# Patient Record
Sex: FEMALE | Race: WHITE | Employment: OTHER | URBAN - METROPOLITAN AREA
[De-identification: names, ages, dates, MRNs, and addresses within clinical notes are randomized per-mention and may not be internally consistent; named-entity substitution may affect disease eponyms.]

---

## 2017-07-05 ENCOUNTER — HOSPITAL ENCOUNTER (OUTPATIENT)
Dept: MAMMOGRAPHY | Age: 77
Discharge: HOME | End: 2017-07-05
Attending: FAMILY MEDICINE
Payer: MEDICARE

## 2017-07-05 DIAGNOSIS — Z12.31 ENCOUNTER FOR SCREENING MAMMOGRAM FOR MALIGNANT NEOPLASM OF BREAST: ICD-10-CM

## 2017-07-05 PROCEDURE — 77067 SCR MAMMO BI INCL CAD: CPT | Performed by: FAMILY MEDICINE

## 2018-02-28 PROBLEM — M47.22 OSTEOARTHRITIS OF SPINE WITH RADICULOPATHY, CERVICAL REGION: Status: ACTIVE | Noted: 2018-02-28

## 2018-03-09 ENCOUNTER — HOSPITAL ENCOUNTER (OUTPATIENT)
Dept: MRI IMAGING | Age: 78
Discharge: HOME | End: 2018-03-09
Attending: PHYSICIAN ASSISTANT
Payer: MEDICARE

## 2018-03-09 DIAGNOSIS — M54.5 LOW BACK PAIN, UNSPECIFIED BACK PAIN LATERALITY, UNSPECIFIED CHRONICITY, WITH SCIATICA PRESENCE UNSPECIFIED: ICD-10-CM

## 2018-03-09 DIAGNOSIS — M47.22 OSTEOARTHRITIS OF SPINE WITH RADICULOPATHY, CERVICAL REGION: ICD-10-CM

## 2018-03-09 DIAGNOSIS — M54.2 NECK PAIN: ICD-10-CM

## 2018-03-09 PROCEDURE — 72141 MRI NECK SPINE W/O DYE: CPT | Performed by: PHYSICIAN ASSISTANT

## 2018-03-29 PROBLEM — M62.838 MUSCLE SPASM: Status: ACTIVE | Noted: 2018-03-29

## 2018-03-29 PROBLEM — M79.10 MYALGIA: Status: ACTIVE | Noted: 2018-03-29

## 2018-04-25 ENCOUNTER — HOSPITAL ENCOUNTER (INPATIENT)
Facility: HOSPITAL | Age: 78
LOS: 3 days | Discharge: NURSING, REHAB OR LONG-TERM CARE | DRG: 511 | End: 2018-04-29
Attending: EMERGENCY MEDICINE | Admitting: HOSPITALIST
Payer: MEDICARE

## 2018-04-25 ENCOUNTER — APPOINTMENT (OUTPATIENT)
Dept: GENERAL RADIOLOGY | Facility: HOSPITAL | Age: 78
DRG: 511 | End: 2018-04-25
Payer: MEDICARE

## 2018-04-25 DIAGNOSIS — S62.102A CLOSED FRACTURE OF LEFT WRIST, INITIAL ENCOUNTER: ICD-10-CM

## 2018-04-25 DIAGNOSIS — S32.512A CLOSED FRACTURE OF LEFT SUPERIOR PUBIC RAMUS, INITIAL ENCOUNTER: Primary | ICD-10-CM

## 2018-04-25 DIAGNOSIS — E11.65 TYPE 2 DIABETES MELLITUS WITH HYPERGLYCEMIA, UNSPECIFIED WHETHER LONG TERM INSULIN USE (HCC): ICD-10-CM

## 2018-04-25 PROBLEM — Z79.4 TYPE 2 DIABETES MELLITUS WITHOUT COMPLICATION, WITH LONG-TERM CURRENT USE OF INSULIN (HCC): Chronic | Status: ACTIVE | Noted: 2018-04-25

## 2018-04-25 PROBLEM — E11.9 TYPE 2 DIABETES MELLITUS WITHOUT COMPLICATION, WITH LONG-TERM CURRENT USE OF INSULIN (HCC): Chronic | Status: ACTIVE | Noted: 2018-04-25

## 2018-04-25 PROBLEM — E78.2 MIXED HYPERLIPIDEMIA: Chronic | Status: ACTIVE | Noted: 2018-04-25

## 2018-04-25 PROBLEM — I10 ESSENTIAL HYPERTENSION: Chronic | Status: ACTIVE | Noted: 2018-04-25

## 2018-04-25 PROBLEM — N17.9 ACUTE RENAL FAILURE (ARF): Status: ACTIVE | Noted: 2018-04-25

## 2018-04-25 PROBLEM — N17.9 ACUTE RENAL FAILURE (ARF) (HCC): Status: ACTIVE | Noted: 2018-04-25

## 2018-04-25 PROCEDURE — 73100 X-RAY EXAM OF WRIST: CPT | Performed by: EMERGENCY MEDICINE

## 2018-04-25 PROCEDURE — 73502 X-RAY EXAM HIP UNI 2-3 VIEWS: CPT

## 2018-04-25 PROCEDURE — 99222 1ST HOSP IP/OBS MODERATE 55: CPT | Performed by: HOSPITALIST

## 2018-04-25 PROCEDURE — 2W3DX1Z IMMOBILIZATION OF LEFT LOWER ARM USING SPLINT: ICD-10-PCS | Performed by: EMERGENCY MEDICINE

## 2018-04-25 RX ORDER — ATORVASTATIN CALCIUM 40 MG/1
80 TABLET, FILM COATED ORAL NIGHTLY
Status: DISCONTINUED | OUTPATIENT
Start: 2018-04-25 | End: 2018-04-29

## 2018-04-25 RX ORDER — POLYETHYLENE GLYCOL 3350 17 G/17G
17 POWDER, FOR SOLUTION ORAL DAILY PRN
Status: DISCONTINUED | OUTPATIENT
Start: 2018-04-25 | End: 2018-04-28

## 2018-04-25 RX ORDER — DILTIAZEM HYDROCHLORIDE 300 MG/1
300 CAPSULE, COATED, EXTENDED RELEASE ORAL DAILY
COMMUNITY
End: 2021-04-06 | Stop reason: ALTCHOICE

## 2018-04-25 RX ORDER — HEPARIN SODIUM 5000 [USP'U]/ML
5000 INJECTION, SOLUTION INTRAVENOUS; SUBCUTANEOUS EVERY 8 HOURS SCHEDULED
Status: DISCONTINUED | OUTPATIENT
Start: 2018-04-25 | End: 2018-04-29

## 2018-04-25 RX ORDER — HYDROCODONE BITARTRATE AND ACETAMINOPHEN 5; 325 MG/1; MG/1
2 TABLET ORAL EVERY 4 HOURS PRN
Status: DISCONTINUED | OUTPATIENT
Start: 2018-04-25 | End: 2018-04-26

## 2018-04-25 RX ORDER — HYDROMORPHONE HYDROCHLORIDE 1 MG/ML
0.5 INJECTION, SOLUTION INTRAMUSCULAR; INTRAVENOUS; SUBCUTANEOUS EVERY 30 MIN PRN
Status: DISCONTINUED | OUTPATIENT
Start: 2018-04-25 | End: 2018-04-27

## 2018-04-25 RX ORDER — VALSARTAN 320 MG/1
320 TABLET ORAL DAILY
Status: DISCONTINUED | OUTPATIENT
Start: 2018-04-26 | End: 2018-04-29

## 2018-04-25 RX ORDER — FENOFIBRATE 134 MG/1
134 CAPSULE ORAL
Status: DISCONTINUED | OUTPATIENT
Start: 2018-04-26 | End: 2018-04-29

## 2018-04-25 RX ORDER — BISACODYL 10 MG
10 SUPPOSITORY, RECTAL RECTAL
Status: DISCONTINUED | OUTPATIENT
Start: 2018-04-25 | End: 2018-04-26

## 2018-04-25 RX ORDER — DOCUSATE SODIUM 100 MG/1
100 CAPSULE, LIQUID FILLED ORAL 2 TIMES DAILY
Status: DISCONTINUED | OUTPATIENT
Start: 2018-04-25 | End: 2018-04-29

## 2018-04-25 RX ORDER — METOPROLOL SUCCINATE 25 MG/1
25 TABLET, EXTENDED RELEASE ORAL DAILY
Status: DISCONTINUED | OUTPATIENT
Start: 2018-04-26 | End: 2018-04-29

## 2018-04-25 RX ORDER — TEMAZEPAM 15 MG/1
15 CAPSULE ORAL NIGHTLY PRN
Status: DISCONTINUED | OUTPATIENT
Start: 2018-04-25 | End: 2018-04-29

## 2018-04-25 RX ORDER — DILTIAZEM HYDROCHLORIDE 300 MG/1
300 CAPSULE, COATED, EXTENDED RELEASE ORAL DAILY
Status: DISCONTINUED | OUTPATIENT
Start: 2018-04-26 | End: 2018-04-29

## 2018-04-25 RX ORDER — DEXTROSE MONOHYDRATE 25 G/50ML
50 INJECTION, SOLUTION INTRAVENOUS
Status: DISCONTINUED | OUTPATIENT
Start: 2018-04-25 | End: 2018-04-26

## 2018-04-25 RX ORDER — SODIUM CHLORIDE 9 MG/ML
INJECTION, SOLUTION INTRAVENOUS CONTINUOUS
Status: DISCONTINUED | OUTPATIENT
Start: 2018-04-25 | End: 2018-04-26

## 2018-04-25 RX ORDER — HYDROMORPHONE HYDROCHLORIDE 1 MG/ML
0.5 INJECTION, SOLUTION INTRAMUSCULAR; INTRAVENOUS; SUBCUTANEOUS EVERY 30 MIN PRN
Status: COMPLETED | OUTPATIENT
Start: 2018-04-25 | End: 2018-04-27

## 2018-04-25 RX ORDER — ACETAMINOPHEN 325 MG/1
650 TABLET ORAL EVERY 4 HOURS PRN
Status: DISCONTINUED | OUTPATIENT
Start: 2018-04-25 | End: 2018-04-29

## 2018-04-25 RX ORDER — ONDANSETRON 2 MG/ML
4 INJECTION INTRAMUSCULAR; INTRAVENOUS EVERY 6 HOURS PRN
Status: DISCONTINUED | OUTPATIENT
Start: 2018-04-25 | End: 2018-04-29

## 2018-04-25 RX ORDER — ACETAMINOPHEN / DIPHENHYDRAMINE 25; 500 MG/1; MG/1
2 TABLET ORAL NIGHTLY PRN
COMMUNITY
End: 2018-09-07

## 2018-04-25 RX ORDER — HYDROCODONE BITARTRATE AND ACETAMINOPHEN 5; 325 MG/1; MG/1
1 TABLET ORAL EVERY 4 HOURS PRN
Status: DISCONTINUED | OUTPATIENT
Start: 2018-04-25 | End: 2018-04-26

## 2018-04-25 RX ORDER — VALSARTAN AND HYDROCHLOROTHIAZIDE 320; 25 MG/1; MG/1
1 TABLET, FILM COATED ORAL DAILY
Status: DISCONTINUED | OUTPATIENT
Start: 2018-04-25 | End: 2018-04-25

## 2018-04-25 RX ORDER — HYDROMORPHONE HYDROCHLORIDE 1 MG/ML
0.5 INJECTION, SOLUTION INTRAMUSCULAR; INTRAVENOUS; SUBCUTANEOUS EVERY 30 MIN PRN
Status: DISCONTINUED | OUTPATIENT
Start: 2018-04-25 | End: 2018-04-25

## 2018-04-25 RX ORDER — INSULIN LISPRO 100 [IU]/ML
INJECTION, SOLUTION INTRAVENOUS; SUBCUTANEOUS
COMMUNITY
End: 2019-04-10

## 2018-04-25 RX ORDER — ACETAMINOPHEN 325 MG/1
650 TABLET ORAL EVERY 6 HOURS PRN
Status: DISCONTINUED | OUTPATIENT
Start: 2018-04-25 | End: 2018-04-26

## 2018-04-25 RX ORDER — ONDANSETRON 2 MG/ML
4 INJECTION INTRAMUSCULAR; INTRAVENOUS ONCE
Status: COMPLETED | OUTPATIENT
Start: 2018-04-25 | End: 2018-04-25

## 2018-04-25 RX ORDER — ONDANSETRON 2 MG/ML
4 INJECTION INTRAMUSCULAR; INTRAVENOUS EVERY 4 HOURS PRN
Status: DISCONTINUED | OUTPATIENT
Start: 2018-04-25 | End: 2018-04-25

## 2018-04-25 NOTE — PLAN OF CARE
Arrived via bed.  vss. Left upper extremity with ace wrap and post mold clean, dry, intact. Cap refill to left fingers less than 3 seconds. Wiggles left fingers. Cap refill to lle < 3 seconds. Denies numbness, tingling.   Instructed patient regarding d

## 2018-04-25 NOTE — H&P
[facility]ITAL[facility]  History and Physical     Ling Villalba Patient Status:  Emergency    1940 MRN [id number]   Location [facility] Attending Vonnie Mcmahon MD   Hosp Day # 0 PCP Cem Head     Chief Complaint: atorvastatin 80 MG Oral Tab Take 80 mg by mouth nightly. Disp:  Rfl: 2   Fenofibrate 160 MG Oral Tab Take 160 mg by mouth daily. Disp:  Rfl: 2   furosemide 20 MG Oral Tab Take 20 mg by mouth daily.    Disp:  Rfl: 2       Review of Systems:   A compreh surgical intervention. PT OT on consult. Orthopedic surgery also on consult. 2. Left distal radius fracture-patient to go for ORIF in the a.m. by orthopedic surgery. 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   Will cons

## 2018-04-26 ENCOUNTER — ANESTHESIA EVENT (OUTPATIENT)
Dept: SURGERY | Facility: HOSPITAL | Age: 78
DRG: 511 | End: 2018-04-26
Payer: MEDICARE

## 2018-04-26 ENCOUNTER — APPOINTMENT (OUTPATIENT)
Dept: GENERAL RADIOLOGY | Facility: HOSPITAL | Age: 78
DRG: 511 | End: 2018-04-26
Attending: ORTHOPAEDIC SURGERY
Payer: MEDICARE

## 2018-04-26 ENCOUNTER — ANESTHESIA (OUTPATIENT)
Dept: SURGERY | Facility: HOSPITAL | Age: 78
DRG: 511 | End: 2018-04-26
Payer: MEDICARE

## 2018-04-26 ENCOUNTER — SURGERY (OUTPATIENT)
Age: 78
End: 2018-04-26

## 2018-04-26 PROCEDURE — 0PSJ04Z REPOSITION LEFT RADIUS WITH INTERNAL FIXATION DEVICE, OPEN APPROACH: ICD-10-PCS | Performed by: ORTHOPAEDIC SURGERY

## 2018-04-26 PROCEDURE — 76001 XR FLUOROSCOPE EXAM >1 HR EXTENSIVE (CPT=76001): CPT | Performed by: ORTHOPAEDIC SURGERY

## 2018-04-26 PROCEDURE — 3E0T3BZ INTRODUCTION OF ANESTHETIC AGENT INTO PERIPHERAL NERVES AND PLEXI, PERCUTANEOUS APPROACH: ICD-10-PCS | Performed by: ANESTHESIOLOGY

## 2018-04-26 PROCEDURE — 99232 SBSQ HOSP IP/OBS MODERATE 35: CPT | Performed by: HOSPITALIST

## 2018-04-26 DEVICE — IMPLANTABLE DEVICE: Type: IMPLANTABLE DEVICE | Status: FUNCTIONAL

## 2018-04-26 DEVICE — IMPLANTABLE DEVICE: Type: IMPLANTABLE DEVICE | Site: WRIST | Status: FUNCTIONAL

## 2018-04-26 RX ORDER — POTASSIUM CHLORIDE 14.9 MG/ML
20 INJECTION INTRAVENOUS ONCE
Status: COMPLETED | OUTPATIENT
Start: 2018-04-26 | End: 2018-04-26

## 2018-04-26 RX ORDER — HYDROMORPHONE HYDROCHLORIDE 1 MG/ML
0.4 INJECTION, SOLUTION INTRAMUSCULAR; INTRAVENOUS; SUBCUTANEOUS EVERY 5 MIN PRN
Status: DISCONTINUED | OUTPATIENT
Start: 2018-04-26 | End: 2018-04-26 | Stop reason: HOSPADM

## 2018-04-26 RX ORDER — ONDANSETRON 2 MG/ML
4 INJECTION INTRAMUSCULAR; INTRAVENOUS AS NEEDED
Status: DISCONTINUED | OUTPATIENT
Start: 2018-04-26 | End: 2018-04-26 | Stop reason: HOSPADM

## 2018-04-26 RX ORDER — SODIUM CHLORIDE 9 MG/ML
INJECTION, SOLUTION INTRAVENOUS CONTINUOUS
Status: DISCONTINUED | OUTPATIENT
Start: 2018-04-26 | End: 2018-04-27

## 2018-04-26 RX ORDER — HYDROCODONE BITARTRATE AND ACETAMINOPHEN 5; 325 MG/1; MG/1
2 TABLET ORAL AS NEEDED
Status: DISCONTINUED | OUTPATIENT
Start: 2018-04-26 | End: 2018-04-26 | Stop reason: HOSPADM

## 2018-04-26 RX ORDER — ACETAMINOPHEN 500 MG
1000 TABLET ORAL ONCE AS NEEDED
Status: DISCONTINUED | OUTPATIENT
Start: 2018-04-26 | End: 2018-04-26 | Stop reason: HOSPADM

## 2018-04-26 RX ORDER — NALOXONE HYDROCHLORIDE 0.4 MG/ML
80 INJECTION, SOLUTION INTRAMUSCULAR; INTRAVENOUS; SUBCUTANEOUS AS NEEDED
Status: DISCONTINUED | OUTPATIENT
Start: 2018-04-26 | End: 2018-04-26 | Stop reason: HOSPADM

## 2018-04-26 RX ORDER — HYDROCODONE BITARTRATE AND ACETAMINOPHEN 5; 325 MG/1; MG/1
1 TABLET ORAL AS NEEDED
Status: DISCONTINUED | OUTPATIENT
Start: 2018-04-26 | End: 2018-04-26 | Stop reason: HOSPADM

## 2018-04-26 RX ORDER — CEFAZOLIN SODIUM/WATER 2 G/20 ML
2 SYRINGE (ML) INTRAVENOUS EVERY 8 HOURS
Status: COMPLETED | OUTPATIENT
Start: 2018-04-27 | End: 2018-04-27

## 2018-04-26 RX ORDER — SODIUM CHLORIDE, SODIUM LACTATE, POTASSIUM CHLORIDE, CALCIUM CHLORIDE 600; 310; 30; 20 MG/100ML; MG/100ML; MG/100ML; MG/100ML
INJECTION, SOLUTION INTRAVENOUS CONTINUOUS
Status: DISCONTINUED | OUTPATIENT
Start: 2018-04-26 | End: 2018-04-26 | Stop reason: HOSPADM

## 2018-04-26 RX ORDER — DEXTROSE MONOHYDRATE 25 G/50ML
50 INJECTION, SOLUTION INTRAVENOUS
Status: DISCONTINUED | OUTPATIENT
Start: 2018-04-26 | End: 2018-04-26 | Stop reason: HOSPADM

## 2018-04-26 RX ORDER — MIDAZOLAM HYDROCHLORIDE 1 MG/ML
1 INJECTION INTRAMUSCULAR; INTRAVENOUS EVERY 5 MIN PRN
Status: DISCONTINUED | OUTPATIENT
Start: 2018-04-26 | End: 2018-04-26 | Stop reason: HOSPADM

## 2018-04-26 RX ORDER — MEPERIDINE HYDROCHLORIDE 25 MG/ML
12.5 INJECTION INTRAMUSCULAR; INTRAVENOUS; SUBCUTANEOUS AS NEEDED
Status: DISCONTINUED | OUTPATIENT
Start: 2018-04-26 | End: 2018-04-26 | Stop reason: HOSPADM

## 2018-04-26 RX ORDER — BISACODYL 10 MG
10 SUPPOSITORY, RECTAL RECTAL
Status: DISCONTINUED | OUTPATIENT
Start: 2018-04-26 | End: 2018-04-29

## 2018-04-26 RX ORDER — INSULIN ASPART 100 [IU]/ML
INJECTION, SOLUTION INTRAVENOUS; SUBCUTANEOUS ONCE
Status: DISCONTINUED | OUTPATIENT
Start: 2018-04-26 | End: 2018-04-26 | Stop reason: HOSPADM

## 2018-04-26 NOTE — PROGRESS NOTES
[facility]ITAL[facility]  Progress Note     Ling Villalba Patient Status:  Inpatient    1940 MRN [id number]   [facility] 3SW-A Attending Atul Bradley MD   Hosp Day # 0 PCP Cem Head     Chief Complaint: s/p fall    S: Patient denies cp Daily   • Heparin Sodium (Porcine)  5,000 Units Subcutaneous Q8H [facility] & assisted   • docusate sodium  100 mg Oral BID   • Insulin Aspart Pen  1-10 Units Subcutaneous TID AC and HS   • valsartan  320 mg Oral Daily       ASSESSMENT / PLAN:     1.  Left superior pubic kasey

## 2018-04-26 NOTE — PROGRESS NOTES
[facility] Progress Note    Patient seen and examined.  Chart reviewed.      No chest pain or shortness of breath.       /45 (BP Location: Right arm)   Pulse 63   Temp 98.3 °F (36.8 °C) (Oral)   Resp 18   Ht 5' 7\" (1.702 m)   Wt 157 lb (71.2 kg)

## 2018-04-26 NOTE — ANESTHESIA PREPROCEDURE EVALUATION
PRE-OP EVALUATION    Patient Name: Natalio King    Pre-op Diagnosis: Fracture Randell Carlisle. 8XXA]    Procedure(s):  LEFT WRIST OPEN REDUCTION INTERNAL FIXATION     Surgeon(s) and Role:     * Maren Ibrahim MD - Primary    Pre-op vitals reviewed.   Temp: 98.7 Oral Q4H PRN   Or      [MAR Hold] HYDROcodone-acetaminophen (NORCO) 5-325 MG per tab 2 tablet 2 tablet Oral Q4H PRN   [MAR Hold] ondansetron HCl (ZOFRAN) injection 4 mg 4 mg Intravenous Q6H PRN   [MAR Hold] docusate sodium (COLACE) cap 100 mg 100 mg Oral B Allergies: Patient has no known allergies.       Anesthesia Evaluation  Past Surgical History:  No date: HYSTERECTOMY      Comment: age 32     Smoking status: Former Smoker     Smokeless tobacco: Never Used    Comment: quit 10 years ago     Alcohol us

## 2018-04-26 NOTE — PROGRESS NOTES
[facility]ITAL[facility]  Progress Note     Monika Gerard Patient Status:  Inpatient    1940 MRN [id number]   [facility] 3SW-A Attending Meeta Littlejohn MD   Hosp Day # 0 PCP Rober Puckett     Chief Complaint: fall     S: Patient   C/o left w micronized  134 mg Oral Daily with breakfast   • insulin detemir  28 Units Subcutaneous Nightly   • Metoprolol Succinate ER  25 mg Oral Daily   • Heparin Sodium (Porcine)  5,000 Units Subcutaneous Q8H [facility] & intermediate   • docusate sodium  100 mg Oral BID   • Insulin As

## 2018-04-26 NOTE — PROGRESS NOTES
Called cardiology and left message with answering service regarding consult.     Received call back from Dr Halle Torres- will see patient

## 2018-04-26 NOTE — ANESTHESIA POSTPROCEDURE EVALUATION
[address] Patient Status:  Inpatient   Age/Gender 68year old female MRN [id number]   [facility] SURGERY Attending Balwinder Nick MD   [facility] Day # 0 PCP Mary File       Anesthesia Post-op Note    Procedure(s):  LEFT W

## 2018-04-26 NOTE — CONSULTS
[facility]    PATIENT'S NAME: Carrie Ramos   ATTENDING PHYSICIAN: SUDEEP Parra PHYSICIAN: Clint Moran M.D.    PATIENT ACCOUNT#:   [de-identified]    LOCATION:  [address] [place] [address]  MEDICAL RECORD #:   [id number]       DATE OF BIRTH:  [birth year]. No evidence of femur fracture. ASSESSMENT:    1. Left superior and inferior pubic rami fractures, non-manipulative fracture care. 2.   Left comminuted, displaced, distal radius fracture.     PLAN:  I discussed the diagnoses with the patien

## 2018-04-26 NOTE — CONSULTS
[facility]    Report of Consultation    Nicolasa Mcintyre Patient Status:  Observation    1940 MRN [id number]   [facility] 3SW-A [facility] Day # 0 PCP Denece Gowers     Date of Admission:  2018 Diabetes ([facility] [address]    • Elevated cholesterol    • Essential hypertension        Past Surgical History        Past Surgical History:  No date: HYSTERECTOMY      Comment: age 32    Family History        No family history on file.     Social History        Patient mg 100 mg Oral BID   PEG 3350 (MIRALAX) powder packet 17 g 17 g Oral Daily PRN   bisacodyl (DULCOLAX) rectal suppository 10 mg 10 mg Rectal Daily PRN   temazepam (RESTORIL) cap 15 mg 15 mg Oral Nightly PRN   Insulin Aspart Pen (NOVOLOG) 100 UNIT/ML flexpen Last 24 hours: No intake or output data in the 24 hours ending 04/25/18 2217   This shift: No intake/output data recorded.        Scheduled Meds:    • atorvastatin  80 mg Oral Nightly   • [START ON 4/26/2018] DilTIAZem HCl ER Coated Beads  300 mg Oral Da Xr Wrist (2 Views), Left (cpt=73100)    Result Date: 4/25/2018  CONCLUSION:   1. Comminuted and displaced  intra-articular fracture involving the left distal radius.   The proximal displacement of the fracture fragments does allow the lateral aspect of t

## 2018-04-26 NOTE — OCCUPATIONAL THERAPY NOTE
OCCUPATIONAL THERAPY                         OT order received and chart reviewed. The patient has a displaced distal radius fracture and will be having surgical repair later today. Will hold OT evaluation today and see tomorrow.  Will need new

## 2018-04-26 NOTE — PHYSICAL THERAPY NOTE
IP PT attempted to see pt this AM for evaluation. Per Vickie PARKER, pt will be undergoing surgery for distal radial Fx later today. PT will HOLD for evaluation until pt is deemed appropriate post-op.

## 2018-04-27 PROCEDURE — 99232 SBSQ HOSP IP/OBS MODERATE 35: CPT | Performed by: HOSPITALIST

## 2018-04-27 RX ORDER — HYDROCODONE BITARTRATE AND ACETAMINOPHEN 5; 325 MG/1; MG/1
1 TABLET ORAL EVERY 6 HOURS PRN
Status: DISCONTINUED | OUTPATIENT
Start: 2018-04-27 | End: 2018-04-28

## 2018-04-27 RX ORDER — HYDROMORPHONE HYDROCHLORIDE 1 MG/ML
0.2 INJECTION, SOLUTION INTRAMUSCULAR; INTRAVENOUS; SUBCUTANEOUS EVERY 30 MIN PRN
Status: DISCONTINUED | OUTPATIENT
Start: 2018-04-27 | End: 2018-04-29

## 2018-04-27 RX ORDER — HYDROCODONE BITARTRATE AND ACETAMINOPHEN 10; 325 MG/1; MG/1
1 TABLET ORAL EVERY 6 HOURS PRN
Status: DISCONTINUED | OUTPATIENT
Start: 2018-04-27 | End: 2018-04-28

## 2018-04-27 NOTE — CM/SW NOTE
Informed by Anahi Guzman with [facility] [phone] that pt is accepted for rehab- bed would be available Sunday if medically cleared for d/c.  Cornell Phillip with [facility] here to see pt. She is familiar with facility as her  has been there previously.     Lakeshia Block

## 2018-04-27 NOTE — PROGRESS NOTES
[facility]  Progress Note     Evelynbelen Lazar Patient Status:  Inpatient    1940 MRN [id number]   [facility] 3SW-A Attending Naye Malik MD   Hosp Day # 1 PCP Christy Montoya     Chief Complaint: fall     S: Patient    Had a lot Lab  04/25/18   1620   TROP  <0.046            Imaging: Imaging data reviewed in Epic.     Medications:   • atorvastatin  80 mg Oral Nightly   • DilTIAZem HCl ER Coated Beads  300 mg Oral Daily   • fenofibrate micronized  134 mg Oral Daily with breakfast

## 2018-04-27 NOTE — CM/SW NOTE
Message received from Ayleen WEBBER, re: [facility] choice of [facility]. Referral sent via [employer]. Awaiting response.

## 2018-04-27 NOTE — PHYSICAL THERAPY NOTE
PHYSICAL THERAPY EVALUATION - INPATIENT     Room Number: 364/364-A  Evaluation Date: 4/27/2018  Type of Evaluation: Initial  Physician Order: PT Eval and Treat    Presenting Problem: S/p ORIF Left distal radius fx on 4/26/18, Left  superior pubic kasey Past Surgical History  Past Surgical History:  No date: HYSTERECTOMY      Comment: age 32    HOME SITUATION  Type of Home: House   Home Layout: One level  Stairs to Enter : 2  Railing: No  Stairs to Bedroom: 0  Railing: No    Lives With: Spouse  Conchita 4/5  Right Knee extension  4/5  Right Knee flexion  4/5  Right Dorsiflexion  4/5  Left Dorsiflexion  4/5   decreased L LE strength S/p Left superior pubic ramus fx on 4/25/18.  Patient demonstrated good L quadriceps activation during SAQ exercise    BALANCE clearing LEs off the bed. Patient was instructed on proper body mechanics for sit to stand, she performed sitting on EOB to standing at platform RW with max assist x2 and verbal cues to push from bed and hand placements on the walker.  Patient was instructe comorbidities manifest themselves as functional limitations in independent bed mobility, transfers, gait skills and safety.   The patient is below baseline and would benefit from skilled inpatient PT to address the above deficits to assist patient in return

## 2018-04-27 NOTE — CONSULTS
PM&R Consult Dictated. Recommendations:    Patient's biggest barrier is pain control.  Pt may benefit from an acute rehab stay, however, she does not feel she could tolerate aggressive therapy at this time and would prefer to go to subacute [facility]),

## 2018-04-27 NOTE — PROGRESS NOTES
[facility]  Progress Note    Nereida Frey Patient Status:  Inpatient    1940 MRN [id number]   [facility] 3SW-A Attending Niurka Santoro MD   [facility] Day # 1 PCP Toi Vasquez     SUBJECTIVE:  INTERVAL HISTORY: S/P  1  Procedure(s):

## 2018-04-27 NOTE — PROGRESS NOTES
Pt's urine has returned to a normal color. Now light yellow, no odor present, no urinary issues present.

## 2018-04-27 NOTE — BRIEF OP NOTE
Pre-Operative Diagnosis: Fracture [T14. 8XXA]     Post-Operative Diagnosis: Fracture [T14. 8XXA] left distal radius      Procedure Performed:   Procedure(s):  LEFT WRIST OPEN REDUCTION INTERNAL FIXATION     Surgeon(s) and Role:     * MD Khloe Oconnell P

## 2018-04-27 NOTE — CONSULTS
[facility]    PATIENT'S NAME: Bree Gilda   ATTENDING PHYSICIAN: SUDEEP Jay: Susan Hall DO   PATIENT ACCOUNT#:   [de-identified]    LOCATION:  [address] [place] [address]  MEDICAL RECORD #:   [id number]       DATE OF BIRTH:  [birth year]/ use of tobacco, alcohol, or illicit drug use. FAMILY HISTORY:  Reviewed per chart and is noncontributory to today's evaluation. FUNCTIONAL STATUS:  Prior to admission, the patient was independent for mobility and ADLs.       CURRENT FUNCTIONAL STATUS: symmetric. Tongue is midline. Sensation intact to light touch in all extremities. Speech is clear and fluent. LABORATORY DATA:  BMP from today sodium 140, potassium 3.8, BUN 22, creatinine 1. 12.   WBC 8.9, hemoglobin 10.3, hematocrit 31.6, platelet co

## 2018-04-27 NOTE — OPERATIVE REPORT
[facility]    PATIENT'S NAME: Prince Borrero   ATTENDING PHYSICIAN: Inge Allred M.D. OPERATING PHYSICIAN: Sagrario Garcia M.D.    PATIENT ACCOUNT#:   [de-identified]    LOCATION:  [address] [place] [address]  MEDICAL RECORD #:   [id number]       DATE OF BIRTH:  [birth year] revealing the fracture site and the comminution. Irrigation was used to clear the area of hematoma and debris.   Manipulation of the multiple fragments was performed and a DVR plate placed at the volar cortex and held in position with a K-wire, confirming

## 2018-04-27 NOTE — CM/SW NOTE
04/27/18 1300   CM/SW Referral Data   Referral Source Nurse; Other  (PT)   Reason for Referral Discharge planning   Informant Patient; Children;Edward Staff   Pertinent Medical Hx   Primary Care Physician Name FLORENTINO Cuello   Patient Info   Patient's Mental Stat

## 2018-04-27 NOTE — PLAN OF CARE
STRAIGHT CATH DONE WITH STERILE TECHNIQUE NO PROBLEM, URINE OUTPUT DARK YEN URINE NO BLOOD, SAMPLES SENT TO LAB. PT STATES\" I FEEL RELIEF AFTER CATH INSERTED.

## 2018-04-27 NOTE — OCCUPATIONAL THERAPY NOTE
OCCUPATIONAL THERAPY EVALUATION - INPATIENT     Room Number: 364/364-A  Evaluation Date: 4/27/2018  Type of Evaluation: Initial  Presenting Problem: L wrist ORIF and pubic rami fx    Physician Order: IP Consult to Occupational Therapy  Reason for Therapy: Lower Extremity: Weight Bearing as Tolerated    PAIN ASSESSMENT  Ratin  Location: L wrist and leg  Management Techniques: Relaxation;Repositioning    COGNITION  Overall Cognitive Status:  WFL - within functional limits    VISION  Current Vision: no vis w Mod a for balance due to pain. Max a scooting to EOB due to only able to use R UE to help propel herself forward. OT adjusted Pt's sling in sitting for proper positioning of L UE. Pt educated in use of adl ae but is unable to use at this time.  Platform w Complexity  Occupational Profile/Medical History MODERATE - Expanded review of history including review of medical or therapy record   Specific performance deficits impacting engagement in ADL/IADL MODERATE  3 - 5 performance deficits   Client Assessment/P

## 2018-04-27 NOTE — PROGRESS NOTES
[facility] Progress Note    Patient seen and examined.  Chart reviewed.  Discussed with RN. No chest pain or shortness of breath. Did well with surgery, had some confusion post-op but was easily re-oriented.     /53 (BP Location: Right arm)

## 2018-04-28 PROCEDURE — 99232 SBSQ HOSP IP/OBS MODERATE 35: CPT | Performed by: HOSPITALIST

## 2018-04-28 RX ORDER — ARIPIPRAZOLE 15 MG/1
40 TABLET ORAL ONCE
Status: COMPLETED | OUTPATIENT
Start: 2018-04-28 | End: 2018-04-28

## 2018-04-28 RX ORDER — HYDROCODONE BITARTRATE AND ACETAMINOPHEN 10; 325 MG/1; MG/1
1-2 TABLET ORAL EVERY 4 HOURS PRN
Status: DISCONTINUED | OUTPATIENT
Start: 2018-04-28 | End: 2018-04-29

## 2018-04-28 RX ORDER — POLYETHYLENE GLYCOL 3350 17 G/17G
17 POWDER, FOR SOLUTION ORAL DAILY
Status: DISCONTINUED | OUTPATIENT
Start: 2018-04-29 | End: 2018-04-29

## 2018-04-28 NOTE — PLAN OF CARE
Pt DTV this AM, unable to urinate. Per Dr. Heri Malik, will insert verdin. Verdin inserted without difficulty, urine dark in color, almost brown with some sediment noted. Dr. Heri Malik notified, will obtain UA.

## 2018-04-28 NOTE — PROGRESS NOTES
Attempted to void per bedpan but unable this time,helped patient to get up and use bedside commode,total lift used by PCT'S,still unable to void,straight cath done,obtained 1000 cc of urine. TOLERATED WELL

## 2018-04-28 NOTE — PHYSICAL THERAPY NOTE
PHYSICAL THERAPY TREATMENT NOTE - INPATIENT    Room Number: 364/364-A     Session: 1   Number of Visits to Meet Established Goals: 5    Presenting Problem: S/p ORIF Left distal radius fx on 4/26/18, Left  superior pubic ramus fx on 4/25/18  History relate out of the bed, but it is so hard to move\"    Patient’s self-stated goal is to sit in the chair    OBJECTIVE  Precautions:  Other (Comment) (Sling on L UE)    WEIGHT BEARING RESTRICTION  Weight Bearing Restriction: L lower extremity;L upper extremity     L sling to improve positioning/comfort. Encouraged pt to perform hand opening/closing every hour to improve distal edema and promote proper resting position for UE with hand slightly elevated.  2 person assist for reclined>sit transfer (pt requested head of b improvements in willingness/ability to participate in bed mobility, sit<>stand transfers, and seated therex. Pt continues to have high pain with mobility and L LE wt bearing requiring MAX A x2 for transfers and limited gait control/tolerance.  Patient j luis

## 2018-04-28 NOTE — PROGRESS NOTES
Post Op Day 1 Ortho Note    Status Post Nerve Block:  Type of Nerve Block: Left superclavicular  Single Injection Nerve Block    Post op review: No evidence of immediate block related complications and No paresthesia noted    Patient reports nerve block wo

## 2018-04-28 NOTE — PROGRESS NOTES
[facility]  Progress Note     Mini Larsen Patient Status:  Inpatient    1940 MRN [id number]   [facility] 3SW-A Attending Diana Roque MD   [address] Day # 2 PCP Elaina Temple     Chief Complaint: fall     S: Patient still having mg/dL). No results for input(s): PTP, INR in the last 72 hours. Recent Labs   Lab  04/25/18   1620   TROP  <0.046            Imaging: Imaging data reviewed in Epic.     Medications:   • atorvastatin  80 mg Oral Nightly   • DilTIAZem HCl ER Coated Bead

## 2018-04-29 VITALS
TEMPERATURE: 98 F | WEIGHT: 157 LBS | DIASTOLIC BLOOD PRESSURE: 60 MMHG | BODY MASS INDEX: 24.64 KG/M2 | HEIGHT: 67 IN | SYSTOLIC BLOOD PRESSURE: 126 MMHG | RESPIRATION RATE: 20 BRPM | HEART RATE: 64 BPM | OXYGEN SATURATION: 98 %

## 2018-04-29 PROCEDURE — 99239 HOSP IP/OBS DSCHRG MGMT >30: CPT | Performed by: HOSPITALIST

## 2018-04-29 RX ORDER — PSEUDOEPHEDRINE HCL 30 MG
100 TABLET ORAL 2 TIMES DAILY
Qty: 60 CAPSULE | Refills: 0 | Status: SHIPPED | OUTPATIENT
Start: 2018-04-29 | End: 2018-09-07

## 2018-04-29 RX ORDER — POLYETHYLENE GLYCOL 3350 17 G/17G
17 POWDER, FOR SOLUTION ORAL DAILY
Qty: 510 G | Refills: 0 | Status: SHIPPED | OUTPATIENT
Start: 2018-04-30 | End: 2018-05-30

## 2018-04-29 RX ORDER — HYDROCODONE BITARTRATE AND ACETAMINOPHEN 10; 325 MG/1; MG/1
1-2 TABLET ORAL EVERY 4 HOURS PRN
Qty: 30 TABLET | Refills: 0 | Status: SHIPPED | OUTPATIENT
Start: 2018-04-29 | End: 2018-09-07

## 2018-04-29 NOTE — PLAN OF CARE
Report given to ELENA Mayer from [facility]. Plan of care and medications reviewed, verbalized understanding. Awaiting ambulance service to transport patient.

## 2018-04-29 NOTE — PLAN OF CARE
Pt ready to discharge, transported to [facility] via [facility] with all belongings.

## 2018-04-29 NOTE — PHYSICAL THERAPY NOTE
PHYSICAL THERAPY TREATMENT NOTE - INPATIENT    Room Number: 364/364-A     Session: 2&3  Number of Visits to Meet Established Goals: 5    Presenting Problem: S/p ORIF Left distal radius fx on 4/26/18, Left  superior pubic ramus fx on 4/25/18  History relat when I am just resting; but when I move it hurts a lot\"    Patient’s self-stated goal is to take some steps    OBJECTIVE  Precautions:  Other (Comment) (Sling on L UE)    WEIGHT BEARING RESTRICTION  Weight Bearing Restriction: L lower extremity;L upper ext transfer. From reclined/upright position, pt able to move toward EOB with MOD A for L LE and trunk support. MIN A to scoot hips toward EOB to rest feet on floor. Able to participate in seated hand and LE therex with improved tolerance.  Required MOD A x2 fo mobility and L LE wt bearing requiring MAX A x2 for bed<>chair transfers and limited gait control/tolerance.  Patient continues to demonstrate deficits in strength, balance, endurance, mobility and functional independence, and will benefit from continued sk

## 2018-04-29 NOTE — PROGRESS NOTES
[facility]  Progress Note     Ammy Molina Patient Status:  Inpatient    1940 MRN [id number]   [facility] 3SW-A Attending Marco Antonio Lin MD   [facility] Day # 3 PCP Dena Young     Chief Complaint: fall     S: Patient without new 134 mg Oral Daily with breakfast   • insulin detemir  28 Units Subcutaneous Nightly   • Metoprolol Succinate ER  25 mg Oral Daily   • Heparin Sodium (Porcine)  5,000 Units Subcutaneous Q8H [facility]   • docusate sodium  100 mg Oral BID   • Insulin Aspart Pen  1

## 2018-04-29 NOTE — CM/SW NOTE
MD orders received for [facility] transfer today. SW spoke with [facility] who confirms bed available for transfer today.  [facility] [phone] called to arrange ambulance for  time of 3:00pm. PCS completed and placed on chart for transpo

## 2018-04-29 NOTE — PROGRESS NOTES
No numbness    No distress. Left hand is swollen moderately. Has good motion to all digits. Cap refill are good. s/p left distal radius ORIF:  Continue elevation and splint. Encouraged digital motion. Fu with  Dr. Jamey Mensah 1-2 weeks.     Odette Jara,

## 2018-04-30 NOTE — DISCHARGE SUMMARY
[facility] HOSPITALIST  DISCHARGE SUMMARY     Mat Ospina Patient Status:  Inpatient    1940 MRN [id number]   [facility] 3SW-A Attending No att. providers found   Hosp Day # 3 PCP Hai Rocha     Date of Admission: 2018  Date of improved with IV fluid hydration. Patient was seen by physical therapy recommended subacute rehab placement.     Procedures during hospitalization:   LEFT WRIST OPEN REDUCTION INTERNAL FIXATION     Incidental or significant findings and recommendations (br daily with meals. Refills:  0     Metoprolol Succinate ER 25 MG Tb24  Commonly known as: Toprol XL      Take 25 mg by mouth daily. Refills:  2     Potassium Chloride ER 10 MEQ Tbcr  Commonly known as:  K-DUR      Take 10 mEq by mouth daily.    Refills:

## 2018-04-30 NOTE — CM/SW NOTE
04/30/18 1300   Discharge disposition   Expected discharge disposition Skilled Nurs   Name of [address] [facility]   Discharge transportation [facility] 4/29/18

## 2018-05-05 ENCOUNTER — SNF ADMIT/H&P (OUTPATIENT)
Dept: FAMILY MEDICINE CLINIC | Facility: CLINIC | Age: 78
End: 2018-05-05

## 2018-05-05 DIAGNOSIS — E78.2 MIXED HYPERLIPIDEMIA: Chronic | ICD-10-CM

## 2018-05-05 DIAGNOSIS — R53.1 GENERALIZED WEAKNESS: ICD-10-CM

## 2018-05-05 DIAGNOSIS — S52.572D OTHER CLOSED INTRA-ARTICULAR FRACTURE OF DISTAL END OF LEFT RADIUS WITH ROUTINE HEALING, SUBSEQUENT ENCOUNTER: ICD-10-CM

## 2018-05-05 DIAGNOSIS — R53.81 PHYSICAL DECONDITIONING: ICD-10-CM

## 2018-05-05 DIAGNOSIS — Z98.890 S/P ORIF (OPEN REDUCTION INTERNAL FIXATION) FRACTURE: Primary | ICD-10-CM

## 2018-05-05 DIAGNOSIS — E11.65 TYPE 2 DIABETES MELLITUS WITH HYPERGLYCEMIA, UNSPECIFIED WHETHER LONG TERM INSULIN USE (HCC): ICD-10-CM

## 2018-05-05 DIAGNOSIS — M47.22 OSTEOARTHRITIS OF SPINE WITH RADICULOPATHY, CERVICAL REGION: ICD-10-CM

## 2018-05-05 DIAGNOSIS — Z87.81 S/P ORIF (OPEN REDUCTION INTERNAL FIXATION) FRACTURE: Primary | ICD-10-CM

## 2018-05-05 DIAGNOSIS — S62.102A CLOSED FRACTURE OF LEFT WRIST, INITIAL ENCOUNTER: ICD-10-CM

## 2018-05-05 DIAGNOSIS — K59.00 CONSTIPATION, UNSPECIFIED CONSTIPATION TYPE: ICD-10-CM

## 2018-05-05 DIAGNOSIS — S32.512D CLOSED FRACTURE OF LEFT SUPERIOR PUBIC RAMUS WITH ROUTINE HEALING, SUBSEQUENT ENCOUNTER: ICD-10-CM

## 2018-05-05 DIAGNOSIS — I10 ESSENTIAL HYPERTENSION: Chronic | ICD-10-CM

## 2018-05-05 PROCEDURE — 99306 1ST NF CARE HIGH MDM 50: CPT | Performed by: FAMILY MEDICINE

## 2018-05-05 PROCEDURE — 1111F DSCHRG MED/CURRENT MED MERGE: CPT | Performed by: FAMILY MEDICINE

## 2018-05-08 ENCOUNTER — NURSE ONLY (OUTPATIENT)
Dept: LAB | Age: 78
End: 2018-05-08
Attending: FAMILY MEDICINE
Payer: MEDICARE

## 2018-05-08 DIAGNOSIS — S32.000A COMPRESSION OF LUMBAR VERTEBRA (HCC): ICD-10-CM

## 2018-05-08 DIAGNOSIS — N17.9 ACUTE KIDNEY FAILURE, UNSPECIFIED (HCC): Primary | ICD-10-CM

## 2018-05-08 PROBLEM — S32.512D: Status: ACTIVE | Noted: 2018-04-25

## 2018-05-08 PROBLEM — S52.572D OTHER CLOSED INTRA-ARTICULAR FRACTURE OF DISTAL END OF LEFT RADIUS WITH ROUTINE HEALING, SUBSEQUENT ENCOUNTER: Status: ACTIVE | Noted: 2018-05-08

## 2018-05-08 PROBLEM — Z87.81 S/P ORIF (OPEN REDUCTION INTERNAL FIXATION) FRACTURE: Status: ACTIVE | Noted: 2018-05-08

## 2018-05-08 PROBLEM — Z98.890 S/P ORIF (OPEN REDUCTION INTERNAL FIXATION) FRACTURE: Status: ACTIVE | Noted: 2018-05-08

## 2018-05-08 PROCEDURE — 83735 ASSAY OF MAGNESIUM: CPT

## 2018-05-08 PROCEDURE — 80053 COMPREHEN METABOLIC PANEL: CPT

## 2018-05-08 PROCEDURE — 85025 COMPLETE CBC W/AUTO DIFF WBC: CPT

## 2018-05-08 PROCEDURE — 82306 VITAMIN D 25 HYDROXY: CPT

## 2018-05-08 PROCEDURE — 82746 ASSAY OF FOLIC ACID SERUM: CPT

## 2018-05-08 PROCEDURE — 82607 VITAMIN B-12: CPT

## 2018-05-08 PROCEDURE — 84443 ASSAY THYROID STIM HORMONE: CPT

## 2018-05-11 ENCOUNTER — NURSE ONLY (OUTPATIENT)
Dept: LAB | Age: 78
End: 2018-05-11
Attending: FAMILY MEDICINE
Payer: MEDICARE

## 2018-05-11 DIAGNOSIS — N39.0 URINARY TRACT INFECTION, SITE NOT SPECIFIED: Primary | ICD-10-CM

## 2018-05-11 PROCEDURE — 87086 URINE CULTURE/COLONY COUNT: CPT

## 2018-05-11 PROCEDURE — 87077 CULTURE AEROBIC IDENTIFY: CPT

## 2018-05-11 PROCEDURE — 87186 SC STD MICRODIL/AGAR DIL: CPT

## 2018-05-11 PROCEDURE — 81001 URINALYSIS AUTO W/SCOPE: CPT

## 2018-05-11 NOTE — PROGRESS NOTES
[address]: Khoa Red MD     1940 MRN [id number]   [facility]  Admission 18      Last Hospital Discharge 18 PCP Quita Ganser of Discharge 18       Date of Admission:  generalized physical deconditioning and generalized weakness. Patient reports pain is currently under control she does have a left arm sling in place denies any pain to area.    Patient was noted to have renal insufficiency while in hospital currently unde Fenofibrate 160 MG Oral Tab Take 160 mg by mouth daily. furosemide 20 MG Oral Tab Take 20 mg by mouth daily. No current facility-administered medications on file prior to visit.        HISTORY:  She  has a past medical history of Diabetes ([address] swelling. Left hip: She exhibits decreased range of motion and decreased strength. Lymphadenopathy:     She has no cervical adenopathy. Neurological: She is alert and oriented to person, place, and time. She has normal reflexes.  She displays nor involving the left distal radius. The proximal displacement of the fracture fragments does allow the lateral aspect of the carpal rows to sublux proximally. A wrist CT could be useful for further evaluation of carpal alignment.   2.  Acute avulsion fract CULTURE REFLEX   Collection Time: 05/11/18  7:33 AM   Result Value Ref Range   Urine Color Yellow Yellow   Clarity Urine Clear Clear   Spec Gravity 1.013 1.001 - 1.030   Glucose Urine Negative Negative mg/dl   Bilirubin Urine Negative Negative   Ketones Ur Natalia Wesley needs then following services:  Occupational Therapy  Physical Therapy  Respiratory Therapy  wound care     I anticipate that she will need 2 weeks of therapy and recooperation before an eventual transition to home and we will work on her discharge

## 2018-05-14 ENCOUNTER — MED REC SCAN ONLY (OUTPATIENT)
Dept: FAMILY MEDICINE CLINIC | Facility: CLINIC | Age: 78
End: 2018-05-14

## 2018-05-15 ENCOUNTER — APPOINTMENT (OUTPATIENT)
Dept: GENERAL RADIOLOGY | Facility: HOSPITAL | Age: 78
End: 2018-05-15
Attending: EMERGENCY MEDICINE
Payer: MEDICARE

## 2018-05-15 ENCOUNTER — APPOINTMENT (OUTPATIENT)
Dept: NUCLEAR MEDICINE | Facility: HOSPITAL | Age: 78
End: 2018-05-15
Attending: EMERGENCY MEDICINE
Payer: MEDICARE

## 2018-05-15 PROCEDURE — 71045 X-RAY EXAM CHEST 1 VIEW: CPT | Performed by: EMERGENCY MEDICINE

## 2018-05-15 NOTE — ED NOTES
Pt awake and alert, skin w/d,resps reg/unlabored. Pt appears comfortable and in no distress. Discharge instructions reviewed with Rio Alberto, nurse from [facility], as well as with pt and her daughter.  Discharge papers and prescription given to pt's daughter

## 2018-05-15 NOTE — ED PROVIDER NOTES
Patient Seen in: [facility] Emergency Department    History   Patient presents with:  Dyspnea KIRSTEN SOB (respiratory)  Nausea/Vomiting/Diarrhea (gastrointestinal)  Anxiety/Panic attack (neurologic)    Stated Complaint: pt c/o shortness of breath, N/V 100%   BMI 24.58 kg/m²         Physical Exam      Constitutional: Pt is oriented to person, place, and time. Appears well-developed and well-nourished. She is obviously anxious. Looking on the room, wide-eyed. Head: Normocephalic and atraumatic.    Nose: result                 Please view results for these tests on the individual orders. RAINBOW DRAW BLUE   RAINBOW DRAW LAVENDER   RAINBOW DRAW LIGHT GREEN   RAINBOW DRAW GOLD     EKG    Rate, intervals and axes as noted on EKG Report.   Rate: 71  Rhythm: S worsen        Medications Prescribed:  Current Discharge Medication List    START taking these medications    LORazepam 0.5 MG Oral Tab  Take 1 tablet (0.5 mg total) by mouth every 6 (six) hours as needed for Anxiety.   Qty: 10 tablet Refills: 0

## 2018-05-15 NOTE — ED NOTES
Pt awake and alert, appears calm, resps regular and unlabored. Pt denies feeling short of breath at this time. Hold on CT at this time due to elevated creatinine.

## 2018-05-15 NOTE — ED INITIAL ASSESSMENT (HPI)
Pt presents to the ED via EMS from [facility] s/p pelvic fracture and left wrist fracture since April. Pt states that she became short of breath some time this morning, with nausea and vomiting. Pt was diagnosed with a UTI yesterday.  Pt c/o feeling anxi

## 2018-05-15 NOTE — ED NOTES
Pt's resps appear easier at this time, pt states she is feeling better. Awaiting lab results for CT.

## 2018-05-15 NOTE — ED NOTES
Pt awake and alert,skin w/d,resps reg/unlabored. Per dr. Mimi Jenkins, pt to receive 1/2 of bag of saline and will be discharged back to rehab. Order for CT and VQ will be cancelled by Dr. Mimi Jenkins. Pt and family aware.

## 2018-05-15 NOTE — ED NOTES
Pt positioned on cart for comfort, blanket rolls placed under both knees and under left arm for support and comfort. Pt awake and alert,skin w/d, resps appear easier. Pt appears less anxious at this time. Family at bedside.

## 2018-05-22 ENCOUNTER — SNF VISIT (OUTPATIENT)
Dept: INTERNAL MEDICINE CLINIC | Age: 78
End: 2018-05-22

## 2018-05-22 DIAGNOSIS — I10 ESSENTIAL HYPERTENSION: ICD-10-CM

## 2018-05-22 DIAGNOSIS — E78.00 PURE HYPERCHOLESTEROLEMIA: ICD-10-CM

## 2018-05-22 DIAGNOSIS — R52 PAIN: ICD-10-CM

## 2018-05-22 DIAGNOSIS — E08.00 DIABETES MELLITUS DUE TO UNDERLYING CONDITION WITH HYPEROSMOLARITY WITHOUT COMA, UNSPECIFIED WHETHER LONG TERM INSULIN USE (HCC): ICD-10-CM

## 2018-05-22 DIAGNOSIS — S32.512A CLOSED FRACTURE OF LEFT SUPERIOR PUBIC RAMUS, INITIAL ENCOUNTER: Primary | ICD-10-CM

## 2018-05-22 DIAGNOSIS — R60.0 LOWER EXTREMITY EDEMA: ICD-10-CM

## 2018-05-22 PROCEDURE — 99308 SBSQ NF CARE LOW MDM 20: CPT | Performed by: NURSE PRACTITIONER

## 2018-05-23 ENCOUNTER — NURSE ONLY (OUTPATIENT)
Dept: LAB | Age: 78
End: 2018-05-23
Attending: NURSE PRACTITIONER
Payer: MEDICARE

## 2018-05-23 VITALS
DIASTOLIC BLOOD PRESSURE: 60 MMHG | HEART RATE: 86 BPM | OXYGEN SATURATION: 99 % | TEMPERATURE: 98 F | RESPIRATION RATE: 16 BRPM | SYSTOLIC BLOOD PRESSURE: 124 MMHG

## 2018-05-23 DIAGNOSIS — R33.9 RETENTION OF URINE, UNSPECIFIED: ICD-10-CM

## 2018-05-23 DIAGNOSIS — E11.8 DIABETIC COMPLICATION (HCC): Primary | ICD-10-CM

## 2018-05-23 DIAGNOSIS — B35.0 AMIANTACEA BARBAE: ICD-10-CM

## 2018-05-23 DIAGNOSIS — E78.5 HYPERLIPEMIA: ICD-10-CM

## 2018-05-23 DIAGNOSIS — N17.9 ACUTE KIDNEY FAILURE, UNSPECIFIED (HCC): ICD-10-CM

## 2018-05-23 PROCEDURE — 80048 BASIC METABOLIC PNL TOTAL CA: CPT

## 2018-05-24 NOTE — PROGRESS NOTES
Maria Del Carmen Peguero, 9/13/1940, 68year old, female    Chief Complaint:  Patient presents with:   Follow - Up: s/p left arm fracture and juvenal leg swelling       Subjective:  Patient presented to ER after falling while getting tangled with dog leash and subseque guarding, no rebound tenderness.   :Deferred  LYMPHATIC:no lymphedema  MUSCULOSKELETAL: ---+pelvic area pain  EXTREMITIES/VASCULAR:dorsalis pedal pulses 2+ and ---no cyanosis,clubbing;+2 edema on juvenal le,  NEUROLOGIC: intact; no sensorimotor deficit, refle

## 2018-05-25 ENCOUNTER — NURSE ONLY (OUTPATIENT)
Dept: LAB | Age: 78
End: 2018-05-25
Attending: NURSE PRACTITIONER
Payer: MEDICARE

## 2018-05-25 DIAGNOSIS — I10 ESSENTIAL HYPERTENSION, MALIGNANT: Primary | ICD-10-CM

## 2018-05-25 DIAGNOSIS — E11.65 TYPE II DIABETES MELLITUS, UNCONTROLLED (HCC): ICD-10-CM

## 2018-05-25 DIAGNOSIS — E78.5 HYPERLIPEMIA: ICD-10-CM

## 2018-05-25 PROCEDURE — 80048 BASIC METABOLIC PNL TOTAL CA: CPT

## 2018-05-26 ENCOUNTER — SNF ADMIT/H&P (OUTPATIENT)
Dept: FAMILY MEDICINE CLINIC | Facility: CLINIC | Age: 78
End: 2018-05-26

## 2018-05-26 DIAGNOSIS — Z79.4 TYPE 2 DIABETES MELLITUS WITHOUT COMPLICATION, WITH LONG-TERM CURRENT USE OF INSULIN (HCC): Chronic | ICD-10-CM

## 2018-05-26 DIAGNOSIS — S62.102D CLOSED FRACTURE OF LEFT WRIST WITH ROUTINE HEALING, SUBSEQUENT ENCOUNTER: ICD-10-CM

## 2018-05-26 DIAGNOSIS — S32.512D CLOSED FRACTURE OF LEFT SUPERIOR PUBIC RAMUS WITH ROUTINE HEALING, SUBSEQUENT ENCOUNTER: ICD-10-CM

## 2018-05-26 DIAGNOSIS — R53.1 GENERALIZED WEAKNESS: ICD-10-CM

## 2018-05-26 DIAGNOSIS — K59.09 OTHER CONSTIPATION: ICD-10-CM

## 2018-05-26 DIAGNOSIS — Z98.890 HISTORY OF OPEN REDUCTION AND INTERNAL FIXATION (ORIF) PROCEDURE: Primary | ICD-10-CM

## 2018-05-26 DIAGNOSIS — E78.2 MIXED HYPERLIPIDEMIA: Chronic | ICD-10-CM

## 2018-05-26 DIAGNOSIS — M47.22 OSTEOARTHRITIS OF SPINE WITH RADICULOPATHY, CERVICAL REGION: ICD-10-CM

## 2018-05-26 DIAGNOSIS — R60.9 PERIPHERAL EDEMA: ICD-10-CM

## 2018-05-26 DIAGNOSIS — I10 ESSENTIAL HYPERTENSION: Chronic | ICD-10-CM

## 2018-05-26 DIAGNOSIS — R53.81 PHYSICAL DECONDITIONING: ICD-10-CM

## 2018-05-26 DIAGNOSIS — E11.9 TYPE 2 DIABETES MELLITUS WITHOUT COMPLICATION, WITH LONG-TERM CURRENT USE OF INSULIN (HCC): Chronic | ICD-10-CM

## 2018-05-26 PROCEDURE — 99306 1ST NF CARE HIGH MDM 50: CPT | Performed by: FAMILY MEDICINE

## 2018-05-28 NOTE — PROGRESS NOTES
[address]: Keshia Wagner MD     1940 MRN [id number]   [facility]  Admission 18      Last Hospital Discharge 18 TAISHA Bartlett of Discharge 18       Date of Admission:  deconditioning and generalized weakness. Patient reports pain is currently under control she does have a left arm sling in place denies any pain to area.    Patient was noted to have renal insufficiency while in hospital currently under control mild consti 25 MG Oral Tablet 24 Hr Take 25 mg by mouth daily. MetFORMIN HCl 850 MG Oral Tab Take 850 mg by mouth 2 (two) times daily with meals. atorvastatin 80 MG Oral Tab Take 80 mg by mouth nightly.      Fenofibrate 160 MG Oral Tab Take 160 mg by mouth chet distension and no mass. There is no tenderness. There is no rebound and no guarding. No hernia. Musculoskeletal: She exhibits no edema. Left wrist: She exhibits decreased range of motion, tenderness, bony tenderness and swelling.         Left hip: compared to the distal radius. SOFT TISSUES:  There is diffuse sauce to pseudo swelling about the left wrist. EFFUSION:   None visible. OTHER:  Negative. CONCLUSION:   1.   Comminuted and displaced  intra-articular fracture involving the left distal Nondisplaced fracture involving the left superior pubic ramus. Dictated by: Young Yates MD on 4/25/2018 at 15:39     Approved by: Young Yates MD              No results found for this or any previous visit (from the past 67 hour(s)).         ASSESSME

## 2018-05-29 ENCOUNTER — NURSE ONLY (OUTPATIENT)
Dept: LAB | Age: 78
End: 2018-05-29
Attending: NURSE PRACTITIONER
Payer: MEDICARE

## 2018-05-29 DIAGNOSIS — I10 HTN (HYPERTENSION): Primary | ICD-10-CM

## 2018-05-29 PROCEDURE — 80048 BASIC METABOLIC PNL TOTAL CA: CPT

## 2018-06-08 ENCOUNTER — HOSPITAL ENCOUNTER (OUTPATIENT)
Dept: ULTRASOUND IMAGING | Facility: HOSPITAL | Age: 78
Discharge: HOME | End: 2018-06-08
Attending: FAMILY MEDICINE
Payer: MEDICARE

## 2018-06-08 DIAGNOSIS — R60.0 LEG EDEMA: ICD-10-CM

## 2018-06-08 PROCEDURE — 93971 EXTREMITY STUDY: CPT | Performed by: FAMILY MEDICINE

## 2018-06-29 PROBLEM — G90.512 COMPLEX REGIONAL PAIN SYNDROME TYPE 1 OF LEFT UPPER EXTREMITY: Status: ACTIVE | Noted: 2018-06-29

## 2018-06-29 PROBLEM — R25.1 TREMOR OF UNKNOWN ORIGIN: Status: ACTIVE | Noted: 2018-06-29

## 2018-07-09 ENCOUNTER — HOSPITAL ENCOUNTER (OUTPATIENT)
Dept: BONE DENSITY | Age: 78
Discharge: HOME | End: 2018-07-09
Attending: FAMILY MEDICINE
Payer: MEDICARE

## 2018-07-09 DIAGNOSIS — M81.0 POSTMENOPAUSAL OSTEOPOROSIS: ICD-10-CM

## 2018-07-09 PROCEDURE — 77080 DXA BONE DENSITY AXIAL: CPT | Performed by: FAMILY MEDICINE

## 2018-07-11 PROCEDURE — 86617 LYME DISEASE ANTIBODY: CPT | Performed by: OTHER

## 2018-07-11 PROCEDURE — 86618 LYME DISEASE ANTIBODY: CPT | Performed by: OTHER

## 2018-07-11 PROCEDURE — 84425 ASSAY OF VITAMIN B-1: CPT | Performed by: OTHER

## 2018-07-11 PROCEDURE — 83921 ORGANIC ACID SINGLE QUANT: CPT | Performed by: OTHER

## 2018-07-11 PROCEDURE — 82607 VITAMIN B-12: CPT | Performed by: OTHER

## 2018-07-11 PROCEDURE — 82746 ASSAY OF FOLIC ACID SERUM: CPT | Performed by: OTHER

## 2018-08-10 PROBLEM — S52.572G: Status: ACTIVE | Noted: 2018-05-08

## 2018-08-14 PROBLEM — S62.101K: Status: ACTIVE | Noted: 2018-08-14

## 2019-01-21 PROCEDURE — 83921 ORGANIC ACID SINGLE QUANT: CPT | Performed by: OTHER

## 2019-01-21 PROCEDURE — 84425 ASSAY OF VITAMIN B-1: CPT | Performed by: OTHER

## 2019-04-30 PROBLEM — Z86.010 PERSONAL HISTORY OF COLONIC POLYPS: Status: ACTIVE | Noted: 2019-04-30

## 2019-04-30 PROBLEM — Z86.0100 PERSONAL HISTORY OF COLONIC POLYPS: Status: ACTIVE | Noted: 2019-04-30

## 2019-07-02 ENCOUNTER — HOSPITAL ENCOUNTER (OUTPATIENT)
Dept: ULTRASOUND IMAGING | Age: 79
Discharge: HOME | End: 2019-07-02
Attending: FAMILY MEDICINE
Payer: MEDICARE

## 2019-07-02 DIAGNOSIS — N18.9 CHRONIC KIDNEY DISEASE, UNSPECIFIED: ICD-10-CM

## 2019-07-02 PROCEDURE — 76775 US EXAM ABDO BACK WALL LIM: CPT | Performed by: FAMILY MEDICINE

## 2019-07-11 ENCOUNTER — HOSPITAL ENCOUNTER (EMERGENCY)
Facility: HOSPITAL | Age: 79
Discharge: HOME | End: 2019-07-11
Attending: EMERGENCY MEDICINE
Payer: MEDICARE

## 2019-07-11 VITALS
RESPIRATION RATE: 16 BRPM | HEIGHT: 66 IN | BODY MASS INDEX: 18.96 KG/M2 | DIASTOLIC BLOOD PRESSURE: 66 MMHG | WEIGHT: 118 LBS | TEMPERATURE: 98 F | SYSTOLIC BLOOD PRESSURE: 130 MMHG | OXYGEN SATURATION: 98 % | HEART RATE: 88 BPM

## 2019-07-11 DIAGNOSIS — R33.9 URINARY RETENTION: ICD-10-CM

## 2019-07-11 DIAGNOSIS — R31.9 HEMATURIA, UNSPECIFIED TYPE: Primary | ICD-10-CM

## 2019-07-11 LAB
GLUCOSE UR STRIP.AUTO-MCNC: NEGATIVE MG/DL
LEUKOCYTE ESTERASE UR QL STRIP.AUTO: NEGATIVE
NITRITE UR QL STRIP.AUTO: NEGATIVE
PH UR STRIP.AUTO: 5.5 [PH] (ref 4.5–8)
PROT UR STRIP.AUTO-MCNC: >=300 MG/DL
RBC #/AREA URNS AUTO: >10 /HPF
SP GR UR STRIP.AUTO: 1.02 (ref 1–1.03)
UROBILINOGEN UR STRIP.AUTO-MCNC: 0.2 MG/DL

## 2019-07-11 PROCEDURE — 51702 INSERT TEMP BLADDER CATH: CPT

## 2019-07-11 PROCEDURE — 87086 URINE CULTURE/COLONY COUNT: CPT | Performed by: EMERGENCY MEDICINE

## 2019-07-11 PROCEDURE — 81015 MICROSCOPIC EXAM OF URINE: CPT | Performed by: EMERGENCY MEDICINE

## 2019-07-11 PROCEDURE — 81001 URINALYSIS AUTO W/SCOPE: CPT | Performed by: EMERGENCY MEDICINE

## 2019-07-11 PROCEDURE — 99284 EMERGENCY DEPT VISIT MOD MDM: CPT

## 2019-07-11 PROCEDURE — 99283 EMERGENCY DEPT VISIT LOW MDM: CPT

## 2019-07-11 RX ORDER — CEPHALEXIN 500 MG/1
500 CAPSULE ORAL ONCE
Status: COMPLETED | OUTPATIENT
Start: 2019-07-11 | End: 2019-07-11

## 2019-07-11 RX ORDER — CEFADROXIL 500 MG/1
500 CAPSULE ORAL 2 TIMES DAILY
Qty: 14 CAPSULE | Refills: 0 | Status: SHIPPED | OUTPATIENT
Start: 2019-07-11 | End: 2019-07-18

## 2019-07-11 NOTE — ED NOTES
Pt reevaluated by er physician. Pt informed of all her test reports and plan of care. Pt verbalizing understanding.  Correia cath to be removed, pt declined to keep it on, will return if unable to urinate in the next 6-8 hours, pt verbalizing understanding

## 2019-07-11 NOTE — ED INITIAL ASSESSMENT (HPI)
Pt states she noted blood in her urine yesterday. This morning at 0300 woke up with low abd pressure and urinary urgency.

## 2019-07-11 NOTE — ED PROVIDER NOTES
Patient Seen in: [facility] Emergency Department    History   Patient presents with:  Urinary Symptoms (urologic)  Eval-G (genital)    Stated Complaint: Urinary symptoms, bleeding.      HPI    70-year-old female presents to the emergency department c BMI 19.05 kg/m²         Physical Exam    General appearance: This is an elderly female lying in a gurney. Vital signs were reviewed per nurse's notes. HEENT: Normocephalic atraumatic. Anicteric sclera.   Oral mucosa is moist.  Neck: No adenopathy or thyr see if the appointment can be moved up sooner. MDM   #1. Hematuria likely secondary to urinary tract infection. 2.  Acute urinary retention secondary to #1.               Disposition and Plan     Clinical Impression:  Hematuria, unspecified type  (prim

## 2019-07-11 NOTE — ED NOTES
Pt tolerated verdin cath insertion, 600 urine mello colored output. Specimen to lab. Manual irrigation until clear done.  Pt tolerated it well

## 2019-08-27 PROCEDURE — 88108 CYTOPATH CONCENTRATE TECH: CPT | Performed by: UROLOGY

## 2019-10-16 PROBLEM — N17.9 ACUTE RENAL FAILURE (ARF): Status: RESOLVED | Noted: 2018-04-25 | Resolved: 2019-10-16

## 2019-10-16 PROBLEM — C67.2 MALIGNANT NEOPLASM OF LATERAL WALL OF URINARY BLADDER (HCC): Status: ACTIVE | Noted: 2019-10-16

## 2019-10-16 PROBLEM — N17.9 ACUTE RENAL FAILURE (ARF) (HCC): Status: RESOLVED | Noted: 2018-04-25 | Resolved: 2019-10-16

## 2020-10-16 ENCOUNTER — HOSPITAL ENCOUNTER (OUTPATIENT)
Dept: BONE DENSITY | Age: 80
Discharge: HOME | End: 2020-10-16
Attending: FAMILY MEDICINE
Payer: MEDICARE

## 2020-10-16 DIAGNOSIS — M81.0 AGE-RELATED OSTEOPOROSIS WITHOUT CURRENT PATHOLOGICAL FRACTURE: ICD-10-CM

## 2020-10-16 PROCEDURE — 77080 DXA BONE DENSITY AXIAL: CPT | Performed by: FAMILY MEDICINE

## 2020-12-17 RX ORDER — CYANOCOBALAMIN (VITAMIN B-12) 1000 MCG
1 TABLET, EXTENDED RELEASE ORAL DAILY
COMMUNITY

## 2020-12-17 RX ORDER — MELATONIN
1000 DAILY
COMMUNITY

## 2020-12-28 ENCOUNTER — LAB ENCOUNTER (OUTPATIENT)
Dept: LAB | Age: 80
End: 2020-12-28
Attending: UROLOGY
Payer: MEDICARE

## 2020-12-28 DIAGNOSIS — C67.2 MALIGNANT NEOPLASM OF LATERAL WALL OF URINARY BLADDER (HCC): ICD-10-CM

## 2020-12-28 LAB
DEPRECATED RDW RBC AUTO: 46.2 FL (ref 35.1–46.3)
ERYTHROCYTE [DISTWIDTH] IN BLOOD BY AUTOMATED COUNT: 15 % (ref 11–15)
HCT VFR BLD AUTO: 38.1 %
HGB BLD-MCNC: 11.4 G/DL
MCH RBC QN AUTO: 25.4 PG (ref 26–34)
MCHC RBC AUTO-ENTMCNC: 29.9 G/DL (ref 31–37)
MCV RBC AUTO: 84.9 FL
PLATELET # BLD AUTO: 344 10(3)UL (ref 150–450)
RBC # BLD AUTO: 4.49 X10(6)UL
WBC # BLD AUTO: 10.2 X10(3) UL (ref 4–11)

## 2020-12-28 PROCEDURE — 85027 COMPLETE CBC AUTOMATED: CPT

## 2020-12-28 PROCEDURE — 36415 COLL VENOUS BLD VENIPUNCTURE: CPT

## 2020-12-29 LAB — SARS-COV-2 RNA RESP QL NAA+PROBE: NOT DETECTED

## 2020-12-31 ENCOUNTER — HOSPITAL ENCOUNTER (OUTPATIENT)
Facility: HOSPITAL | Age: 80
Setting detail: HOSPITAL OUTPATIENT SURGERY
Discharge: HOME | End: 2020-12-31
Attending: UROLOGY | Admitting: UROLOGY
Payer: MEDICARE

## 2020-12-31 ENCOUNTER — ANESTHESIA (OUTPATIENT)
Dept: SURGERY | Facility: HOSPITAL | Age: 80
End: 2020-12-31
Payer: MEDICARE

## 2020-12-31 ENCOUNTER — ANESTHESIA EVENT (OUTPATIENT)
Dept: SURGERY | Facility: HOSPITAL | Age: 80
End: 2020-12-31
Payer: MEDICARE

## 2020-12-31 VITALS
RESPIRATION RATE: 16 BRPM | BODY MASS INDEX: 25.01 KG/M2 | DIASTOLIC BLOOD PRESSURE: 58 MMHG | HEART RATE: 59 BPM | OXYGEN SATURATION: 100 % | WEIGHT: 159.38 LBS | SYSTOLIC BLOOD PRESSURE: 146 MMHG | HEIGHT: 67 IN | TEMPERATURE: 97 F

## 2020-12-31 DIAGNOSIS — C67.2 MALIGNANT NEOPLASM OF LATERAL WALL OF URINARY BLADDER (HCC): Primary | ICD-10-CM

## 2020-12-31 LAB
GLUCOSE BLD-MCNC: 107 MG/DL (ref 70–99)
GLUCOSE BLD-MCNC: 113 MG/DL (ref 70–99)

## 2020-12-31 PROCEDURE — 82962 GLUCOSE BLOOD TEST: CPT

## 2020-12-31 PROCEDURE — 0TBB8ZX EXCISION OF BLADDER, VIA NATURAL OR ARTIFICIAL OPENING ENDOSCOPIC, DIAGNOSTIC: ICD-10-PCS | Performed by: UROLOGY

## 2020-12-31 PROCEDURE — 88305 TISSUE EXAM BY PATHOLOGIST: CPT | Performed by: UROLOGY

## 2020-12-31 PROCEDURE — 3E0M705 INTRODUCTION OF OTHER ANTINEOPLASTIC INTO PERITONEAL CAVITY, VIA NATURAL OR ARTIFICIAL OPENING: ICD-10-PCS | Performed by: UROLOGY

## 2020-12-31 RX ORDER — ONDANSETRON 2 MG/ML
INJECTION INTRAMUSCULAR; INTRAVENOUS AS NEEDED
Status: DISCONTINUED | OUTPATIENT
Start: 2020-12-31 | End: 2020-12-31 | Stop reason: SURG

## 2020-12-31 RX ORDER — ONDANSETRON 2 MG/ML
4 INJECTION INTRAMUSCULAR; INTRAVENOUS AS NEEDED
Status: DISCONTINUED | OUTPATIENT
Start: 2020-12-31 | End: 2020-12-31

## 2020-12-31 RX ORDER — EPHEDRINE SULFATE 50 MG/ML
INJECTION INTRAVENOUS AS NEEDED
Status: DISCONTINUED | OUTPATIENT
Start: 2020-12-31 | End: 2020-12-31 | Stop reason: SURG

## 2020-12-31 RX ORDER — ACETAMINOPHEN 500 MG
1000 TABLET ORAL ONCE
Status: DISCONTINUED | OUTPATIENT
Start: 2020-12-31 | End: 2020-12-31 | Stop reason: HOSPADM

## 2020-12-31 RX ORDER — HYDROMORPHONE HYDROCHLORIDE 1 MG/ML
0.4 INJECTION, SOLUTION INTRAMUSCULAR; INTRAVENOUS; SUBCUTANEOUS EVERY 5 MIN PRN
Status: DISCONTINUED | OUTPATIENT
Start: 2020-12-31 | End: 2020-12-31

## 2020-12-31 RX ORDER — SODIUM CHLORIDE, SODIUM LACTATE, POTASSIUM CHLORIDE, CALCIUM CHLORIDE 600; 310; 30; 20 MG/100ML; MG/100ML; MG/100ML; MG/100ML
INJECTION, SOLUTION INTRAVENOUS CONTINUOUS
Status: DISCONTINUED | OUTPATIENT
Start: 2020-12-31 | End: 2020-12-31

## 2020-12-31 RX ORDER — MEPERIDINE HYDROCHLORIDE 25 MG/ML
12.5 INJECTION INTRAMUSCULAR; INTRAVENOUS; SUBCUTANEOUS AS NEEDED
Status: DISCONTINUED | OUTPATIENT
Start: 2020-12-31 | End: 2020-12-31

## 2020-12-31 RX ORDER — DEXTROSE MONOHYDRATE 25 G/50ML
50 INJECTION, SOLUTION INTRAVENOUS
Status: DISCONTINUED | OUTPATIENT
Start: 2020-12-31 | End: 2020-12-31 | Stop reason: HOSPADM

## 2020-12-31 RX ORDER — INSULIN ASPART 100 [IU]/ML
INJECTION, SOLUTION INTRAVENOUS; SUBCUTANEOUS ONCE
Status: DISCONTINUED | OUTPATIENT
Start: 2020-12-31 | End: 2020-12-31

## 2020-12-31 RX ORDER — NALOXONE HYDROCHLORIDE 0.4 MG/ML
80 INJECTION, SOLUTION INTRAMUSCULAR; INTRAVENOUS; SUBCUTANEOUS AS NEEDED
Status: DISCONTINUED | OUTPATIENT
Start: 2020-12-31 | End: 2020-12-31

## 2020-12-31 RX ORDER — CEFAZOLIN SODIUM/WATER 2 G/20 ML
2 SYRINGE (ML) INTRAVENOUS ONCE
Status: COMPLETED | OUTPATIENT
Start: 2020-12-31 | End: 2020-12-31

## 2020-12-31 RX ORDER — DIPHENHYDRAMINE HYDROCHLORIDE 50 MG/ML
12.5 INJECTION INTRAMUSCULAR; INTRAVENOUS AS NEEDED
Status: DISCONTINUED | OUTPATIENT
Start: 2020-12-31 | End: 2020-12-31

## 2020-12-31 RX ORDER — LIDOCAINE HYDROCHLORIDE 10 MG/ML
INJECTION, SOLUTION EPIDURAL; INFILTRATION; INTRACAUDAL; PERINEURAL AS NEEDED
Status: DISCONTINUED | OUTPATIENT
Start: 2020-12-31 | End: 2020-12-31 | Stop reason: SURG

## 2020-12-31 RX ORDER — DEXTROSE MONOHYDRATE 25 G/50ML
50 INJECTION, SOLUTION INTRAVENOUS
Status: DISCONTINUED | OUTPATIENT
Start: 2020-12-31 | End: 2020-12-31

## 2020-12-31 RX ORDER — CEFAZOLIN SODIUM/WATER 2 G/20 ML
SYRINGE (ML) INTRAVENOUS
Status: DISCONTINUED
Start: 2020-12-31 | End: 2020-12-31

## 2020-12-31 RX ADMIN — EPHEDRINE SULFATE 5 MG: 50 INJECTION INTRAVENOUS at 08:16:00

## 2020-12-31 RX ADMIN — SODIUM CHLORIDE, SODIUM LACTATE, POTASSIUM CHLORIDE, CALCIUM CHLORIDE: 600; 310; 30; 20 INJECTION, SOLUTION INTRAVENOUS at 08:33:00

## 2020-12-31 RX ADMIN — EPHEDRINE SULFATE 5 MG: 50 INJECTION INTRAVENOUS at 08:02:00

## 2020-12-31 RX ADMIN — CEFAZOLIN SODIUM/WATER 2 G: 2 G/20 ML SYRINGE (ML) INTRAVENOUS at 07:40:00

## 2020-12-31 RX ADMIN — EPHEDRINE SULFATE 10 MG: 50 INJECTION INTRAVENOUS at 07:46:00

## 2020-12-31 RX ADMIN — LIDOCAINE HYDROCHLORIDE 50 MG: 10 INJECTION, SOLUTION EPIDURAL; INFILTRATION; INTRACAUDAL; PERINEURAL at 07:32:00

## 2020-12-31 RX ADMIN — ONDANSETRON 4 MG: 2 INJECTION INTRAMUSCULAR; INTRAVENOUS at 08:20:00

## 2020-12-31 NOTE — ANESTHESIA POSTPROCEDURE EVALUATION
[address] Patient Status:  Hospital Outpatient Surgery   Age/Gender [de-identified]year old female MRN [id number]   [facility] SURGERY Attending Beverly Cagle MD   Hosp Day # 0 PCP Dana Higgins       Anesthesia Post-op Note    Pro

## 2020-12-31 NOTE — ANESTHESIA PREPROCEDURE EVALUATION
PRE-OP EVALUATION    Patient Name: Stephany Thomas    Pre-op Diagnosis: Malignant neoplasm of lateral wall of urinary bladder ([facility] [address] [C67.2]    Procedure(s):  CYSTOSCOPY, TRANSURETHRAL RESECTION OF BLADDER TUMOR, POST OPERATIVE INSTILLATION OF GEMCITABINE , Rfl: , 12/27/2020 at 0900    •  DilTIAZem HCl ER Coated Beads (CARTIA XT) 300 MG Oral Capsule SR 24 Hr, Take 300 mg by mouth daily. , Disp: , Rfl: , 12/31/2020 at 0330    •  Metoprolol Succinate ER 25 MG Oral Tablet 24 Hr, Take 25 mg by mouth daily.   Sakshi Vines WRIST OPEN REDUCTION INTERNAL FIXATION Left 4/26/2018    Performed by Julia Steven MD at [facility] MAIN OR     Social History    Tobacco Use      Smoking status: Former Smoker        Packs/day: 1.00        Years: 49.00        Pack years: 52      Smokeless to

## 2020-12-31 NOTE — ANESTHESIA PROCEDURE NOTES
Airway  Urgency: elective    Airway not difficult    General Information and Staff    Patient location during procedure: OR  Anesthesiologist: Obinna Dee MD  Resident/CRNA: Andrade Lozano CRNA  Performed: CRNA     Indications and Patient Condition  I

## 2020-12-31 NOTE — BRIEF OP NOTE
Pre-Operative Diagnosis: Malignant neoplasm of lateral wall of urinary bladder (HCC) [C67.2]     Post-Operative Diagnosis: Malignant neoplasm of lateral wall of urinary bladder (HCC) [C67.2] , dome and posterior wall     Procedure Performed:   Procedure(s)

## 2021-01-03 NOTE — PROGRESS NOTES
Future Appointments  Will try to call patient this week in lieu of face to face visit  Had discussed with patient and daughter the poss of trying Gemzar x 6 post TURBT instead of more BCG  1/13/2021  10:10 AM   Tim Hernandez MD            NPV RCK URO    [facility]

## 2021-01-04 NOTE — OPERATIVE REPORT
[facility], [place]    PATIENT'S NAME: Shoshana Briones   ATTENDING PHYSICIAN: Marjorie Orta M.D. OPERATING PHYSICIAN: Marjorie Orta M.D.    PATIENT ACCOUNT#:   [de-identified]    LOCATION:  PREOPASCC  PRE ASCC 4 EDWP 10  MEDICAL RECORD #:   [id number]       DATE OF DARCIE There was 1+ trabeculation. There were again multiple papillary regions scattered throughout the bladder. The bulk are closer to the posterior and dome. The largest tumor is about 1 cm in diameter. They are papillary in nature.   There is not a lot of e 16:52:21  [facility] [id number]  TESFAYE/    cc: Dr. Susan Dickens

## 2021-01-27 DIAGNOSIS — Z23 NEED FOR VACCINATION: ICD-10-CM

## 2021-03-29 ENCOUNTER — TELEPHONE (OUTPATIENT)
Dept: FAMILY MEDICINE CLINIC | Facility: CLINIC | Age: 81
End: 2021-03-29

## 2021-04-06 ENCOUNTER — OFFICE VISIT (OUTPATIENT)
Dept: FAMILY MEDICINE CLINIC | Facility: CLINIC | Age: 81
End: 2021-04-06
Payer: MEDICARE

## 2021-04-06 VITALS
DIASTOLIC BLOOD PRESSURE: 60 MMHG | WEIGHT: 152.38 LBS | TEMPERATURE: 98 F | SYSTOLIC BLOOD PRESSURE: 118 MMHG | HEIGHT: 65.5 IN | OXYGEN SATURATION: 99 % | BODY MASS INDEX: 25.08 KG/M2 | HEART RATE: 66 BPM

## 2021-04-06 DIAGNOSIS — E78.2 MIXED HYPERLIPIDEMIA: ICD-10-CM

## 2021-04-06 DIAGNOSIS — Z79.4 TYPE 2 DIABETES MELLITUS WITHOUT COMPLICATION, WITH LONG-TERM CURRENT USE OF INSULIN (HCC): Primary | ICD-10-CM

## 2021-04-06 DIAGNOSIS — I10 ESSENTIAL HYPERTENSION: ICD-10-CM

## 2021-04-06 DIAGNOSIS — E11.9 TYPE 2 DIABETES MELLITUS WITHOUT COMPLICATION, WITH LONG-TERM CURRENT USE OF INSULIN (HCC): Primary | ICD-10-CM

## 2021-04-06 PROBLEM — S62.101K: Status: RESOLVED | Noted: 2018-08-14 | Resolved: 2021-04-06

## 2021-04-06 PROCEDURE — 99213 OFFICE O/P EST LOW 20 MIN: CPT | Performed by: FAMILY MEDICINE

## 2021-04-06 PROCEDURE — 80061 LIPID PANEL: CPT | Performed by: FAMILY MEDICINE

## 2021-04-06 PROCEDURE — 83036 HEMOGLOBIN GLYCOSYLATED A1C: CPT | Performed by: FAMILY MEDICINE

## 2021-04-06 PROCEDURE — 80053 COMPREHEN METABOLIC PANEL: CPT | Performed by: FAMILY MEDICINE

## 2021-04-06 RX ORDER — VALSARTAN AND HYDROCHLOROTHIAZIDE 160; 25 MG/1; MG/1
1 TABLET ORAL DAILY
COMMUNITY
End: 2021-06-11

## 2021-04-06 RX ORDER — INSULIN LISPRO 100 [IU]/ML
INJECTION, SOLUTION INTRAVENOUS; SUBCUTANEOUS
COMMUNITY
End: 2021-11-05

## 2021-04-06 RX ORDER — AMLODIPINE BESYLATE 5 MG/1
5 TABLET ORAL DAILY
COMMUNITY

## 2021-04-06 RX ORDER — INSULIN DETEMIR 100 [IU]/ML
INJECTION, SOLUTION SUBCUTANEOUS
COMMUNITY
Start: 2021-03-15 | End: 2021-04-13

## 2021-04-06 NOTE — PROGRESS NOTES
Benoit Baca is a [de-identified]year old female. HPI:   Patient's dtr, Stephen Dalton referred her to me. Is here to establish care.   Patient's pcp was in [address] [place] [address] (Dr. Mere Huff) but patient recently moved in with her dtr in [facility] and transfering care her 11/25/2020    Cystoscopy Procedure Dr Vannessa Zelaya  - recurrent tumor on BTS   • CYSTOURETHROSCOPY,BIOPSY  12/16/2019    BBx of scar sites, [place]   • CYSTOURETHROSCOPY,FULGUR .5-2CM ALBERT  04/16/2020    TURBT, [place]   • CYSTOURETHROSCOPY,FULGUR .5-2CM ALBERT  07/30/2

## 2021-04-12 ENCOUNTER — TELEPHONE (OUTPATIENT)
Dept: FAMILY MEDICINE CLINIC | Facility: CLINIC | Age: 81
End: 2021-04-12

## 2021-04-12 NOTE — TELEPHONE ENCOUNTER
Received medical records from [facility] [address]  Gave medical records disc to

## 2021-04-12 NOTE — TELEPHONE ENCOUNTER
----- Message from David Parisi MD sent at 4/12/2021  6:55 AM CDT -----  Pleaes notify patient her diabetes is not well controlled, I need to adjust her medication.   Please verify with her exactly whiich medicationis she is taking and what dosages, it miles

## 2021-04-13 RX ORDER — INSULIN DETEMIR 100 [IU]/ML
26 INJECTION, SOLUTION SUBCUTANEOUS NIGHTLY
Refills: 0 | COMMUNITY
Start: 2021-04-13 | End: 2021-08-24

## 2021-04-13 NOTE — TELEPHONE ENCOUNTER
Spoke with patient and she states she take 850mg of metformin bid. Humalog sliding scale and Levimir 22-24 units at bedtime. I asked her how many time a day she checks her blood sugar, she stated 3x. I asked her blood sugar this am and it was 195.   She

## 2021-04-13 NOTE — TELEPHONE ENCOUNTER
Patient states that in the past month she has had 2 hypoglycemic events. It is always at 2-3am and the blood sugar at those times were 40 and 50.

## 2021-04-13 NOTE — TELEPHONE ENCOUNTER
Not fasting doesn't impact the A1C, the result is reliable. When was the last time she felt hypoglycemic and her blood sugar was too low? What was her blood sugar at that time and what time of day?   I want to increase her insulin, but depending on if she

## 2021-04-13 NOTE — TELEPHONE ENCOUNTER
Let's up metformin to 1000mg BID, Script sent    If no hypoglycemic events after a week on that, then increase her levemir to 26units at bedtime. I'd like a week of her blood sugar and sliding scale injection log  1-2 weeks after upping levemir.   On that

## 2021-04-23 NOTE — TELEPHONE ENCOUNTER
Spoke with patient and her sugars have been better. She takes 1000mg metformin bid and 24 units of levemir at night and patient states sugars are 90's in the morning and like 180s during the day. I will check with patient in a week or two.

## 2021-04-28 ENCOUNTER — PATIENT MESSAGE (OUTPATIENT)
Dept: FAMILY MEDICINE CLINIC | Facility: CLINIC | Age: 81
End: 2021-04-28

## 2021-04-28 NOTE — TELEPHONE ENCOUNTER
From: Donte Saleem  To:  Fam Mansfield MD  Sent: 4/28/2021 1:02 PM CDT  Subject: Other    Dear Dr. Elis Childs,  I received an email this morning regarding an eye exam. Every year I schedule an eye exam with my ophthalmologist, Dr. Antolin Teresa who is with [facility]

## 2021-04-30 ENCOUNTER — TELEPHONE (OUTPATIENT)
Dept: FAMILY MEDICINE CLINIC | Facility: CLINIC | Age: 81
End: 2021-04-30

## 2021-05-05 PROBLEM — S52.572G: Status: RESOLVED | Noted: 2018-05-08 | Resolved: 2021-05-05

## 2021-05-05 PROBLEM — G90.512 COMPLEX REGIONAL PAIN SYNDROME TYPE 1 OF LEFT UPPER EXTREMITY: Status: RESOLVED | Noted: 2018-06-29 | Resolved: 2021-05-05

## 2021-05-05 PROBLEM — E11.65 TYPE 2 DIABETES MELLITUS WITH HYPERGLYCEMIA, UNSPECIFIED WHETHER LONG TERM INSULIN USE (HCC): Status: RESOLVED | Noted: 2018-04-25 | Resolved: 2021-05-05

## 2021-05-05 PROBLEM — M79.10 MYALGIA: Status: RESOLVED | Noted: 2018-03-29 | Resolved: 2021-05-05

## 2021-05-05 PROBLEM — S32.512A CLOSED FRACTURE OF LEFT SUPERIOR PUBIC RAMUS (HCC): Status: RESOLVED | Noted: 2018-04-25 | Resolved: 2021-05-05

## 2021-05-05 PROBLEM — S32.512D: Status: RESOLVED | Noted: 2018-04-25 | Resolved: 2021-05-05

## 2021-05-05 PROBLEM — M62.838 MUSCLE SPASM: Status: RESOLVED | Noted: 2018-03-29 | Resolved: 2021-05-05

## 2021-05-05 PROBLEM — S62.102A CLOSED FRACTURE OF LEFT WRIST, INITIAL ENCOUNTER: Status: RESOLVED | Noted: 2018-04-25 | Resolved: 2021-05-05

## 2021-05-11 ENCOUNTER — TELEPHONE (OUTPATIENT)
Dept: FAMILY MEDICINE CLINIC | Facility: CLINIC | Age: 81
End: 2021-05-11

## 2021-06-11 ENCOUNTER — PATIENT MESSAGE (OUTPATIENT)
Dept: FAMILY MEDICINE CLINIC | Facility: CLINIC | Age: 81
End: 2021-06-11

## 2021-06-11 RX ORDER — METOPROLOL SUCCINATE 25 MG/1
25 TABLET, EXTENDED RELEASE ORAL DAILY
Qty: 90 TABLET | Refills: 2 | Status: SHIPPED | OUTPATIENT
Start: 2021-06-11

## 2021-06-11 RX ORDER — VALSARTAN AND HYDROCHLOROTHIAZIDE 160; 25 MG/1; MG/1
1 TABLET ORAL DAILY
Qty: 90 TABLET | Refills: 2 | Status: SHIPPED | OUTPATIENT
Start: 2021-06-11

## 2021-06-11 NOTE — TELEPHONE ENCOUNTER
LOV 04/06/2021  BP Readings from Last 3 Encounters:  04/06/21 : 118/60  12/31/20 : 146/58  07/11/19 : 130/66      Last refill on ?(historical), for #?, with ? refills  Valsartan-hydroCHLOROthiazide 160-25 MG Oral Tab    Last refill on ?(historical), for #?

## 2021-06-11 NOTE — TELEPHONE ENCOUNTER
From: Reggy Apley  To: Lindsay Joel MD  Sent: 6/11/2021 12:05 PM CDT  Subject: Prescription Question    Dr. Osvaldo Block, could you please send a couple of scripts to [facility] [place]?  I have no more refills left on the scripts

## 2021-06-23 ENCOUNTER — TELEPHONE (OUTPATIENT)
Dept: FAMILY MEDICINE CLINIC | Facility: CLINIC | Age: 81
End: 2021-06-23

## 2021-06-23 ENCOUNTER — PATIENT MESSAGE (OUTPATIENT)
Dept: FAMILY MEDICINE CLINIC | Facility: CLINIC | Age: 81
End: 2021-06-23

## 2021-06-23 NOTE — TELEPHONE ENCOUNTER
From: Stephany Thomas  To: Pema Watson MD  Sent: 6/23/2021 3:09 PM CDT  Subject: Prescription Question    Dr. Wilber Solano, would you please send a script for One Touch Ultra test strips to [facility] in [place] at [address] [place] [address]? Thank you.   Jeanine Chung

## 2021-06-27 RX ORDER — BLOOD SUGAR DIAGNOSTIC
STRIP MISCELLANEOUS
Qty: 100 EACH | Refills: 3 | Status: SHIPPED | OUTPATIENT
Start: 2021-06-27 | End: 2021-06-27

## 2021-06-27 RX ORDER — BLOOD SUGAR DIAGNOSTIC
STRIP MISCELLANEOUS
Qty: 100 EACH | Refills: 3 | Status: SHIPPED | OUTPATIENT
Start: 2021-06-27 | End: 2021-06-28

## 2021-06-28 ENCOUNTER — TELEPHONE (OUTPATIENT)
Dept: FAMILY MEDICINE CLINIC | Facility: CLINIC | Age: 81
End: 2021-06-28

## 2021-06-28 DIAGNOSIS — E11.9 TYPE 2 DIABETES MELLITUS WITHOUT COMPLICATION, WITH LONG-TERM CURRENT USE OF INSULIN (HCC): Primary | ICD-10-CM

## 2021-06-28 DIAGNOSIS — Z79.4 TYPE 2 DIABETES MELLITUS WITHOUT COMPLICATION, WITH LONG-TERM CURRENT USE OF INSULIN (HCC): Primary | ICD-10-CM

## 2021-06-28 RX ORDER — BLOOD SUGAR DIAGNOSTIC
STRIP MISCELLANEOUS
Qty: 100 EACH | Refills: 3 | Status: SHIPPED | OUTPATIENT
Start: 2021-06-28 | End: 2021-09-10

## 2021-06-28 NOTE — TELEPHONE ENCOUNTER
PHARMACY CALLED AND ADV THAT ONE TOUCH STRIPS THAT WERE SENT IN YESTERDAY NEEDS TO HAVE A ICD-10     CODE OF E11.9 AND ORDER HAS TO HAVE ON IT IF PT IS INSULIN DEPENDENT OR NOT.     PLEASE ADV    THANK YOU 73

## 2021-07-06 ENCOUNTER — MED REC SCAN ONLY (OUTPATIENT)
Dept: FAMILY MEDICINE CLINIC | Facility: CLINIC | Age: 81
End: 2021-07-06

## 2021-08-18 ENCOUNTER — PATIENT MESSAGE (OUTPATIENT)
Dept: FAMILY MEDICINE CLINIC | Facility: CLINIC | Age: 81
End: 2021-08-18

## 2021-08-18 DIAGNOSIS — M47.22 OSTEOARTHRITIS OF SPINE WITH RADICULOPATHY, CERVICAL REGION: ICD-10-CM

## 2021-08-18 DIAGNOSIS — M54.2 NECK PAIN: ICD-10-CM

## 2021-08-18 DIAGNOSIS — M54.50 LOW BACK PAIN: ICD-10-CM

## 2021-08-18 NOTE — TELEPHONE ENCOUNTER
From: Dena Razo  To: Emily Mcclure MD  Sent: 8/18/2021 8:50 AM CDT  Subject: Other    Good morning Dr. Maria Luz Muller,  Would you please send a script for Fenofibrate 160 MG and Risedronate Sodium 35 MG to [facility] in [place] [address]   ([phone]).  This is th

## 2021-08-21 RX ORDER — FENOFIBRATE 160 MG/1
160 TABLET ORAL DAILY
Qty: 90 TABLET | Refills: 3 | Status: SHIPPED | OUTPATIENT
Start: 2021-08-21

## 2021-08-21 RX ORDER — RISEDRONATE SODIUM 35 MG/1
35 TABLET, FILM COATED ORAL
Qty: 12 TABLET | Refills: 3 | Status: SHIPPED | OUTPATIENT
Start: 2021-08-21

## 2021-08-24 ENCOUNTER — PATIENT MESSAGE (OUTPATIENT)
Dept: FAMILY MEDICINE CLINIC | Facility: CLINIC | Age: 81
End: 2021-08-24

## 2021-08-24 DIAGNOSIS — M54.50 LOW BACK PAIN: ICD-10-CM

## 2021-08-24 DIAGNOSIS — M47.22 OSTEOARTHRITIS OF SPINE WITH RADICULOPATHY, CERVICAL REGION: ICD-10-CM

## 2021-08-24 DIAGNOSIS — M54.2 NECK PAIN: ICD-10-CM

## 2021-08-24 RX ORDER — INSULIN DETEMIR 100 [IU]/ML
26 INJECTION, SOLUTION SUBCUTANEOUS NIGHTLY
Qty: 9 ML | Refills: 1 | Status: SHIPPED | OUTPATIENT
Start: 2021-08-24 | End: 2022-01-03

## 2021-08-24 NOTE — TELEPHONE ENCOUNTER
From: Deb Pro  To: Lolly Selby MD  Sent: 8/24/2021 12:26 PM CDT  Subject: Other    Dr. Topher Singletary,  Before you leave could you please send new scripts to [facility] in [place] for Levenir and Humog insulin pens? Thank you.

## 2021-08-25 RX ORDER — INSULIN LISPRO 100 [IU]/ML
INJECTION, SOLUTION INTRAVENOUS; SUBCUTANEOUS
Qty: 3 ML | Refills: 1 | Status: SHIPPED | OUTPATIENT
Start: 2021-08-25 | End: 2022-01-28

## 2021-09-07 ENCOUNTER — TELEPHONE (OUTPATIENT)
Dept: FAMILY MEDICINE CLINIC | Facility: CLINIC | Age: 81
End: 2021-09-07

## 2021-09-07 NOTE — TELEPHONE ENCOUNTER
Pharmacy called to let [facility] know that for the refill of the   LEVEMIR FLEXTOUCH 100 UNIT/ML Subcutaneous Solution Pen-injector    It was sent for 3 pens,   They cant open the box so they are going to give her all 5 pens.

## 2021-09-10 DIAGNOSIS — E11.9 TYPE 2 DIABETES MELLITUS WITHOUT COMPLICATION, WITH LONG-TERM CURRENT USE OF INSULIN (HCC): ICD-10-CM

## 2021-09-10 DIAGNOSIS — Z79.4 TYPE 2 DIABETES MELLITUS WITHOUT COMPLICATION, WITH LONG-TERM CURRENT USE OF INSULIN (HCC): ICD-10-CM

## 2021-09-10 RX ORDER — BLOOD SUGAR DIAGNOSTIC
STRIP MISCELLANEOUS
Qty: 100 EACH | Refills: 3 | Status: SHIPPED | OUTPATIENT
Start: 2021-09-10 | End: 2021-09-20

## 2021-09-20 ENCOUNTER — TELEPHONE (OUTPATIENT)
Dept: FAMILY MEDICINE CLINIC | Facility: CLINIC | Age: 81
End: 2021-09-20

## 2021-09-20 DIAGNOSIS — Z79.4 TYPE 2 DIABETES MELLITUS WITHOUT COMPLICATION, WITH LONG-TERM CURRENT USE OF INSULIN (HCC): ICD-10-CM

## 2021-09-20 DIAGNOSIS — E11.9 TYPE 2 DIABETES MELLITUS WITHOUT COMPLICATION, WITH LONG-TERM CURRENT USE OF INSULIN (HCC): ICD-10-CM

## 2021-09-20 RX ORDER — BLOOD SUGAR DIAGNOSTIC
STRIP MISCELLANEOUS
Qty: 100 EACH | Refills: 3 | Status: SHIPPED | OUTPATIENT
Start: 2021-09-20

## 2021-10-07 ENCOUNTER — OFFICE VISIT (OUTPATIENT)
Dept: FAMILY MEDICINE CLINIC | Facility: CLINIC | Age: 81
End: 2021-10-07
Payer: MEDICARE

## 2021-10-07 VITALS
DIASTOLIC BLOOD PRESSURE: 70 MMHG | OXYGEN SATURATION: 100 % | HEART RATE: 94 BPM | RESPIRATION RATE: 18 BRPM | WEIGHT: 160 LBS | HEIGHT: 66 IN | BODY MASS INDEX: 25.71 KG/M2 | TEMPERATURE: 98 F | SYSTOLIC BLOOD PRESSURE: 132 MMHG

## 2021-10-07 DIAGNOSIS — Z76.89 ENCOUNTER TO ESTABLISH CARE WITH NEW DOCTOR: Primary | ICD-10-CM

## 2021-10-07 DIAGNOSIS — E11.9 TYPE 2 DIABETES MELLITUS WITHOUT COMPLICATION, WITH LONG-TERM CURRENT USE OF INSULIN (HCC): ICD-10-CM

## 2021-10-07 DIAGNOSIS — Z23 NEED FOR INFLUENZA VACCINATION: ICD-10-CM

## 2021-10-07 DIAGNOSIS — G89.29 CHRONIC LEFT SHOULDER PAIN: ICD-10-CM

## 2021-10-07 DIAGNOSIS — Z79.4 TYPE 2 DIABETES MELLITUS WITHOUT COMPLICATION, WITH LONG-TERM CURRENT USE OF INSULIN (HCC): ICD-10-CM

## 2021-10-07 DIAGNOSIS — M54.50 CHRONIC LOW BACK PAIN WITHOUT SCIATICA, UNSPECIFIED BACK PAIN LATERALITY: ICD-10-CM

## 2021-10-07 DIAGNOSIS — M79.605 PAIN OF LEFT LOWER EXTREMITY: ICD-10-CM

## 2021-10-07 DIAGNOSIS — M25.512 CHRONIC LEFT SHOULDER PAIN: ICD-10-CM

## 2021-10-07 DIAGNOSIS — G89.29 CHRONIC LOW BACK PAIN WITHOUT SCIATICA, UNSPECIFIED BACK PAIN LATERALITY: ICD-10-CM

## 2021-10-07 PROCEDURE — 99214 OFFICE O/P EST MOD 30 MIN: CPT | Performed by: FAMILY MEDICINE

## 2021-10-11 NOTE — TELEPHONE ENCOUNTER
Pt states she was suppose to have Metformin sent on 10/07/2021 to [address] [place] [address] in [place] [address]. She called there wasn't a refill sent in. Pt would like a call back once refill is sent. Forward to Dr. Aries Alvarez please advise on refills.      Diabetic Medication P

## 2021-11-01 NOTE — PROGRESS NOTES
[facility] Family Medicine Office Note  Chief Complaint:   Patient presents with:  Medication Follow-Up: Patient states she will need a new script for Metformin.  Also would like flu shot      HPI:   This is a 80year old female coming in for new Father    • Colon Polyps Mother    • Diabetes Mother    • Heart Attack Mother      Allergies:  No Known Allergies  Current Meds:  Current Outpatient Medications   Medication Sig Dispense Refill   • Glucose Blood (ONETOUCH ULTRA) In Vitro Strip Use daily as 25.82 kg/m² as calculated from the following:    Height as of this encounter: 5' 6\" (1.676 m). Weight as of this encounter: 160 lb (72.6 kg). Vital signs reviewed. Appears stated age, well groomed.   Physical Exam     GEN: Not in any acute distress, ma Flu vaccine ordered     Patient verbalized understanding and agreed with the plan. Patient was given the opportunity to ask questions. All questions were addressed, and patient voices no further concerns. Patient is in agreement with plan.  If the patient

## 2021-11-02 NOTE — ED PROVIDER NOTES
Patient Seen in: [facility] Emergency Department    History   Patient presents with:  Fall (musculoskeletal, neurologic)    Stated Complaint: fall wrist deformity    HPI    66-year-old female fell while walking her dog landing on her left wrist comp on palpation. There is no shortening or external rotation of the left hip. Peripheral pulses are present. Both the lower extremities in the right upper extremity are atraumatic. Left upper extremity–there is deformity to the distal radius and ulna.   Ca CT could be useful for further evaluation of carpal alignment. 2.  Acute avulsion fracture of the ulnar styloid.     Dictated by: Elis Juarez MD on 4/25/2018 at 15:43     Approved by: Elis Juarez MD            Xr Hip W Or Wo Pelvis 2 Or 3 Views, Left ( Abdomen

## 2021-11-05 ENCOUNTER — TELEPHONE (OUTPATIENT)
Dept: FAMILY MEDICINE CLINIC | Facility: CLINIC | Age: 81
End: 2021-11-05

## 2021-11-05 ENCOUNTER — OFFICE VISIT (OUTPATIENT)
Dept: FAMILY MEDICINE CLINIC | Facility: CLINIC | Age: 81
End: 2021-11-05
Payer: MEDICARE

## 2021-11-05 VITALS
SYSTOLIC BLOOD PRESSURE: 120 MMHG | HEART RATE: 50 BPM | BODY MASS INDEX: 26.05 KG/M2 | OXYGEN SATURATION: 100 % | RESPIRATION RATE: 18 BRPM | TEMPERATURE: 97 F | HEIGHT: 65 IN | DIASTOLIC BLOOD PRESSURE: 72 MMHG | WEIGHT: 156.38 LBS

## 2021-11-05 DIAGNOSIS — C67.2 MALIGNANT NEOPLASM OF LATERAL WALL OF URINARY BLADDER (HCC): ICD-10-CM

## 2021-11-05 DIAGNOSIS — Z86.010 PERSONAL HISTORY OF COLONIC POLYPS: ICD-10-CM

## 2021-11-05 DIAGNOSIS — R25.1 TREMOR OF UNKNOWN ORIGIN: ICD-10-CM

## 2021-11-05 DIAGNOSIS — M54.50 CHRONIC LOW BACK PAIN WITHOUT SCIATICA, UNSPECIFIED BACK PAIN LATERALITY: ICD-10-CM

## 2021-11-05 DIAGNOSIS — R26.89 BALANCE PROBLEMS: ICD-10-CM

## 2021-11-05 DIAGNOSIS — E78.2 MIXED HYPERLIPIDEMIA: ICD-10-CM

## 2021-11-05 DIAGNOSIS — E11.9 TYPE 2 DIABETES MELLITUS WITHOUT COMPLICATION, WITH LONG-TERM CURRENT USE OF INSULIN (HCC): ICD-10-CM

## 2021-11-05 DIAGNOSIS — Z79.4 TYPE 2 DIABETES MELLITUS WITHOUT COMPLICATION, WITH LONG-TERM CURRENT USE OF INSULIN (HCC): ICD-10-CM

## 2021-11-05 DIAGNOSIS — M81.0 AGE-RELATED OSTEOPOROSIS WITHOUT CURRENT PATHOLOGICAL FRACTURE: ICD-10-CM

## 2021-11-05 DIAGNOSIS — Z13.0 SCREENING FOR DEFICIENCY ANEMIA: Primary | ICD-10-CM

## 2021-11-05 DIAGNOSIS — Z87.81 S/P ORIF (OPEN REDUCTION INTERNAL FIXATION) FRACTURE: ICD-10-CM

## 2021-11-05 DIAGNOSIS — Z98.890 S/P ORIF (OPEN REDUCTION INTERNAL FIXATION) FRACTURE: ICD-10-CM

## 2021-11-05 DIAGNOSIS — G89.29 CHRONIC LOW BACK PAIN WITHOUT SCIATICA, UNSPECIFIED BACK PAIN LATERALITY: ICD-10-CM

## 2021-11-05 DIAGNOSIS — M47.22 OSTEOARTHRITIS OF SPINE WITH RADICULOPATHY, CERVICAL REGION: ICD-10-CM

## 2021-11-05 DIAGNOSIS — I10 ESSENTIAL HYPERTENSION: ICD-10-CM

## 2021-11-05 DIAGNOSIS — Z00.00 ENCOUNTER FOR ANNUAL HEALTH EXAMINATION: ICD-10-CM

## 2021-11-05 DIAGNOSIS — G89.29 CHRONIC LEFT SHOULDER PAIN: ICD-10-CM

## 2021-11-05 DIAGNOSIS — M25.512 CHRONIC LEFT SHOULDER PAIN: ICD-10-CM

## 2021-11-05 PROBLEM — E11.65 TYPE 2 DIABETES MELLITUS WITH HYPERGLYCEMIA (HCC): Status: ACTIVE | Noted: 2021-11-05

## 2021-11-05 PROCEDURE — G0439 PPPS, SUBSEQ VISIT: HCPCS | Performed by: FAMILY MEDICINE

## 2021-11-05 PROCEDURE — 80061 LIPID PANEL: CPT | Performed by: FAMILY MEDICINE

## 2021-11-05 PROCEDURE — 85025 COMPLETE CBC W/AUTO DIFF WBC: CPT | Performed by: FAMILY MEDICINE

## 2021-11-05 PROCEDURE — 83036 HEMOGLOBIN GLYCOSYLATED A1C: CPT | Performed by: FAMILY MEDICINE

## 2021-11-05 PROCEDURE — 80053 COMPREHEN METABOLIC PANEL: CPT | Performed by: FAMILY MEDICINE

## 2021-11-05 NOTE — PATIENT INSTRUCTIONS
[facility] SCREENING SCHEDULE   Tests on this list are recommended by your physician but may not be covered, or covered at this frequency, by your insurer. Please check with your insurance carrier before scheduling to verify coverage.    PREVENT 10/16/2020      No recommendations at this time   Pap and Pelvic    Pap   Covered every 2 years for women at normal risk;  Annually if at high risk -  No recommendations at this time    Chlamydia Annually if high risk -  No recommendations at this time   Sc Component Value Date    CREATSERUM 1.65 (H) 04/06/2021         BUN Annually Lab Results   Component Value Date    BUN 43 (H) 04/06/2021       Drug Serum Conc Annually No results found for: DIGOXIN, DIG, VALP              Recommended Websites for Advanced

## 2021-11-05 NOTE — TELEPHONE ENCOUNTER
Pt is scheduled for a pre op for 3/10/2022 for cosmetic surgery  orders will be faxed per pt     Future Appointments   Date Time Provider Argenis Lucas   11/16/2021 10:00 AM Mariela Thornton MD NPV RCK URO [facility] NPV RCK   2/23/2022 10:40 AM Madeline Kebede

## 2021-11-05 NOTE — PROGRESS NOTES
HPI:   Monika Gerard is a 80year old female who presents for a Medicare Subsequent Annual Wellness visit (Pt already had Initial Annual Wellness).     No concerns today, however on further query she appears to use her quick acting insulin at varying d Health Care on file in [address] [place] [address] Rd.    Advance care planning including the explanation and discussion of advance directives standard forms performed Face to Face with patient and Family/surrogate (if present), and forms available to patient in AVS       She smoked metFORMIN HCl 1000 MG Oral Tab, Take 1 tablet (1,000 mg total) by mouth 2 (two) times daily with meals.   Glucose Blood (ONETOUCH ULTRA) In Vitro Strip, Use daily as directed to check blood sugar, insulin dependent  HUMALOG KWIKPEN 100 UNIT/ML Subcutaneou has a 49.00 pack-year smoking history. She has never used smokeless tobacco. She reports that she does not drink alcohol and does not use drugs.      REVIEW OF SYSTEMS:   All other review of systems are negative except for as mentioned in the HPI      EXAM: 06/01/2021, 07/01/2021, 08/02/2021, 09/01/2021, 10/01/2021, 11/01/2021   • Influenza 11/13/2017, 08/13/2018, 09/23/2019, 09/01/2020   • Pneumococcal (Prevnar 13) 03/11/2019   • Vitamin B-12 Up To 1000mcg, IM/SC Inj 07/11/2018, 07/12/2018, 07/13/2018, 07/16 recent falls     Chronic left shoulder pain  - Controlled now   - PT referral placed at last visit     Chronic low back pain without sciatica, unspecified back pain laterality  -Controlled now   - PT referral placed     Encounter for annual health examinat pounds without trying?: 2 - No  Has your appetite been poor?: No  How does the patient maintain a good energy level?: Daily Walks  How would you describe your daily physical activity?: Light  How would you describe your current health state?: Good  How do Covered every 4 years -    Fecal Occult Blood Test Covered annually -   Bone Density Screening    Bone density screening    Covered every 2 years after age 72 if diagnosed with risk of osteoporosis or estrogen deficiency.     Covered yearly for long-term gl MICROALBCREA, [facility]    LDL Annually Lab Results   Component Value Date    LDL 48 11/05/2021       Dilated Eye Exam Annually -       Annual Monitoring of Persistent Medications (ACE/ARB, digoxin diuretics, anticonvulsants)    Potassium Annually Lab Re

## 2021-11-10 ENCOUNTER — TELEPHONE (OUTPATIENT)
Dept: FAMILY MEDICINE CLINIC | Facility: CLINIC | Age: 81
End: 2021-11-10

## 2021-11-10 NOTE — TELEPHONE ENCOUNTER
----- Message from Ayaka Lang MD sent at 11/7/2021  6:44 PM CST -----  [facility] sent, please call the patient get more details on how she gives herself Ana Maria Pena prior to her meals - 12-14 units prior to her meals seems like it is a lot.  I want to

## 2021-11-11 NOTE — TELEPHONE ENCOUNTER
Left message to voicemail (per verbal release form consent, confirmed with identifying message.) Patient advised to call office back [phone] - need to discuss note below.

## 2021-11-12 NOTE — TELEPHONE ENCOUNTER
Pt called back and reports if her blood glucose under 100 - NO insulin    Pt reports her \"sliding scale\" Varies - has been administering 10-12 units humalog kwikpen    If eating a lot or/and depending what she will eat - then will give self 12 units vs 1

## 2021-11-14 NOTE — TELEPHONE ENCOUNTER
She was complaining of some dizzy spells and I believe the big fluctuations in the sugar is making her hypoglycemic and thus her meals are so varied in quantity \"eating a lot\"     Recommended medication modifications to avoid these huge fluctuations:

## 2021-11-15 NOTE — TELEPHONE ENCOUNTER
[facility] - Last read by Nadine Valdez at 1:20 PM on 11/15/2021.       Recall placed for 3-4 weeks follow-up w/ pt RE: blood glucose and insulin

## 2021-11-15 NOTE — TELEPHONE ENCOUNTER
[facility] sent to pt regarding doctor's note below  Routing to nurse pool for follow-up message read by pt    Placed - Notify me if not read by: 11/20/21      Attempted to call pt at multiple numbers - to discuss note below via Telephone call - no answers.      L

## 2021-12-06 ENCOUNTER — TELEPHONE (OUTPATIENT)
Dept: FAMILY MEDICINE CLINIC | Facility: CLINIC | Age: 81
End: 2021-12-06

## 2021-12-06 NOTE — TELEPHONE ENCOUNTER
[facility] sent to pt regarding note below  Routing to nurse pool for follow-up message read by pt and for pt response  Placed - Notify me if not read by: 12/18/2021

## 2021-12-09 NOTE — TELEPHONE ENCOUNTER
Left message to voicemail (per verbal release form consent, confirmed with identifying message.) Patient advised to call office back [phone] - need to discuss note below.

## 2021-12-17 ENCOUNTER — TELEPHONE (OUTPATIENT)
Dept: FAMILY MEDICINE CLINIC | Facility: CLINIC | Age: 81
End: 2021-12-17

## 2021-12-17 DIAGNOSIS — E11.9: Primary | ICD-10-CM

## 2021-12-17 NOTE — TELEPHONE ENCOUNTER
Routing to referrals dept -  Able to change the current referral for pt?  Pt requesting closer location \"[facility] [place]\"    Please advise, thank you

## 2021-12-17 NOTE — TELEPHONE ENCOUNTER
Called and spoke with pt - advised of note below    Pt reports she has been adjusting Insulin (levemir) from 28 units to 26 at night  Takes humalog - from 12-14 units to now 10-12 units - takes after dinner most of time - pt reports she follows parameters

## 2021-12-17 NOTE — TELEPHONE ENCOUNTER
Pt reports Dr. Mirna Prater gave referral for Physical Therapy - location in [facility] - pt reports too far for her    Pt requesting closer PT location - requesting referral for \"[facility] [place]\"

## 2021-12-20 ENCOUNTER — TELEPHONE (OUTPATIENT)
Dept: ENDOCRINOLOGY CLINIC | Facility: CLINIC | Age: 81
End: 2021-12-20

## 2021-12-20 DIAGNOSIS — E11.9 TYPE 2 DIABETES MELLITUS WITHOUT COMPLICATION, WITH LONG-TERM CURRENT USE OF INSULIN (HCC): Primary | ICD-10-CM

## 2021-12-20 DIAGNOSIS — Z79.4 TYPE 2 DIABETES MELLITUS WITHOUT COMPLICATION, WITH LONG-TERM CURRENT USE OF INSULIN (HCC): Primary | ICD-10-CM

## 2021-12-20 NOTE — TELEPHONE ENCOUNTER
I have pended an order for a diagnostic Shilo. I try to have pt.'s keep a food log & when they took their insulin while wearing it.  Thanks,

## 2022-01-03 ENCOUNTER — PATIENT MESSAGE (OUTPATIENT)
Dept: FAMILY MEDICINE CLINIC | Facility: CLINIC | Age: 82
End: 2022-01-03

## 2022-01-03 ENCOUNTER — NURSE ONLY (OUTPATIENT)
Dept: ENDOCRINOLOGY CLINIC | Facility: CLINIC | Age: 82
End: 2022-01-03
Payer: MEDICARE

## 2022-01-03 VITALS — BODY MASS INDEX: 26 KG/M2 | WEIGHT: 158 LBS

## 2022-01-03 DIAGNOSIS — E11.9 TYPE 2 DIABETES MELLITUS WITHOUT COMPLICATION, WITH LONG-TERM CURRENT USE OF INSULIN (HCC): Chronic | ICD-10-CM

## 2022-01-03 DIAGNOSIS — Z79.4 TYPE 2 DIABETES MELLITUS WITHOUT COMPLICATION, WITH LONG-TERM CURRENT USE OF INSULIN (HCC): Chronic | ICD-10-CM

## 2022-01-03 PROCEDURE — 95249 CONT GLUC MNTR PT PROV EQP: CPT | Performed by: DIETITIAN, REGISTERED

## 2022-01-03 RX ORDER — INSULIN DETEMIR 100 [IU]/ML
26 INJECTION, SOLUTION SUBCUTANEOUS NIGHTLY
Qty: 9 ML | Refills: 1 | Status: SHIPPED | OUTPATIENT
Start: 2022-01-03

## 2022-01-03 NOTE — TELEPHONE ENCOUNTER
From: Paul Montiel  To: Angle Sheets MD  Sent: 1/3/2022 8:17 AM CST  Subject: Insulin Prescription    Dr. Tacho Cheng, would you please send a new Levemir Flex Touch pens prescription to [facility] in [place] [address].  I started using my last pen this

## 2022-01-03 NOTE — TELEPHONE ENCOUNTER
Routing to provider per protocol. LEVEMIR FLEXTOUCH 100 UNIT/ML Subcutaneous Solution Pen-injector  Last refilled on 8/24/21 for #9ml  with 1 rf. Last labs 11/5/21. Last seen on 11/5/21.      Future Appointments   Date Time Provider Argenis uLcas

## 2022-01-04 NOTE — PROGRESS NOTES
Taty Schwartz  [id number] was seen for Personal Continuous Glucose Monitoring Training/Start:    Date: 1/3/2022  Referring Provider: Stephen Steel  Start time: 10am End time: 11am  [facility] 12/5/21 to 1/3/22  Meter downloaded; form sent to scanning    F

## 2022-01-13 ENCOUNTER — TELEPHONE (OUTPATIENT)
Dept: ENDOCRINOLOGY CLINIC | Facility: CLINIC | Age: 82
End: 2022-01-13

## 2022-01-13 DIAGNOSIS — Z79.4 TYPE 2 DIABETES MELLITUS WITH HYPERGLYCEMIA, WITH LONG-TERM CURRENT USE OF INSULIN (HCC): Primary | ICD-10-CM

## 2022-01-13 DIAGNOSIS — E11.65 TYPE 2 DIABETES MELLITUS WITH HYPERGLYCEMIA, WITH LONG-TERM CURRENT USE OF INSULIN (HCC): Primary | ICD-10-CM

## 2022-01-17 RX ORDER — FLASH GLUCOSE SENSOR
1 KIT MISCELLANEOUS
Qty: 6 EACH | Refills: 3 | Status: SHIPPED | OUTPATIENT
Start: 2022-01-17

## 2022-01-17 NOTE — TELEPHONE ENCOUNTER
Pt. was provided a [employer]  at her visit. Have pended a prescription to check on ins. Coverage.  Please sign if agreeable

## 2022-01-19 ENCOUNTER — TELEPHONE (OUTPATIENT)
Dept: FAMILY MEDICINE CLINIC | Facility: CLINIC | Age: 82
End: 2022-01-19

## 2022-01-19 NOTE — TELEPHONE ENCOUNTER
Cira recevied from [address] [place] [address] that the [employer] sensors need a prior [employer] the [employer] and spoke with Fox Qureshi he states that the sensors do not need a PA but that they can not bill for these sensors and the only thing that they can do is offer the pt a coupon card-

## 2022-01-24 ENCOUNTER — TELEPHONE (OUTPATIENT)
Dept: FAMILY MEDICINE CLINIC | Facility: CLINIC | Age: 82
End: 2022-01-24

## 2022-01-24 ENCOUNTER — NURSE ONLY (OUTPATIENT)
Dept: ENDOCRINOLOGY CLINIC | Facility: CLINIC | Age: 82
End: 2022-01-24
Payer: MEDICARE

## 2022-01-24 VITALS — BODY MASS INDEX: 25 KG/M2 | WEIGHT: 157 LBS

## 2022-01-24 DIAGNOSIS — Z79.4 TYPE 2 DIABETES MELLITUS WITH HYPERGLYCEMIA, WITH LONG-TERM CURRENT USE OF INSULIN (HCC): ICD-10-CM

## 2022-01-24 DIAGNOSIS — E11.65 TYPE 2 DIABETES MELLITUS WITH HYPERGLYCEMIA, WITH LONG-TERM CURRENT USE OF INSULIN (HCC): ICD-10-CM

## 2022-01-24 PROCEDURE — G0108 DIAB MANAGE TRN  PER INDIV: HCPCS | Performed by: DIETITIAN, REGISTERED

## 2022-01-24 NOTE — PROGRESS NOTES
Kanwal Paiz  [id number] was seen for Continuous Glucose Sensor Follow-up Visit:    Date: 1/24/2022  Referring Provider: Dr. Roshan Monte  Start time: 11am End time: 11:30am    Sensor Type: Shilo 2    Site Assessment: sensor ended 1 week ago    Review

## 2022-01-24 NOTE — TELEPHONE ENCOUNTER
Please refer to TE 01/19/2022    Per pharmacy - can only offer coupon card, if pt agreeable to coupon price

## 2022-01-24 NOTE — TELEPHONE ENCOUNTER
----- Message from Mireya Saba RN, CDE sent at 1/24/2022 10:58 AM CST -----  Regarding: Progress on Shilo 2  Hi,  I'm seeing Kam today & was wondering what progress has been made on getting her the Boyd 2 sensors covered by Medicare.  I'm giving her a sample sensor todayJas Steen

## 2022-01-28 ENCOUNTER — TELEPHONE (OUTPATIENT)
Dept: FAMILY MEDICINE CLINIC | Facility: CLINIC | Age: 82
End: 2022-01-28

## 2022-01-28 DIAGNOSIS — M54.2 NECK PAIN: ICD-10-CM

## 2022-01-28 DIAGNOSIS — M47.22 OSTEOARTHRITIS OF SPINE WITH RADICULOPATHY, CERVICAL REGION: ICD-10-CM

## 2022-01-28 DIAGNOSIS — M54.50 LOW BACK PAIN: ICD-10-CM

## 2022-01-28 RX ORDER — INSULIN LISPRO 100 [IU]/ML
INJECTION, SOLUTION INTRAVENOUS; SUBCUTANEOUS
Qty: 15 ML | Refills: 0 | Status: SHIPPED | OUTPATIENT
Start: 2022-01-28

## 2022-01-28 NOTE — TELEPHONE ENCOUNTER
Fax request for Humalog    LRF  8/25/21  LOV 11/5/21    Lab Results   Component Value Date     (H) 11/05/2021    A1C 6.9 (H) 11/05/2021     Last comp 11/5/21  AST   15 - 37 U/L 24    ALT   13 - 56 U/L 24      Refilled per protocol  Future Appointmen

## 2022-02-16 ENCOUNTER — TELEPHONE (OUTPATIENT)
Dept: FAMILY MEDICINE CLINIC | Facility: CLINIC | Age: 82
End: 2022-02-16

## 2022-02-17 NOTE — TELEPHONE ENCOUNTER
Called Dr. Shiv Angel office [phone] (Facial Plastic & Reconstructive Surgery)    Was advised to call Dr. Shiv Angel surgical scheduler at ph# [phone] - Yspb detailed message to send pre-op orders to our office, fax and office # provided    Awaiting pre-op orders at this time

## 2022-02-23 ENCOUNTER — TELEPHONE (OUTPATIENT)
Dept: FAMILY MEDICINE CLINIC | Facility: CLINIC | Age: 82
End: 2022-02-23

## 2022-02-23 ENCOUNTER — TELEPHONE (OUTPATIENT)
Dept: ENDOCRINOLOGY CLINIC | Facility: CLINIC | Age: 82
End: 2022-02-23

## 2022-02-23 ENCOUNTER — OFFICE VISIT (OUTPATIENT)
Dept: FAMILY MEDICINE CLINIC | Facility: CLINIC | Age: 82
End: 2022-02-23
Payer: MEDICARE

## 2022-02-23 VITALS
TEMPERATURE: 97 F | DIASTOLIC BLOOD PRESSURE: 78 MMHG | HEIGHT: 66 IN | RESPIRATION RATE: 18 BRPM | BODY MASS INDEX: 24.97 KG/M2 | SYSTOLIC BLOOD PRESSURE: 136 MMHG | WEIGHT: 155.38 LBS

## 2022-02-23 DIAGNOSIS — N18.32 STAGE 3B CHRONIC KIDNEY DISEASE (HCC): ICD-10-CM

## 2022-02-23 DIAGNOSIS — E11.65 TYPE 2 DIABETES MELLITUS WITH HYPERGLYCEMIA, WITH LONG-TERM CURRENT USE OF INSULIN (HCC): ICD-10-CM

## 2022-02-23 DIAGNOSIS — Z79.4 TYPE 2 DIABETES MELLITUS WITH HYPERGLYCEMIA, WITH LONG-TERM CURRENT USE OF INSULIN (HCC): ICD-10-CM

## 2022-02-23 DIAGNOSIS — E11.65 TYPE 2 DIABETES MELLITUS WITH HYPERGLYCEMIA, WITH LONG-TERM CURRENT USE OF INSULIN (HCC): Primary | ICD-10-CM

## 2022-02-23 DIAGNOSIS — M54.50 LOW BACK PAIN: ICD-10-CM

## 2022-02-23 DIAGNOSIS — Z01.818 PREOP TESTING: Primary | ICD-10-CM

## 2022-02-23 DIAGNOSIS — I10 ESSENTIAL HYPERTENSION: ICD-10-CM

## 2022-02-23 DIAGNOSIS — M47.22 OSTEOARTHRITIS OF SPINE WITH RADICULOPATHY, CERVICAL REGION: ICD-10-CM

## 2022-02-23 DIAGNOSIS — Z79.4 TYPE 2 DIABETES MELLITUS WITH HYPERGLYCEMIA, WITH LONG-TERM CURRENT USE OF INSULIN (HCC): Primary | ICD-10-CM

## 2022-02-23 DIAGNOSIS — M54.2 NECK PAIN: ICD-10-CM

## 2022-02-23 PROBLEM — N18.9 CHRONIC KIDNEY DISEASE (CKD): Status: ACTIVE | Noted: 2022-02-23

## 2022-02-23 PROBLEM — N18.9 CHRONIC KIDNEY DISEASE (CKD): Status: RESOLVED | Noted: 2022-02-23 | Resolved: 2022-02-23

## 2022-02-23 LAB
ANION GAP SERPL CALC-SCNC: 7 MMOL/L (ref 0–18)
BUN BLD-MCNC: 33 MG/DL (ref 7–18)
CALCIUM BLD-MCNC: 10.3 MG/DL (ref 8.5–10.1)
CHLORIDE SERPL-SCNC: 107 MMOL/L (ref 98–112)
CO2 SERPL-SCNC: 29 MMOL/L (ref 21–32)
CREAT BLD-MCNC: 1.25 MG/DL
FASTING STATUS PATIENT QL REPORTED: YES
GLUCOSE BLD-MCNC: 127 MG/DL (ref 70–99)
OSMOLALITY SERPL CALC.SUM OF ELEC: 305 MOSM/KG (ref 275–295)
POTASSIUM SERPL-SCNC: 4.6 MMOL/L (ref 3.5–5.1)
SODIUM SERPL-SCNC: 143 MMOL/L (ref 136–145)

## 2022-02-23 PROCEDURE — 93000 ELECTROCARDIOGRAM COMPLETE: CPT | Performed by: FAMILY MEDICINE

## 2022-02-23 PROCEDURE — 99214 OFFICE O/P EST MOD 30 MIN: CPT | Performed by: FAMILY MEDICINE

## 2022-02-23 PROCEDURE — 80048 BASIC METABOLIC PNL TOTAL CA: CPT | Performed by: OTOLARYNGOLOGY

## 2022-02-23 RX ORDER — CEPHALEXIN 500 MG/1
CAPSULE ORAL
COMMUNITY
Start: 2022-02-22 | End: 2022-03-08

## 2022-02-23 RX ORDER — INSULIN LISPRO 100 [IU]/ML
INJECTION, SOLUTION INTRAVENOUS; SUBCUTANEOUS
Qty: 15 ML | Refills: 0 | Status: SHIPPED | OUTPATIENT
Start: 2022-02-23 | End: 2022-03-18

## 2022-02-23 RX ORDER — INSULIN DETEMIR 100 [IU]/ML
18 INJECTION, SOLUTION SUBCUTANEOUS NIGHTLY
Qty: 9 ML | Refills: 1 | COMMUNITY
Start: 2022-02-23 | End: 2022-05-28

## 2022-02-23 RX ORDER — OXYCODONE HYDROCHLORIDE AND ACETAMINOPHEN 5; 325 MG/1; MG/1
TABLET ORAL
COMMUNITY
Start: 2022-02-22 | End: 2022-03-08

## 2022-02-23 NOTE — TELEPHONE ENCOUNTER
Please let Néstor Christophe know that she needs to follow up with me on 3/7 to nail her Insulin dosing down and to touch base regarding the Free style NATALIE. I told her to follow up with me after surgery but in hindsight I am not realizing (while typing her note out) that she needs to see me a few days priro to her surgery. Thank you     One visit on 3/7 (3 days prior to surgery) and the next visit 10- 14 days after surgery :) Thank you.   She is going to be jumping all over the place especially if having facial surgery and not being able to eat or chew :) - Per MM

## 2022-02-23 NOTE — TELEPHONE ENCOUNTER
Left message to voicemail (per verbal release form consent, did not leave detailed message) Patient advised to call office back [phone] - need to discuss note below.

## 2022-02-23 NOTE — TELEPHONE ENCOUNTER
[facility] For [facility] CGM. Pharmacist said that the Dx Code was included so will have to call his help desk to find out what the problem is. Said he would contact the pt.

## 2022-02-24 NOTE — TELEPHONE ENCOUNTER
Received a P. A. regarding Shilo 2 sensors. Contacted [facility] & spoke w/Allen the pharmacy tech. He said they aren't able to process pres. for CGM's through Medicare Part B. They had been able to in the past. So will need to go through [facility] until they can get back to me w/an answer. Will start paperwork process.

## 2022-02-24 NOTE — TELEPHONE ENCOUNTER
Pt. Contacted regarding response from [facility]. The pharmacist looked everything over & doesn't see what other information they would need. He said he will need to call his help desk & would return call to pt. I said I will also call back tomorrow. Pt. v/u & was agreeable w/plan.

## 2022-02-28 DIAGNOSIS — M47.22 OSTEOARTHRITIS OF SPINE WITH RADICULOPATHY, CERVICAL REGION: ICD-10-CM

## 2022-02-28 DIAGNOSIS — M54.50 LOW BACK PAIN: ICD-10-CM

## 2022-02-28 DIAGNOSIS — M54.2 NECK PAIN: ICD-10-CM

## 2022-02-28 RX ORDER — ATORVASTATIN CALCIUM 80 MG/1
TABLET, FILM COATED ORAL
Qty: 90 TABLET | Refills: 0 | Status: SHIPPED | OUTPATIENT
Start: 2022-02-28 | End: 2022-05-28

## 2022-02-28 NOTE — TELEPHONE ENCOUNTER
Cholesterol Medication Protocol Passed 02/28/2022 12:06 PM   Protocol Details  ALT < 80    ALT resulted within past year    Lipid panel within past 12 months    Appointment within past 12 or next 3 months     Refilled per protocol    Last refilled on *** #*** with *** rf.  LOV- ***  Last labs- ***    Sent to pharmacy

## 2022-02-28 NOTE — TELEPHONE ENCOUNTER
Cholesterol Medication Protocol Passed 02/28/2022 01:31 PM   Protocol Details  ALT < 80    ALT resulted within past year    Lipid panel within past 12 months    Appointment within past 12 or next 3 months     Order per protocol

## 2022-02-28 NOTE — TELEPHONE ENCOUNTER
[facility] sent to pt regarding doctor's note below  Pt to schedule follow-up appts

## 2022-03-02 ENCOUNTER — TELEPHONE (OUTPATIENT)
Dept: ENDOCRINOLOGY CLINIC | Facility: CLINIC | Age: 82
End: 2022-03-02

## 2022-03-02 ENCOUNTER — MED REC SCAN ONLY (OUTPATIENT)
Dept: FAMILY MEDICINE CLINIC | Facility: CLINIC | Age: 82
End: 2022-03-02

## 2022-03-02 NOTE — TELEPHONE ENCOUNTER
After speaking w/[facility], they were mistaken about being able to process Shilo CGM through Medicare Part B. I have completed an order form to [facility] ([facility]) & faxed to PCP office for signature w/confirmation received.

## 2022-03-03 ENCOUNTER — TELEPHONE (OUTPATIENT)
Dept: FAMILY MEDICINE CLINIC | Facility: CLINIC | Age: 82
End: 2022-03-03

## 2022-03-03 NOTE — TELEPHONE ENCOUNTER
Received fax from [address] [place] [address],[address] regarding pt's Rx    Insulin Lisp /ML    - drug NOT on drug list - pt was given temporary supply.     Please advise on alternative, thank you    (send to scanning)

## 2022-03-04 ENCOUNTER — TELEPHONE (OUTPATIENT)
Dept: FAMILY MEDICINE CLINIC | Facility: CLINIC | Age: 82
End: 2022-03-04

## 2022-03-04 NOTE — TELEPHONE ENCOUNTER
Please review pt's lab results for pre-op - 02/23/22 BMP    Pt to follow-up prior to surgery  Future Appointments   Date Time Provider Argenis Lucas   3/7/2022  1:00 PM Ayaka Eagle MD [facility] EMG Carmin Opitz

## 2022-03-07 ENCOUNTER — TELEPHONE (OUTPATIENT)
Dept: FAMILY MEDICINE CLINIC | Facility: CLINIC | Age: 82
End: 2022-03-07

## 2022-03-07 ENCOUNTER — OFFICE VISIT (OUTPATIENT)
Dept: FAMILY MEDICINE CLINIC | Facility: CLINIC | Age: 82
End: 2022-03-07
Payer: MEDICARE

## 2022-03-07 VITALS
DIASTOLIC BLOOD PRESSURE: 80 MMHG | WEIGHT: 157.19 LBS | BODY MASS INDEX: 25 KG/M2 | TEMPERATURE: 97 F | SYSTOLIC BLOOD PRESSURE: 170 MMHG | HEART RATE: 61 BPM | OXYGEN SATURATION: 98 %

## 2022-03-07 DIAGNOSIS — Z51.89 ENCOUNTER FOR MEDICATION ADJUSTMENT: Primary | ICD-10-CM

## 2022-03-07 DIAGNOSIS — Z71.9 ENCOUNTER FOR EDUCATION: ICD-10-CM

## 2022-03-07 PROCEDURE — 99213 OFFICE O/P EST LOW 20 MIN: CPT | Performed by: FAMILY MEDICINE

## 2022-03-07 NOTE — TELEPHONE ENCOUNTER
EKG and BMP results faxed to #[phone], to [facility] RN
[place] (nurse) [place] surgical nurse called and states that she needs the HNP clearance and EKG.  She can see clearance in Epic but needs EKG faxed      Fax number [phone]
No

## 2022-03-08 ENCOUNTER — TELEPHONE (OUTPATIENT)
Dept: FAMILY MEDICINE CLINIC | Facility: CLINIC | Age: 82
End: 2022-03-08

## 2022-03-08 NOTE — TELEPHONE ENCOUNTER
Looking for preop clearance faxed    [place] from [facility]      Fax #[phone]

## 2022-03-08 NOTE — TELEPHONE ENCOUNTER
Routing to Dr. Mirna Prater for review. Labs and EKG faxed yesterday. Please advise--pre op note not complete/no clearance given. Thank you.

## 2022-03-09 RX ORDER — METOPROLOL SUCCINATE 25 MG/1
25 TABLET, EXTENDED RELEASE ORAL DAILY
Qty: 90 TABLET | Refills: 2 | Status: SHIPPED | OUTPATIENT
Start: 2022-03-09

## 2022-03-09 RX ORDER — VALSARTAN AND HYDROCHLOROTHIAZIDE 160; 25 MG/1; MG/1
1 TABLET ORAL DAILY
Qty: 90 TABLET | Refills: 2 | Status: SHIPPED | OUTPATIENT
Start: 2022-03-09

## 2022-03-09 NOTE — TELEPHONE ENCOUNTER
Received fax from [facility] regarding Rx renewal request    LOV 03/07/22  Last labs 02/23/2022  BP Readings from Last 3 Encounters:  03/07/22 : (!) 170/80  02/23/22 : 136/78  11/11/21 : (!) 165/73      Last refill on 06/11/2021, for #90 tabs, with 2 refills  Metoprolol Succinate ER 25 MG Oral Tablet 24 Hr    Last refill on 06/11/2021, for #90 tabs, with 2 refills  Valsartan-hydroCHLOROthiazide 160-25 MG Oral Tab    Hypertension Medications Protocol Passed 03/09/2022 04:32 PM   Protocol Details  CMP or BMP in past 12 months    Last serum creatinine< 2.0    Appointment in past 6 or next 3 months       Future Appointments   Date Time Provider Argenis Lucas   4/7/2022  2:00 PM Ayaka Gonzales MD [facility] EMG Unknown [place] per protocol

## 2022-03-10 ENCOUNTER — TELEPHONE (OUTPATIENT)
Dept: FAMILY MEDICINE CLINIC | Facility: CLINIC | Age: 82
End: 2022-03-10

## 2022-03-10 NOTE — TELEPHONE ENCOUNTER
Need certificate of medical necessity and a prescription for a gemma reader.     Phone [phone]    Fax: [phone]

## 2022-03-15 ENCOUNTER — PATIENT MESSAGE (OUTPATIENT)
Dept: FAMILY MEDICINE CLINIC | Facility: CLINIC | Age: 82
End: 2022-03-15

## 2022-03-15 NOTE — TELEPHONE ENCOUNTER
From: Aishwarya Jefferson  To: Bobby Quinn MD  Sent: 3/15/2022 2:38 PM CDT  Subject: Jacquelin Ariza,  I am about to lose my mind over getting the CGM! I was assured on March 9 that my prescription was approved by Medicare and I would receive a 90 day supply of sensors and a monitor in 5 days (order #[id number]). Today I called Argen[facility] Lugo because I have not received it and have not heard anything from them about the shipping delay. Today I was told my order was cancelled on March 10 because of an improper prescription. I just can't believe the run around I've been getting regarding this product. Can you or Erica Ruiz (I cannot reach her) deal with [facility]? I've been dealing with this since January.   CAN YOU PLEASE HELP ME!!!!!!

## 2022-03-16 ENCOUNTER — TELEPHONE (OUTPATIENT)
Dept: FAMILY MEDICINE CLINIC | Facility: CLINIC | Age: 82
End: 2022-03-16

## 2022-03-16 NOTE — TELEPHONE ENCOUNTER
Regarding CMN & Letter of medical necessity, you will need to contact  [facility] & they will send paperwork to be completed.

## 2022-03-16 NOTE — TELEPHONE ENCOUNTER
Received fax from [facility] regarding order and CMN    Dr. Edward Brenner to review and sign  Need to fax back to #[phone]    Office visit 11/05/2021 - [facility]  Nurse visit 01/03/2022 - w/ Diabetic educator

## 2022-03-16 NOTE — TELEPHONE ENCOUNTER
Called [facility] - requesting for them to fax over paperwork for CMN - our office fax # provided    Awaiting paperwork/fax from [place] at this time

## 2022-03-16 NOTE — TELEPHONE ENCOUNTER
We were in the process of sending these requests out to [facility]. Where are we on this ?

## 2022-03-16 NOTE — TELEPHONE ENCOUNTER
PT CALLED AND ADV THAT SHE NEEDS AN MRI. DR Maribel Goyal ADV PT TO CALL PCP AND PLACE.  LOOKS LIKE DR KENNEDY TRIED TO PLACE ORDER --PLEASE VERIFY     [place] [address]

## 2022-03-17 NOTE — TELEPHONE ENCOUNTER
Dr. Mirna Prater signed and faxed to MATTHEW TUCKER [facility] fax# [phone]    Awaiting response from [facility] at this time

## 2022-03-17 NOTE — TELEPHONE ENCOUNTER
Patient picked temporary supply Lispro 2/25/22    Per Janett at [facility] [address] is preferred. Pharmacy ran as Lispro.     Will need to order as Humalog

## 2022-03-17 NOTE — TELEPHONE ENCOUNTER
Dr. Sarita Salcedo did place the order she can go wherever is in network for her- she has medicare so no PA required    The order is visible in THE [facility] and [place] [address] system.

## 2022-03-18 RX ORDER — INSULIN LISPRO 100 [IU]/ML
INJECTION, SOLUTION SUBCUTANEOUS
Qty: 3 EACH | Refills: 0 | COMMUNITY
Start: 2022-03-18

## 2022-03-22 NOTE — TELEPHONE ENCOUNTER
Called [facility] and Spoke with Chico Degroot to confirm that they received the CMN form and she confirms that yes she did recevie the form and it is under review-    They will contact us if they need anything else

## 2022-03-25 NOTE — TELEPHONE ENCOUNTER
Scarlet Hatchet called me back on the status of the device and sensors- it is going to shipping today- per Simone Kohli- the pt will be able to track the shipment via her email- there is 3 months of sensors and a reader that is coming to her.

## 2022-03-31 NOTE — TELEPHONE ENCOUNTER
Diabetic Medication Protocol Failed 03/31/2022 01:34 AM   Protocol Details  Microalbumin procedure in past 12 months or taking ACE/ARB    HgBA1C procedure resulted in past 6 months    Last HgBA1C < 7.5    Appointment in past 6 or next 3 months     Last OV 3/7/22  Last lab 11/5/21 A1c 6.9, on valsartan/hctz  Last refilled 10/11/21  #180  1 refill

## 2022-04-05 ENCOUNTER — PATIENT MESSAGE (OUTPATIENT)
Dept: FAMILY MEDICINE CLINIC | Facility: CLINIC | Age: 82
End: 2022-04-05

## 2022-04-05 RX ORDER — FENOFIBRATE 160 MG/1
160 TABLET ORAL DAILY
Qty: 90 TABLET | Refills: 3 | Status: SHIPPED | OUTPATIENT
Start: 2022-04-05

## 2022-04-05 RX ORDER — AMLODIPINE BESYLATE 5 MG/1
5 TABLET ORAL DAILY
Qty: 90 TABLET | Refills: 0 | Status: SHIPPED | OUTPATIENT
Start: 2022-04-05

## 2022-04-05 NOTE — TELEPHONE ENCOUNTER
From: Atiya Márquez  To: David Dominique MD  Sent: 4/5/2022 8:57 AM CDT  Subject: No Refills Left    Dr. Carol Mendez would you please send new scripts for Amlodipine and Fenofibrate to [facility] in [place] [address]? Thank you.

## 2022-04-06 RX ORDER — AMLODIPINE BESYLATE 5 MG/1
5 TABLET ORAL DAILY
Qty: 90 TABLET | Refills: 0 | OUTPATIENT
Start: 2022-04-06

## 2022-04-06 RX ORDER — FENOFIBRATE 160 MG/1
160 TABLET ORAL DAILY
Qty: 90 TABLET | Refills: 3 | OUTPATIENT
Start: 2022-04-06

## 2022-04-07 ENCOUNTER — OFFICE VISIT (OUTPATIENT)
Dept: FAMILY MEDICINE CLINIC | Facility: CLINIC | Age: 82
End: 2022-04-07
Payer: MEDICARE

## 2022-04-07 VITALS
RESPIRATION RATE: 18 BRPM | TEMPERATURE: 98 F | BODY MASS INDEX: 25 KG/M2 | DIASTOLIC BLOOD PRESSURE: 72 MMHG | SYSTOLIC BLOOD PRESSURE: 128 MMHG | WEIGHT: 155.13 LBS

## 2022-04-07 DIAGNOSIS — Z79.4 TYPE 2 DIABETES MELLITUS WITHOUT COMPLICATION, WITH LONG-TERM CURRENT USE OF INSULIN (HCC): ICD-10-CM

## 2022-04-07 DIAGNOSIS — Z09 FOLLOW-UP EXAM: Primary | ICD-10-CM

## 2022-04-07 DIAGNOSIS — I10 ESSENTIAL HYPERTENSION: ICD-10-CM

## 2022-04-07 DIAGNOSIS — Z23 NEED FOR VACCINATION: ICD-10-CM

## 2022-04-07 DIAGNOSIS — E11.9 TYPE 2 DIABETES MELLITUS WITHOUT COMPLICATION, WITH LONG-TERM CURRENT USE OF INSULIN (HCC): ICD-10-CM

## 2022-04-07 PROCEDURE — 99213 OFFICE O/P EST LOW 20 MIN: CPT | Performed by: FAMILY MEDICINE

## 2022-04-28 ENCOUNTER — TELEPHONE (OUTPATIENT)
Dept: FAMILY MEDICINE CLINIC | Facility: CLINIC | Age: 82
End: 2022-04-28

## 2022-04-28 NOTE — TELEPHONE ENCOUNTER
Pt advised of note below - she v/u    Future Appointments   Date Time Provider Argenis Lucas   5/2/2022 11:00 AM EMG AARTI NURSE BRYAN Albrecht   7/13/2022 11:00 AM MD BRYAN Schuler EMG Rizwan Albrecht

## 2022-04-28 NOTE — TELEPHONE ENCOUNTER
Ayaka Lamb MD [place], [place] Nurse 10 hours ago (10:47 PM)     Please let patient know that she is due for her PCV 20. Order placed.

## 2022-05-02 ENCOUNTER — NURSE ONLY (OUTPATIENT)
Dept: FAMILY MEDICINE CLINIC | Facility: CLINIC | Age: 82
End: 2022-05-02
Payer: MEDICARE

## 2022-05-02 PROCEDURE — G0009 ADMIN PNEUMOCOCCAL VACCINE: HCPCS | Performed by: FAMILY MEDICINE

## 2022-05-02 PROCEDURE — 90677 PCV20 VACCINE IM: CPT | Performed by: FAMILY MEDICINE

## 2022-05-02 NOTE — PROGRESS NOTES
Michelle Ferguson present in office for nurse visit. PCV20 Vaccine(s) as ordered by Dr. Tami Betancourt, Lot and Expiration date verified with Desi LANG, Administered to left deltoid, VIS sheet(s) given to pt     All questions/concerns addressed. Patient left in stable condition.

## 2022-05-27 DIAGNOSIS — Z79.4 TYPE 2 DIABETES MELLITUS WITH HYPERGLYCEMIA, WITH LONG-TERM CURRENT USE OF INSULIN (HCC): ICD-10-CM

## 2022-05-27 DIAGNOSIS — E11.65 TYPE 2 DIABETES MELLITUS WITH HYPERGLYCEMIA, WITH LONG-TERM CURRENT USE OF INSULIN (HCC): ICD-10-CM

## 2022-05-27 DIAGNOSIS — M47.22 OSTEOARTHRITIS OF SPINE WITH RADICULOPATHY, CERVICAL REGION: ICD-10-CM

## 2022-05-27 DIAGNOSIS — M54.2 NECK PAIN: ICD-10-CM

## 2022-05-27 DIAGNOSIS — M54.50 LOW BACK PAIN: ICD-10-CM

## 2022-05-28 RX ORDER — INSULIN DETEMIR 100 [IU]/ML
INJECTION, SOLUTION SUBCUTANEOUS
Qty: 15 ML | Refills: 0 | Status: SHIPPED | OUTPATIENT
Start: 2022-05-28

## 2022-05-28 RX ORDER — ATORVASTATIN CALCIUM 80 MG/1
TABLET, FILM COATED ORAL
Qty: 90 TABLET | Refills: 0 | Status: SHIPPED | OUTPATIENT
Start: 2022-05-28

## 2022-05-28 NOTE — TELEPHONE ENCOUNTER
Last refilled 2/28/22 for #90 with 0 RF  Last Lipid 11/5/21  Last ALT 11/5/21    Cholesterol Medication Protocol Passed 05/27/2022 08:48 PM   Protocol Details  ALT < 80    ALT resulted within past year    Lipid panel within past 12 months    Appointment within past 12 or next 3 months     Nick last refilled 2/23/22 for 9 ml with 1 RF  Protocol: none  LOV with MM 4/7/22  Future appt with pcp 7/13/22

## 2022-06-20 DIAGNOSIS — E11.65 TYPE 2 DIABETES MELLITUS WITH HYPERGLYCEMIA, WITH LONG-TERM CURRENT USE OF INSULIN (HCC): ICD-10-CM

## 2022-06-20 DIAGNOSIS — Z79.4 TYPE 2 DIABETES MELLITUS WITH HYPERGLYCEMIA, WITH LONG-TERM CURRENT USE OF INSULIN (HCC): ICD-10-CM

## 2022-06-20 RX ORDER — INSULIN LISPRO 100 [IU]/ML
INJECTION, SOLUTION INTRAVENOUS; SUBCUTANEOUS
Qty: 15 ML | Refills: 0 | Status: SHIPPED | OUTPATIENT
Start: 2022-06-20

## 2022-07-05 RX ORDER — AMLODIPINE BESYLATE 5 MG/1
TABLET ORAL
Qty: 90 TABLET | Refills: 0 | Status: SHIPPED | OUTPATIENT
Start: 2022-07-05

## 2022-07-05 NOTE — TELEPHONE ENCOUNTER
Last OV 4/7/22  Last lab  11/5/21 A1c 6.9, CMP/creat 1.38  Last refilled:  4/5/22 Amlodipine #90  0 refills  3/31/22 metformin  #180  0 refill

## 2022-07-11 NOTE — ED NOTES
Elida Burch 69 year old female    SURGEON:  Vance Omalley MD    PREOPERATIVE DIAGNOSIS:  Visually significant Nuclear Sclerotic cataract, Left eye.    POSTOPERATIVE DIAGNOSIS:  Visually significant Nuclear Sclerotic cataract,  Left eye.    PROCEDURE PERFORMED:  Phacoemulsification of cataract with posterior chamber lens implantation, Left eye.    ANESTHESIA:  Local with monitored anesthesia care.     ASSISTANT:  KWAME Headley    COMPLICATIONS: None    SPECIMENS: None    ESTIMATED BLOOD LOSS: None    IMPLANTS/GRAFTS: See description of procedure         DESCRIPTION OF PROCEDURE:  While in the preoperative holding area, the pupil of the operative eye was pharmacologically dilated.  Tetracaine 0.5% was placed on the ocular surface twice before surgery.  The patient was properly positioned and transported to the operating room.  The periorbital area was thoroughly prepped with Betadine and draped in the usual fashion.     A wire lid speculum was then placed to separate the lids.  The operating microscope was brought into position. The surgery was performed from the temporal side. A 2.5 mm keratome was inserted into the temporal clear limbal cornea to create a self-sealing incision with entry into the anterior chamber.  A second stab incision was placed through the limbal cornea.  Viscoelastic was then instilled to deepen the anterior chamber.  A central capsulorrhexis was fashioned and balanced salt solution then placed to hydrodissect and hydrodelineate the lens material.  The lens nucleus was then rotated.  The phaco-emulsifier was introduced and used to sculpt a groove into the central lens nucleus, creating a deep cleft in the central nucleus.  The nucleus was cracked and rotated.  Each half was similarly sculpted and divided.  The pieces were emulsified within the capsular bag requiring a total ultrasound time of 1:21 and an EFX of 60.  The remaining cortical material was then aspirated from the fornices of the  Full report called to [facility], pt stable at this time. capsular bag.  The posterior capsule was cleaned and polished and the bag filled with Viscoelastic.  Multiple ORA measurements were taken to guide IOL selection and measure astigmatism which was low and did not correlate with office measurements. No LRi was performed.  A Robel & Robel ZCB00 posterior chamber intraocular lens implant with a power of +22.0 diopters was folded and inserted through the corneal wound and into the capsular bag.  This lens was placed within the capsular bag and rotated into good position.  The remaining Viscoelastic was then evacuated from the anterior chamber and from beneath the intraocular lens optic.  Additional balanced salt solution was placed to obtain good intraocular pressure.  The corneal wound was hydrated and found to be watertight. The lid speculum and drapes were removed.  Vigamox and Timoptic were placed on the eye.  A protective shield was placed, and the patient then returned to recovery in good condition.

## 2022-07-13 ENCOUNTER — OFFICE VISIT (OUTPATIENT)
Dept: FAMILY MEDICINE CLINIC | Facility: CLINIC | Age: 82
End: 2022-07-13
Payer: MEDICARE

## 2022-07-13 VITALS
TEMPERATURE: 97 F | SYSTOLIC BLOOD PRESSURE: 104 MMHG | BODY MASS INDEX: 25 KG/M2 | DIASTOLIC BLOOD PRESSURE: 68 MMHG | WEIGHT: 156.5 LBS | HEART RATE: 65 BPM | OXYGEN SATURATION: 99 % | RESPIRATION RATE: 16 BRPM

## 2022-07-13 DIAGNOSIS — I10 ESSENTIAL HYPERTENSION: ICD-10-CM

## 2022-07-13 DIAGNOSIS — Z79.4 TYPE 2 DIABETES MELLITUS WITH HYPERGLYCEMIA, WITH LONG-TERM CURRENT USE OF INSULIN (HCC): Primary | ICD-10-CM

## 2022-07-13 DIAGNOSIS — E11.65 TYPE 2 DIABETES MELLITUS WITH HYPERGLYCEMIA, WITH LONG-TERM CURRENT USE OF INSULIN (HCC): Primary | ICD-10-CM

## 2022-07-13 DIAGNOSIS — E78.2 MIXED HYPERLIPIDEMIA: ICD-10-CM

## 2022-07-13 PROBLEM — E11.22 TYPE 2 DIABETES MELLITUS WITH DIABETIC CHRONIC KIDNEY DISEASE (HCC): Status: ACTIVE | Noted: 2022-07-13

## 2022-07-13 LAB
CREAT UR-SCNC: 58.3 MG/DL
EST. AVERAGE GLUCOSE BLD GHB EST-MCNC: 143 MG/DL (ref 68–126)
HBA1C MFR BLD: 6.6 % (ref ?–5.7)
MICROALBUMIN UR-MCNC: 2.6 MG/DL
MICROALBUMIN/CREAT 24H UR-RTO: 44.6 UG/MG (ref ?–30)

## 2022-07-13 PROCEDURE — 83036 HEMOGLOBIN GLYCOSYLATED A1C: CPT | Performed by: FAMILY MEDICINE

## 2022-07-13 PROCEDURE — 99213 OFFICE O/P EST LOW 20 MIN: CPT | Performed by: FAMILY MEDICINE

## 2022-07-13 PROCEDURE — 82570 ASSAY OF URINE CREATININE: CPT | Performed by: FAMILY MEDICINE

## 2022-07-13 PROCEDURE — 82043 UR ALBUMIN QUANTITATIVE: CPT | Performed by: FAMILY MEDICINE

## 2022-07-13 RX ORDER — INSULIN DETEMIR 100 [IU]/ML
INJECTION, SOLUTION SUBCUTANEOUS DAILY
Qty: 16.2 ML | Refills: 0 | COMMUNITY
Start: 2022-07-13

## 2022-07-14 ENCOUNTER — TELEPHONE (OUTPATIENT)
Dept: FAMILY MEDICINE CLINIC | Facility: CLINIC | Age: 82
End: 2022-07-14

## 2022-07-14 NOTE — TELEPHONE ENCOUNTER
Advised patient of Doctor's note below. Patient verbalized understanding. No further questions at this time.     Recall placed for 3-6 month DM check up

## 2022-07-14 NOTE — TELEPHONE ENCOUNTER
----- Message from Ayaka Peña MD sent at 7/13/2022 11:39 PM CDT -----  Labs look good - stable. Follow up in 3-6 months for diabetes check up. At this time recommend to strictly adhere to using the Sliding scale only if sugars > 150 (THIS SHOULD BE check a few minutes prior to her meals) and if > 150 she gives herself LISPRO and eats right after. Patient informed me that sometimes she will give herself insulin even if she sugars are 100. So, please educate regarding the same.

## 2022-07-16 RX ORDER — RISEDRONATE SODIUM 35 MG/1
35 TABLET, FILM COATED ORAL
Qty: 12 TABLET | Refills: 3 | Status: SHIPPED | OUTPATIENT
Start: 2022-07-16

## 2022-07-16 NOTE — TELEPHONE ENCOUNTER
Osteoporosis Medication Protocol Passed 07/16/2022 10:36 AM   Protocol Details  DEXA scan within past 2 years    CMP within the past 12 months    Calcium level between 8.3 and 10.3    GFR level greater than 35    Appointment within past 12 or next 3 months        Refilled per protocol  Risedronate Sodium 35 MG Oral Tab  Last refilled on 8/21/21 #12 with 3 rf.   LOV- 7/13/22  Last labs- 11/5/21    Sent to pharmacy

## 2022-07-18 RX ORDER — AMLODIPINE BESYLATE 5 MG/1
TABLET ORAL
Qty: 90 TABLET | Refills: 0 | OUTPATIENT
Start: 2022-07-18

## 2022-07-18 NOTE — TELEPHONE ENCOUNTER
Medication Quantity Refills Start End   AMLODIPINE 5 MG Oral Tab 90 tablet 0 7/5/2022      Refill request too soon

## 2022-07-19 ENCOUNTER — PATIENT MESSAGE (OUTPATIENT)
Dept: FAMILY MEDICINE CLINIC | Facility: CLINIC | Age: 82
End: 2022-07-19

## 2022-07-19 ENCOUNTER — TELEPHONE (OUTPATIENT)
Dept: FAMILY MEDICINE CLINIC | Facility: CLINIC | Age: 82
End: 2022-07-19

## 2022-07-19 RX ORDER — AMLODIPINE BESYLATE 5 MG/1
5 TABLET ORAL DAILY
Qty: 90 TABLET | Refills: 0 | Status: SHIPPED | OUTPATIENT
Start: 2022-07-19

## 2022-07-19 NOTE — TELEPHONE ENCOUNTER
Refill was sent on 7/5/22 for 90 days. [facility] sent to patient to confirm she picked this up.

## 2022-07-19 NOTE — TELEPHONE ENCOUNTER
From: Katerine Veras  To: Marva Mancini MD  Sent: 7/19/2022 9:03 AM CDT  Subject: Refill Problem    Osman told me my request for an Amlodipine refill was denied. Would you be kind enough to tell me why you won't renew this prescription? Thalidomide Counseling: I discussed with the patient the risks of thalidomide including but not limited to birth defects, anxiety, weakness, chest pain, dizziness, cough and severe allergy.

## 2022-07-19 NOTE — TELEPHONE ENCOUNTER
Manny Rodriguez states pt lost medication, she searched her house and car and was not able to find prescription. Refill has been sent for pt as pt was in store waiting.

## 2022-08-01 ENCOUNTER — TELEPHONE (OUTPATIENT)
Dept: FAMILY MEDICINE CLINIC | Facility: CLINIC | Age: 82
End: 2022-08-01

## 2022-08-01 NOTE — TELEPHONE ENCOUNTER
Yes please. She is diabetic and I would like to see her first. Thank you.  Put her in this week (possibly when XR is available)

## 2022-08-01 NOTE — TELEPHONE ENCOUNTER
Pt states all of a sudden pt is having sharp shooting pain across foot. Pain has been happening for a while but happening more often now. Loses balance when pain happens. Pt would like an xray. Please advise what pt should do. Thank you!

## 2022-08-04 ENCOUNTER — HOSPITAL ENCOUNTER (OUTPATIENT)
Dept: ULTRASOUND IMAGING | Age: 82
Discharge: HOME | End: 2022-08-04
Attending: FAMILY MEDICINE
Payer: MEDICARE

## 2022-08-04 ENCOUNTER — TELEPHONE (OUTPATIENT)
Dept: FAMILY MEDICINE CLINIC | Facility: CLINIC | Age: 82
End: 2022-08-04

## 2022-08-04 ENCOUNTER — OFFICE VISIT (OUTPATIENT)
Dept: FAMILY MEDICINE CLINIC | Facility: CLINIC | Age: 82
End: 2022-08-04
Payer: MEDICARE

## 2022-08-04 VITALS
BODY MASS INDEX: 24.94 KG/M2 | WEIGHT: 155.19 LBS | SYSTOLIC BLOOD PRESSURE: 130 MMHG | HEIGHT: 66 IN | DIASTOLIC BLOOD PRESSURE: 78 MMHG | HEART RATE: 72 BPM | TEMPERATURE: 97 F

## 2022-08-04 DIAGNOSIS — M79.662 PAIN OF LEFT CALF: Primary | ICD-10-CM

## 2022-08-04 DIAGNOSIS — M79.662 PAIN OF LEFT CALF: ICD-10-CM

## 2022-08-04 DIAGNOSIS — M25.472 ANKLE SWELLING, LEFT: ICD-10-CM

## 2022-08-04 PROCEDURE — 99213 OFFICE O/P EST LOW 20 MIN: CPT | Performed by: FAMILY MEDICINE

## 2022-08-04 PROCEDURE — 93971 EXTREMITY STUDY: CPT | Performed by: FAMILY MEDICINE

## 2022-08-04 NOTE — TELEPHONE ENCOUNTER
----- Message from Ayaka Anderson MD sent at 8/4/2022  4:43 PM CDT -----  Please let Natalie Hazelruben know that her US was normal and negative for a DVT. At this time I recommend Rest, Ice, ace wrapping (remove at night time) and Elevation. Tylenol  500 mg every 6 hours as needed for pain / discomfort.    Avoid any strenuous activity for the next 2 weeks - likely a sprain / calf strain

## 2022-08-21 DIAGNOSIS — E11.65 TYPE 2 DIABETES MELLITUS WITH HYPERGLYCEMIA, WITH LONG-TERM CURRENT USE OF INSULIN (HCC): ICD-10-CM

## 2022-08-21 DIAGNOSIS — Z79.4 TYPE 2 DIABETES MELLITUS WITH HYPERGLYCEMIA, WITH LONG-TERM CURRENT USE OF INSULIN (HCC): ICD-10-CM

## 2022-08-22 RX ORDER — INSULIN DETEMIR 100 [IU]/ML
INJECTION, SOLUTION SUBCUTANEOUS
Qty: 15 ML | Refills: 0 | Status: SHIPPED | OUTPATIENT
Start: 2022-08-22

## 2022-08-22 NOTE — TELEPHONE ENCOUNTER
LOV 8/4/22  Last refill 7/13/22 #16.2ml 0 refills  **most recent refill sig is for 16-18 units per day

## 2022-08-28 DIAGNOSIS — E11.65 TYPE 2 DIABETES MELLITUS WITH HYPERGLYCEMIA, WITH LONG-TERM CURRENT USE OF INSULIN (HCC): ICD-10-CM

## 2022-08-28 DIAGNOSIS — Z79.4 TYPE 2 DIABETES MELLITUS WITH HYPERGLYCEMIA, WITH LONG-TERM CURRENT USE OF INSULIN (HCC): ICD-10-CM

## 2022-08-29 RX ORDER — INSULIN LISPRO 100 [IU]/ML
INJECTION, SOLUTION INTRAVENOUS; SUBCUTANEOUS
Qty: 15 ML | Refills: 0 | Status: SHIPPED | OUTPATIENT
Start: 2022-08-29

## 2022-09-04 DIAGNOSIS — M54.2 NECK PAIN: ICD-10-CM

## 2022-09-04 DIAGNOSIS — M54.50 LOW BACK PAIN: ICD-10-CM

## 2022-09-04 DIAGNOSIS — M47.22 OSTEOARTHRITIS OF SPINE WITH RADICULOPATHY, CERVICAL REGION: ICD-10-CM

## 2022-09-06 RX ORDER — ATORVASTATIN CALCIUM 80 MG/1
TABLET, FILM COATED ORAL
Qty: 90 TABLET | Refills: 0 | Status: SHIPPED | OUTPATIENT
Start: 2022-09-06

## 2022-09-06 NOTE — TELEPHONE ENCOUNTER
Cholesterol Medication Protocol Passed 09/04/2022 08:43 AM   Protocol Details  ALT < 80    ALT resulted within past year    Lipid panel within past 12 months    Appointment within past 12 or next 3 months        LRF 5/28/22 #90  LOV  8/4/22   Last lipid  11/5/21  Future Appointments   Date Time Provider Argenis Lucas   11/11/2022 10:00 AM Ayaka Chaparro MD [facility] Lugo     Refilled per protocol

## 2022-09-13 ENCOUNTER — HOSPITAL ENCOUNTER (INPATIENT)
Facility: HOSPITAL | Age: 82
LOS: 2 days | Discharge: HOME | DRG: 309 | End: 2022-09-15
Attending: EMERGENCY MEDICINE | Admitting: HOSPITALIST

## 2022-09-13 ENCOUNTER — APPOINTMENT (OUTPATIENT)
Dept: GENERAL RADIOLOGY | Facility: HOSPITAL | Age: 82
DRG: 309 | End: 2022-09-13
Attending: EMERGENCY MEDICINE

## 2022-09-13 ENCOUNTER — TELEPHONE (OUTPATIENT)
Dept: FAMILY MEDICINE CLINIC | Facility: CLINIC | Age: 82
End: 2022-09-13

## 2022-09-13 DIAGNOSIS — I48.91 ATRIAL FIBRILLATION WITH RAPID VENTRICULAR RESPONSE (HCC): Primary | ICD-10-CM

## 2022-09-13 PROBLEM — I10 BENIGN ESSENTIAL HTN: Status: ACTIVE | Noted: 2018-04-25

## 2022-09-13 PROBLEM — E11.9 DIABETES MELLITUS TYPE 2 IN NONOBESE (HCC): Status: ACTIVE | Noted: 2022-07-13

## 2022-09-13 LAB
ALBUMIN SERPL-MCNC: 3.8 G/DL (ref 3.4–5)
ALBUMIN/GLOB SERPL: 1.4 {RATIO} (ref 1–2)
ALP LIVER SERPL-CCNC: 38 U/L
ALT SERPL-CCNC: 17 U/L
ANION GAP SERPL CALC-SCNC: 7 MMOL/L (ref 0–18)
APTT PPP: 30 SECONDS (ref 23.3–35.6)
AST SERPL-CCNC: 18 U/L (ref 15–37)
ATRIAL RATE: 125 BPM
BASOPHILS # BLD AUTO: 0.05 X10(3) UL (ref 0–0.2)
BASOPHILS NFR BLD AUTO: 0.6 %
BILIRUB SERPL-MCNC: 0.5 MG/DL (ref 0.1–2)
BUN BLD-MCNC: 34 MG/DL (ref 7–18)
CALCIUM BLD-MCNC: 10 MG/DL (ref 8.5–10.1)
CHLORIDE SERPL-SCNC: 108 MMOL/L (ref 98–112)
CO2 SERPL-SCNC: 27 MMOL/L (ref 21–32)
CREAT BLD-MCNC: 1.62 MG/DL
D DIMER PPP FEU-MCNC: <0.27 UG/ML FEU (ref ?–0.81)
EOSINOPHIL # BLD AUTO: 0.12 X10(3) UL (ref 0–0.7)
EOSINOPHIL NFR BLD AUTO: 1.4 %
ERYTHROCYTE [DISTWIDTH] IN BLOOD BY AUTOMATED COUNT: 15.9 %
GFR SERPLBLD BASED ON 1.73 SQ M-ARVRAT: 32 ML/MIN/1.73M2 (ref 60–?)
GLOBULIN PLAS-MCNC: 2.8 G/DL (ref 2.8–4.4)
GLUCOSE BLD-MCNC: 81 MG/DL (ref 70–99)
GLUCOSE BLD-MCNC: 92 MG/DL (ref 70–99)
HCT VFR BLD AUTO: 38.5 %
HGB BLD-MCNC: 12.3 G/DL
IMM GRANULOCYTES # BLD AUTO: 0.02 X10(3) UL (ref 0–1)
IMM GRANULOCYTES NFR BLD: 0.2 %
LYMPHOCYTES # BLD AUTO: 2.91 X10(3) UL (ref 1–4)
LYMPHOCYTES NFR BLD AUTO: 34.2 %
MCH RBC QN AUTO: 26.8 PG (ref 26–34)
MCHC RBC AUTO-ENTMCNC: 31.9 G/DL (ref 31–37)
MCV RBC AUTO: 83.9 FL
MONOCYTES # BLD AUTO: 0.85 X10(3) UL (ref 0.1–1)
MONOCYTES NFR BLD AUTO: 10 %
NEUTROPHILS # BLD AUTO: 4.56 X10 (3) UL (ref 1.5–7.7)
NEUTROPHILS # BLD AUTO: 4.56 X10(3) UL (ref 1.5–7.7)
NEUTROPHILS NFR BLD AUTO: 53.6 %
OSMOLALITY SERPL CALC.SUM OF ELEC: 301 MOSM/KG (ref 275–295)
PLATELET # BLD AUTO: 370 10(3)UL (ref 150–450)
POTASSIUM SERPL-SCNC: 4 MMOL/L (ref 3.5–5.1)
PROT SERPL-MCNC: 6.6 G/DL (ref 6.4–8.2)
Q-T INTERVAL: 340 MS
QRS DURATION: 90 MS
QTC CALCULATION (BEZET): 482 MS
R AXIS: -9 DEGREES
RBC # BLD AUTO: 4.59 X10(6)UL
SARS-COV-2 RNA RESP QL NAA+PROBE: NOT DETECTED
SODIUM SERPL-SCNC: 142 MMOL/L (ref 136–145)
T AXIS: 198 DEGREES
TROPONIN I HIGH SENSITIVITY: 27 NG/L
TSI SER-ACNC: 1.35 MIU/ML (ref 0.36–3.74)
VENTRICULAR RATE: 121 BPM
WBC # BLD AUTO: 8.5 X10(3) UL (ref 4–11)

## 2022-09-13 PROCEDURE — 71045 X-RAY EXAM CHEST 1 VIEW: CPT | Performed by: EMERGENCY MEDICINE

## 2022-09-13 PROCEDURE — 99223 1ST HOSP IP/OBS HIGH 75: CPT | Performed by: HOSPITALIST

## 2022-09-13 PROCEDURE — 99497 ADVNCD CARE PLAN 30 MIN: CPT | Performed by: HOSPITALIST

## 2022-09-13 RX ORDER — ATORVASTATIN CALCIUM 80 MG/1
80 TABLET, FILM COATED ORAL DAILY
Status: DISCONTINUED | OUTPATIENT
Start: 2022-09-13 | End: 2022-09-13

## 2022-09-13 RX ORDER — DEXTROSE MONOHYDRATE 25 G/50ML
50 INJECTION, SOLUTION INTRAVENOUS
Status: DISCONTINUED | OUTPATIENT
Start: 2022-09-13 | End: 2022-09-15

## 2022-09-13 RX ORDER — ASPIRIN 81 MG/1
324 TABLET, CHEWABLE ORAL ONCE
Status: COMPLETED | OUTPATIENT
Start: 2022-09-13 | End: 2022-09-13

## 2022-09-13 RX ORDER — FENOFIBRATE 134 MG/1
134 CAPSULE ORAL
Status: DISCONTINUED | OUTPATIENT
Start: 2022-09-14 | End: 2022-09-15

## 2022-09-13 RX ORDER — HEPARIN SODIUM 1000 [USP'U]/ML
60 INJECTION, SOLUTION INTRAVENOUS; SUBCUTANEOUS ONCE
Status: COMPLETED | OUTPATIENT
Start: 2022-09-13 | End: 2022-09-13

## 2022-09-13 RX ORDER — BISACODYL 10 MG
10 SUPPOSITORY, RECTAL RECTAL
Status: DISCONTINUED | OUTPATIENT
Start: 2022-09-13 | End: 2022-09-15

## 2022-09-13 RX ORDER — CALCIUM CARBONATE/VITAMIN D3 250-3.125
1 TABLET ORAL DAILY
Status: DISCONTINUED | OUTPATIENT
Start: 2022-09-13 | End: 2022-09-15

## 2022-09-13 RX ORDER — ONDANSETRON 2 MG/ML
4 INJECTION INTRAMUSCULAR; INTRAVENOUS EVERY 6 HOURS PRN
Status: DISCONTINUED | OUTPATIENT
Start: 2022-09-13 | End: 2022-09-15

## 2022-09-13 RX ORDER — SODIUM CHLORIDE 9 MG/ML
1000 INJECTION, SOLUTION INTRAVENOUS CONTINUOUS
Status: ACTIVE | OUTPATIENT
Start: 2022-09-13 | End: 2022-09-14

## 2022-09-13 RX ORDER — HEPARIN SODIUM AND DEXTROSE 10000; 5 [USP'U]/100ML; G/100ML
12 INJECTION INTRAVENOUS ONCE
Status: COMPLETED | OUTPATIENT
Start: 2022-09-13 | End: 2022-09-13

## 2022-09-13 RX ORDER — POLYETHYLENE GLYCOL 3350 17 G/17G
17 POWDER, FOR SOLUTION ORAL DAILY PRN
Status: DISCONTINUED | OUTPATIENT
Start: 2022-09-13 | End: 2022-09-15

## 2022-09-13 RX ORDER — MELATONIN
3 NIGHTLY PRN
Status: DISCONTINUED | OUTPATIENT
Start: 2022-09-13 | End: 2022-09-15

## 2022-09-13 RX ORDER — ECHINACEA PURPUREA EXTRACT 125 MG
1 TABLET ORAL
Status: DISCONTINUED | OUTPATIENT
Start: 2022-09-13 | End: 2022-09-15

## 2022-09-13 RX ORDER — ATORVASTATIN CALCIUM 80 MG/1
80 TABLET, FILM COATED ORAL NIGHTLY
Status: DISCONTINUED | OUTPATIENT
Start: 2022-09-13 | End: 2022-09-15

## 2022-09-13 RX ORDER — METOCLOPRAMIDE HYDROCHLORIDE 5 MG/ML
5 INJECTION INTRAMUSCULAR; INTRAVENOUS EVERY 8 HOURS PRN
Status: DISCONTINUED | OUTPATIENT
Start: 2022-09-13 | End: 2022-09-15

## 2022-09-13 RX ORDER — NICOTINE POLACRILEX 4 MG
30 LOZENGE BUCCAL
Status: DISCONTINUED | OUTPATIENT
Start: 2022-09-13 | End: 2022-09-15

## 2022-09-13 RX ORDER — ACETAMINOPHEN 500 MG
1000 TABLET ORAL EVERY 4 HOURS PRN
Status: DISCONTINUED | OUTPATIENT
Start: 2022-09-13 | End: 2022-09-15

## 2022-09-13 RX ORDER — HEPARIN SODIUM AND DEXTROSE 10000; 5 [USP'U]/100ML; G/100ML
INJECTION INTRAVENOUS CONTINUOUS
Status: DISCONTINUED | OUTPATIENT
Start: 2022-09-13 | End: 2022-09-14

## 2022-09-13 RX ORDER — SENNOSIDES 8.6 MG
17.2 TABLET ORAL NIGHTLY PRN
Status: DISCONTINUED | OUTPATIENT
Start: 2022-09-13 | End: 2022-09-15

## 2022-09-13 RX ORDER — NICOTINE POLACRILEX 4 MG
15 LOZENGE BUCCAL
Status: DISCONTINUED | OUTPATIENT
Start: 2022-09-13 | End: 2022-09-15

## 2022-09-13 RX ORDER — METOPROLOL SUCCINATE 25 MG/1
25 TABLET, EXTENDED RELEASE ORAL
Status: DISCONTINUED | OUTPATIENT
Start: 2022-09-14 | End: 2022-09-15

## 2022-09-13 RX ORDER — SODIUM CHLORIDE, SODIUM LACTATE, POTASSIUM CHLORIDE, CALCIUM CHLORIDE 600; 310; 30; 20 MG/100ML; MG/100ML; MG/100ML; MG/100ML
INJECTION, SOLUTION INTRAVENOUS CONTINUOUS
Status: CANCELLED | OUTPATIENT
Start: 2022-09-13 | End: 2022-09-14

## 2022-09-13 RX ORDER — MELATONIN
1000 DAILY
Status: DISCONTINUED | OUTPATIENT
Start: 2022-09-13 | End: 2022-09-15

## 2022-09-13 NOTE — TELEPHONE ENCOUNTER
Pt called she is having shortness of breath, heavy feeling in her chest, fatigue and her BP is high.

## 2022-09-13 NOTE — TELEPHONE ENCOUNTER
Feels like there are heavy books on chest. Started 3 days ago. Sleeping fine through the night. 10 min after she is up she feels it. She is so tired, has to sit down after a shower and rest.   Her daughter is in the house with her and worried. I recommend she go to the ER immediately. She agrees and says her daughter can drive her. They will go to THE [facility]. Forwarded to RN to follow up and make sure she was seen.

## 2022-09-13 NOTE — ED QUICK NOTES
Orders for admission, patient is aware of plan and ready to go upstairs. Any questions, please call ED RN Nuzhat at extension [phone].      Patient Covid vaccination status: Fully vaccinated and immunocompromised     COVID Test Ordered in ED: Rapid SARS-CoV-2 by PCR    COVID Suspicion at Admission: Low clinical suspicion for COVID    Running Infusions:   0.9NS @ gravity  Heparin @ 8mL/hr   cardizem paused    Mental Status/LOC at time of transport: A&Ox3    Other pertinent information:   CIWA score: N/A   NIH score:  N/A

## 2022-09-14 ENCOUNTER — APPOINTMENT (OUTPATIENT)
Dept: CV DIAGNOSTICS | Facility: HOSPITAL | Age: 82
DRG: 309 | End: 2022-09-14
Attending: HOSPITALIST

## 2022-09-14 LAB
ANION GAP SERPL CALC-SCNC: 3 MMOL/L (ref 0–18)
APTT PPP: 50.4 SECONDS (ref 23.3–35.6)
APTT PPP: 77.6 SECONDS (ref 23.3–35.6)
BUN BLD-MCNC: 32 MG/DL (ref 7–18)
CALCIUM BLD-MCNC: 8.7 MG/DL (ref 8.5–10.1)
CHLORIDE SERPL-SCNC: 112 MMOL/L (ref 98–112)
CO2 SERPL-SCNC: 26 MMOL/L (ref 21–32)
CREAT BLD-MCNC: 1.3 MG/DL
ERYTHROCYTE [DISTWIDTH] IN BLOOD BY AUTOMATED COUNT: 15.9 %
GFR SERPLBLD BASED ON 1.73 SQ M-ARVRAT: 41 ML/MIN/1.73M2 (ref 60–?)
GLUCOSE BLD-MCNC: 100 MG/DL (ref 70–99)
GLUCOSE BLD-MCNC: 162 MG/DL (ref 70–99)
GLUCOSE BLD-MCNC: 166 MG/DL (ref 70–99)
GLUCOSE BLD-MCNC: 203 MG/DL (ref 70–99)
GLUCOSE BLD-MCNC: 96 MG/DL (ref 70–99)
HCT VFR BLD AUTO: 35.3 %
HGB BLD-MCNC: 11.1 G/DL
MAGNESIUM SERPL-MCNC: 1.8 MG/DL (ref 1.6–2.6)
MCH RBC QN AUTO: 26.9 PG (ref 26–34)
MCHC RBC AUTO-ENTMCNC: 31.4 G/DL (ref 31–37)
MCV RBC AUTO: 85.7 FL
OSMOLALITY SERPL CALC.SUM OF ELEC: 299 MOSM/KG (ref 275–295)
PLATELET # BLD AUTO: 295 10(3)UL (ref 150–450)
PLATELET # BLD AUTO: 295 10(3)UL (ref 150–450)
POTASSIUM SERPL-SCNC: 3.6 MMOL/L (ref 3.5–5.1)
POTASSIUM SERPL-SCNC: 4.3 MMOL/L (ref 3.5–5.1)
RBC # BLD AUTO: 4.12 X10(6)UL
SODIUM SERPL-SCNC: 141 MMOL/L (ref 136–145)
WBC # BLD AUTO: 8.1 X10(3) UL (ref 4–11)

## 2022-09-14 PROCEDURE — 93306 TTE W/DOPPLER COMPLETE: CPT | Performed by: HOSPITALIST

## 2022-09-14 PROCEDURE — 99233 SBSQ HOSP IP/OBS HIGH 50: CPT | Performed by: HOSPITALIST

## 2022-09-14 RX ORDER — MAGNESIUM OXIDE 400 MG/1
400 TABLET ORAL ONCE
Status: COMPLETED | OUTPATIENT
Start: 2022-09-14 | End: 2022-09-14

## 2022-09-14 RX ORDER — DILTIAZEM HYDROCHLORIDE 180 MG/1
180 CAPSULE, EXTENDED RELEASE ORAL DAILY
Status: DISCONTINUED | OUTPATIENT
Start: 2022-09-14 | End: 2022-09-15

## 2022-09-14 RX ORDER — POTASSIUM CHLORIDE 20 MEQ/1
40 TABLET, EXTENDED RELEASE ORAL EVERY 4 HOURS
Status: COMPLETED | OUTPATIENT
Start: 2022-09-14 | End: 2022-09-14

## 2022-09-14 RX ORDER — MAGNESIUM HYDROXIDE/ALUMINUM HYDROXICE/SIMETHICONE 120; 1200; 1200 MG/30ML; MG/30ML; MG/30ML
30 SUSPENSION ORAL 4 TIMES DAILY PRN
Status: DISCONTINUED | OUTPATIENT
Start: 2022-09-14 | End: 2022-09-15

## 2022-09-14 NOTE — PROGRESS NOTES
81 y/o female with hx bladder CA presents 3 days of chest fullness and SOb. Found to have afib. Some slowing on cardizem. Start cardizem 180 and Eliquis. eventual stress test as outpatient. Hopefully home 1-2 days.  Echo normal. Romero develop hematuria on Eliquis and if she does then [facility]

## 2022-09-14 NOTE — PLAN OF CARE
Alert and oriented x4. On RA. Denies any SOB or chest pain. A fib on tele, rates irregular. On Hep gtt. Continent of bowel and bladder. Denies pain. Up with SBA. QID accucheck.

## 2022-09-14 NOTE — PLAN OF CARE
Received patient at 1100. Alert and Oriented x4. Tele Rhythm Afib. Rate  at rest, 120s with activity. On PO cardizem and eliquis. O2 saturation 98% On room air. Breath sounds clear. Bed is locked and in low position. Call light and personal items within reach. No C/O chest pain or shortness of breath. Pt voiding with no issue. Pt tolerating ambulation well with SBA. . Skin dry and intact. Reviewed plan of care and patient verbalizes understanding. Problem: Patient/Family Goals  Goal: Patient/Family Long Term Goal  Description: Patient's Long Term Goal: No further admission    Interventions:  - compliance with medication regimen  -participation in plan of care  -diagnostic testing  -appropriate follow up with MDs  - See additional Care Plan goals for specific interventions  Outcome: Progressing  Goal: Patient/Family Short Term Goal  Description: Patient's Short Term Goal: discharge    Interventions:   - medication regimen compliance  -participation in new med education  -diagnostic testing  -participation in plan of care  - See additional Care Plan goals for specific interventions  Outcome: Progressing     Problem: CARDIOVASCULAR - ADULT  Goal: Maintains optimal cardiac output and hemodynamic stability  Description: INTERVENTIONS:  - Monitor vital signs, rhythm, and trends  - Monitor for bleeding, hypotension and signs of decreased cardiac output  - Evaluate effectiveness of vasoactive medications to optimize hemodynamic stability  - Monitor arterial and/or venous puncture sites for bleeding and/or hematoma  - Assess quality of pulses, skin color and temperature  - Assess for signs of decreased coronary artery perfusion - ex.  Angina  - Evaluate fluid balance, assess for edema, trend weights  Outcome: Progressing  Goal: Absence of cardiac arrhythmias or at baseline  Description: INTERVENTIONS:  - Continuous cardiac monitoring, monitor vital signs, obtain 12 lead EKG if indicated  - Evaluate effectiveness of antiarrhythmic and heart rate control medications as ordered  - Initiate emergency measures for life threatening arrhythmias  - Monitor electrolytes and administer replacement therapy as ordered  Outcome: Progressing

## 2022-09-14 NOTE — PLAN OF CARE
Assumed care of patient at 0730. Alert and oriented. Vital signs stable. Atrial fibrillation on telemetry. HR mostly controlled. Low 100s when ambulating. Patient reports miles is greatly improved. Heparin gtt and diltiazem gtt infusing per order. CGM to RUE. Echo at bedside. Diltiazem gtt turned down and off per protocol. HR 50s-60s, still Afib. Plan of care updated with patient. Will cont to monitor. Problem: Patient/Family Goals  Goal: Patient/Family Long Term Goal  Description: Patient's Long Term Goal: No further admission    Interventions:  - compliance with medication regimen  -participation in plan of care  -diagnostic testing  -appropriate follow up with MDs  - See additional Care Plan goals for specific interventions  Outcome: Progressing  Goal: Patient/Family Short Term Goal  Description: Patient's Short Term Goal: discharge    Interventions:   - medication regimen compliance  -participation in new med education  -diagnostic testing  -participation in plan of care  - See additional Care Plan goals for specific interventions  Outcome: Progressing     Problem: CARDIOVASCULAR - ADULT  Goal: Maintains optimal cardiac output and hemodynamic stability  Description: INTERVENTIONS:  - Monitor vital signs, rhythm, and trends  - Monitor for bleeding, hypotension and signs of decreased cardiac output  - Evaluate effectiveness of vasoactive medications to optimize hemodynamic stability  - Monitor arterial and/or venous puncture sites for bleeding and/or hematoma  - Assess quality of pulses, skin color and temperature  - Assess for signs of decreased coronary artery perfusion - ex.  Angina  - Evaluate fluid balance, assess for edema, trend weights  Outcome: Progressing  Goal: Absence of cardiac arrhythmias or at baseline  Description: INTERVENTIONS:  - Continuous cardiac monitoring, monitor vital signs, obtain 12 lead EKG if indicated  - Evaluate effectiveness of antiarrhythmic and heart rate control medications as ordered  - Initiate emergency measures for life threatening arrhythmias  - Monitor electrolytes and administer replacement therapy as ordered  Outcome: Progressing

## 2022-09-15 VITALS
TEMPERATURE: 97 F | SYSTOLIC BLOOD PRESSURE: 105 MMHG | RESPIRATION RATE: 20 BRPM | OXYGEN SATURATION: 96 % | DIASTOLIC BLOOD PRESSURE: 51 MMHG | HEART RATE: 84 BPM | BODY MASS INDEX: 25.19 KG/M2 | HEIGHT: 66 IN | WEIGHT: 156.75 LBS

## 2022-09-15 LAB
APTT PPP: 32.8 SECONDS (ref 23.3–35.6)
GLUCOSE BLD-MCNC: 122 MG/DL (ref 70–99)
GLUCOSE BLD-MCNC: 171 MG/DL (ref 70–99)
MAGNESIUM SERPL-MCNC: 2.1 MG/DL (ref 1.6–2.6)
PLATELET # BLD AUTO: 312 10(3)UL (ref 150–450)

## 2022-09-15 PROCEDURE — 99238 HOSP IP/OBS DSCHRG MGMT 30/<: CPT | Performed by: HOSPITALIST

## 2022-09-15 RX ORDER — ATORVASTATIN CALCIUM 80 MG/1
80 TABLET, FILM COATED ORAL NIGHTLY
COMMUNITY

## 2022-09-15 RX ORDER — DILTIAZEM HYDROCHLORIDE 180 MG/1
180 CAPSULE, COATED, EXTENDED RELEASE ORAL DAILY
Qty: 30 CAPSULE | Refills: 3 | Status: SHIPPED | OUTPATIENT
Start: 2022-09-16 | End: 2022-09-29

## 2022-09-15 NOTE — PLAN OF CARE
Patient alert and oriented x4. Up with standby assist. Afib on tele. Maintaining o2 sats on RA. C/o intermittent SOB with activity overnight. Denies pain. Updated patient on POC, no questions at this time. Safety precautions put in place, bed alarm on. Problem: Patient/Family Goals  Goal: Patient/Family Long Term Goal  Description: Patient's Long Term Goal: No further admission    Interventions:  - compliance with medication regimen  -participation in plan of care  -diagnostic testing  -appropriate follow up with MDs  - See additional Care Plan goals for specific interventions  Outcome: Progressing  Goal: Patient/Family Short Term Goal  Description: Patient's Short Term Goal: discharge    Interventions:   - medication regimen compliance  -participation in new med education  -diagnostic testing  -participation in plan of care  - See additional Care Plan goals for specific interventions  Outcome: Progressing     Problem: CARDIOVASCULAR - ADULT  Goal: Maintains optimal cardiac output and hemodynamic stability  Description: INTERVENTIONS:  - Monitor vital signs, rhythm, and trends  - Monitor for bleeding, hypotension and signs of decreased cardiac output  - Evaluate effectiveness of vasoactive medications to optimize hemodynamic stability  - Monitor arterial and/or venous puncture sites for bleeding and/or hematoma  - Assess quality of pulses, skin color and temperature  - Assess for signs of decreased coronary artery perfusion - ex.  Angina  - Evaluate fluid balance, assess for edema, trend weights  Outcome: Progressing  Goal: Absence of cardiac arrhythmias or at baseline  Description: INTERVENTIONS:  - Continuous cardiac monitoring, monitor vital signs, obtain 12 lead EKG if indicated  - Evaluate effectiveness of antiarrhythmic and heart rate control medications as ordered  - Initiate emergency measures for life threatening arrhythmias  - Monitor electrolytes and administer replacement therapy as ordered  Outcome: Progressing

## 2022-09-15 NOTE — PLAN OF CARE
Pt is Ok to discharge per primary and consults. Pt tolerating ambulation well. Denies pain or discomfort. Will follow up with EP in 2 weeks. Has scripts for eliquis and cardizem filled from meds to beds. HR is 60-70 at rest  with activity. Discharge instructions including medications and follow ups given and patient and daughter verbalize understanding. IV removed, tele monitor discontinued. Daughter here to pick her up. All belongings taken with pt. Pt transported off unit via wheelchair.

## 2022-09-15 NOTE — CM/SW NOTE
Script for eliquis sent to [facility] with insurance per month $133.94--30 day free voucher will be applied by pharmacy

## 2022-09-15 NOTE — PLAN OF CARE
Received patient at 0730. Alert and Oriented x4. Tele Rhythm Afib rate  at rest, 120s with activity. O2 saturation 96% On room air. Breath sounds clear. Bed is locked and in low position. Call light and personal items within reach. No C/O chest pain or shortness of breath. Shortness of breath with exertion improved now that HR is more controlled. Pt voiding with no issue. Pt tolerating ambulation well. Right arm CGM intact. Eliquis has been priced and info sent to cardiac APN. Skin dry and intact. Reviewed plan of care and patient verbalizes understanding. Problem: Patient/Family Goals  Goal: Patient/Family Long Term Goal  Description: Patient's Long Term Goal: No further admission    Interventions:  - compliance with medication regimen  -participation in plan of care  -diagnostic testing  -appropriate follow up with MDs  - See additional Care Plan goals for specific interventions  Outcome: Progressing  Goal: Patient/Family Short Term Goal  Description: Patient's Short Term Goal: discharge    Interventions:   - medication regimen compliance  -participation in new med education  -diagnostic testing  -participation in plan of care  - See additional Care Plan goals for specific interventions  Outcome: Progressing     Problem: CARDIOVASCULAR - ADULT  Goal: Maintains optimal cardiac output and hemodynamic stability  Description: INTERVENTIONS:  - Monitor vital signs, rhythm, and trends  - Monitor for bleeding, hypotension and signs of decreased cardiac output  - Evaluate effectiveness of vasoactive medications to optimize hemodynamic stability  - Monitor arterial and/or venous puncture sites for bleeding and/or hematoma  - Assess quality of pulses, skin color and temperature  - Assess for signs of decreased coronary artery perfusion - ex.  Angina  - Evaluate fluid balance, assess for edema, trend weights  Outcome: Progressing  Goal: Absence of cardiac arrhythmias or at baseline  Description: INTERVENTIONS:  - Continuous cardiac monitoring, monitor vital signs, obtain 12 lead EKG if indicated  - Evaluate effectiveness of antiarrhythmic and heart rate control medications as ordered  - Initiate emergency measures for life threatening arrhythmias  - Monitor electrolytes and administer replacement therapy as ordered  Outcome: Progressing

## 2022-09-16 ENCOUNTER — PATIENT OUTREACH (OUTPATIENT)
Dept: CASE MANAGEMENT | Age: 82
End: 2022-09-16

## 2022-09-16 DIAGNOSIS — Z02.9 ENCOUNTERS FOR UNSPECIFIED ADMINISTRATIVE PURPOSE: ICD-10-CM

## 2022-09-16 DIAGNOSIS — I48.91 ATRIAL FIBRILLATION WITH RAPID VENTRICULAR RESPONSE (HCC): ICD-10-CM

## 2022-09-16 PROCEDURE — 1111F DSCHRG MED/CURRENT MED MERGE: CPT

## 2022-09-22 ENCOUNTER — OFFICE VISIT (OUTPATIENT)
Dept: INTERNAL MEDICINE CLINIC | Facility: CLINIC | Age: 82
End: 2022-09-22

## 2022-09-22 VITALS
TEMPERATURE: 97 F | WEIGHT: 157 LBS | HEIGHT: 66 IN | OXYGEN SATURATION: 95 % | SYSTOLIC BLOOD PRESSURE: 128 MMHG | DIASTOLIC BLOOD PRESSURE: 72 MMHG | HEART RATE: 81 BPM | RESPIRATION RATE: 20 BRPM | BODY MASS INDEX: 25.23 KG/M2

## 2022-09-22 DIAGNOSIS — I48.91 ATRIAL FIBRILLATION WITH RAPID VENTRICULAR RESPONSE (HCC): Primary | ICD-10-CM

## 2022-09-22 DIAGNOSIS — K59.09 OTHER CONSTIPATION: ICD-10-CM

## 2022-09-22 DIAGNOSIS — Z79.4 TYPE 2 DIABETES MELLITUS WITHOUT COMPLICATION, WITH LONG-TERM CURRENT USE OF INSULIN (HCC): Chronic | ICD-10-CM

## 2022-09-22 DIAGNOSIS — E11.9 TYPE 2 DIABETES MELLITUS WITHOUT COMPLICATION, WITH LONG-TERM CURRENT USE OF INSULIN (HCC): Chronic | ICD-10-CM

## 2022-09-22 PROCEDURE — 1111F DSCHRG MED/CURRENT MED MERGE: CPT | Performed by: NURSE PRACTITIONER

## 2022-09-22 PROCEDURE — 99495 TRANSJ CARE MGMT MOD F2F 14D: CPT | Performed by: NURSE PRACTITIONER

## 2022-09-22 NOTE — PATIENT INSTRUCTIONS
PATIENT INSTRUCTIONS:    1.  [facility] Support program call [phone] (MKATRIN 7a-7p, Sat-Sun 8a-5pm) - this program is specifically for Medicare patients. 2.  May take Colace (Docusate Sodium) 100mg capsule - 1 capsule once or twice daily as needed for hard stool or Miralax - 1 capful in 8 ounces of liquid once daily as needed.

## 2022-09-27 ENCOUNTER — APPOINTMENT (OUTPATIENT)
Dept: GENERAL RADIOLOGY | Facility: HOSPITAL | Age: 82
End: 2022-09-27
Attending: EMERGENCY MEDICINE
Payer: MEDICARE

## 2022-09-27 ENCOUNTER — HOSPITAL ENCOUNTER (INPATIENT)
Facility: HOSPITAL | Age: 82
LOS: 2 days | Discharge: HOME | End: 2022-09-29
Attending: EMERGENCY MEDICINE | Admitting: INTERNAL MEDICINE
Payer: MEDICARE

## 2022-09-27 DIAGNOSIS — R06.09 DOE (DYSPNEA ON EXERTION): ICD-10-CM

## 2022-09-27 DIAGNOSIS — I48.91 ATRIAL FIBRILLATION WITH RAPID VENTRICULAR RESPONSE (HCC): Primary | ICD-10-CM

## 2022-09-27 DIAGNOSIS — J81.0 ACUTE PULMONARY EDEMA (HCC): ICD-10-CM

## 2022-09-27 LAB
ALBUMIN SERPL-MCNC: 3.5 G/DL (ref 3.4–5)
ALBUMIN/GLOB SERPL: 1 {RATIO} (ref 1–2)
ALP LIVER SERPL-CCNC: 50 U/L
ALT SERPL-CCNC: 23 U/L
ANION GAP SERPL CALC-SCNC: 8 MMOL/L (ref 0–18)
AST SERPL-CCNC: 18 U/L (ref 15–37)
ATRIAL RATE: 129 BPM
BASOPHILS # BLD AUTO: 0.03 X10(3) UL (ref 0–0.2)
BASOPHILS NFR BLD AUTO: 0.4 %
BILIRUB SERPL-MCNC: 0.6 MG/DL (ref 0.1–2)
BUN BLD-MCNC: 24 MG/DL (ref 7–18)
CALCIUM BLD-MCNC: 9.6 MG/DL (ref 8.5–10.1)
CHLORIDE SERPL-SCNC: 110 MMOL/L (ref 98–112)
CO2 SERPL-SCNC: 23 MMOL/L (ref 21–32)
CREAT BLD-MCNC: 1.39 MG/DL
D DIMER PPP FEU-MCNC: <0.27 UG/ML FEU (ref ?–0.82)
EOSINOPHIL # BLD AUTO: 0.1 X10(3) UL (ref 0–0.7)
EOSINOPHIL NFR BLD AUTO: 1.4 %
ERYTHROCYTE [DISTWIDTH] IN BLOOD BY AUTOMATED COUNT: 15.8 %
GFR SERPLBLD BASED ON 1.73 SQ M-ARVRAT: 38 ML/MIN/1.73M2 (ref 60–?)
GLOBULIN PLAS-MCNC: 3.4 G/DL (ref 2.8–4.4)
GLUCOSE BLD-MCNC: 129 MG/DL (ref 70–99)
GLUCOSE BLD-MCNC: 165 MG/DL (ref 70–99)
GLUCOSE BLD-MCNC: 233 MG/DL (ref 70–99)
HCT VFR BLD AUTO: 37.1 %
HGB BLD-MCNC: 11.4 G/DL
IMM GRANULOCYTES # BLD AUTO: 0.02 X10(3) UL (ref 0–1)
IMM GRANULOCYTES NFR BLD: 0.3 %
LYMPHOCYTES # BLD AUTO: 1.68 X10(3) UL (ref 1–4)
LYMPHOCYTES NFR BLD AUTO: 23.5 %
MCH RBC QN AUTO: 26.1 PG (ref 26–34)
MCHC RBC AUTO-ENTMCNC: 30.7 G/DL (ref 31–37)
MCV RBC AUTO: 84.9 FL
MONOCYTES # BLD AUTO: 0.66 X10(3) UL (ref 0.1–1)
MONOCYTES NFR BLD AUTO: 9.2 %
NEUTROPHILS # BLD AUTO: 4.65 X10 (3) UL (ref 1.5–7.7)
NEUTROPHILS # BLD AUTO: 4.65 X10(3) UL (ref 1.5–7.7)
NEUTROPHILS NFR BLD AUTO: 65.2 %
NT-PROBNP SERPL-MCNC: 4447 PG/ML (ref ?–450)
OSMOLALITY SERPL CALC.SUM OF ELEC: 300 MOSM/KG (ref 275–295)
PLATELET # BLD AUTO: 323 10(3)UL (ref 150–450)
POTASSIUM SERPL-SCNC: 4.2 MMOL/L (ref 3.5–5.1)
PROT SERPL-MCNC: 6.9 G/DL (ref 6.4–8.2)
Q-T INTERVAL: 356 MS
QRS DURATION: 94 MS
QTC CALCULATION (BEZET): 479 MS
R AXIS: 0 DEGREES
RBC # BLD AUTO: 4.37 X10(6)UL
SARS-COV-2 RNA RESP QL NAA+PROBE: NOT DETECTED
SODIUM SERPL-SCNC: 141 MMOL/L (ref 136–145)
T AXIS: 173 DEGREES
TROPONIN I HIGH SENSITIVITY: 8 NG/L
VENTRICULAR RATE: 109 BPM
WBC # BLD AUTO: 7.1 X10(3) UL (ref 4–11)

## 2022-09-27 PROCEDURE — 71045 X-RAY EXAM CHEST 1 VIEW: CPT | Performed by: EMERGENCY MEDICINE

## 2022-09-27 PROCEDURE — 99223 1ST HOSP IP/OBS HIGH 75: CPT | Performed by: HOSPITALIST

## 2022-09-27 RX ORDER — INSULIN LISPRO 100 [IU]/ML
INJECTION, SOLUTION INTRAVENOUS; SUBCUTANEOUS
COMMUNITY

## 2022-09-27 RX ORDER — METOCLOPRAMIDE HYDROCHLORIDE 5 MG/ML
5 INJECTION INTRAMUSCULAR; INTRAVENOUS EVERY 8 HOURS PRN
Status: DISCONTINUED | OUTPATIENT
Start: 2022-09-27 | End: 2022-09-29

## 2022-09-27 RX ORDER — FUROSEMIDE 10 MG/ML
20 INJECTION INTRAMUSCULAR; INTRAVENOUS
Status: COMPLETED | OUTPATIENT
Start: 2022-09-28 | End: 2022-09-28

## 2022-09-27 RX ORDER — ATORVASTATIN CALCIUM 80 MG/1
80 TABLET, FILM COATED ORAL NIGHTLY
Status: DISCONTINUED | OUTPATIENT
Start: 2022-09-27 | End: 2022-09-29

## 2022-09-27 RX ORDER — DEXTROSE MONOHYDRATE 25 G/50ML
50 INJECTION, SOLUTION INTRAVENOUS
Status: DISCONTINUED | OUTPATIENT
Start: 2022-09-27 | End: 2022-09-29

## 2022-09-27 RX ORDER — ACETAMINOPHEN 500 MG
500 TABLET ORAL EVERY 4 HOURS PRN
Status: DISCONTINUED | OUTPATIENT
Start: 2022-09-27 | End: 2022-09-29

## 2022-09-27 RX ORDER — METOPROLOL SUCCINATE 25 MG/1
25 TABLET, EXTENDED RELEASE ORAL DAILY
Status: DISCONTINUED | OUTPATIENT
Start: 2022-09-28 | End: 2022-09-29

## 2022-09-27 RX ORDER — FUROSEMIDE 10 MG/ML
40 INJECTION INTRAMUSCULAR; INTRAVENOUS ONCE
Status: COMPLETED | OUTPATIENT
Start: 2022-09-27 | End: 2022-09-27

## 2022-09-27 RX ORDER — DILTIAZEM HYDROCHLORIDE 180 MG/1
180 CAPSULE, EXTENDED RELEASE ORAL DAILY
Status: DISCONTINUED | OUTPATIENT
Start: 2022-09-27 | End: 2022-09-27

## 2022-09-27 RX ORDER — NICOTINE POLACRILEX 4 MG
30 LOZENGE BUCCAL
Status: DISCONTINUED | OUTPATIENT
Start: 2022-09-27 | End: 2022-09-29

## 2022-09-27 RX ORDER — ONDANSETRON 2 MG/ML
4 INJECTION INTRAMUSCULAR; INTRAVENOUS EVERY 6 HOURS PRN
Status: DISCONTINUED | OUTPATIENT
Start: 2022-09-27 | End: 2022-09-29

## 2022-09-27 RX ORDER — FENOFIBRATE 134 MG/1
134 CAPSULE ORAL
Refills: 3 | Status: DISCONTINUED | OUTPATIENT
Start: 2022-09-28 | End: 2022-09-29

## 2022-09-27 RX ORDER — NICOTINE POLACRILEX 4 MG
15 LOZENGE BUCCAL
Status: DISCONTINUED | OUTPATIENT
Start: 2022-09-27 | End: 2022-09-29

## 2022-09-27 RX ORDER — MELATONIN
3 NIGHTLY PRN
Status: DISCONTINUED | OUTPATIENT
Start: 2022-09-27 | End: 2022-09-29

## 2022-09-27 NOTE — PLAN OF CARE
Patient seen and examined independently. Full consult to follow. 80year old female with progressive dyspnea, orthopnea and lower extremity swelling in left leg. She has been recently treated for afib w RVR and plan was for outpatient DCCV after consultation with EP after 30 days of on uninterrupted [address]. However signs and symptoms of heart failure with moderate afib rates. Continue IV diuresis, IV diltiazem drip if needed for persistent afib rate > 130, will review with EP colleagues but tentative plans for DEBRA/DCCV tomorrow; thus keep NPO after midnight. D/w patient and daughter at bedside.     Maxi Abreu MD

## 2022-09-27 NOTE — ED QUICK NOTES
Orders for admission, patient is aware of plan and ready to go upstairs. Any questions, please call ED RN Roxane Yanez at extension [phone]    Vaccinated? Yes  Type of COVID test sent: Rapid  COVID Suspicion level: Low      Titratable drug(s) infusing:   Rate: diltiazem @ 10 mg/hr.     LOC at time of transport: A&O x4    Other pertinent information:     CIWA score=  NIH score=

## 2022-09-27 NOTE — ED INITIAL ASSESSMENT (HPI)
Pt here due to KIRSTEN. Pt was here two weeks ago for the same thing she is scheduled to get cardioverted in the next few weeks. Since leaving the hospital she has been getting worse.

## 2022-09-27 NOTE — PROGRESS NOTES
Patient received at 1600 from ED. Alert and oriented x4. Tele rhythm is atrial fibrillation with a rate of  bpm at rest and 120-130 with activity. Cardizem drip going at 10 mg/hr. NPO 9/28 at 0015 for DEBRA/DCCV. O2 saturation at 97% on room air. Shortness of breath on exertion, breath sounds are regular and diminished. Patient is able to walk throughout room with stand by assistance. Patient voids. Last BM 9/27. PIV clean,dry,, and intact, currently infusing. CGM on left posterior arm, consent forms signed. Skin is intact, rechecked with ELENA Perea. Patient denies any pain.

## 2022-09-28 ENCOUNTER — APPOINTMENT (OUTPATIENT)
Dept: CV DIAGNOSTICS | Facility: HOSPITAL | Age: 82
End: 2022-09-28
Payer: MEDICARE

## 2022-09-28 ENCOUNTER — APPOINTMENT (OUTPATIENT)
Dept: INTERVENTIONAL RADIOLOGY/VASCULAR | Facility: HOSPITAL | Age: 82
End: 2022-09-28
Payer: MEDICARE

## 2022-09-28 LAB
ALBUMIN SERPL-MCNC: 3.3 G/DL (ref 3.4–5)
ALBUMIN/GLOB SERPL: 1 {RATIO} (ref 1–2)
ALP LIVER SERPL-CCNC: 46 U/L
ALT SERPL-CCNC: 23 U/L
ANION GAP SERPL CALC-SCNC: 7 MMOL/L (ref 0–18)
AST SERPL-CCNC: 32 U/L (ref 15–37)
BASOPHILS # BLD AUTO: 0.03 X10(3) UL (ref 0–0.2)
BASOPHILS NFR BLD AUTO: 0.5 %
BILIRUB SERPL-MCNC: 1 MG/DL (ref 0.1–2)
BUN BLD-MCNC: 24 MG/DL (ref 7–18)
CALCIUM BLD-MCNC: 9.7 MG/DL (ref 8.5–10.1)
CHLORIDE SERPL-SCNC: 107 MMOL/L (ref 98–112)
CO2 SERPL-SCNC: 26 MMOL/L (ref 21–32)
CREAT BLD-MCNC: 1.38 MG/DL
EOSINOPHIL # BLD AUTO: 0.14 X10(3) UL (ref 0–0.7)
EOSINOPHIL NFR BLD AUTO: 2.4 %
ERYTHROCYTE [DISTWIDTH] IN BLOOD BY AUTOMATED COUNT: 15.9 %
GFR SERPLBLD BASED ON 1.73 SQ M-ARVRAT: 38 ML/MIN/1.73M2 (ref 60–?)
GLOBULIN PLAS-MCNC: 3.3 G/DL (ref 2.8–4.4)
GLUCOSE BLD-MCNC: 154 MG/DL (ref 70–99)
GLUCOSE BLD-MCNC: 168 MG/DL (ref 70–99)
GLUCOSE BLD-MCNC: 178 MG/DL (ref 70–99)
GLUCOSE BLD-MCNC: 179 MG/DL (ref 70–99)
GLUCOSE BLD-MCNC: 284 MG/DL (ref 70–99)
HCT VFR BLD AUTO: 35.5 %
HGB BLD-MCNC: 11.1 G/DL
IMM GRANULOCYTES # BLD AUTO: 0.01 X10(3) UL (ref 0–1)
IMM GRANULOCYTES NFR BLD: 0.2 %
LYMPHOCYTES # BLD AUTO: 1.67 X10(3) UL (ref 1–4)
LYMPHOCYTES NFR BLD AUTO: 28.3 %
MAGNESIUM SERPL-MCNC: 1.8 MG/DL (ref 1.6–2.6)
MCH RBC QN AUTO: 26.7 PG (ref 26–34)
MCHC RBC AUTO-ENTMCNC: 31.3 G/DL (ref 31–37)
MCV RBC AUTO: 85.5 FL
MONOCYTES # BLD AUTO: 0.81 X10(3) UL (ref 0.1–1)
MONOCYTES NFR BLD AUTO: 13.7 %
NEUTROPHILS # BLD AUTO: 3.24 X10 (3) UL (ref 1.5–7.7)
NEUTROPHILS # BLD AUTO: 3.24 X10(3) UL (ref 1.5–7.7)
NEUTROPHILS NFR BLD AUTO: 54.9 %
OSMOLALITY SERPL CALC.SUM OF ELEC: 298 MOSM/KG (ref 275–295)
PLATELET # BLD AUTO: 291 10(3)UL (ref 150–450)
POTASSIUM SERPL-SCNC: 4.4 MMOL/L (ref 3.5–5.1)
PROT SERPL-MCNC: 6.6 G/DL (ref 6.4–8.2)
RBC # BLD AUTO: 4.15 X10(6)UL
SODIUM SERPL-SCNC: 140 MMOL/L (ref 136–145)
WBC # BLD AUTO: 5.9 X10(3) UL (ref 4–11)

## 2022-09-28 PROCEDURE — B24BZZ4 ULTRASONOGRAPHY OF HEART WITH AORTA, TRANSESOPHAGEAL: ICD-10-PCS | Performed by: INTERNAL MEDICINE

## 2022-09-28 PROCEDURE — 93325 DOPPLER ECHO COLOR FLOW MAPG: CPT

## 2022-09-28 PROCEDURE — 99233 SBSQ HOSP IP/OBS HIGH 50: CPT | Performed by: HOSPITALIST

## 2022-09-28 PROCEDURE — 93320 DOPPLER ECHO COMPLETE: CPT

## 2022-09-28 PROCEDURE — 5A2204Z RESTORATION OF CARDIAC RHYTHM, SINGLE: ICD-10-PCS | Performed by: INTERNAL MEDICINE

## 2022-09-28 RX ORDER — MAGNESIUM OXIDE 400 MG/1
400 TABLET ORAL ONCE
Status: COMPLETED | OUTPATIENT
Start: 2022-09-28 | End: 2022-09-28

## 2022-09-28 RX ORDER — MIDAZOLAM HYDROCHLORIDE 1 MG/ML
INJECTION INTRAMUSCULAR; INTRAVENOUS
Status: COMPLETED
Start: 2022-09-28 | End: 2022-09-28

## 2022-09-28 NOTE — PROCEDURES
[facility]  Cardioversion Note    [facility]   [facility] [id number] MRN [id number]   Admission Date 9/27/2022 Procedure Date 9/28/2022   Attending Physician Dat Mckoy DO Procedure Physician Wiliam Acosta MD     Pre-Operative Diagnosis: Atrial fibrillation with rapid ventricular rate    Post-Operative Diagnosis: Same as above    Procedure(s) Performed:    1. Cardioversion. 2.    Sedation     Indication:  Atrial fibrillation with rapid ventricular rate    Anesthesia: IV Sedation with Brevital    Complications: None    Summary of Case: The patient was brought to the cardiac interventional suites in a fasting and non-sedated state after providing informed consent. IV sedation was administered during continuous ECG, pulse oximeter and non-invasive hemodynamic monitoring from 4:04 PM to 4:08 PM.  After administering a total of 20 mg of IV Brevital, the desired level of sedation was achieved. A single 300-joule synchronized biphasic shock was delivered with successful conversion to sinus rhythm. The patient remained on telemetry until fully recovered. There were no complications. Conclusion:  1. Successful cardioversion.     Wiliam Acosta MD  9/28/2022  4:22 PM

## 2022-09-28 NOTE — CM/SW NOTE
Received order for POLST form. POLST completed by RN's earlier. Waiting signature for MD. Soumya Handler acknowledged.     JACQUIE Romero, [facility]  Discharge [address] [facility].

## 2022-09-28 NOTE — PROGRESS NOTES
Pt in IVS for DEBRA/cardioversion. T.O. at 15:46. Pt on tele, o2 applied, suction at bedside. After adequate sedation, DEBRA started. Pt tolerated well, end time 1603. Pt then was cardioverted at [address]. Pt in NSR. Pt awake and alert. Report given to floor RN.  Will transfer pt back to [facility]

## 2022-09-28 NOTE — PLAN OF CARE
Received patient at 0730 . Alert and oriented x4 . Tele rhythm is atrial fibrillation with a rate maintaining a range of  at rest and 121-130 with exertion . Cardizem drip currently running at 15 mg/hr. O2 saturation is 94% on room air  . Breath sounds are regular and diminished bilaterally. Patient is having shortness of breath on exertion, IV lasix BID is being given and patient says she can notice an improvement in breathing and a decrease in edema in lower extremities bilaterally . Patient is ambulating throughout the room with stand by assist . Patient is voiding. Last BM is 9/27. NPO for DEBRA/DCCV this afternoon. POLST form completed with patient, Jaciel Palafox RN witnessed and awaiting signature from physician, Dr. Maciej Cardenas notified. Patient has no complaints of pain. Call light is within patient reach. All patient needs are currently met.             Problem: Diabetes/Glucose Control  Goal: Glucose maintained within prescribed range  Description: INTERVENTIONS:  - Monitor Blood Glucose as ordered  - Assess for signs and symptoms of hyperglycemia and hypoglycemia  - Administer ordered medications to maintain glucose within target range  - Assess barriers to adequate nutritional intake and initiate nutrition consult as needed  - Instruct patient on self management of diabetes  Outcome: Progressing     Problem: Patient/Family Goals  Goal: Patient/Family Long Term Goal  Description: Patient's Long Term Goal: go home    Interventions:  - rate control, DEBRA/CDV, increase activity, tele monitoring    - See additional Care Plan goals for specific interventions  Outcome: Progressing  Goal: Patient/Family Short Term Goal  Description: Patient's Short Term Goal: return to baseline    Interventions:   - - rate control, DEBRA/CDV, increase activity, tele monitoring    - See additional Care Plan goals for specific interventions  Outcome: Progressing     Problem: CARDIOVASCULAR - ADULT  Goal: Maintains optimal cardiac output and hemodynamic stability  Description: INTERVENTIONS:  - Monitor vital signs, rhythm, and trends  - Monitor for bleeding, hypotension and signs of decreased cardiac output  - Evaluate effectiveness of vasoactive medications to optimize hemodynamic stability  - Monitor arterial and/or venous puncture sites for bleeding and/or hematoma  - Assess quality of pulses, skin color and temperature  - Assess for signs of decreased coronary artery perfusion - ex.  Angina  - Evaluate fluid balance, assess for edema, trend weights  Outcome: Progressing  Goal: Absence of cardiac arrhythmias or at baseline  Description: INTERVENTIONS:  - Continuous cardiac monitoring, monitor vital signs, obtain 12 lead EKG if indicated  - Evaluate effectiveness of antiarrhythmic and heart rate control medications as ordered  - Initiate emergency measures for life threatening arrhythmias  - Monitor electrolytes and administer replacement therapy as ordered  Outcome: Progressing     Problem: RESPIRATORY - ADULT  Goal: Achieves optimal ventilation and oxygenation  Description: INTERVENTIONS:  - Assess for changes in respiratory status  - Assess for changes in mentation and behavior  - Position to facilitate oxygenation and minimize respiratory effort  - Oxygen supplementation based on oxygen saturation or ABGs  - Provide Smoking Cessation handout, if applicable  - Encourage broncho-pulmonary hygiene including cough, deep breathe, Incentive Spirometry  - Assess the need for suctioning and perform as needed  - Assess and instruct to report SOB or any respiratory difficulty  - Respiratory Therapy support as indicated  - Manage/alleviate anxiety  - Monitor for signs/symptoms of CO2 retention  Outcome: Progressing

## 2022-09-28 NOTE — PLAN OF CARE
Heart rate went up to 130 with shortness of breath when she up to the washroom. Problem: Diabetes/Glucose Control  Goal: Glucose maintained within prescribed range  Description: INTERVENTIONS:  - Monitor Blood Glucose as ordered  - Assess for signs and symptoms of hyperglycemia and hypoglycemia  - Administer ordered medications to maintain glucose within target range  - Assess barriers to adequate nutritional intake and initiate nutrition consult as needed  - Instruct patient on self management of diabetes  Outcome: Progressing     Problem: Patient/Family Goals  Goal: Patient/Family Long Term Goal  Description: Patient's Long Term Goal: go home    Interventions:  - rate control, DEBRA/CDV, increase activity, tele monitoring    - See additional Care Plan goals for specific interventions  Outcome: Progressing  Goal: Patient/Family Short Term Goal  Description: Patient's Short Term Goal: return to baseline    Interventions:   - - rate control, DEBRA/CDV, increase activity, tele monitoring    - See additional Care Plan goals for specific interventions  Outcome: Progressing     Problem: CARDIOVASCULAR - ADULT  Goal: Maintains optimal cardiac output and hemodynamic stability  Description: INTERVENTIONS:  - Monitor vital signs, rhythm, and trends  - Monitor for bleeding, hypotension and signs of decreased cardiac output  - Evaluate effectiveness of vasoactive medications to optimize hemodynamic stability  - Monitor arterial and/or venous puncture sites for bleeding and/or hematoma  - Assess quality of pulses, skin color and temperature  - Assess for signs of decreased coronary artery perfusion - ex.  Angina  - Evaluate fluid balance, assess for edema, trend weights  Outcome: Progressing  Goal: Absence of cardiac arrhythmias or at baseline  Description: INTERVENTIONS:  - Continuous cardiac monitoring, monitor vital signs, obtain 12 lead EKG if indicated  - Evaluate effectiveness of antiarrhythmic and heart rate control medications as ordered  - Initiate emergency measures for life threatening arrhythmias  - Monitor electrolytes and administer replacement therapy as ordered  Outcome: Progressing     Problem: RESPIRATORY - ADULT  Goal: Achieves optimal ventilation and oxygenation  Description: INTERVENTIONS:  - Assess for changes in respiratory status  - Assess for changes in mentation and behavior  - Position to facilitate oxygenation and minimize respiratory effort  - Oxygen supplementation based on oxygen saturation or ABGs  - Provide Smoking Cessation handout, if applicable  - Encourage broncho-pulmonary hygiene including cough, deep breathe, Incentive Spirometry  - Assess the need for suctioning and perform as needed  - Assess and instruct to report SOB or any respiratory difficulty  - Respiratory Therapy support as indicated  - Manage/alleviate anxiety  - Monitor for signs/symptoms of CO2 retention  Outcome: Progressing

## 2022-09-29 VITALS
RESPIRATION RATE: 18 BRPM | SYSTOLIC BLOOD PRESSURE: 151 MMHG | DIASTOLIC BLOOD PRESSURE: 59 MMHG | HEIGHT: 66 IN | TEMPERATURE: 98 F | WEIGHT: 152.13 LBS | OXYGEN SATURATION: 96 % | HEART RATE: 77 BPM | BODY MASS INDEX: 24.45 KG/M2

## 2022-09-29 PROBLEM — K59.09 OTHER CONSTIPATION: Status: ACTIVE | Noted: 2022-09-29

## 2022-09-29 LAB
ANION GAP SERPL CALC-SCNC: 5 MMOL/L (ref 0–18)
ATRIAL RATE: 61 BPM
ATRIAL RATE: 76 BPM
BASOPHILS # BLD AUTO: 0.02 X10(3) UL (ref 0–0.2)
BASOPHILS NFR BLD AUTO: 0.3 %
BUN BLD-MCNC: 27 MG/DL (ref 7–18)
CALCIUM BLD-MCNC: 9.1 MG/DL (ref 8.5–10.1)
CHLORIDE SERPL-SCNC: 106 MMOL/L (ref 98–112)
CO2 SERPL-SCNC: 28 MMOL/L (ref 21–32)
CREAT BLD-MCNC: 1.46 MG/DL
EOSINOPHIL # BLD AUTO: 0.24 X10(3) UL (ref 0–0.7)
EOSINOPHIL NFR BLD AUTO: 3.9 %
ERYTHROCYTE [DISTWIDTH] IN BLOOD BY AUTOMATED COUNT: 16.1 %
GFR SERPLBLD BASED ON 1.73 SQ M-ARVRAT: 36 ML/MIN/1.73M2 (ref 60–?)
GLUCOSE BLD-MCNC: 125 MG/DL (ref 70–99)
GLUCOSE BLD-MCNC: 135 MG/DL (ref 70–99)
GLUCOSE BLD-MCNC: 280 MG/DL (ref 70–99)
HCT VFR BLD AUTO: 35.2 %
HGB BLD-MCNC: 11.1 G/DL
IMM GRANULOCYTES # BLD AUTO: 0.02 X10(3) UL (ref 0–1)
IMM GRANULOCYTES NFR BLD: 0.3 %
LYMPHOCYTES # BLD AUTO: 1.37 X10(3) UL (ref 1–4)
LYMPHOCYTES NFR BLD AUTO: 22 %
MAGNESIUM SERPL-MCNC: 1.9 MG/DL (ref 1.6–2.6)
MCH RBC QN AUTO: 26.7 PG (ref 26–34)
MCHC RBC AUTO-ENTMCNC: 31.5 G/DL (ref 31–37)
MCV RBC AUTO: 84.8 FL
MONOCYTES # BLD AUTO: 0.83 X10(3) UL (ref 0.1–1)
MONOCYTES NFR BLD AUTO: 13.3 %
NEUTROPHILS # BLD AUTO: 3.75 X10 (3) UL (ref 1.5–7.7)
NEUTROPHILS # BLD AUTO: 3.75 X10(3) UL (ref 1.5–7.7)
NEUTROPHILS NFR BLD AUTO: 60.2 %
OSMOLALITY SERPL CALC.SUM OF ELEC: 295 MOSM/KG (ref 275–295)
P AXIS: 70 DEGREES
P AXIS: 73 DEGREES
P-R INTERVAL: 160 MS
P-R INTERVAL: 196 MS
PLATELET # BLD AUTO: 269 10(3)UL (ref 150–450)
POTASSIUM SERPL-SCNC: 3.6 MMOL/L (ref 3.5–5.1)
Q-T INTERVAL: 424 MS
Q-T INTERVAL: 462 MS
QRS DURATION: 102 MS
QRS DURATION: 96 MS
QTC CALCULATION (BEZET): 465 MS
QTC CALCULATION (BEZET): 477 MS
R AXIS: -2 DEGREES
R AXIS: -9 DEGREES
RBC # BLD AUTO: 4.15 X10(6)UL
SODIUM SERPL-SCNC: 139 MMOL/L (ref 136–145)
T AXIS: 253 DEGREES
T AXIS: 64 DEGREES
VENTRICULAR RATE: 61 BPM
VENTRICULAR RATE: 76 BPM
WBC # BLD AUTO: 6.2 X10(3) UL (ref 4–11)

## 2022-09-29 PROCEDURE — 99239 HOSP IP/OBS DSCHRG MGMT >30: CPT | Performed by: HOSPITALIST

## 2022-09-29 PROCEDURE — 99497 ADVNCD CARE PLAN 30 MIN: CPT | Performed by: HOSPITALIST

## 2022-09-29 RX ORDER — METOPROLOL SUCCINATE 25 MG/1
25 TABLET, EXTENDED RELEASE ORAL ONCE
Status: COMPLETED | OUTPATIENT
Start: 2022-09-29 | End: 2022-09-29

## 2022-09-29 RX ORDER — METOPROLOL SUCCINATE 50 MG/1
50 TABLET, EXTENDED RELEASE ORAL
Qty: 90 TABLET | Refills: 1 | Status: SHIPPED | OUTPATIENT
Start: 2022-09-30

## 2022-09-29 RX ORDER — FENOFIBRATE 134 MG/1
134 CAPSULE ORAL
Qty: 90 CAPSULE | Refills: 1 | Status: SHIPPED | OUTPATIENT
Start: 2022-09-30

## 2022-09-29 RX ORDER — METOPROLOL SUCCINATE 50 MG/1
50 TABLET, EXTENDED RELEASE ORAL
Status: DISCONTINUED | OUTPATIENT
Start: 2022-09-30 | End: 2022-09-29

## 2022-09-29 RX ORDER — POTASSIUM CHLORIDE 20 MEQ/1
40 TABLET, EXTENDED RELEASE ORAL EVERY 4 HOURS
Status: COMPLETED | OUTPATIENT
Start: 2022-09-29 | End: 2022-09-29

## 2022-09-29 NOTE — PLAN OF CARE
Patient alert and oriented x4. Up with standby assist. NSR on tele. Maintaining o2 sats on RA. C/o intermittent SOB with exertion. Denies pain. Updated patient on POC, verbalized understanding. Safety precautions put in place, bed alarm on. Problem: Diabetes/Glucose Control  Goal: Glucose maintained within prescribed range  Description: INTERVENTIONS:  - Monitor Blood Glucose as ordered  - Assess for signs and symptoms of hyperglycemia and hypoglycemia  - Administer ordered medications to maintain glucose within target range  - Assess barriers to adequate nutritional intake and initiate nutrition consult as needed  - Instruct patient on self management of diabetes  Outcome: Progressing     Problem: Patient/Family Goals  Goal: Patient/Family Long Term Goal  Description: Patient's Long Term Goal: go home    Interventions:  - rate control, DEBRA/CDV, increase activity, tele monitoring    - See additional Care Plan goals for specific interventions  Outcome: Progressing  Goal: Patient/Family Short Term Goal  Description: Patient's Short Term Goal: return to baseline    Interventions:   - - rate control, DEBRA/CDV, increase activity, tele monitoring    - See additional Care Plan goals for specific interventions  Outcome: Progressing     Problem: CARDIOVASCULAR - ADULT  Goal: Maintains optimal cardiac output and hemodynamic stability  Description: INTERVENTIONS:  - Monitor vital signs, rhythm, and trends  - Monitor for bleeding, hypotension and signs of decreased cardiac output  - Evaluate effectiveness of vasoactive medications to optimize hemodynamic stability  - Monitor arterial and/or venous puncture sites for bleeding and/or hematoma  - Assess quality of pulses, skin color and temperature  - Assess for signs of decreased coronary artery perfusion - ex.  Angina  - Evaluate fluid balance, assess for edema, trend weights  Outcome: Progressing  Goal: Absence of cardiac arrhythmias or at baseline  Description: INTERVENTIONS:  - Continuous cardiac monitoring, monitor vital signs, obtain 12 lead EKG if indicated  - Evaluate effectiveness of antiarrhythmic and heart rate control medications as ordered  - Initiate emergency measures for life threatening arrhythmias  - Monitor electrolytes and administer replacement therapy as ordered  Outcome: Progressing     Problem: RESPIRATORY - ADULT  Goal: Achieves optimal ventilation and oxygenation  Description: INTERVENTIONS:  - Assess for changes in respiratory status  - Assess for changes in mentation and behavior  - Position to facilitate oxygenation and minimize respiratory effort  - Oxygen supplementation based on oxygen saturation or ABGs  - Provide Smoking Cessation handout, if applicable  - Encourage broncho-pulmonary hygiene including cough, deep breathe, Incentive Spirometry  - Assess the need for suctioning and perform as needed  - Assess and instruct to report SOB or any respiratory difficulty  - Respiratory Therapy support as indicated  - Manage/alleviate anxiety  - Monitor for signs/symptoms of CO2 retention  Outcome: Progressing

## 2022-09-29 NOTE — PROGRESS NOTES
NURSING DISCHARGE NOTE    Discharged Home via Wheelchair. Accompanied by RN  Belongings Taken by patient/family.

## 2022-09-29 NOTE — CONSULTS
[facility]    PATIENT'S NAME: Shelly Gong   ATTENDING PHYSICIAN: Pearletha Harada, M.D. Josetta Kirsch: Mauro Breaux M.D. PATIENT ACCOUNT#:   [de-identified]    LOCATION:  [address] [place] [address]  MEDICAL RECORD #:   [id number]       YOB: 1940  ADMISSION DATE:       09/13/2022      CONSULT DATE:  09/14/2022    REPORT OF CONSULTATION    HISTORY OF PRESENT ILLNESS:  An 80-year-old female with history of hypertension, diabetes, and dyslipidemia. She has had 3 days of some chest pressure and shortness of breath. Denies any syncope, orthopnea, or PND. The patient was found in the emergency room to have rapid atrial fibrillation. Patient has a history of bladder cancer. She is currently resting comfortably. PAST MEDICAL HISTORY:  Diabetes, hypertension, dyslipidemia, bladder cancer. MEDICATIONS:  Atorvastatin, metformin, insulin, valsartan. SOCIAL HISTORY:  Patient quit smoking. FAMILY HISTORY:  Remarkable for father with diabetes. Mom with colonic polyps. REVIEW OF SYSTEMS:  Denies hemoptysis, denies hematemesis, denies hematuria. Rest of review of systems normal except as in HPI. PHYSICAL EXAMINATION:    VITAL SIGNS:  Blood pressure 130/70; pulse 90, irregularly irregular. HEENT:  Within normal limits. NECK:  No JVD at 30 degrees. No thyromegaly. LUNGS:  Clear to auscultation. HEART:  PMI nondisplaced. S1, S2. No S3.    ABDOMEN:  Soft. No hepatosplenomegaly. EXTREMITIES:  No clubbing, cyanosis or edema. BACK:  No kyphosis. EKG:  Atrial fibrillation. ASSESSMENT AND PLAN:    1. Atrial fibrillation. At the present time, patient has new-onset atrial fibrillation. We will start on IV Cardizem and then place her on Cardizem 180 mg. Would place her on Eliquis. Patient should have a stress test as an outpatient. Given her bladder cancer, I think we should plan on a Watchman to avoid anticoagulation in the long run.    2.   History of bladder cancer. 3.   Hypertension. 4.   Diabetes.     Dictated By Robbie Casanova M.D.  d: 09/28/2022 18:40:38  t: 09/28/2022 21:00:53  [facility] [id number]  YNT/

## 2022-09-30 ENCOUNTER — PATIENT OUTREACH (OUTPATIENT)
Dept: CASE MANAGEMENT | Age: 82
End: 2022-09-30

## 2022-09-30 NOTE — PROGRESS NOTES
TOMMIE spoke with patient she states is not doing any better, her shortness of breath is severe, has wheezing, cough. Pt advised to return to ED for further evaluation. She states will be calling cardiology and then proceeding to ED if needed. Pt declined further assessment. TOMMIE closing encounter.

## 2022-10-01 ENCOUNTER — PATIENT MESSAGE (OUTPATIENT)
Dept: FAMILY MEDICINE CLINIC | Facility: CLINIC | Age: 82
End: 2022-10-01

## 2022-10-01 NOTE — TELEPHONE ENCOUNTER
LOV 08/04/2022  Last labs 09/29/2022  Last refill on ?, for #?, with ? refills    Future Appointments   Date Time Provider Argenis Lucas   10/6/2022 10:30 AM RUIZ Hartmann [facility]   11/11/2022 10:00 AM Ayaka Daly MD [facility] [place] 2[facility]     Order(s) pending, please review. Thank you.   [address]

## 2022-10-01 NOTE — TELEPHONE ENCOUNTER
From: Marla Roger  To: Sue Denise MD  Sent: 10/1/2022 10:54 AM CDT  Subject: Need Prescription    Would you please send a prescription for a supply of needles for my insulin pens to [facility] in [place] [address].

## 2022-10-05 ENCOUNTER — TELEPHONE (OUTPATIENT)
Dept: FAMILY MEDICINE CLINIC | Facility: CLINIC | Age: 82
End: 2022-10-05

## 2022-10-05 NOTE — TELEPHONE ENCOUNTER
[facility] ASKING IF WE CAN CHANGE THE PEN NEEDLE FROM 5MM AND 29 GAUGE TO 5MM AND 31 GAUGE.   [phone]

## 2022-10-06 ENCOUNTER — OFFICE VISIT (OUTPATIENT)
Dept: INTERNAL MEDICINE CLINIC | Facility: CLINIC | Age: 82
End: 2022-10-06
Payer: MEDICARE

## 2022-10-06 VITALS
OXYGEN SATURATION: 97 % | HEIGHT: 66 IN | SYSTOLIC BLOOD PRESSURE: 138 MMHG | BODY MASS INDEX: 23.95 KG/M2 | WEIGHT: 149 LBS | HEART RATE: 64 BPM | RESPIRATION RATE: 18 BRPM | TEMPERATURE: 97 F | DIASTOLIC BLOOD PRESSURE: 68 MMHG

## 2022-10-06 DIAGNOSIS — K59.1 FUNCTIONAL DIARRHEA: ICD-10-CM

## 2022-10-06 DIAGNOSIS — I48.91 ATRIAL FIBRILLATION WITH RAPID VENTRICULAR RESPONSE (HCC): Primary | ICD-10-CM

## 2022-10-06 DIAGNOSIS — E78.2 MIXED HYPERLIPIDEMIA: Chronic | ICD-10-CM

## 2022-10-06 DIAGNOSIS — E11.9 TYPE 2 DIABETES MELLITUS WITHOUT COMPLICATION, WITH LONG-TERM CURRENT USE OF INSULIN (HCC): Chronic | ICD-10-CM

## 2022-10-06 DIAGNOSIS — Z79.4 TYPE 2 DIABETES MELLITUS WITHOUT COMPLICATION, WITH LONG-TERM CURRENT USE OF INSULIN (HCC): Chronic | ICD-10-CM

## 2022-10-06 DIAGNOSIS — J81.0 ACUTE PULMONARY EDEMA (HCC): ICD-10-CM

## 2022-10-06 DIAGNOSIS — I10 BENIGN ESSENTIAL HTN: ICD-10-CM

## 2022-10-06 PROCEDURE — 99214 OFFICE O/P EST MOD 30 MIN: CPT | Performed by: NURSE PRACTITIONER

## 2022-10-06 PROCEDURE — 1111F DSCHRG MED/CURRENT MED MERGE: CPT | Performed by: NURSE PRACTITIONER

## 2022-10-06 RX ORDER — LISINOPRIL 5 MG/1
5 TABLET ORAL DAILY
COMMUNITY
Start: 2022-10-04

## 2022-10-06 RX ORDER — POTASSIUM CHLORIDE 1500 MG/1
1 TABLET, FILM COATED, EXTENDED RELEASE ORAL DAILY
COMMUNITY
Start: 2022-09-30

## 2022-10-06 RX ORDER — FUROSEMIDE 40 MG/1
40 TABLET ORAL DAILY
COMMUNITY
Start: 2022-09-30

## 2022-10-11 ENCOUNTER — TELEPHONE (OUTPATIENT)
Dept: INTERNAL MEDICINE CLINIC | Facility: CLINIC | Age: 82
End: 2022-10-11

## 2022-10-11 NOTE — TELEPHONE ENCOUNTER
Diabetic Medication Protocol Passed 10/11/2022 08:18 AM   Protocol Details  HgBA1C procedure resulted in past 6 months    Last HgBA1C < 7.5    Microalbumin procedure in past 12 months or taking ACE/ARB    Appointment in past 6 or next 3 months        Refilled per protocol  metFORMIN HCl 1000 MG Oral Tab  Last refilled on ? #? with ? rf.   LOV- 8/4/22  Last labs- 7/13/22    Sent to pharmacy

## 2022-10-11 NOTE — TELEPHONE ENCOUNTER
Pt calling with last 5 days of BP readings as requested by Renzo Simental APRN     AM BP AM HR PM BP PM HR   10/7 166/79 51 165/72 59   10/8 163/87 59 184/79 59   10/9 167/76 52 156/76 66   10/10 173/85 56 160/69 65   10/11 167/89 61

## 2022-10-12 NOTE — TELEPHONE ENCOUNTER
Call patient and have her increase her Lisinopril to 10mg daily and continue to check BP BID x 1 week and call in BP log again.  Call if HR is less than 50

## 2022-10-12 NOTE — TELEPHONE ENCOUNTER
Called patient - verified patient's name and  - informed pt of provider's note - patient verbalized understanding

## 2022-10-16 PROBLEM — K59.1 FUNCTIONAL DIARRHEA: Status: ACTIVE | Noted: 2022-10-16

## 2022-10-19 ENCOUNTER — TELEPHONE (OUTPATIENT)
Dept: CASE MANAGEMENT | Age: 82
End: 2022-10-19

## 2022-10-19 DIAGNOSIS — I10 ESSENTIAL HYPERTENSION: Primary | ICD-10-CM

## 2022-10-19 RX ORDER — LISINOPRIL 20 MG/1
20 TABLET ORAL EVERY EVENING
Qty: 30 TABLET | Refills: 0 | Status: SHIPPED | OUTPATIENT
Start: 2022-10-19

## 2022-10-19 NOTE — TELEPHONE ENCOUNTER
Called patient - verified patient's name and  - informed pt of provider's note - patient verbalized understanding and intent to comply

## 2022-10-19 NOTE — TELEPHONE ENCOUNTER
Pt calling with BP readings.  Please advise     AM BP AM HR PM BP PM HR   10/12 153/65 61 182/93 68   10/13 141/76 58 160/85 66   10/14 147/80 59 159/87 65   10/15 169/78 58 157/87 61   10/16 166/81 57 171/70 56   10/17 159/74 54 178/76 60   10/18 179/77 56 187/75 57   10/19 178/84 56

## 2022-10-19 NOTE — TELEPHONE ENCOUNTER
Call patient and have her continue to take Metoprolol in am and change Lisinopril to increase to 20mg in the evening now. Continue to check BP 2 times daily and call in log in one week as well as monitor HR and hold any medication for HR of 50 or less. We will send a new prescription for the Lisinopril.

## 2022-10-19 NOTE — TELEPHONE ENCOUNTER
Dose of Lisinopril increased to 20mg every evening per APN. New prescription sent for 30 day supply.

## 2022-10-26 NOTE — TELEPHONE ENCOUNTER
Pt calling with BP readings.  Please advise     AM BP AM HR PM BP PM HR   10/19 178/84 56 183/71 61   10/20 157/82 58 148/71 65   10/21 164/77 59 161/69 65   10/22 132/76 53 183/81 62   10/23 180/82 54 160/68 57   10/24 154/87 51 159/70 57   10/25 158/73 51 163/86 56

## 2022-10-26 NOTE — TELEPHONE ENCOUNTER
Called patient - informed pt of provider's note - patient verbalized understanding but stated that it was her understanding that Dr Italia Moore was not going to manage her HTN meds but Dr Yudelka Mas would. Pt has appt with Dr Yudelka Mas next week and will discuss at that point.  Pt states she will continue keeping log of her BP

## 2022-10-27 ENCOUNTER — TELEPHONE (OUTPATIENT)
Dept: CASE MANAGEMENT | Age: 82
End: 2022-10-27

## 2022-10-27 ENCOUNTER — TELEPHONE (OUTPATIENT)
Dept: FAMILY MEDICINE CLINIC | Facility: CLINIC | Age: 82
End: 2022-10-27

## 2022-10-27 DIAGNOSIS — I10 ESSENTIAL HYPERTENSION: Primary | ICD-10-CM

## 2022-10-27 RX ORDER — AMLODIPINE BESYLATE 5 MG/1
5 TABLET ORAL DAILY
Qty: 30 TABLET | Refills: 0 | Status: SHIPPED | OUTPATIENT
Start: 2022-10-27

## 2022-10-27 NOTE — TELEPHONE ENCOUNTER
Call patient and let her know Dr. Minerva Figueroa would like her to start Norvasc 5mg daily and we will send in prescription.

## 2022-10-27 NOTE — TELEPHONE ENCOUNTER
Patient called with new blood pressure readings. SBP still running high in the 160-180's. APN deferred to Dr. Abhay Ackerman (cardiology) for next steps due to high blood pressure and low heart rate. Patient is maxed out on the Lisinopril at this time. Per Dr. Blank Said, start Amlodipine 5mg daily. Sent prescription for Amlodipine.

## 2022-11-07 ENCOUNTER — LAB ENCOUNTER (OUTPATIENT)
Dept: LAB | Age: 82
End: 2022-11-07
Attending: INTERNAL MEDICINE
Payer: MEDICARE

## 2022-11-07 DIAGNOSIS — E11.65 TYPE II DIABETES MELLITUS WITH HYPEROSMOLARITY, UNCONTROLLED (HCC): ICD-10-CM

## 2022-11-07 DIAGNOSIS — E78.2 MIXED HYPERLIPIDEMIA: ICD-10-CM

## 2022-11-07 DIAGNOSIS — I10 ESSENTIAL HYPERTENSION, MALIGNANT: Primary | ICD-10-CM

## 2022-11-07 DIAGNOSIS — E11.00 TYPE II DIABETES MELLITUS WITH HYPEROSMOLARITY, UNCONTROLLED (HCC): ICD-10-CM

## 2022-11-07 LAB
ANION GAP SERPL CALC-SCNC: 7 MMOL/L (ref 0–18)
BUN BLD-MCNC: 32 MG/DL (ref 7–18)
CALCIUM BLD-MCNC: 9.9 MG/DL (ref 8.5–10.1)
CHLORIDE SERPL-SCNC: 107 MMOL/L (ref 98–112)
CO2 SERPL-SCNC: 29 MMOL/L (ref 21–32)
CREAT BLD-MCNC: 1.55 MG/DL
FASTING STATUS PATIENT QL REPORTED: NO
GFR SERPLBLD BASED ON 1.73 SQ M-ARVRAT: 33 ML/MIN/1.73M2 (ref 60–?)
GLUCOSE BLD-MCNC: 220 MG/DL (ref 70–99)
NT-PROBNP SERPL-MCNC: 865 PG/ML (ref ?–450)
OSMOLALITY SERPL CALC.SUM OF ELEC: 310 MOSM/KG (ref 275–295)
POTASSIUM SERPL-SCNC: 3.9 MMOL/L (ref 3.5–5.1)
SODIUM SERPL-SCNC: 143 MMOL/L (ref 136–145)

## 2022-11-07 PROCEDURE — 80048 BASIC METABOLIC PNL TOTAL CA: CPT

## 2022-11-07 PROCEDURE — 83880 ASSAY OF NATRIURETIC PEPTIDE: CPT

## 2022-11-07 PROCEDURE — 36415 COLL VENOUS BLD VENIPUNCTURE: CPT

## 2022-11-08 RX ORDER — INSULIN LISPRO 100 [IU]/ML
INJECTION, SOLUTION INTRAVENOUS; SUBCUTANEOUS
Qty: 15 ML | Refills: 0 | Status: SHIPPED | OUTPATIENT
Start: 2022-11-08

## 2022-11-08 NOTE — TELEPHONE ENCOUNTER
LOV: 8/4/22   Last Refill: historical    Future Appointments   Date Time Provider Argenis Lucas   11/11/2022 10:00 AM Ayaka Orourke MD [facility]       Last A1c value was 6.6% done 7/13/2022.

## 2022-11-11 ENCOUNTER — OFFICE VISIT (OUTPATIENT)
Dept: FAMILY MEDICINE CLINIC | Facility: CLINIC | Age: 82
End: 2022-11-11
Payer: MEDICARE

## 2022-11-11 VITALS
HEIGHT: 66 IN | SYSTOLIC BLOOD PRESSURE: 142 MMHG | HEART RATE: 70 BPM | TEMPERATURE: 98 F | BODY MASS INDEX: 24.33 KG/M2 | WEIGHT: 151.38 LBS | DIASTOLIC BLOOD PRESSURE: 84 MMHG

## 2022-11-11 DIAGNOSIS — E11.9 DIABETES MELLITUS TYPE 2 IN NONOBESE (HCC): ICD-10-CM

## 2022-11-11 DIAGNOSIS — Z00.00 ENCOUNTER FOR ANNUAL HEALTH EXAMINATION: ICD-10-CM

## 2022-11-11 DIAGNOSIS — J81.0 ACUTE PULMONARY EDEMA (HCC): ICD-10-CM

## 2022-11-11 DIAGNOSIS — Z00.00 ANNUAL PHYSICAL EXAM: Primary | ICD-10-CM

## 2022-11-11 DIAGNOSIS — Z91.81 AT HIGH RISK FOR FALLS: ICD-10-CM

## 2022-11-11 DIAGNOSIS — E78.2 MIXED HYPERLIPIDEMIA: ICD-10-CM

## 2022-11-11 DIAGNOSIS — R06.09 DOE (DYSPNEA ON EXERTION): ICD-10-CM

## 2022-11-11 DIAGNOSIS — Z23 NEED FOR VACCINATION: ICD-10-CM

## 2022-11-11 DIAGNOSIS — C67.2 MALIGNANT NEOPLASM OF LATERAL WALL OF URINARY BLADDER (HCC): ICD-10-CM

## 2022-11-11 DIAGNOSIS — I10 BENIGN ESSENTIAL HTN: ICD-10-CM

## 2022-11-11 DIAGNOSIS — I48.91 ATRIAL FIBRILLATION WITH RAPID VENTRICULAR RESPONSE (HCC): ICD-10-CM

## 2022-11-11 DIAGNOSIS — Z86.010 PERSONAL HISTORY OF COLONIC POLYPS: ICD-10-CM

## 2022-11-11 DIAGNOSIS — N18.32 STAGE 3B CHRONIC KIDNEY DISEASE (HCC): ICD-10-CM

## 2022-11-11 DIAGNOSIS — K59.09 OTHER CONSTIPATION: ICD-10-CM

## 2022-11-11 DIAGNOSIS — K59.1 FUNCTIONAL DIARRHEA: ICD-10-CM

## 2022-11-11 PROBLEM — E11.65 TYPE 2 DIABETES MELLITUS WITH HYPERGLYCEMIA (HCC): Status: RESOLVED | Noted: 2021-11-05 | Resolved: 2022-11-11

## 2022-11-11 PROBLEM — E11.22 TYPE 2 DIABETES MELLITUS WITH DIABETIC CHRONIC KIDNEY DISEASE (HCC): Status: ACTIVE | Noted: 2022-11-11

## 2022-11-11 RX ORDER — HYDRALAZINE HYDROCHLORIDE 25 MG/1
25 TABLET, FILM COATED ORAL 3 TIMES DAILY
COMMUNITY
Start: 2022-11-03

## 2022-12-04 ENCOUNTER — PATIENT MESSAGE (OUTPATIENT)
Dept: FAMILY MEDICINE CLINIC | Facility: CLINIC | Age: 82
End: 2022-12-04

## 2022-12-05 NOTE — TELEPHONE ENCOUNTER
[address] [address]  Dept: [phone]  Loc: [address] ENCOUNTER        This patient was not seen or evaluated by the attending physician. I evaluated this patient, the attending physician was available for consultation. CHIEF COMPLAINT    Chief Complaint   Patient presents with    Shortness of Breath     with exertion        HPI    Dyan Estes is a 80 y.o. female who presents with increased confusion and shortness of breath. Onset was uncertain. Patient was brought in by the grandson. The grandson states that his mother, is in rehab due to COVID-19. She is the one that normally takes care of his grandmother. He took her to see his mother, although the patient has not been fully vaccinated against Covid. It does appear that she had a dose about last week approximately on her care everywhere but she does not remember this happening. She is disoriented to person place and time. She seems very confused, was originally not refusing to answer questions for the nursing staff but she seemed cooperative for me. Became much more cooperative for nursing staff with the grandson at bedside. He states that she is somewhat confused at baseline but this is more than typical. The duration has been constant since the patients grandson noticed her symptoms today. She denies cough but she did have a consistent dry, hacking cough on my exam. No known fevers or chills. The context was that the symptoms started spontaneously. Was brought to the ED by the grandson for further evaluation and treatment. REVIEW OF SYSTEMS    Cardiac: no chest pain, no palpitations, no syncope  Respiratory: see HPI, + cough  Neurologic: no syncope or LOC  GI: no vomiting, no diarrhea, no abdominal pain  General: no known measured fevers  All other systems reviewed and are negative.     PAST MEDICAL & Osteoporosis Medication Protocol Failed 12/05/2022 09:19 AM   Protocol Details  DEXA scan within past 2 years    GFR level greater than 35    CMP within the past 12 months    Calcium level between 8.3 and 10.3    Appointment within past 12 or next 3 months        Routing to provider per protocol. risedronate 35 MG Oral Tab  Last refilled on 7/16/22 for #12  with 3 rf. Last labs 9/28/22. Last seen on 11/11/22. Future Appointments   Date Time Provider Argenis Lucas   3/10/2023 10:00 AM Davian aGge MD [facility] [place]          Thank you. SURGICAL HISTORY    Past Medical History:   Diagnosis Date    Advance directive discussed with patient     Asked to bring in:  tells me she is DNR    Chronic kidney disease (CKD) stage G3a/A1, moderately decreased glomerular filtration rate (GFR) between 45-59 mL/min/1.73 square meter and albuminuria creatinine ratio less than 30 mg/g (HCC)     Gout     Gout     HTN (hypertension)     Hypercholesteremia      Past Surgical History:   Procedure Laterality Date    HYSTERECTOMY  1971    RECTOCELE REPAIR  2005    Dr. Ortega Bias  Dec 2012    Dr Mccracken Mask :  Right   Begoniasingel 13  (may include discharge medications prescribed in the ED)  Current Outpatient Rx   Medication Sig Dispense Refill    amLODIPine (NORVASC) 10 MG tablet Take 10 mg by mouth daily         ALLERGIES    Allergies   Allergen Reactions    Morphine      Vomiting      Vicodin [Hydrocodone-Acetaminophen]      Vomiting/nausea    Meclizine      Makes her feels worse     Zetia [Ezetimibe] Itching    Lipitor [Atorvastatin] Other (See Comments)     myalgia       SOCIAL & FAMILY HISTORY    Social History     Socioeconomic History    Marital status: Single     Spouse name: Daughter Oswaldo Diss    Number of children: 3    Years of education: Not on file    Highest education level: Not on file   Occupational History    Occupation: [employer]   Tobacco Use    Smoking status: Never Smoker    Smokeless tobacco: Never Used   Substance and Sexual Activity    Alcohol use: Yes     Comment: Wine nightly    Drug use: No    Sexual activity: Not on file   Other Topics Concern    Not on file   Social History Narrative    Not on file     Social Determinants of Health     Financial Resource Strain:     Difficulty of Paying Living Expenses:    Food Insecurity:     Worried About Running Out of Food in the Last Year:     [address] in the Last Year:    Transportation Needs:     Lack of Transportation (Medical):      Lack of Transportation (Non-Medical):    Physical Activity:     Days of Exercise per Week:     Minutes of Exercise per Session:    Stress:     Feeling of Stress :    Social Connections:     Frequency of Communication with Friends and Family:     Frequency of Social Gatherings with Friends and Family:     Attends Christianity Services:     Active Member of Clubs or Organizations:     Attends Club or Organization Meetings:     Marital Status:    Intimate Partner Violence:     Fear of Current or Ex-Partner:     Emotionally Abused:     Physically Abused:     Sexually Abused:      Family History   Problem Relation Age of Onset    Cirrhosis Mother         Blood transfusion: Hep C    Diabetes Mother     Hypertension Mother     Emphysema Father                PHYSICAL EXAM    VITAL SIGNS: /76   Pulse 107   Temp 97.2 °F (36.2 °C) (Temporal)   Resp 16   Wt 137 lb 5.6 oz (62.3 kg)   SpO2 92%   BMI 25.12 kg/m²    Constitutional:  Well developed, well nourished, no acute distress   HENT: Normocephalic, Atraumatic, moist mucus membranes  Neck: supple, no JVD   Respiratory:  Lungs rhonchorous throughout all lung fields, no wheezes, no retractions   Cardiovascular:  tachycardic rate, no murmurs, rubs or gallops  Vascular: Radial and DP pulses 2+ and equal bilaterally  GI:  Soft, nontender, normal bowel sounds  Musculoskeletal:  no lower extremity edema, no lower extremity asymmetry, no calf tenderness, no thigh tenderness, no acute deformities  Integument:  Skin is warm and dry, no petechiae   Neurologic:  Alert but disoriented, disoriented to person place and time, is uncooperative but easily redirected, no slurred speech, no ataxia or gait abnormalities, moves all 4 extremities spontaneously and equally, sensation to light touch intact in all 4 extremities  Psych: confused affect, no A/V hallucinations noted        EKG    Please see the physician note for EKG interpretation.     LABS  Results for orders placed or performed during the hospital encounter of 10/11/21   COVID-19, Rapid    Specimen: Nasopharyngeal Swab   Result Value Ref Range    SARS-CoV-2, NAAT Not Detected Not Detected   CBC Auto Differential   Result Value Ref Range    WBC 10.4 4.0 - 11.0 K/uL    RBC 4.17 4.00 - 5.20 M/uL    Hemoglobin 12.6 12.0 - 16.0 g/dL    Hematocrit 38.1 36.0 - 48.0 %    MCV 91.2 80.0 - 100.0 fL    MCH 30.2 26.0 - 34.0 pg    MCHC 33.1 31.0 - 36.0 g/dL    RDW 14.4 12.4 - 15.4 %    Platelets 407 394 - 057 K/uL    MPV 7.1 5.0 - 10.5 fL    Neutrophils % 75.1 %    Lymphocytes % 17.1 %    Monocytes % 5.8 %    Eosinophils % 1.4 %    Basophils % 0.6 %    Neutrophils Absolute 7.8 (H) 1.7 - 7.7 K/uL    Lymphocytes Absolute 1.8 1.0 - 5.1 K/uL    Monocytes Absolute 0.6 0.0 - 1.3 K/uL    Eosinophils Absolute 0.1 0.0 - 0.6 K/uL    Basophils Absolute 0.1 0.0 - 0.2 K/uL   Comprehensive Metabolic Panel w/ Reflex to MG   Result Value Ref Range    Sodium 135 (L) 136 - 145 mmol/L    Potassium reflex Magnesium 3.6 3.5 - 5.1 mmol/L    Chloride 97 (L) 99 - 110 mmol/L    CO2 24 21 - 32 mmol/L    Anion Gap 14 3 - 16    Glucose 119 (H) 70 - 99 mg/dL    BUN 16 7 - 20 mg/dL    CREATININE 0.9 0.6 - 1.2 mg/dL    GFR Non-African American 60 (A) >60    GFR African American >60 >60    Calcium 9.4 8.3 - 10.6 mg/dL    Total Protein 8.0 6.4 - 8.2 g/dL    Albumin 3.2 (L) 3.4 - 5.0 g/dL    Albumin/Globulin Ratio 0.7 (L) 1.1 - 2.2    Total Bilirubin 0.7 0.0 - 1.0 mg/dL    Alkaline Phosphatase 115 40 - 129 U/L    ALT 19 10 - 40 U/L    AST 23 15 - 37 U/L    Globulin 4.8 Not Established g/dL   Troponin   Result Value Ref Range    Troponin <0.01 <0.01 ng/mL   Brain Natriuretic Peptide   Result Value Ref Range    Pro- 0 - 449 pg/mL   Lactic Acid, Plasma   Result Value Ref Range    Lactic Acid 1.3 0.4 - 2.0 mmol/L   Procalcitonin   Result Value Ref Range    Procalcitonin 0.08 0.00 - 0.15 ng/mL        RADIOLOGY/PROCEDURES CT CHEST PULMONARY EMBOLISM W CONTRAST   Final Result   1. Multifocal ground-glass the confluent airspace disease. Correlate with   presentation for pneumonia, including COVID pneumonia. Follow-up to   resolution recommended. 2.  Motion degraded evaluation of the pulmonary arteries. No acute pulmonary   embolism to the lobar arteries given limitation. 3. Other findings as described. CT Head WO Contrast   Final Result   No acute hemorrhage or midline shift. Other findings as described. XR CHEST (2 VW)   Final Result   Peripheral bilateral pulmonary a opacification, likely multifocal pneumonia   including COVID-19 pneumonia. Mild right basilar atelectasis. ED COURSE & MEDICAL DECISION MAKING    Pertinent Labs & Imaging studies reviewed and interpreted. (See chart for details)    See chart for details of medications given during the ED stay. Vitals:    10/11/21 1452 10/11/21 1654   BP: 113/76    Pulse: 107    Resp: 16    Temp: 97.2 °F (36.2 °C)    TempSrc: Temporal    SpO2: 92%    Weight:  137 lb 5.6 oz (62.3 kg)       Differential Diagnosis: Acute Coronary Syndrome, Congestive Heart Failure, Myocardial Infarction, Pulmonary Embolus, Pneumonia, Pneumothorax, COVID-19, Influenza, other    CRITICAL CARE NOTE:  There was a high probability of clinically significant life-threatening deterioration of the patient's condition requiring my urgent intervention. Total critical care time was at least 15 minutes. This includes vital sign monitoring, pulse oximetry monitoring, telemetry monitoring, clinical response to the IV medications, reviewing the nursing notes, consultation time, dictation/documentation time, and interpretation of the labwork. This excludes any separately billable procedures performed.   The critical care time above also includes time spent obtaining history from family members and electronic chart, as the patient was unable to provide the history AND the history obtained was directly relevant to the care of the patient. Patient is afebrile and nontoxic in appearance. Tachycardic on arrival.  Not hypoxic but borderline at 92% on room air. Does have some mild conversational dyspnea. Very rhonchorous lung sounds throughout all lung fields, and she does have a hacking cough on exam even though she denies any cough. Labs reveal no leukocytosis or anemia. Metabolic panel unremarkable. No lactic acidosis. CXR findings as above. CT findings as above. EKG interpreted by physician. Troponin negative. CTA to rule out PE was ordered given her borderline hypoxia, and tachycardia and PERC criteria cannot be applied. Rapid Covid was ordered due to the patient's symptoms resembling COVID-19 illness. Covid Decompensation Risk Scale    Screen for Imminent Risk:  -O2 Sat <95%, SBP <100 or DBP <60?  -RR >22?  -Age 61+ AND Pulse >100 at time of disposition? ---------> High Risk- Recommend Admission      Clinical Risk Score:                                                                                                                       -history of CHF, COPD, age >57 = +2 pts each                                                                 -CXR:    Moderate, non-lobar = +2   1 lobar infiltrate = +4   Severe bilateral OR 2+ lobar infiltrates = +8    - HR >100 at disposition: +2 pts    -SpO2 < 92% on RA = +4 pts    -RR> 20 = 1 pt each    Total 18      Score >9+:  High Risk? Admit. Base off of the above scale patient scores a 18 for risk of decompensation within 24 hours due to Covid-19. This was utilized via the clinical management tool as above from [facility]. This puts patient into the HIGH risk category for decompensation within 24 hours and based off of this the recommendation is for the patient to be ADMITTED. Consultation with hospitalist at 31 19 62:  I contacted the hospitalist via [employer], and the patient was accepted for admission. FINAL IMPRESSION    1. Dyspnea and respiratory abnormalities    2. Altered mental status, unspecified altered mental status type    3. Pneumonia of both lungs due to infectious organism, unspecified part of lung    4.  Suspected COVID-19 virus infection          PLAN  Admission to the hospital    (Please note that this note was completed with a voice recognition program.  Every attempt was made to edit the dictations, but inevitably there remain words that are mis-transcribed.)     FLOR Do - KAYA  10/11/21 8188

## 2022-12-05 NOTE — TELEPHONE ENCOUNTER
From: David Membreno  To: Joseph Lang MD  Sent: 12/4/2022 9:20 AM CST  Subject: Prescription Refill    Would you please send a script for Risedronate to [facility] in [place] [address]. I don't have any tablets left.

## 2022-12-06 RX ORDER — RISEDRONATE SODIUM 35 MG/1
35 TABLET, FILM COATED ORAL
Qty: 12 TABLET | Refills: 3 | Status: SHIPPED | OUTPATIENT
Start: 2022-12-06

## 2022-12-07 RX ORDER — INSULIN DETEMIR 100 [IU]/ML
INJECTION, SOLUTION SUBCUTANEOUS
Qty: 15 ML | Refills: 0 | Status: SHIPPED | OUTPATIENT
Start: 2022-12-07

## 2022-12-07 NOTE — TELEPHONE ENCOUNTER
LOV 08/04/2022  Last labs 11/07/2022, 07/13/2022  Last refill on 08/22/2022, for #15mL, with 0 refills  LEVEMIR FLEXTOUCH 100 UNIT/ML Subcutaneous Solution Pen-injector     Future Appointments   Date Time Provider Argenis Lucas   3/10/2023 10:00 AM Corinne Brand, [place], MD [facility] Ryena     Order(s) pending, please review. Thank you.

## 2023-01-04 RX ORDER — INSULIN LISPRO 100 [IU]/ML
INJECTION, SOLUTION INTRAVENOUS; SUBCUTANEOUS
Qty: 15 ML | Refills: 0 | Status: SHIPPED | OUTPATIENT
Start: 2023-01-04

## 2023-01-18 ENCOUNTER — TELEPHONE (OUTPATIENT)
Dept: FAMILY MEDICINE CLINIC | Facility: CLINIC | Age: 83
End: 2023-01-18

## 2023-01-18 NOTE — TELEPHONE ENCOUNTER
Received fax from [facility] regarding \"Rx for CGM is expiring in 45 days.  We are proactively seeking new rx to avoid delay on their next order\"    Dr. Tami Betancourt to review and sign    Pt's facesheet attached  Need to fax to #[phone]

## 2023-02-10 ENCOUNTER — TELEPHONE (OUTPATIENT)
Dept: FAMILY MEDICINE CLINIC | Facility: CLINIC | Age: 83
End: 2023-02-10

## 2023-02-10 NOTE — TELEPHONE ENCOUNTER
Received letter from [facility] regarding pt's Renewal Rx for CGM supplies    Dr. Maciej Almanzar reviewed and signed    [facility] notes 11/11/22 and BMP 11/07/22 - faxed to [place] at fax #[phone]    Send to scanning

## 2023-02-14 ENCOUNTER — TELEPHONE (OUTPATIENT)
Dept: FAMILY MEDICINE CLINIC | Facility: CLINIC | Age: 83
End: 2023-02-14

## 2023-02-16 ENCOUNTER — LAB ENCOUNTER (OUTPATIENT)
Dept: LAB | Facility: HOSPITAL | Age: 83
End: 2023-02-16
Attending: INTERNAL MEDICINE
Payer: MEDICARE

## 2023-02-16 DIAGNOSIS — I10 ESSENTIAL HYPERTENSION, BENIGN: Primary | ICD-10-CM

## 2023-02-16 DIAGNOSIS — E11.65 TYPE II DIABETES MELLITUS WITH HYPEROSMOLARITY, UNCONTROLLED (HCC): ICD-10-CM

## 2023-02-16 DIAGNOSIS — E11.00 TYPE II DIABETES MELLITUS WITH HYPEROSMOLARITY, UNCONTROLLED (HCC): ICD-10-CM

## 2023-02-16 DIAGNOSIS — E78.2 MIXED HYPERLIPIDEMIA: ICD-10-CM

## 2023-02-16 DIAGNOSIS — R06.09 DOE (DYSPNEA ON EXERTION): ICD-10-CM

## 2023-02-16 LAB
ANION GAP SERPL CALC-SCNC: 2 MMOL/L (ref 0–18)
BUN BLD-MCNC: 34 MG/DL (ref 7–18)
CALCIUM BLD-MCNC: 10 MG/DL (ref 8.5–10.1)
CHLORIDE SERPL-SCNC: 111 MMOL/L (ref 98–112)
CO2 SERPL-SCNC: 30 MMOL/L (ref 21–32)
CREAT BLD-MCNC: 1.52 MG/DL
FASTING STATUS PATIENT QL REPORTED: YES
GFR SERPLBLD BASED ON 1.73 SQ M-ARVRAT: 34 ML/MIN/1.73M2 (ref 60–?)
GLUCOSE BLD-MCNC: 141 MG/DL (ref 70–99)
NT-PROBNP SERPL-MCNC: 860 PG/ML (ref ?–450)
OSMOLALITY SERPL CALC.SUM OF ELEC: 306 MOSM/KG (ref 275–295)
POTASSIUM SERPL-SCNC: 3.9 MMOL/L (ref 3.5–5.1)
SODIUM SERPL-SCNC: 143 MMOL/L (ref 136–145)

## 2023-02-16 PROCEDURE — 80048 BASIC METABOLIC PNL TOTAL CA: CPT

## 2023-02-16 PROCEDURE — 83880 ASSAY OF NATRIURETIC PEPTIDE: CPT

## 2023-02-16 PROCEDURE — 36415 COLL VENOUS BLD VENIPUNCTURE: CPT

## 2023-02-23 ENCOUNTER — HOSPITAL ENCOUNTER (OUTPATIENT)
Facility: HOSPITAL | Age: 83
Setting detail: OBSERVATION
LOS: 1 days | Discharge: HOME | End: 2023-02-24
Attending: EMERGENCY MEDICINE | Admitting: EMERGENCY MEDICINE
Payer: MEDICARE

## 2023-02-23 DIAGNOSIS — E16.2 HYPOGLYCEMIA: ICD-10-CM

## 2023-02-23 DIAGNOSIS — I48.91 ATRIAL FIBRILLATION WITH RAPID VENTRICULAR RESPONSE (HCC): Primary | ICD-10-CM

## 2023-02-23 DIAGNOSIS — E11.9 TYPE 2 DIABETES MELLITUS WITHOUT COMPLICATION, WITH LONG-TERM CURRENT USE OF INSULIN (HCC): ICD-10-CM

## 2023-02-23 DIAGNOSIS — Z79.4 TYPE 2 DIABETES MELLITUS WITHOUT COMPLICATION, WITH LONG-TERM CURRENT USE OF INSULIN (HCC): ICD-10-CM

## 2023-02-23 LAB
ALBUMIN SERPL-MCNC: 4.1 G/DL (ref 3.4–5)
ALBUMIN/GLOB SERPL: 1.4 {RATIO} (ref 1–2)
ALP LIVER SERPL-CCNC: 44 U/L
ALT SERPL-CCNC: 22 U/L
ANION GAP SERPL CALC-SCNC: 10 MMOL/L (ref 0–18)
AST SERPL-CCNC: 24 U/L (ref 15–37)
ATRIAL RATE: 110 BPM
BASOPHILS # BLD AUTO: 0.05 X10(3) UL (ref 0–0.2)
BASOPHILS NFR BLD AUTO: 0.5 %
BILIRUB SERPL-MCNC: 0.5 MG/DL (ref 0.1–2)
BUN BLD-MCNC: 37 MG/DL (ref 7–18)
CALCIUM BLD-MCNC: 9.9 MG/DL (ref 8.5–10.1)
CHLORIDE SERPL-SCNC: 107 MMOL/L (ref 98–112)
CHOLEST SERPL-MCNC: 156 MG/DL (ref ?–200)
CO2 SERPL-SCNC: 27 MMOL/L (ref 21–32)
CREAT BLD-MCNC: 1.6 MG/DL
EOSINOPHIL # BLD AUTO: 0.12 X10(3) UL (ref 0–0.7)
EOSINOPHIL NFR BLD AUTO: 1.1 %
ERYTHROCYTE [DISTWIDTH] IN BLOOD BY AUTOMATED COUNT: 13.6 %
EST. AVERAGE GLUCOSE BLD GHB EST-MCNC: 154 MG/DL (ref 68–126)
GFR SERPLBLD BASED ON 1.73 SQ M-ARVRAT: 32 ML/MIN/1.73M2 (ref 60–?)
GLOBULIN PLAS-MCNC: 3 G/DL (ref 2.8–4.4)
GLUCOSE BLD-MCNC: 167 MG/DL (ref 70–99)
GLUCOSE BLD-MCNC: 247 MG/DL (ref 70–99)
GLUCOSE BLD-MCNC: 60 MG/DL (ref 70–99)
GLUCOSE BLD-MCNC: 81 MG/DL (ref 70–99)
GLUCOSE BLD-MCNC: 83 MG/DL (ref 70–99)
HBA1C MFR BLD: 7 % (ref ?–5.7)
HCT VFR BLD AUTO: 42.1 %
HDLC SERPL-MCNC: 78 MG/DL (ref 40–59)
HGB BLD-MCNC: 13.4 G/DL
IMM GRANULOCYTES # BLD AUTO: 0.04 X10(3) UL (ref 0–1)
IMM GRANULOCYTES NFR BLD: 0.4 %
LDLC SERPL CALC-MCNC: 60 MG/DL (ref ?–100)
LYMPHOCYTES # BLD AUTO: 3.28 X10(3) UL (ref 1–4)
LYMPHOCYTES NFR BLD AUTO: 29.9 %
MCH RBC QN AUTO: 26.4 PG (ref 26–34)
MCHC RBC AUTO-ENTMCNC: 31.8 G/DL (ref 31–37)
MCV RBC AUTO: 83 FL
MONOCYTES # BLD AUTO: 1.04 X10(3) UL (ref 0.1–1)
MONOCYTES NFR BLD AUTO: 9.5 %
NEUTROPHILS # BLD AUTO: 6.44 X10 (3) UL (ref 1.5–7.7)
NEUTROPHILS # BLD AUTO: 6.44 X10(3) UL (ref 1.5–7.7)
NEUTROPHILS NFR BLD AUTO: 58.6 %
NONHDLC SERPL-MCNC: 78 MG/DL (ref ?–130)
NT-PROBNP SERPL-MCNC: 1973 PG/ML (ref ?–450)
OSMOLALITY SERPL CALC.SUM OF ELEC: 306 MOSM/KG (ref 275–295)
PLATELET # BLD AUTO: 338 10(3)UL (ref 150–450)
POTASSIUM SERPL-SCNC: 3.3 MMOL/L (ref 3.5–5.1)
PROT SERPL-MCNC: 7.1 G/DL (ref 6.4–8.2)
Q-T INTERVAL: 362 MS
QRS DURATION: 106 MS
QTC CALCULATION (BEZET): 464 MS
R AXIS: -14 DEGREES
RBC # BLD AUTO: 5.07 X10(6)UL
SARS-COV-2 RNA RESP QL NAA+PROBE: NOT DETECTED
SODIUM SERPL-SCNC: 144 MMOL/L (ref 136–145)
T AXIS: 136 DEGREES
TRIGL SERPL-MCNC: 99 MG/DL (ref 30–149)
TROPONIN I HIGH SENSITIVITY: 105 NG/L
TROPONIN I HIGH SENSITIVITY: 25 NG/L
VENTRICULAR RATE: 99 BPM
VLDLC SERPL CALC-MCNC: 15 MG/DL (ref 0–30)
WBC # BLD AUTO: 11 X10(3) UL (ref 4–11)

## 2023-02-23 PROCEDURE — 99223 1ST HOSP IP/OBS HIGH 75: CPT | Performed by: INTERNAL MEDICINE

## 2023-02-23 RX ORDER — POTASSIUM CHLORIDE 20 MEQ/1
40 TABLET, EXTENDED RELEASE ORAL ONCE
Status: COMPLETED | OUTPATIENT
Start: 2023-02-23 | End: 2023-02-23

## 2023-02-23 RX ORDER — FUROSEMIDE 40 MG/1
40 TABLET ORAL DAILY
Status: DISCONTINUED | OUTPATIENT
Start: 2023-02-24 | End: 2023-02-24

## 2023-02-23 RX ORDER — BISACODYL 10 MG
10 SUPPOSITORY, RECTAL RECTAL
Status: DISCONTINUED | OUTPATIENT
Start: 2023-02-23 | End: 2023-02-24

## 2023-02-23 RX ORDER — BENZONATATE 100 MG/1
200 CAPSULE ORAL 3 TIMES DAILY PRN
Status: DISCONTINUED | OUTPATIENT
Start: 2023-02-23 | End: 2023-02-24

## 2023-02-23 RX ORDER — MELATONIN
3 NIGHTLY PRN
Status: DISCONTINUED | OUTPATIENT
Start: 2023-02-23 | End: 2023-02-24

## 2023-02-23 RX ORDER — ONDANSETRON 2 MG/ML
4 INJECTION INTRAMUSCULAR; INTRAVENOUS EVERY 6 HOURS PRN
Status: DISCONTINUED | OUTPATIENT
Start: 2023-02-23 | End: 2023-02-24

## 2023-02-23 RX ORDER — FENOFIBRATE 67 MG/1
134 CAPSULE ORAL
Status: DISCONTINUED | OUTPATIENT
Start: 2023-02-24 | End: 2023-02-24

## 2023-02-23 RX ORDER — METOCLOPRAMIDE HYDROCHLORIDE 5 MG/ML
5 INJECTION INTRAMUSCULAR; INTRAVENOUS EVERY 8 HOURS PRN
Status: DISCONTINUED | OUTPATIENT
Start: 2023-02-23 | End: 2023-02-24

## 2023-02-23 RX ORDER — LISINOPRIL 20 MG/1
20 TABLET ORAL DAILY
Status: DISCONTINUED | OUTPATIENT
Start: 2023-02-24 | End: 2023-02-24

## 2023-02-23 RX ORDER — METOPROLOL SUCCINATE 50 MG/1
50 TABLET, EXTENDED RELEASE ORAL
Status: DISCONTINUED | OUTPATIENT
Start: 2023-02-24 | End: 2023-02-24

## 2023-02-23 RX ORDER — ECHINACEA PURPUREA EXTRACT 125 MG
1 TABLET ORAL
Status: DISCONTINUED | OUTPATIENT
Start: 2023-02-23 | End: 2023-02-24

## 2023-02-23 RX ORDER — DEXTROSE MONOHYDRATE 25 G/50ML
50 INJECTION, SOLUTION INTRAVENOUS
Status: DISCONTINUED | OUTPATIENT
Start: 2023-02-23 | End: 2023-02-24

## 2023-02-23 RX ORDER — ACETAMINOPHEN 500 MG
500 TABLET ORAL EVERY 4 HOURS PRN
Status: DISCONTINUED | OUTPATIENT
Start: 2023-02-23 | End: 2023-02-24

## 2023-02-23 RX ORDER — NICOTINE POLACRILEX 4 MG
30 LOZENGE BUCCAL
Status: DISCONTINUED | OUTPATIENT
Start: 2023-02-23 | End: 2023-02-24

## 2023-02-23 RX ORDER — POLYETHYLENE GLYCOL 3350 17 G/17G
17 POWDER, FOR SOLUTION ORAL DAILY PRN
Status: DISCONTINUED | OUTPATIENT
Start: 2023-02-23 | End: 2023-02-24

## 2023-02-23 RX ORDER — ATORVASTATIN CALCIUM 80 MG/1
80 TABLET, FILM COATED ORAL NIGHTLY
Status: DISCONTINUED | OUTPATIENT
Start: 2023-02-23 | End: 2023-02-24

## 2023-02-23 RX ORDER — SENNOSIDES 8.6 MG
17.2 TABLET ORAL NIGHTLY PRN
Status: DISCONTINUED | OUTPATIENT
Start: 2023-02-23 | End: 2023-02-24

## 2023-02-23 RX ORDER — NICOTINE POLACRILEX 4 MG
15 LOZENGE BUCCAL
Status: DISCONTINUED | OUTPATIENT
Start: 2023-02-23 | End: 2023-02-24

## 2023-02-23 RX ORDER — METOPROLOL TARTRATE 5 MG/5ML
5 INJECTION INTRAVENOUS EVERY 6 HOURS PRN
Status: DISCONTINUED | OUTPATIENT
Start: 2023-02-23 | End: 2023-02-24

## 2023-02-23 RX ORDER — AMLODIPINE BESYLATE 5 MG/1
5 TABLET ORAL DAILY
Status: DISCONTINUED | OUTPATIENT
Start: 2023-02-24 | End: 2023-02-24

## 2023-02-23 RX ORDER — DEXTROSE MONOHYDRATE 25 G/50ML
50 INJECTION, SOLUTION INTRAVENOUS ONCE
Status: COMPLETED | OUTPATIENT
Start: 2023-02-23 | End: 2023-02-23

## 2023-02-23 NOTE — ED INITIAL ASSESSMENT (HPI)
Patient had cardiology appointment today in the clinic her blood sugar is low  . As per daughter her afib is back  Cardiology want to admit her  For cardioversion . Denies chest pain now .

## 2023-02-23 NOTE — ED QUICK NOTES
Orders for admission, patient is aware of plan and ready to go upstairs. Any questions, please call ED RN Raymundo Oconnor at extension [phone].      Patient Covid vaccination status: Fully vaccinated     COVID Test Ordered in ED: Rapid SARS-CoV-2 by PCR    COVID Suspicion at Admission: N/A    Running Infusions:  None    Mental Status/LOC at time of transport: A&Ox4    Other pertinent information: ambulatory  CIWA score: N/A   NIH score:  N/A

## 2023-02-24 VITALS
DIASTOLIC BLOOD PRESSURE: 42 MMHG | SYSTOLIC BLOOD PRESSURE: 150 MMHG | HEART RATE: 55 BPM | OXYGEN SATURATION: 100 % | RESPIRATION RATE: 16 BRPM | WEIGHT: 153 LBS | BODY MASS INDEX: 24.59 KG/M2 | HEIGHT: 66 IN | TEMPERATURE: 98 F

## 2023-02-24 LAB
ANION GAP SERPL CALC-SCNC: 3 MMOL/L (ref 0–18)
ATRIAL RATE: 55 BPM
ATRIAL RATE: 57 BPM
BASOPHILS # BLD AUTO: 0.03 X10(3) UL (ref 0–0.2)
BASOPHILS NFR BLD AUTO: 0.5 %
BUN BLD-MCNC: 37 MG/DL (ref 7–18)
CALCIUM BLD-MCNC: 9.5 MG/DL (ref 8.5–10.1)
CHLORIDE SERPL-SCNC: 111 MMOL/L (ref 98–112)
CO2 SERPL-SCNC: 27 MMOL/L (ref 21–32)
CREAT BLD-MCNC: 1.47 MG/DL
EOSINOPHIL # BLD AUTO: 0.13 X10(3) UL (ref 0–0.7)
EOSINOPHIL NFR BLD AUTO: 2 %
ERYTHROCYTE [DISTWIDTH] IN BLOOD BY AUTOMATED COUNT: 13.8 %
GFR SERPLBLD BASED ON 1.73 SQ M-ARVRAT: 35 ML/MIN/1.73M2 (ref 60–?)
GLUCOSE BLD-MCNC: 136 MG/DL (ref 70–99)
GLUCOSE BLD-MCNC: 148 MG/DL (ref 70–99)
GLUCOSE BLD-MCNC: 253 MG/DL (ref 70–99)
GLUCOSE BLD-MCNC: 298 MG/DL (ref 70–99)
HCT VFR BLD AUTO: 37.8 %
HGB BLD-MCNC: 11.7 G/DL
IMM GRANULOCYTES # BLD AUTO: 0.02 X10(3) UL (ref 0–1)
IMM GRANULOCYTES NFR BLD: 0.3 %
LYMPHOCYTES # BLD AUTO: 1.98 X10(3) UL (ref 1–4)
LYMPHOCYTES NFR BLD AUTO: 30.6 %
MCH RBC QN AUTO: 26.7 PG (ref 26–34)
MCHC RBC AUTO-ENTMCNC: 31 G/DL (ref 31–37)
MCV RBC AUTO: 86.3 FL
MONOCYTES # BLD AUTO: 0.78 X10(3) UL (ref 0.1–1)
MONOCYTES NFR BLD AUTO: 12.1 %
NEUTROPHILS # BLD AUTO: 3.53 X10 (3) UL (ref 1.5–7.7)
NEUTROPHILS # BLD AUTO: 3.53 X10(3) UL (ref 1.5–7.7)
NEUTROPHILS NFR BLD AUTO: 54.5 %
OSMOLALITY SERPL CALC.SUM OF ELEC: 303 MOSM/KG (ref 275–295)
P AXIS: 112 DEGREES
P AXIS: 57 DEGREES
P-R INTERVAL: 182 MS
P-R INTERVAL: 188 MS
PLATELET # BLD AUTO: 280 10(3)UL (ref 150–450)
POTASSIUM SERPL-SCNC: 5.1 MMOL/L (ref 3.5–5.1)
POTASSIUM SERPL-SCNC: 5.1 MMOL/L (ref 3.5–5.1)
Q-T INTERVAL: 446 MS
Q-T INTERVAL: 478 MS
QRS DURATION: 94 MS
QRS DURATION: 94 MS
QTC CALCULATION (BEZET): 434 MS
QTC CALCULATION (BEZET): 457 MS
R AXIS: -13 DEGREES
R AXIS: 195 DEGREES
RBC # BLD AUTO: 4.38 X10(6)UL
SODIUM SERPL-SCNC: 141 MMOL/L (ref 136–145)
T AXIS: -74 DEGREES
T AXIS: 179 DEGREES
TROPONIN I HIGH SENSITIVITY: 138 NG/L
VENTRICULAR RATE: 55 BPM
VENTRICULAR RATE: 57 BPM
WBC # BLD AUTO: 6.5 X10(3) UL (ref 4–11)

## 2023-02-24 PROCEDURE — 99239 HOSP IP/OBS DSCHRG MGMT >30: CPT | Performed by: HOSPITALIST

## 2023-02-24 RX ORDER — METOPROLOL SUCCINATE 25 MG/1
25 TABLET, EXTENDED RELEASE ORAL
Qty: 30 TABLET | Refills: 0 | Status: SHIPPED | OUTPATIENT
Start: 2023-02-25

## 2023-02-24 RX ORDER — METOPROLOL SUCCINATE 25 MG/1
25 TABLET, EXTENDED RELEASE ORAL
Status: DISCONTINUED | OUTPATIENT
Start: 2023-02-24 | End: 2023-02-24

## 2023-02-24 RX ORDER — FLECAINIDE ACETATE 100 MG/1
50 TABLET ORAL EVERY 12 HOURS SCHEDULED
Status: DISCONTINUED | OUTPATIENT
Start: 2023-02-24 | End: 2023-02-24

## 2023-02-24 RX ORDER — FLECAINIDE ACETATE 50 MG/1
50 TABLET ORAL EVERY 12 HOURS SCHEDULED
Qty: 60 TABLET | Refills: 0 | Status: SHIPPED | OUTPATIENT
Start: 2023-02-24

## 2023-02-24 NOTE — CM/SW NOTE
Patient failed inpatient criteria. Second level of review completed and supports observation. UR committee in agreement. Pratima Wolf RN discussed with Dr. Urvashi Christine who approves observation status. Observation letter given to the patient and order written.     [address], [facility]  Extension [phone]

## 2023-02-24 NOTE — PLAN OF CARE
NURSING ADMISSION NOTE      Patient admitted via Cart  Oriented to room. Safety precautions initiated. Bed in low position. Call light in reach. Assumed care of pt around 1900. A/o x4. RA. NSR/SB w/ PACs on tele. Pt got to the floor around 6:45pm, by that time had already converted back to SR.  K replaced per protocol. Critical troponin. [facility] notified. Repeat in the AM.   EKG completed. Denies any pain overnight. Up to the bathroom w/ standby assistance. Currently resting in bed w/ call light within reach.

## 2023-02-24 NOTE — PLAN OF CARE
Pt Aox4  Pt denies pain. RA  SB  Pt's bed in lowest position and bed alarm on. Discharge pt    -educated pt on the importance of follow up and medication. Pt had all her belongings. All of pt's question answered.

## 2023-02-24 NOTE — PLAN OF CARE
David Membreno Patient Status:  Observation    1940 MRN [id number]   [facility] 7NE-A Attending Jonathon Dickey, DO   Hosp Day # 0 PCP Ayaka Mcallister MD     Cardiology Nocturnal APN Plan of Care     Page Received: Bedside RN 2419    Patient with Afib RVR currently in SR. Troponin 25 -> 105 without chest pain.       Assessment/Plan:     - Repeat troponin and EKG in AM; elevation likely s/t demand from Afib RVR  - Plan for possible cardioversion in AM  - Continue eliquis / metoprolol     [place] Steve, [address]  2023  8:15 PM

## 2023-02-27 ENCOUNTER — PATIENT OUTREACH (OUTPATIENT)
Dept: CASE MANAGEMENT | Age: 83
End: 2023-02-27

## 2023-02-27 DIAGNOSIS — Z02.9 ENCOUNTERS FOR UNSPECIFIED ADMINISTRATIVE PURPOSE: ICD-10-CM

## 2023-02-27 DIAGNOSIS — I48.91 ATRIAL FIBRILLATION WITH RAPID VENTRICULAR RESPONSE (HCC): ICD-10-CM

## 2023-02-27 PROCEDURE — 1111F DSCHRG MED/CURRENT MED MERGE: CPT

## 2023-03-01 ENCOUNTER — OFFICE VISIT (OUTPATIENT)
Dept: INTERNAL MEDICINE CLINIC | Facility: CLINIC | Age: 83
End: 2023-03-01
Payer: MEDICARE

## 2023-03-01 VITALS
DIASTOLIC BLOOD PRESSURE: 60 MMHG | HEIGHT: 66 IN | SYSTOLIC BLOOD PRESSURE: 142 MMHG | BODY MASS INDEX: 24.96 KG/M2 | RESPIRATION RATE: 16 BRPM | WEIGHT: 155.31 LBS | HEART RATE: 58 BPM

## 2023-03-01 DIAGNOSIS — I48.91 ATRIAL FIBRILLATION WITH RAPID VENTRICULAR RESPONSE (HCC): Primary | ICD-10-CM

## 2023-03-01 DIAGNOSIS — E11.9 TYPE 2 DIABETES MELLITUS WITHOUT COMPLICATION, WITH LONG-TERM CURRENT USE OF INSULIN (HCC): Chronic | ICD-10-CM

## 2023-03-01 DIAGNOSIS — Z79.4 TYPE 2 DIABETES MELLITUS WITHOUT COMPLICATION, WITH LONG-TERM CURRENT USE OF INSULIN (HCC): Chronic | ICD-10-CM

## 2023-03-01 DIAGNOSIS — E16.2 HYPOGLYCEMIA: ICD-10-CM

## 2023-03-01 DIAGNOSIS — K59.09 OTHER CONSTIPATION: ICD-10-CM

## 2023-03-01 DIAGNOSIS — I10 BENIGN ESSENTIAL HTN: ICD-10-CM

## 2023-03-01 DIAGNOSIS — N18.32 STAGE 3B CHRONIC KIDNEY DISEASE (HCC): ICD-10-CM

## 2023-03-01 PROCEDURE — 1111F DSCHRG MED/CURRENT MED MERGE: CPT | Performed by: NURSE PRACTITIONER

## 2023-03-01 PROCEDURE — 99495 TRANSJ CARE MGMT MOD F2F 14D: CPT | Performed by: NURSE PRACTITIONER

## 2023-03-01 PROCEDURE — 1126F AMNT PAIN NOTED NONE PRSNT: CPT | Performed by: NURSE PRACTITIONER

## 2023-03-01 NOTE — PATIENT INSTRUCTIONS
PATIENT INSTRUCTIONS:    Have Vitamin D levels drawn at next PCP visit to see if Vitamin D dose should be decreased  Discuss with Dr. Maciej Almanzar about Metformin with a low creatinine clearance-maybe should be stopped  Take over the counter Senna-S 8.6-50mg 1 tab every other day until has bowel movement 2 days in a row and then may stop.    Stop Senna-S if any diarrhea  Call us if still not moving your bowels regularly after taking the Senna-S for at least 3-4 doses

## 2023-03-04 NOTE — TELEPHONE ENCOUNTER
Routing to provider per protocol. HUMALOG KWIKPEN 100 UNIT/ML Subcutaneous Solution Pen-injector  Last refilled on 1/4/23 for #15  with 0 rf. Last labs 2/24/23. Last seen on 11/11/22. Future Appointments   Date Time Provider Argenis Lucas   3/8/2023 11:00 AM Morro Toscano MD [facility] [place]          Thank you.

## 2023-03-04 NOTE — TELEPHONE ENCOUNTER
HUMALOG KWIKPEN 100 UNIT/ML Subcutaneous Solution Pen-injector      Pt would like sent to   [id number] [address], [address] [place] [address]  [phone] [facility], [phone]

## 2023-03-06 RX ORDER — INSULIN LISPRO 100 [IU]/ML
30 INJECTION, SOLUTION INTRAVENOUS; SUBCUTANEOUS DAILY
Qty: 15 ML | Refills: 0 | Status: SHIPPED | OUTPATIENT
Start: 2023-03-06

## 2023-03-08 ENCOUNTER — OFFICE VISIT (OUTPATIENT)
Dept: FAMILY MEDICINE CLINIC | Facility: CLINIC | Age: 83
End: 2023-03-08
Payer: MEDICARE

## 2023-03-08 VITALS
HEART RATE: 85 BPM | OXYGEN SATURATION: 98 % | TEMPERATURE: 97 F | BODY MASS INDEX: 25 KG/M2 | SYSTOLIC BLOOD PRESSURE: 120 MMHG | DIASTOLIC BLOOD PRESSURE: 70 MMHG | WEIGHT: 154.19 LBS

## 2023-03-08 DIAGNOSIS — I48.91 ATRIAL FIBRILLATION WITH RAPID VENTRICULAR RESPONSE (HCC): ICD-10-CM

## 2023-03-08 DIAGNOSIS — N18.32 STAGE 3B CHRONIC KIDNEY DISEASE (HCC): ICD-10-CM

## 2023-03-08 DIAGNOSIS — E16.2 HYPOGLYCEMIA: ICD-10-CM

## 2023-03-08 DIAGNOSIS — Z09 HOSPITAL DISCHARGE FOLLOW-UP: Primary | ICD-10-CM

## 2023-03-08 DIAGNOSIS — Z79.4 TYPE 2 DIABETES MELLITUS WITHOUT COMPLICATION, WITH LONG-TERM CURRENT USE OF INSULIN (HCC): Chronic | ICD-10-CM

## 2023-03-08 DIAGNOSIS — I10 BENIGN ESSENTIAL HTN: ICD-10-CM

## 2023-03-08 DIAGNOSIS — E11.9 TYPE 2 DIABETES MELLITUS WITHOUT COMPLICATION, WITH LONG-TERM CURRENT USE OF INSULIN (HCC): Chronic | ICD-10-CM

## 2023-03-21 NOTE — TELEPHONE ENCOUNTER
Last OV:03/08/2023  Last refill:12/07/2022, 15 mL, 0 refill     Medication pended, please sign if appropriate

## 2023-03-22 RX ORDER — INSULIN DETEMIR 100 [IU]/ML
INJECTION, SOLUTION SUBCUTANEOUS
Qty: 15 ML | Refills: 0 | Status: SHIPPED | OUTPATIENT
Start: 2023-03-22

## 2023-03-24 RX ORDER — METOPROLOL SUCCINATE 25 MG/1
25 TABLET, EXTENDED RELEASE ORAL
Qty: 90 TABLET | Refills: 0 | Status: SHIPPED | OUTPATIENT
Start: 2023-03-24 | End: 2023-06-22

## 2023-03-24 NOTE — TELEPHONE ENCOUNTER
This was started during her recent hospitalization. Seeing Dr. Reggie Almonte at [address] [place] [address]. They should be handling this new medication. Patient should also be see in our office sooner than June for follow up. Thanks!

## 2023-03-24 NOTE — TELEPHONE ENCOUNTER
Last OV:03/08/2023  Last refill:02/25/2023, 30 tabs, 0 refill     Medication pended, please sign if appropriate

## 2023-03-27 RX ORDER — FLECAINIDE ACETATE 50 MG/1
50 TABLET ORAL EVERY 12 HOURS SCHEDULED
Qty: 60 TABLET | Refills: 0 | OUTPATIENT
Start: 2023-03-27

## 2023-03-27 NOTE — TELEPHONE ENCOUNTER
Advised patient of APRN's note below. Patient verbalized understanding and stated her Cardiologist filled this for her - pharmacy sent request to wrong office. Advised pt of making sooner appt - pt stated that Dr. Tacho Cheng recommended 3 months. Will keep future appt. Future Appointments   Date Time Provider Argenis Lucas   6/14/2023 10:40 AM Ayaka Whitt MD [facility]     No further questions at this time.

## 2023-03-28 PROBLEM — J42 CHRONIC BRONCHITIS, UNSPECIFIED CHRONIC BRONCHITIS TYPE (HCC): Status: ACTIVE | Noted: 2023-03-28

## 2023-04-17 NOTE — TELEPHONE ENCOUNTER
LOV 03/08/23  Last labs 02/23/23  Last refill on 11/11/22, for #180 tabs, with 0 refills  metFORMIN HCl 1000 MG Oral Tab  Diabetic Medication Protocol Passed 04/17/2023 08:21 AM   Protocol Details  HgBA1C procedure resulted in past 6 months    Last HgBA1C < 7.5    Microalbumin procedure in past 12 months or taking ACE/ARB    Appointment in past 6 or next 3 months     Future Appointments   Date Time Provider Argenis Lucas   6/14/2023 10:40 AM Morro Toscano MD [facility] Angeline Cárdenas per protocol

## 2023-04-29 DIAGNOSIS — N18.32 TYPE 2 DIABETES MELLITUS WITH STAGE 3B CHRONIC KIDNEY DISEASE, WITH LONG-TERM CURRENT USE OF INSULIN (HCC): Primary | ICD-10-CM

## 2023-04-29 DIAGNOSIS — E11.22 TYPE 2 DIABETES MELLITUS WITH STAGE 3B CHRONIC KIDNEY DISEASE, WITH LONG-TERM CURRENT USE OF INSULIN (HCC): Primary | ICD-10-CM

## 2023-04-29 DIAGNOSIS — Z79.4 TYPE 2 DIABETES MELLITUS WITH STAGE 3B CHRONIC KIDNEY DISEASE, WITH LONG-TERM CURRENT USE OF INSULIN (HCC): Primary | ICD-10-CM

## 2023-04-29 RX ORDER — INSULIN LISPRO 100 [IU]/ML
30 INJECTION, SOLUTION INTRAVENOUS; SUBCUTANEOUS DAILY
Qty: 15 ML | Refills: 1 | Status: SHIPPED | OUTPATIENT
Start: 2023-04-29

## 2023-04-29 NOTE — TELEPHONE ENCOUNTER
Last OV:03/08/2023  Last refill:03/06/2023, 15mL 0 refill     Medication pended, please sign if appropriate

## 2023-06-14 ENCOUNTER — OFFICE VISIT (OUTPATIENT)
Dept: FAMILY MEDICINE CLINIC | Facility: CLINIC | Age: 83
End: 2023-06-14
Payer: MEDICARE

## 2023-06-14 VITALS
HEART RATE: 86 BPM | TEMPERATURE: 96 F | WEIGHT: 154.81 LBS | BODY MASS INDEX: 25 KG/M2 | DIASTOLIC BLOOD PRESSURE: 70 MMHG | OXYGEN SATURATION: 90 % | SYSTOLIC BLOOD PRESSURE: 114 MMHG

## 2023-06-14 DIAGNOSIS — E11.9 TYPE 2 DIABETES MELLITUS WITHOUT COMPLICATION, WITH LONG-TERM CURRENT USE OF INSULIN (HCC): Chronic | ICD-10-CM

## 2023-06-14 DIAGNOSIS — I48.91 ATRIAL FIBRILLATION WITH RAPID VENTRICULAR RESPONSE (HCC): ICD-10-CM

## 2023-06-14 DIAGNOSIS — C67.2 MALIGNANT NEOPLASM OF LATERAL WALL OF URINARY BLADDER (HCC): ICD-10-CM

## 2023-06-14 DIAGNOSIS — Z51.81 MEDICATION MONITORING ENCOUNTER: ICD-10-CM

## 2023-06-14 DIAGNOSIS — M81.0 AGE-RELATED OSTEOPOROSIS WITHOUT CURRENT PATHOLOGICAL FRACTURE: Primary | ICD-10-CM

## 2023-06-14 DIAGNOSIS — K86.2 PANCREATIC CYST: ICD-10-CM

## 2023-06-14 DIAGNOSIS — J42 CHRONIC BRONCHITIS, UNSPECIFIED CHRONIC BRONCHITIS TYPE (HCC): ICD-10-CM

## 2023-06-14 DIAGNOSIS — Z79.4 TYPE 2 DIABETES MELLITUS WITHOUT COMPLICATION, WITH LONG-TERM CURRENT USE OF INSULIN (HCC): Chronic | ICD-10-CM

## 2023-06-14 DIAGNOSIS — N18.32 STAGE 3B CHRONIC KIDNEY DISEASE (HCC): ICD-10-CM

## 2023-06-14 DIAGNOSIS — J06.9 VIRAL URI: ICD-10-CM

## 2023-06-14 PROCEDURE — 99214 OFFICE O/P EST MOD 30 MIN: CPT | Performed by: FAMILY MEDICINE

## 2023-06-14 RX ORDER — INSULIN DETEMIR 100 [IU]/ML
14 INJECTION, SOLUTION SUBCUTANEOUS NIGHTLY
Qty: 15 ML | Refills: 0 | COMMUNITY
Start: 2023-06-14

## 2023-06-20 RX ORDER — METOPROLOL SUCCINATE 25 MG/1
TABLET, EXTENDED RELEASE ORAL
Qty: 90 TABLET | Refills: 0 | Status: SHIPPED | OUTPATIENT
Start: 2023-06-20

## 2023-06-20 NOTE — TELEPHONE ENCOUNTER
Hypertension Medications Protocol Passed 06/20/2023 07:40 AM   Protocol Details  CMP or BMP in past 12 months    Last serum creatinine< 2.0    Appointment in past 6 or next 3 months     Last OV 6/14/23  Last lab 2/24/23 bmp/creat 1.47, 4/20/23 creat 1.89  Last refilled 3/24/23  #90  0 refills

## 2023-06-28 ENCOUNTER — HOSPITAL ENCOUNTER (OUTPATIENT)
Dept: INTERVENTIONAL RADIOLOGY/VASCULAR | Facility: HOSPITAL | Age: 83
Discharge: HOME | End: 2023-06-28
Attending: INTERNAL MEDICINE | Admitting: INTERNAL MEDICINE
Payer: MEDICARE

## 2023-06-28 VITALS
BODY MASS INDEX: 24.91 KG/M2 | RESPIRATION RATE: 15 BRPM | HEIGHT: 66 IN | DIASTOLIC BLOOD PRESSURE: 58 MMHG | WEIGHT: 155 LBS | SYSTOLIC BLOOD PRESSURE: 145 MMHG | OXYGEN SATURATION: 99 % | HEART RATE: 61 BPM

## 2023-06-28 DIAGNOSIS — I48.91 A-FIB (HCC): ICD-10-CM

## 2023-06-28 DIAGNOSIS — Z01.818 PRE-OP TESTING: Primary | ICD-10-CM

## 2023-06-28 LAB
ANION GAP SERPL CALC-SCNC: 6 MMOL/L (ref 0–18)
ATRIAL RATE: 57 BPM
BUN BLD-MCNC: 33 MG/DL (ref 7–18)
CALCIUM BLD-MCNC: 9.2 MG/DL (ref 8.5–10.1)
CHLORIDE SERPL-SCNC: 107 MMOL/L (ref 98–112)
CO2 SERPL-SCNC: 27 MMOL/L (ref 21–32)
CREAT BLD-MCNC: 1.74 MG/DL
FASTING STATUS PATIENT QL REPORTED: YES
GFR SERPLBLD BASED ON 1.73 SQ M-ARVRAT: 29 ML/MIN/1.73M2 (ref 60–?)
GLUCOSE BLD-MCNC: 143 MG/DL (ref 70–99)
OSMOLALITY SERPL CALC.SUM OF ELEC: 300 MOSM/KG (ref 275–295)
P AXIS: 80 DEGREES
P-R INTERVAL: 294 MS
POTASSIUM SERPL-SCNC: 4 MMOL/L (ref 3.5–5.1)
Q-T INTERVAL: 466 MS
QRS DURATION: 102 MS
QTC CALCULATION (BEZET): 453 MS
R AXIS: 9 DEGREES
SODIUM SERPL-SCNC: 140 MMOL/L (ref 136–145)
T AXIS: 66 DEGREES
VENTRICULAR RATE: 57 BPM

## 2023-06-28 PROCEDURE — 93005 ELECTROCARDIOGRAM TRACING: CPT

## 2023-06-28 PROCEDURE — 93010 ELECTROCARDIOGRAM REPORT: CPT | Performed by: INTERNAL MEDICINE

## 2023-06-28 PROCEDURE — 5A2204Z RESTORATION OF CARDIAC RHYTHM, SINGLE: ICD-10-PCS | Performed by: INTERNAL MEDICINE

## 2023-06-28 PROCEDURE — 92960 CARDIOVERSION ELECTRIC EXT: CPT | Performed by: INTERNAL MEDICINE

## 2023-06-28 PROCEDURE — 80048 BASIC METABOLIC PNL TOTAL CA: CPT | Performed by: INTERNAL MEDICINE

## 2023-06-28 RX ORDER — METHOHEXITAL IN WATER/PF 100MG/10ML
SYRINGE (ML) INTRAVENOUS
Status: COMPLETED
Start: 2023-06-28 | End: 2023-06-28

## 2023-06-28 RX ORDER — SODIUM CHLORIDE 9 MG/ML
INJECTION, SOLUTION INTRAVENOUS CONTINUOUS
Status: DISCONTINUED | OUTPATIENT
Start: 2023-06-28 | End: 2023-06-28

## 2023-07-12 NOTE — H&P
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    Congestive heart disease ([address]     Diabetes ([address]     IDDM     Diabetes mellitus ([address]     CLARKE (dyspnea on exertion) 2022    Elevated cholesterol     Essential hypertension     High blood pressure     High cholesterol     Hyperlipidemia     Osteoporosis     Type 2 diabetes mellitus with hyperglycemia ([address] 2021    Visual impairment     glasses    Wears glasses      Past Surgical History:   Procedure Laterality Date    CATARACT      COLONOSCOPY      COLONOSCOPY      CYSTOSCOPY,INSERT URETERAL STENT      CYSTOURETHROSCOPY  2020    Cystoscopy Procedure Dr Izzy Paris  - recurrent tumor on BTS    CYSTOURETHROSCOPY,BIOPSY  2019    BBx of scar sites, [place]    CYSTOURETHROSCOPY,FULGUR .5-2CM Oliver Ferraro  2020    TURBT, Dinesh Roles .5-2CM Oliver Ferraro  2020    TURBT with Gemcitabine    CYSTOURETHROSCOPY,FULGUR >5CM LESN  10/03/2019    TURBT with bilateral RPGs ([place])    FRACTURE SURGERY      left wrist    HYSTERECTOMY      age 32    OTHER SURGICAL HISTORY  2021    BTS Cysto, Dr Elisabeth Lockett  2021    [facility]  Cristhian Shingles HISTORY  2021    BTS Chela Rhodes Dr. Cristhian Shingles HISTORY  2022    BTS Cysto- Dr. Damien Serna     Family History   Problem Relation Age of Onset    Diabetes Father     Stroke Father     Colon Polyps Mother     Diabetes Mother Heart Attack Mother     Other (Other) Son         Marilee      reports that she has quit smoking. Her smoking use included cigarettes. She has a 49.00 pack-year smoking history. She has never used smokeless tobacco. She reports that she does not drink alcohol and does not use drugs. Allergies:  No Known Allergies    Medications:  No current facility-administered medications for this encounter. OBJECTIVE      Physical Exam:  Physical Exam   There were no vitals taken for this visit. No data recorded. Wt Readings from Last 3 Encounters:  06/23/23 : 155 lb (70.3 kg)  06/14/23 : 154 lb 12.8 oz (70.2 kg)  03/08/23 : 154 lb 3.2 oz (69.9 kg)      NAD  PERRLA/EOMI  Neck veins not elevated  Carotids- no bruits  CTA bilaterally  Cardiac- rrr  Abdomen- Soft,Nontender, normal BS  Extremities- pulses normal  Edema- none  Mood /Affect Congruent  Skin- no lesions          Results:   Recent Labs   Lab 07/17/23  1410   GLU 91   BUN 28*   CREATSERUM 1.63*   EGFRCR 31*   CA 8.7      K 4.4      CO2 26.0     No results for input(s): RBC, HGB, HCT, MCV, MCH, MCHC, RDW, NEPRELIM, WBC, PLT in the last 168 hours. [unfilled]  No results for input(s): BNP in the last 168 hours. No results found for: PT, INR  Lab Results   Component Value Date    TROP <0.046 05/15/2018    TROP <0.046 04/25/2018         No results found.        Assessment and Plan    Patient Active Problem List:     Osteoarthritis of spine with radiculopathy, cervical region     Benign essential HTN     Type 2 diabetes mellitus without complication, with long-term current use of insulin (HCC)     Mixed hyperlipidemia     S/P ORIF (open reduction internal fixation) fracture     Tremor of unknown origin     Personal history of colonic polyps     Malignant neoplasm of lateral wall of urinary bladder (HCC)     Stage 3b chronic kidney disease (HCC)     Atrial fibrillation with rapid ventricular response ([facility]     Advance care planning     Acute pulmonary edema (HCC)     Other constipation     Functional diarrhea     Type 2 diabetes mellitus with diabetic chronic kidney disease (HCC)     At high risk for falls     Hypoglycemia     Chronic bronchitis, unspecified chronic bronchitis type ([facility]      Consent: Risks, Benefits and alternatives discussed in clinic.  Reverified on the day of the procedure    Procedure Planned: Cryo PVI Plus RF CTI    Sedation Plan: general    Discharge Plan: same day      Katy Steven MD  Cardiac Electrophysiology  [address] [place] [address]  7/19/2023  7:39 AM

## 2023-07-14 NOTE — TELEPHONE ENCOUNTER
LOV 06/14/23  Last labs 02/23/23  Last refill on 06/14/23, for #15ml, with 0 refills  LEVEMIR FLEXTOUCH 100 UNIT/ML Subcutaneous Solution Pen-injector     Future Appointments   Date Time Provider Argenis Shayy   7/17/2023 12:00 PM [facility] LAB [facility]   7/17/2023 12:00 PM [facility] CT MAIN RM4 [facility] CT [facility]   7/17/2023  1:00 PM [facility] CT MAIN RM4 [address] [place] [address]   7/19/2023 12:00 PM [facility] IVS RM 2 EP EH IVS [facility]   10/11/2023 10:40 AM MD BRYAN Clark EMG [facility] Don[facility]     Order(s) pending, please review. Thank you.

## 2023-07-15 RX ORDER — INSULIN DETEMIR 100 [IU]/ML
26 INJECTION, SOLUTION SUBCUTANEOUS NIGHTLY
Qty: 15 ML | Refills: 2 | Status: SHIPPED | OUTPATIENT
Start: 2023-07-15

## 2023-07-17 ENCOUNTER — HOSPITAL ENCOUNTER (OUTPATIENT)
Dept: CT IMAGING | Facility: HOSPITAL | Age: 83
Discharge: HOME | End: 2023-07-17
Attending: INTERNAL MEDICINE
Payer: MEDICARE

## 2023-07-17 ENCOUNTER — LAB ENCOUNTER (OUTPATIENT)
Dept: LAB | Facility: HOSPITAL | Age: 83
End: 2023-07-17
Attending: INTERNAL MEDICINE
Payer: MEDICARE

## 2023-07-17 VITALS — DIASTOLIC BLOOD PRESSURE: 89 MMHG | HEART RATE: 103 BPM | SYSTOLIC BLOOD PRESSURE: 142 MMHG | RESPIRATION RATE: 16 BRPM

## 2023-07-17 VITALS
HEART RATE: 74 BPM | SYSTOLIC BLOOD PRESSURE: 123 MMHG | OXYGEN SATURATION: 97 % | RESPIRATION RATE: 11 BRPM | DIASTOLIC BLOOD PRESSURE: 66 MMHG

## 2023-07-17 DIAGNOSIS — I48.91 ATRIAL FIBRILLATION WITH RAPID VENTRICULAR RESPONSE (HCC): ICD-10-CM

## 2023-07-17 DIAGNOSIS — I48.91 A-FIB (HCC): ICD-10-CM

## 2023-07-17 DIAGNOSIS — Z01.818 PREOP TESTING: ICD-10-CM

## 2023-07-17 LAB
ALBUMIN SERPL-MCNC: 3.3 G/DL (ref 3.4–5)
ALBUMIN/GLOB SERPL: 1.2 {RATIO} (ref 1–2)
ALP LIVER SERPL-CCNC: 38 U/L
ALT SERPL-CCNC: 26 U/L
ANION GAP SERPL CALC-SCNC: 5 MMOL/L (ref 0–18)
AST SERPL-CCNC: 29 U/L (ref 15–37)
BILIRUB SERPL-MCNC: 0.5 MG/DL (ref 0.1–2)
BUN BLD-MCNC: 28 MG/DL (ref 7–18)
CALCIUM BLD-MCNC: 8.7 MG/DL (ref 8.5–10.1)
CHLORIDE SERPL-SCNC: 109 MMOL/L (ref 98–112)
CO2 SERPL-SCNC: 26 MMOL/L (ref 21–32)
CREAT BLD-MCNC: 1.63 MG/DL
FASTING STATUS PATIENT QL REPORTED: YES
GFR SERPLBLD BASED ON 1.73 SQ M-ARVRAT: 31 ML/MIN/1.73M2 (ref 60–?)
GLOBULIN PLAS-MCNC: 2.8 G/DL (ref 2.8–4.4)
GLUCOSE BLD-MCNC: 91 MG/DL (ref 70–99)
OSMOLALITY SERPL CALC.SUM OF ELEC: 295 MOSM/KG (ref 275–295)
POTASSIUM SERPL-SCNC: 4.4 MMOL/L (ref 3.5–5.1)
PROT SERPL-MCNC: 6.1 G/DL (ref 6.4–8.2)
SODIUM SERPL-SCNC: 140 MMOL/L (ref 136–145)

## 2023-07-17 PROCEDURE — 36415 COLL VENOUS BLD VENIPUNCTURE: CPT

## 2023-07-17 PROCEDURE — 80053 COMPREHEN METABOLIC PANEL: CPT

## 2023-07-17 PROCEDURE — 75574 CT ANGIO HRT W/3D IMAGE: CPT | Performed by: INTERNAL MEDICINE

## 2023-07-17 PROCEDURE — 96360 HYDRATION IV INFUSION INIT: CPT

## 2023-07-17 RX ORDER — SODIUM CHLORIDE 9 MG/ML
INJECTION, SOLUTION INTRAVENOUS CONTINUOUS
Status: ACTIVE | OUTPATIENT
Start: 2023-07-17 | End: 2023-07-17

## 2023-07-17 RX ADMIN — SODIUM CHLORIDE: 9 INJECTION, SOLUTION INTRAVENOUS at 12:30:00

## 2023-07-17 NOTE — IMAGING NOTE
Call placed and voicemail left to pt regarding CTA Gated Pulmonary Vein. Instructed to arrive at [phone] for hydration. May eat a light breakfast/lunch but drink plenty of fluids a day before and morning of procedure. .   Advised to hold caffeine 12 hrs prior to procedure. May take usual meds.
Manisha Holding to CT Rm 4. Positioned pt on table. Procedure explained and questions answered. Vital signs monitored and noted in Flowsheet. GFR = 29; received IV hydration  Contrast injected followed by saline flush at 1407  Contrast = 85ml  0.9 NS flush = 64ml  Average HR = 71 BPM Afib    Patient tolerated the procedure without complication. Denies any contrast reaction. Discontinued IV saline lock. Escorted pt to [facility] Dressing Room and discharged in stable condition.
RN received the patient into radiology holding. SBAR given to Marleen Dorsey RN. Patient tolerated IV hydration well. Patient ambulatory to CT with Rina Squires RN.
4

## 2023-07-18 ENCOUNTER — ANESTHESIA EVENT (OUTPATIENT)
Dept: INTERVENTIONAL RADIOLOGY/VASCULAR | Facility: HOSPITAL | Age: 83
End: 2023-07-18
Payer: MEDICARE

## 2023-07-19 ENCOUNTER — HOSPITAL ENCOUNTER (OUTPATIENT)
Dept: INTERVENTIONAL RADIOLOGY/VASCULAR | Facility: HOSPITAL | Age: 83
Discharge: HOME | End: 2023-07-20
Attending: INTERNAL MEDICINE | Admitting: INTERNAL MEDICINE
Payer: MEDICARE

## 2023-07-19 ENCOUNTER — ANESTHESIA (OUTPATIENT)
Dept: INTERVENTIONAL RADIOLOGY/VASCULAR | Facility: HOSPITAL | Age: 83
End: 2023-07-19
Payer: MEDICARE

## 2023-07-19 DIAGNOSIS — I48.91 A-FIB (HCC): Primary | ICD-10-CM

## 2023-07-19 PROBLEM — E78.5 HYPERLIPIDEMIA: Status: ACTIVE | Noted: 2023-07-19

## 2023-07-19 PROBLEM — I10 PRIMARY HYPERTENSION: Status: ACTIVE | Noted: 2018-04-25

## 2023-07-19 PROBLEM — I48.19 PERSISTENT ATRIAL FIBRILLATION (HCC): Status: ACTIVE | Noted: 2023-07-19

## 2023-07-19 LAB
GLUCOSE BLD-MCNC: 125 MG/DL (ref 70–99)
GLUCOSE BLD-MCNC: 173 MG/DL (ref 70–99)
GLUCOSE BLD-MCNC: 260 MG/DL (ref 70–99)
ISTAT ACTIVATED CLOTTING TIME: 245 SECONDS (ref 74–137)
ISTAT ACTIVATED CLOTTING TIME: 287 SECONDS (ref 74–137)

## 2023-07-19 PROCEDURE — 02583ZZ DESTRUCTION OF CONDUCTION MECHANISM, PERCUTANEOUS APPROACH: ICD-10-PCS | Performed by: INTERNAL MEDICINE

## 2023-07-19 PROCEDURE — 4A0234Z MEASUREMENT OF CARDIAC ELECTRICAL ACTIVITY, PERCUTANEOUS APPROACH: ICD-10-PCS | Performed by: INTERNAL MEDICINE

## 2023-07-19 PROCEDURE — 02K83ZZ MAP CONDUCTION MECHANISM, PERCUTANEOUS APPROACH: ICD-10-PCS | Performed by: INTERNAL MEDICINE

## 2023-07-19 PROCEDURE — 99214 OFFICE O/P EST MOD 30 MIN: CPT | Performed by: HOSPITALIST

## 2023-07-19 PROCEDURE — 4A023FZ MEASUREMENT OF CARDIAC RHYTHM, PERCUTANEOUS APPROACH: ICD-10-PCS | Performed by: INTERNAL MEDICINE

## 2023-07-19 RX ORDER — LABETALOL HYDROCHLORIDE 5 MG/ML
5 INJECTION, SOLUTION INTRAVENOUS EVERY 5 MIN PRN
Status: DISCONTINUED | OUTPATIENT
Start: 2023-07-19 | End: 2023-07-19 | Stop reason: HOSPADM

## 2023-07-19 RX ORDER — LIDOCAINE HYDROCHLORIDE 40 MG/ML
SOLUTION TOPICAL AS NEEDED
Status: DISCONTINUED | OUTPATIENT
Start: 2023-07-19 | End: 2023-07-20 | Stop reason: SURG

## 2023-07-19 RX ORDER — METOPROLOL SUCCINATE 50 MG/1
50 TABLET, EXTENDED RELEASE ORAL DAILY
Status: DISCONTINUED | OUTPATIENT
Start: 2023-07-20 | End: 2023-07-20

## 2023-07-19 RX ORDER — AMLODIPINE BESYLATE 5 MG/1
5 TABLET ORAL DAILY
Status: DISCONTINUED | OUTPATIENT
Start: 2023-07-20 | End: 2023-07-20

## 2023-07-19 RX ORDER — ATORVASTATIN CALCIUM 80 MG/1
80 TABLET, FILM COATED ORAL NIGHTLY
Status: DISCONTINUED | OUTPATIENT
Start: 2023-07-19 | End: 2023-07-20

## 2023-07-19 RX ORDER — HYDROMORPHONE HYDROCHLORIDE 1 MG/ML
0.2 INJECTION, SOLUTION INTRAMUSCULAR; INTRAVENOUS; SUBCUTANEOUS EVERY 5 MIN PRN
Status: DISCONTINUED | OUTPATIENT
Start: 2023-07-19 | End: 2023-07-19 | Stop reason: HOSPADM

## 2023-07-19 RX ORDER — MIDAZOLAM HYDROCHLORIDE 1 MG/ML
1 INJECTION INTRAMUSCULAR; INTRAVENOUS EVERY 5 MIN PRN
Status: DISCONTINUED | OUTPATIENT
Start: 2023-07-19 | End: 2023-07-19 | Stop reason: HOSPADM

## 2023-07-19 RX ORDER — CHLORHEXIDINE GLUCONATE 4 G/100ML
30 SOLUTION TOPICAL
Status: DISCONTINUED | OUTPATIENT
Start: 2023-07-20 | End: 2023-07-19 | Stop reason: HOSPADM

## 2023-07-19 RX ORDER — DEXAMETHASONE SODIUM PHOSPHATE 4 MG/ML
VIAL (ML) INJECTION AS NEEDED
Status: DISCONTINUED | OUTPATIENT
Start: 2023-07-19 | End: 2023-07-20 | Stop reason: SURG

## 2023-07-19 RX ORDER — ONDANSETRON 2 MG/ML
4 INJECTION INTRAMUSCULAR; INTRAVENOUS EVERY 6 HOURS PRN
Status: DISCONTINUED | OUTPATIENT
Start: 2023-07-19 | End: 2023-07-19 | Stop reason: HOSPADM

## 2023-07-19 RX ORDER — INSULIN ASPART 100 [IU]/ML
INJECTION, SOLUTION INTRAVENOUS; SUBCUTANEOUS
Status: COMPLETED
Start: 2023-07-19 | End: 2023-07-19

## 2023-07-19 RX ORDER — ONDANSETRON 2 MG/ML
INJECTION INTRAMUSCULAR; INTRAVENOUS AS NEEDED
Status: DISCONTINUED | OUTPATIENT
Start: 2023-07-19 | End: 2023-07-20 | Stop reason: SURG

## 2023-07-19 RX ORDER — LISINOPRIL 20 MG/1
20 TABLET ORAL EVERY EVENING
Status: DISCONTINUED | OUTPATIENT
Start: 2023-07-19 | End: 2023-07-20

## 2023-07-19 RX ORDER — HYDROMORPHONE HYDROCHLORIDE 1 MG/ML
0.6 INJECTION, SOLUTION INTRAMUSCULAR; INTRAVENOUS; SUBCUTANEOUS EVERY 5 MIN PRN
Status: DISCONTINUED | OUTPATIENT
Start: 2023-07-19 | End: 2023-07-19 | Stop reason: HOSPADM

## 2023-07-19 RX ORDER — FUROSEMIDE 40 MG/1
40 TABLET ORAL DAILY
Status: DISCONTINUED | OUTPATIENT
Start: 2023-07-20 | End: 2023-07-20

## 2023-07-19 RX ORDER — DEXTROSE MONOHYDRATE 25 G/50ML
50 INJECTION, SOLUTION INTRAVENOUS
Status: DISCONTINUED | OUTPATIENT
Start: 2023-07-19 | End: 2023-07-20

## 2023-07-19 RX ORDER — LIDOCAINE HYDROCHLORIDE 10 MG/ML
INJECTION, SOLUTION EPIDURAL; INFILTRATION; INTRACAUDAL; PERINEURAL AS NEEDED
Status: DISCONTINUED | OUTPATIENT
Start: 2023-07-19 | End: 2023-07-20 | Stop reason: SURG

## 2023-07-19 RX ORDER — SODIUM CHLORIDE, SODIUM LACTATE, POTASSIUM CHLORIDE, CALCIUM CHLORIDE 600; 310; 30; 20 MG/100ML; MG/100ML; MG/100ML; MG/100ML
INJECTION, SOLUTION INTRAVENOUS CONTINUOUS
Status: DISCONTINUED | OUTPATIENT
Start: 2023-07-19 | End: 2023-07-19 | Stop reason: HOSPADM

## 2023-07-19 RX ORDER — DEXTROSE MONOHYDRATE 25 G/50ML
50 INJECTION, SOLUTION INTRAVENOUS
Status: DISCONTINUED | OUTPATIENT
Start: 2023-07-19 | End: 2023-07-19 | Stop reason: HOSPADM

## 2023-07-19 RX ORDER — LIDOCAINE HYDROCHLORIDE 10 MG/ML
INJECTION, SOLUTION EPIDURAL; INFILTRATION; INTRACAUDAL; PERINEURAL
Status: COMPLETED
Start: 2023-07-19 | End: 2023-07-19

## 2023-07-19 RX ORDER — NICOTINE POLACRILEX 4 MG
30 LOZENGE BUCCAL
Status: DISCONTINUED | OUTPATIENT
Start: 2023-07-19 | End: 2023-07-20

## 2023-07-19 RX ORDER — NICOTINE POLACRILEX 4 MG
15 LOZENGE BUCCAL
Status: DISCONTINUED | OUTPATIENT
Start: 2023-07-19 | End: 2023-07-19 | Stop reason: HOSPADM

## 2023-07-19 RX ORDER — METOPROLOL TARTRATE 5 MG/5ML
2.5 INJECTION INTRAVENOUS ONCE
Status: DISCONTINUED | OUTPATIENT
Start: 2023-07-19 | End: 2023-07-19 | Stop reason: HOSPADM

## 2023-07-19 RX ORDER — HYDROCODONE BITARTRATE AND ACETAMINOPHEN 5; 325 MG/1; MG/1
1 TABLET ORAL ONCE AS NEEDED
Status: DISCONTINUED | OUTPATIENT
Start: 2023-07-19 | End: 2023-07-19 | Stop reason: HOSPADM

## 2023-07-19 RX ORDER — HYDROCODONE BITARTRATE AND ACETAMINOPHEN 5; 325 MG/1; MG/1
2 TABLET ORAL ONCE AS NEEDED
Status: DISCONTINUED | OUTPATIENT
Start: 2023-07-19 | End: 2023-07-19 | Stop reason: HOSPADM

## 2023-07-19 RX ORDER — MIDAZOLAM HYDROCHLORIDE 1 MG/ML
INJECTION INTRAMUSCULAR; INTRAVENOUS AS NEEDED
Status: DISCONTINUED | OUTPATIENT
Start: 2023-07-19 | End: 2023-07-20 | Stop reason: SURG

## 2023-07-19 RX ORDER — NICOTINE POLACRILEX 4 MG
15 LOZENGE BUCCAL
Status: DISCONTINUED | OUTPATIENT
Start: 2023-07-19 | End: 2023-07-20

## 2023-07-19 RX ORDER — INSULIN ASPART 100 [IU]/ML
INJECTION, SOLUTION INTRAVENOUS; SUBCUTANEOUS ONCE
Status: COMPLETED | OUTPATIENT
Start: 2023-07-19 | End: 2023-07-19

## 2023-07-19 RX ORDER — HYDROMORPHONE HYDROCHLORIDE 1 MG/ML
0.4 INJECTION, SOLUTION INTRAMUSCULAR; INTRAVENOUS; SUBCUTANEOUS EVERY 5 MIN PRN
Status: DISCONTINUED | OUTPATIENT
Start: 2023-07-19 | End: 2023-07-19 | Stop reason: HOSPADM

## 2023-07-19 RX ORDER — HEPARIN SODIUM 5000 [USP'U]/ML
INJECTION, SOLUTION INTRAVENOUS; SUBCUTANEOUS
Status: COMPLETED
Start: 2023-07-19 | End: 2023-07-19

## 2023-07-19 RX ORDER — PANTOPRAZOLE SODIUM 40 MG/1
40 TABLET, DELAYED RELEASE ORAL
Status: DISCONTINUED | OUTPATIENT
Start: 2023-07-20 | End: 2023-07-20

## 2023-07-19 RX ORDER — FLECAINIDE ACETATE 100 MG/1
50 TABLET ORAL EVERY 12 HOURS SCHEDULED
Status: DISCONTINUED | OUTPATIENT
Start: 2023-07-19 | End: 2023-07-20

## 2023-07-19 RX ORDER — ACETAMINOPHEN 500 MG
1000 TABLET ORAL ONCE AS NEEDED
Status: DISCONTINUED | OUTPATIENT
Start: 2023-07-19 | End: 2023-07-19 | Stop reason: HOSPADM

## 2023-07-19 RX ORDER — PROTAMINE SULFATE 10 MG/ML
INJECTION, SOLUTION INTRAVENOUS AS NEEDED
Status: DISCONTINUED | OUTPATIENT
Start: 2023-07-19 | End: 2023-07-20 | Stop reason: SURG

## 2023-07-19 RX ORDER — NICOTINE POLACRILEX 4 MG
30 LOZENGE BUCCAL
Status: DISCONTINUED | OUTPATIENT
Start: 2023-07-19 | End: 2023-07-19 | Stop reason: HOSPADM

## 2023-07-19 RX ORDER — MEPERIDINE HYDROCHLORIDE 25 MG/ML
12.5 INJECTION INTRAMUSCULAR; INTRAVENOUS; SUBCUTANEOUS AS NEEDED
Status: DISCONTINUED | OUTPATIENT
Start: 2023-07-19 | End: 2023-07-19 | Stop reason: HOSPADM

## 2023-07-19 RX ORDER — NALOXONE HYDROCHLORIDE 0.4 MG/ML
80 INJECTION, SOLUTION INTRAMUSCULAR; INTRAVENOUS; SUBCUTANEOUS AS NEEDED
Status: DISCONTINUED | OUTPATIENT
Start: 2023-07-19 | End: 2023-07-19 | Stop reason: HOSPADM

## 2023-07-19 RX ORDER — SODIUM CHLORIDE 9 MG/ML
INJECTION, SOLUTION INTRAVENOUS
Status: COMPLETED | OUTPATIENT
Start: 2023-07-20 | End: 2023-07-19

## 2023-07-19 RX ORDER — HEPARIN SODIUM 5000 [USP'U]/ML
INJECTION, SOLUTION INTRAVENOUS; SUBCUTANEOUS AS NEEDED
Status: DISCONTINUED | OUTPATIENT
Start: 2023-07-19 | End: 2023-07-20 | Stop reason: SURG

## 2023-07-19 RX ORDER — METOCLOPRAMIDE HYDROCHLORIDE 5 MG/ML
INJECTION INTRAMUSCULAR; INTRAVENOUS AS NEEDED
Status: DISCONTINUED | OUTPATIENT
Start: 2023-07-19 | End: 2023-07-20 | Stop reason: SURG

## 2023-07-19 RX ADMIN — FLECAINIDE ACETATE 50 MG: 100 TABLET ORAL at 22:12:00

## 2023-07-19 RX ADMIN — METOCLOPRAMIDE HYDROCHLORIDE 10 MG: 5 INJECTION INTRAMUSCULAR; INTRAVENOUS at 15:31:00

## 2023-07-19 RX ADMIN — SODIUM CHLORIDE: 9 INJECTION, SOLUTION INTRAVENOUS at 14:08:00

## 2023-07-19 RX ADMIN — HEPARIN SODIUM 5000 UNITS: 5000 INJECTION, SOLUTION INTRAVENOUS; SUBCUTANEOUS at 15:00:00

## 2023-07-19 RX ADMIN — SODIUM CHLORIDE: 9 INJECTION, SOLUTION INTRAVENOUS at 13:17:00

## 2023-07-19 RX ADMIN — ONDANSETRON 4 MG: 2 INJECTION INTRAMUSCULAR; INTRAVENOUS at 15:31:00

## 2023-07-19 RX ADMIN — PROTAMINE SULFATE 50 MG: 10 INJECTION, SOLUTION INTRAVENOUS at 15:31:00

## 2023-07-19 RX ADMIN — INSULIN ASPART 1 UNITS: 100 INJECTION, SOLUTION INTRAVENOUS; SUBCUTANEOUS at 16:28:00

## 2023-07-19 RX ADMIN — ATORVASTATIN CALCIUM 80 MG: 80 TABLET, FILM COATED ORAL at 20:34:00

## 2023-07-19 RX ADMIN — LISINOPRIL 20 MG: 20 TABLET ORAL at 18:27:00

## 2023-07-19 RX ADMIN — MIDAZOLAM HYDROCHLORIDE 2 MG: 1 INJECTION INTRAMUSCULAR; INTRAVENOUS at 13:14:00

## 2023-07-19 RX ADMIN — LIDOCAINE HYDROCHLORIDE 50 MG: 10 INJECTION, SOLUTION EPIDURAL; INFILTRATION; INTRACAUDAL; PERINEURAL at 13:34:00

## 2023-07-19 RX ADMIN — HEPARIN SODIUM 10000 UNITS: 5000 INJECTION, SOLUTION INTRAVENOUS; SUBCUTANEOUS at 14:08:00

## 2023-07-19 RX ADMIN — HEPARIN SODIUM 5000 UNITS: 5000 INJECTION, SOLUTION INTRAVENOUS; SUBCUTANEOUS at 14:33:00

## 2023-07-19 RX ADMIN — SODIUM CHLORIDE, SODIUM LACTATE, POTASSIUM CHLORIDE, CALCIUM CHLORIDE: 600; 310; 30; 20 INJECTION, SOLUTION INTRAVENOUS at 16:34:00

## 2023-07-19 RX ADMIN — SODIUM CHLORIDE: 9 INJECTION, SOLUTION INTRAVENOUS at 15:40:00

## 2023-07-19 RX ADMIN — DEXAMETHASONE SODIUM PHOSPHATE 4 MG: 4 MG/ML VIAL (ML) INJECTION at 13:51:00

## 2023-07-19 RX ADMIN — LIDOCAINE HYDROCHLORIDE 4 ML: 40 SOLUTION TOPICAL at 13:35:00

## 2023-07-19 NOTE — PROCEDURES
Procedure Note    [facility] Location: Cath Lab   CSN [id number] MRN [id number]   Admission Date (Not on file)  Operation Date 07/19/23    Attending Physician Lara Tadeo MD Operating Physician Lara Tadeo MD     Pre-Operative Diagnosis: Atrial fibrillation    Post-Operative Diagnosis: Same as above    Procedure Performed: EP & ABLATE [facility] ARRHYTHMIA/Atrial Fibrillation  1. Comprehensive electrophysiology study. 2. Coronary sinus catheter placement to record left atrial electrograms and pace the left atrium. .    3. Three-dimensional intracardiac mapping. 4. Intracardiac echocardiography (ICE). 5. Transseptal catheterization. 6. Cryoablation for atrial fibrillation. 7. Venous closure with Vascade  8. Secondary Arrhythmia- Typical AFL- CTI ablation    Indication: Symptomatic paroxysmal atrial fibrillation. EBL: Minimal    Summary of Case: The patient was brought to the EP lab in a fasting and   nonsedated state after providing informed consent. . The right and left groins were prepped   and draped in a sterile fashion. 1 14 fr sheath were placed in the right femoral  vein via the modified Seldinger technique and US guidance. 2 sheaths were placed in the left femoral vein. Catheters were placed in the appropriate   Positions (HRA, CS, RV, HB) under ICE and 3D mapping guidance. An intracardiac echo catheter was placed in the mid right atrium and the  interatrial septum was clearly visualized. A Stylefinch versacross SL-1 sheath was then advanced  through a short 14-Indonesian sheath in the right femoral vein over a long  guidewire to the SVC-RA junction. The sheath, dilator  and needle were withdrawn in the 5-6 oclock position until tenting was clearly   visualized within the interatrial septum. The RF wire was advanced   through the fossa ovalis, this was confirmed  by ICE. The dilator   and sheath were gently advanced over the needle followed by removal of the  dilator and needle. A circular tip wire was advanced to the left atrium and the SL-1 advanced over the wire and then withdrawn to the right atrium. The SL-1 sheath was then exchanged over a long guidewire for a CryoCath  sheath. A heparin bolus was given prior to transeptal access and after femoral vein access   to maintain an ACT level of at least 300-350 seconds. A 28mm balloon was then  advanced into the CryoCath sheath and subsequently into the left atrium. Cryo balloon lesions were then delivered adjacent to the ostia of all   4 pulmonary veins. Balloon contact was verified by ICE and 3-D Mapping and contrast. 1-2 lesion sets were applied to each of the four veins. When delivering cryo balloon lesions to the right-sided veins, high   output pacing was performed from a pacing catheter positioned at the   SVC-RA junction in order to achieve phrenic nerve capture and   monitor right hemidiaphragm activity. There was no compromise or   change in right hemidiaphragm activity during cryoablation of the right-sided vein. An esophageal temperature probe was placed to monitor temperatures during Cryo energy delivery. All 4 pulmonary veins were isolated as demonstrated by entrance and exit block. This was confirmed by either differential pacing with the coronary sinus catheter (placed in the CS to record electrograms and pace the left atrium) and the circular pulmonary vein catheter. Catheter positions and cardiac anatomy was visualized by 3D mapping. Protamine was given after a test dose, the sheaths were pulled and hemostasis was maintained with Vascade    ELECTROPHYSIOLOGY STUDY FINDINGS:   Sinus rhythm, cycle length 870 ms,   AL 140ms,  ms, QT 440ms. AH 105ms, HV 53ms. CONCLUSIONS:   1. Sinus node function normal.   2. Atrioventricular node function normal.   3. His-Purkinje function normal.   4. Pulmonary vein isolation was achieved with cryoablation as   described above.    5. There was no pericardial effusion observed at the conclusion of   the procedure, confirmed by intracardiac echocardiography. 6. Vascade Venous Closure  7. Ablation of typical AFL- CTI ablation    Complications:  None      Plan:    1) Continue oral anticoagulation, uninterrupted. 2) Continue Flecainide/Metoprolol  3) Per pt preference- admit overnight  4) Pantoprazole 40 mg daily ( new prescription) - 1 month only  5) Follow up in 1-2 weeks with NP in [address] office and with me in 6-8 weeks  6) No TTE needed before discharge.                Hayley Thomas MD    Cardiac Electrophysiololgy  [address] [place] [address]  7/19/2023

## 2023-07-19 NOTE — ANESTHESIA PROCEDURE NOTES
Airway  Date/Time: 7/19/2023 1:35 PM  Urgency: elective    Airway not difficult    General Information and Staff    Patient location during procedure: OR  Anesthesiologist: Bonny Iyer MD  Performed: anesthesiologist   Performed by: Bonny Iyer MD  Authorized by: Bonny Iyer MD      Indications and Patient Condition  Indications for airway management: anesthesia  Sedation level: deep  Preoxygenated: yes  Patient position: sniffing  MILS maintained throughout  Mask difficulty assessment: 0 - not attempted    Final Airway Details  Final airway type: endotracheal airway      Successful airway: ETT  Cuffed: yes   Successful intubation technique: direct laryngoscopy  Endotracheal tube insertion site: oral  Blade: Tammy  Blade size: #3  ETT size (mm): 7.0    Cormack-Lehane Classification: grade I - full view of glottis  Placement verified by: capnometry   Cuff volume (mL): 6  Measured from: lips  ETT to lips (cm): 22  Number of attempts at approach: 1

## 2023-07-19 NOTE — PLAN OF CARE
NURSING ADMISSION NOTE      Patient admitted via Cart  Oriented to room. Safety precautions initiated. Bed in low position. Call light in reach. Patient alert and oriented x 4. On RA. Up with SBA. NSR on tele. Continent of bowel/bladder. No complaints of pain, shortness of breath, chest pain/discomfort. POC: groin recovery, discharge tomorrow. Call light within reach. Fall precautions in place. Admission navigator completed with patient. Patient and daughter updated on POC, all questions answered at this time.      Problem: Patient/Family Goals  Goal: Patient/Family Long Term Goal  Description: Patient's Long Term Goal: to go home    Interventions:  - groin recovery  - See additional Care Plan goals for specific interventions  Outcome: Progressing  Goal: Patient/Family Short Term Goal  Description: Patient's Short Term Goal: to feel better    Interventions:   - groin recovery  -meds as ordered  - See additional Care Plan goals for specific interventions  Outcome: Progressing

## 2023-07-20 VITALS
RESPIRATION RATE: 20 BRPM | OXYGEN SATURATION: 98 % | SYSTOLIC BLOOD PRESSURE: 112 MMHG | TEMPERATURE: 98 F | DIASTOLIC BLOOD PRESSURE: 61 MMHG | HEART RATE: 57 BPM

## 2023-07-20 LAB
ATRIAL RATE: 66 BPM
EST. AVERAGE GLUCOSE BLD GHB EST-MCNC: 160 MG/DL (ref 68–126)
GLUCOSE BLD-MCNC: 199 MG/DL (ref 70–99)
GLUCOSE BLD-MCNC: 232 MG/DL (ref 70–99)
GLUCOSE BLD-MCNC: 337 MG/DL (ref 70–99)
HBA1C MFR BLD: 7.2 % (ref ?–5.7)
P AXIS: 70 DEGREES
P-R INTERVAL: 324 MS
Q-T INTERVAL: 468 MS
QRS DURATION: 106 MS
QTC CALCULATION (BEZET): 490 MS
R AXIS: -41 DEGREES
T AXIS: 63 DEGREES
VENTRICULAR RATE: 66 BPM

## 2023-07-20 PROCEDURE — 99214 OFFICE O/P EST MOD 30 MIN: CPT | Performed by: HOSPITALIST

## 2023-07-20 RX ORDER — PANTOPRAZOLE SODIUM 40 MG/1
40 TABLET, DELAYED RELEASE ORAL
Qty: 30 TABLET | Refills: 0 | Status: SHIPPED
Start: 2023-07-21

## 2023-07-20 RX ADMIN — FLECAINIDE ACETATE 50 MG: 100 TABLET ORAL at 09:09:00

## 2023-07-20 RX ADMIN — METOPROLOL SUCCINATE 50 MG: 50 TABLET, EXTENDED RELEASE ORAL at 09:10:00

## 2023-07-20 RX ADMIN — PANTOPRAZOLE SODIUM 40 MG: 40 TABLET, DELAYED RELEASE ORAL at 06:35:00

## 2023-07-20 RX ADMIN — AMLODIPINE BESYLATE 5 MG: 5 TABLET ORAL at 09:10:00

## 2023-07-20 RX ADMIN — FUROSEMIDE 40 MG: 40 TABLET ORAL at 09:10:00

## 2023-07-20 NOTE — PLAN OF CARE
Assumed care of patient @ 0730 patient resting in bed, AOX4, reports no pain. Lung sounds clear bilaterally, O2 saturation maintained on room air. No complaints of shortness of breath or chest pain. NSR on tele, S1-S2 present, no adventitious sounds noted. Bowel sounds present and active in all quadrants. Patient voiding with some difficulty starting stream, following with urology outpatient. Pt ambulating in rojas without issue. Right and left groin sites clean, dry, intact, dressings removed by cardiology today, soft, no hematoma. Plan of Care: Post cryoablation education. Home today    Discussed plan of care with patient, verbalized understanding. Call light within reach.      Problem: Patient/Family Goals  Goal: Patient/Family Long Term Goal  Description: Patient's Long Term Goal: Stay out of afib    Interventions:  - Cryoablation  - Take medications as ordered  - Attend follow up appointments  - See additional Care Plan goals for specific interventions  Outcome: Progressing  Goal: Patient/Family Short Term Goal  Description: Patient's Short Term Goal: Go home    Interventions:   - Cardiac monitoring  - Labs, tele  - Medications as orderd  - See additional Care Plan goals for specific interventions  Outcome: Progressing

## 2023-07-20 NOTE — PLAN OF CARE
Pt okay to discharge per hospitalist and consults. Discharge AVS including medication information, follow-ups, and prescriptions given. Patient verbalized understanding and is agreeable with discharge. Bilateral groins clean, dry, intact, soft, no  hematoma. IV removed, telemetry discontinued. All belongings taken with patient. Transported off unit via wheelchair.      Problem: Patient/Family Goals  Goal: Patient/Family Long Term Goal  Description: Patient's Long Term Goal: Stay out of afib    Interventions:  - Cryoablation  - Take medications as ordered  - Attend follow up appointments  - See additional Care Plan goals for specific interventions  7/20/2023 1433 by Wen Ruiz, RN  Outcome: Adequate for Discharge  7/20/2023 1034 by Wen Ruiz RN  Outcome: Progressing  Goal: Patient/Family Short Term Goal  Description: Patient's Short Term Goal: Go home    Interventions:   - Cardiac monitoring  - Labs, tele  - Medications as orderd  - See additional Care Plan goals for specific interventions  7/20/2023 1433 by Wen Ruiz, RN  Outcome: Adequate for Discharge  7/20/2023 1034 by Wen Ruiz, RN  Outcome: Progressing

## 2023-07-20 NOTE — PLAN OF CARE
Assumed care for pt at 19:30 on 7/19    S/p cyroablation via R + L groin, dressings c/d/I soft without hematoma. BLE sensation intact, +2 pedal pulses. A&Ox4. Denies pain. VSS on RA. NSR on tele. Eliquis given. Unable to void by 20:00 until sat on BSC- voided 100cc. . Up with 1 assist.   IV flushed and capped. QID. Plan: Hgb A1C AM draw, anticipated dc home    Bed alarm on. Call light in reach. Able to make needs known.

## 2023-07-21 ENCOUNTER — PATIENT OUTREACH (OUTPATIENT)
Dept: CASE MANAGEMENT | Age: 83
End: 2023-07-21

## 2023-07-21 DIAGNOSIS — I48.91 ATRIAL FIBRILLATION WITH RAPID VENTRICULAR RESPONSE (HCC): ICD-10-CM

## 2023-07-21 DIAGNOSIS — Z02.9 ENCOUNTERS FOR UNSPECIFIED ADMINISTRATIVE PURPOSE: Primary | ICD-10-CM

## 2023-07-21 PROCEDURE — 1111F DSCHRG MED/CURRENT MED MERGE: CPT

## 2023-08-10 ENCOUNTER — TELEPHONE (OUTPATIENT)
Dept: FAMILY MEDICINE CLINIC | Facility: CLINIC | Age: 83
End: 2023-08-10

## 2023-08-10 NOTE — TELEPHONE ENCOUNTER
Received fax from [facility] regarding Documentation Request    \"Please fax the below documentation supporting the need for an Insulin Pump/CGM and/or Supplies as required by the patient's insurance to [phone]  Past six months of clinical/office notes pertaining to diabetes treatment and consultation\"      Office visit with Dr. Cayetano Ariza notes: 06/14/23 and 03/08/23  Faxed to #[phone]

## 2023-08-30 ENCOUNTER — HOSPITAL ENCOUNTER (OUTPATIENT)
Dept: BONE DENSITY | Age: 83
Discharge: HOME | End: 2023-08-30
Attending: FAMILY MEDICINE
Payer: MEDICARE

## 2023-08-30 ENCOUNTER — MED REC SCAN ONLY (OUTPATIENT)
Dept: FAMILY MEDICINE CLINIC | Facility: CLINIC | Age: 83
End: 2023-08-30

## 2023-08-30 DIAGNOSIS — M81.0 AGE-RELATED OSTEOPOROSIS WITHOUT CURRENT PATHOLOGICAL FRACTURE: ICD-10-CM

## 2023-08-30 PROCEDURE — 77080 DXA BONE DENSITY AXIAL: CPT | Performed by: FAMILY MEDICINE

## 2023-10-25 ENCOUNTER — TELEPHONE (OUTPATIENT)
Dept: FAMILY MEDICINE CLINIC | Facility: CLINIC | Age: 83
End: 2023-10-25

## 2023-10-25 NOTE — TELEPHONE ENCOUNTER
WAITING TO DISCHARGE PT - WANTING TO KNOW IF WE WILL OVER SEE PT.    ROUTED TO NP - NP NOT IN OFFICE TODAY    SPOKE WITH DR CARDOZA [facility] AND ADV WILL WILL F/U WITH PT CARE    CELSO : PHONE: [phone]    THANK YOU    ** SPOKE WITH CELSO AND LETY ABOVE INFO - WILL BE FAXING OVER PAPERWORK TO SIGN **

## 2023-10-25 NOTE — TELEPHONE ENCOUNTER
[facility], will Doctor over see home health? Please call today.

## 2023-11-10 ENCOUNTER — MED REC SCAN ONLY (OUTPATIENT)
Dept: FAMILY MEDICINE CLINIC | Facility: CLINIC | Age: 83
End: 2023-11-10

## 2023-11-15 ENCOUNTER — OFFICE VISIT (OUTPATIENT)
Dept: FAMILY MEDICINE CLINIC | Facility: CLINIC | Age: 83
End: 2023-11-15
Payer: MEDICARE

## 2023-11-15 VITALS
TEMPERATURE: 98 F | HEART RATE: 62 BPM | SYSTOLIC BLOOD PRESSURE: 142 MMHG | OXYGEN SATURATION: 98 % | WEIGHT: 150 LBS | DIASTOLIC BLOOD PRESSURE: 80 MMHG | BODY MASS INDEX: 24.11 KG/M2 | HEIGHT: 66 IN

## 2023-11-15 DIAGNOSIS — M79.601 RIGHT ARM PAIN: ICD-10-CM

## 2023-11-15 DIAGNOSIS — Z23 FLU VACCINE NEED: ICD-10-CM

## 2023-11-15 DIAGNOSIS — Z79.4 TYPE 2 DIABETES MELLITUS WITHOUT COMPLICATION, WITH LONG-TERM CURRENT USE OF INSULIN (HCC): Primary | ICD-10-CM

## 2023-11-15 DIAGNOSIS — J81.0 ACUTE PULMONARY EDEMA (HCC): ICD-10-CM

## 2023-11-15 DIAGNOSIS — N18.32 STAGE 3B CHRONIC KIDNEY DISEASE (HCC): ICD-10-CM

## 2023-11-15 DIAGNOSIS — E11.21 TYPE 2 DIABETES MELLITUS WITH NEPHROPATHY (HCC): ICD-10-CM

## 2023-11-15 DIAGNOSIS — E11.9 TYPE 2 DIABETES MELLITUS WITHOUT COMPLICATION, WITH LONG-TERM CURRENT USE OF INSULIN (HCC): Primary | ICD-10-CM

## 2023-11-15 LAB
ALBUMIN SERPL-MCNC: 3.6 G/DL (ref 3.4–5)
ALBUMIN/GLOB SERPL: 1.1 {RATIO} (ref 1–2)
ALP LIVER SERPL-CCNC: 60 U/L
ALT SERPL-CCNC: 21 U/L
ANION GAP SERPL CALC-SCNC: 6 MMOL/L (ref 0–18)
AST SERPL-CCNC: 16 U/L (ref 15–37)
BASOPHILS # BLD AUTO: 0.05 X10(3) UL (ref 0–0.2)
BASOPHILS NFR BLD AUTO: 0.5 %
BILIRUB SERPL-MCNC: 0.6 MG/DL (ref 0.1–2)
BUN BLD-MCNC: 28 MG/DL (ref 9–23)
CALCIUM BLD-MCNC: 9.6 MG/DL (ref 8.5–10.1)
CARTRIDGE LOT#: 593 NUMERIC
CHLORIDE SERPL-SCNC: 106 MMOL/L (ref 98–112)
CO2 SERPL-SCNC: 28 MMOL/L (ref 21–32)
CREAT BLD-MCNC: 1.41 MG/DL
CREAT UR-SCNC: 108 MG/DL
EGFRCR SERPLBLD CKD-EPI 2021: 37 ML/MIN/1.73M2 (ref 60–?)
EOSINOPHIL # BLD AUTO: 0.18 X10(3) UL (ref 0–0.7)
EOSINOPHIL NFR BLD AUTO: 1.9 %
ERYTHROCYTE [DISTWIDTH] IN BLOOD BY AUTOMATED COUNT: 15.3 %
FASTING STATUS PATIENT QL REPORTED: NO
GLOBULIN PLAS-MCNC: 3.4 G/DL (ref 2.8–4.4)
GLUCOSE BLD-MCNC: 141 MG/DL (ref 70–99)
HCT VFR BLD AUTO: 35.4 %
HEMOGLOBIN A1C: 5.5 % (ref 4.3–5.6)
HGB BLD-MCNC: 11.1 G/DL
IMM GRANULOCYTES # BLD AUTO: 0.06 X10(3) UL (ref 0–1)
IMM GRANULOCYTES NFR BLD: 0.6 %
LYMPHOCYTES # BLD AUTO: 1.59 X10(3) UL (ref 1–4)
LYMPHOCYTES NFR BLD AUTO: 16.8 %
MCH RBC QN AUTO: 27.8 PG (ref 26–34)
MCHC RBC AUTO-ENTMCNC: 31.4 G/DL (ref 31–37)
MCV RBC AUTO: 88.5 FL
MICROALBUMIN UR-MCNC: 28.8 MG/DL
MICROALBUMIN/CREAT 24H UR-RTO: 266.7 UG/MG (ref ?–30)
MONOCYTES # BLD AUTO: 1.01 X10(3) UL (ref 0.1–1)
MONOCYTES NFR BLD AUTO: 10.7 %
NEUTROPHILS # BLD AUTO: 6.57 X10 (3) UL (ref 1.5–7.7)
NEUTROPHILS # BLD AUTO: 6.57 X10(3) UL (ref 1.5–7.7)
NEUTROPHILS NFR BLD AUTO: 69.5 %
OSMOLALITY SERPL CALC.SUM OF ELEC: 298 MOSM/KG (ref 275–295)
PLATELET # BLD AUTO: 382 10(3)UL (ref 150–450)
POTASSIUM SERPL-SCNC: 4.2 MMOL/L (ref 3.5–5.1)
PROT SERPL-MCNC: 7 G/DL (ref 6.4–8.2)
RBC # BLD AUTO: 4 X10(6)UL
SODIUM SERPL-SCNC: 140 MMOL/L (ref 136–145)
WBC # BLD AUTO: 9.5 X10(3) UL (ref 4–11)

## 2023-11-15 PROCEDURE — 85025 COMPLETE CBC W/AUTO DIFF WBC: CPT | Performed by: FAMILY MEDICINE

## 2023-11-15 PROCEDURE — 80053 COMPREHEN METABOLIC PANEL: CPT | Performed by: FAMILY MEDICINE

## 2023-11-15 PROCEDURE — 82570 ASSAY OF URINE CREATININE: CPT | Performed by: FAMILY MEDICINE

## 2023-11-15 PROCEDURE — 82043 UR ALBUMIN QUANTITATIVE: CPT | Performed by: FAMILY MEDICINE

## 2023-11-15 RX ORDER — PEN NEEDLE, DIABETIC 32GX 5/32"
1 NEEDLE, DISPOSABLE MISCELLANEOUS DAILY
Qty: 90 EACH | Refills: 0 | Status: SHIPPED | OUTPATIENT
Start: 2023-11-15 | End: 2024-11-14

## 2023-11-15 RX ORDER — GABAPENTIN 100 MG/1
2 CAPSULE ORAL EVERY EVENING
COMMUNITY
Start: 2023-10-25 | End: 2023-11-15

## 2023-11-15 RX ORDER — TRAMADOL HYDROCHLORIDE 50 MG/1
1 TABLET ORAL 3 TIMES DAILY PRN
COMMUNITY
Start: 2023-10-25

## 2023-11-15 RX ORDER — GABAPENTIN 100 MG/1
200 CAPSULE ORAL EVERY EVENING
Qty: 180 CAPSULE | Refills: 0 | Status: SHIPPED | OUTPATIENT
Start: 2023-11-15

## 2023-11-15 RX ORDER — FENOFIBRATE 145 MG/1
145 TABLET, COATED ORAL DAILY
COMMUNITY
Start: 2023-10-25

## 2023-11-15 RX ORDER — INSULIN GLARGINE 100 [IU]/ML
8 INJECTION, SOLUTION SUBCUTANEOUS NIGHTLY
Qty: 1 ML | Refills: 0 | Status: SHIPPED | OUTPATIENT
Start: 2023-11-15

## 2023-11-15 RX ORDER — INSULIN GLARGINE 100 [IU]/ML
INJECTION, SOLUTION SUBCUTANEOUS
COMMUNITY
Start: 2023-10-25 | End: 2023-11-15

## 2023-11-16 ENCOUNTER — TELEPHONE (OUTPATIENT)
Dept: FAMILY MEDICINE CLINIC | Facility: CLINIC | Age: 83
End: 2023-11-16

## 2023-11-16 DIAGNOSIS — Z79.4 TYPE 2 DIABETES MELLITUS WITHOUT COMPLICATION, WITH LONG-TERM CURRENT USE OF INSULIN (HCC): Primary | ICD-10-CM

## 2023-11-16 DIAGNOSIS — E11.21 TYPE 2 DIABETES MELLITUS WITH NEPHROPATHY (HCC): ICD-10-CM

## 2023-11-16 DIAGNOSIS — E11.9 TYPE 2 DIABETES MELLITUS WITHOUT COMPLICATION, WITH LONG-TERM CURRENT USE OF INSULIN (HCC): Primary | ICD-10-CM

## 2023-11-16 NOTE — TELEPHONE ENCOUNTER
----- Message from Roel Rebolledo MD sent at 11/16/2023  7:39 AM CST -----  Renal function has improved. I still wish for her to see nephrologist and I would like to adjust her med. Please have her schedule virtual or routine follow up. Also can you pend the order for freestyle reader for me to sign. I was having problems with errors yesterday and I forgot to do it before Ieft. Thank you      Order(s) pending, please review and route back to Nurse pool. Thank you.

## 2023-11-16 NOTE — TELEPHONE ENCOUNTER
Advised patient of Dr. Desi Siddiqui note below. Patient verbalized understanding, requesting to send same info in [facility]. [facility]. - [facility] sent to pt)    Pt asking - does Dr. Girma Catherine want to see pt before or after she sees nephrology?     Please advise, thank you

## 2023-11-17 NOTE — TELEPHONE ENCOUNTER
Matt Pro MD  You1 hour ago (7:42 AM)     If she has appt with renal specialist I can see her after to review for med changes and assess for stability. Thank you     Advised patient of Dr. Khoa Flores note above. Patient verbalized understanding. Pt to call nephrologist to schedule appt, will call office back to schedule follow-up w/ Dr. Emerita Avilez. No further questions at this time.

## 2023-11-18 ENCOUNTER — MED REC SCAN ONLY (OUTPATIENT)
Dept: FAMILY MEDICINE CLINIC | Facility: CLINIC | Age: 83
End: 2023-11-18

## 2023-11-22 ENCOUNTER — HOME HEALTH CHARGES (OUTPATIENT)
Dept: FAMILY MEDICINE CLINIC | Facility: CLINIC | Age: 83
End: 2023-11-22

## 2023-11-22 DIAGNOSIS — S12.400D UNSPECIFIED DISPLACED FRACTURE OF FIFTH CERVICAL VERTEBRA, SUBSEQUENT ENCOUNTER FOR FRACTURE WITH ROUTINE HEALING: Primary | ICD-10-CM

## 2023-11-28 ENCOUNTER — TELEPHONE (OUTPATIENT)
Dept: FAMILY MEDICINE CLINIC | Facility: CLINIC | Age: 83
End: 2023-11-28

## 2023-11-28 NOTE — TELEPHONE ENCOUNTER
Updated Diabetic Eye Exam in patient's chart from [facility], sent paperwork to scanning.

## 2023-12-05 ENCOUNTER — TELEPHONE (OUTPATIENT)
Dept: FAMILY MEDICINE CLINIC | Facility: CLINIC | Age: 83
End: 2023-12-05

## 2023-12-05 NOTE — TELEPHONE ENCOUNTER
Received fax from [address] [place] [address] regarding refill request: Risedronate       Take 1 tablet (35 mg total) by mouth every 7 days. , Normal, Disp-12 tablet, R-3  Patient not taking: Reported on 11/15/2023   Dispense: 12 tablet   Refills: 3 ordered   Pharmacy: [id number], [address] [place] [address]  [address] [place] [address] [phone], [phone] (Ph: [phone])   Order Details  Ordered on: 12/06/22   Discontinued on: 11/15/23   Discontinue reason: Therapy completed   Authorizing provider: Ayaka Parra MD   History   Start Date End Date   risedronate 35 MG Oral Tab 12/04/22 11/15/23   risedronate 35 MG Oral Tab 07/16/22 12/06/22   Risedronate Sodium 35 MG Oral Tab 08/18/21 07/16/22   Risedronate Sodium 35 MG Oral Tab 08/16/18 08/21/21

## 2023-12-08 VITALS — SYSTOLIC BLOOD PRESSURE: 123 MMHG | DIASTOLIC BLOOD PRESSURE: 60 MMHG

## 2023-12-08 RX ORDER — AMLODIPINE BESYLATE 10 MG/1
10 TABLET ORAL DAILY
Qty: 90 TABLET | Refills: 1 | Status: SHIPPED | OUTPATIENT
Start: 2023-12-08

## 2023-12-08 NOTE — TELEPHONE ENCOUNTER
Fax received from [place] [facility] to refill risedronate- all notes indicate pt is not taking    Called the pt to confirm and she states that she is taking this medication    LRF 12/6/22 #12 with 3 rf  Last dexa 8/30/23  Routerd to provider to address

## 2023-12-08 NOTE — TELEPHONE ENCOUNTER
LOV  11/15/23  LRF  8/10/23 By cardiologist-   BP Readings from Last 3 Encounters:   11/15/23 142/80   07/20/23 112/61   07/17/23 142/89     Amlodipine 10mg  changed when in the hospital    Saw Dr. Morelia Taylor yesterday  Aníbal Coma  Date / Time: 08/0/6626   BP Systolic 501 mmHg   BP Diastolic 60 mmHg

## 2023-12-08 NOTE — TELEPHONE ENCOUNTER
Hypertension Medications Protocol Jkmvsh2112/08/2023 11:44 AM   Protocol Details CMP or BMP in past 12 months    Last serum creatinine< 2.0    Appointment in past 6 or next 3 months      Refilled per protocol

## 2023-12-08 NOTE — TELEPHONE ENCOUNTER
Argenis WHITTAKER [phone], [phone] [address] [place] [facility] [address]   Phone: [phone] Fax: [phone]   Hours: Not open 24 hours       PATIENT REQUESTS REFILL FOR AMLODIPINE 10MG QD

## 2023-12-11 RX ORDER — RISEDRONATE SODIUM 35 MG/1
35 TABLET, FILM COATED ORAL
Qty: 12 TABLET | Refills: 3 | Status: SHIPPED | OUTPATIENT
Start: 2023-12-11

## 2023-12-15 ENCOUNTER — MED REC SCAN ONLY (OUTPATIENT)
Dept: FAMILY MEDICINE CLINIC | Facility: CLINIC | Age: 83
End: 2023-12-15

## 2023-12-16 ENCOUNTER — PATIENT MESSAGE (OUTPATIENT)
Dept: FAMILY MEDICINE CLINIC | Facility: CLINIC | Age: 83
End: 2023-12-16

## 2023-12-16 DIAGNOSIS — E78.2 MIXED HYPERLIPIDEMIA: Primary | ICD-10-CM

## 2023-12-17 DIAGNOSIS — N18.32 TYPE 2 DIABETES MELLITUS WITH STAGE 3B CHRONIC KIDNEY DISEASE, WITH LONG-TERM CURRENT USE OF INSULIN (HCC): ICD-10-CM

## 2023-12-17 DIAGNOSIS — E11.22 TYPE 2 DIABETES MELLITUS WITH STAGE 3B CHRONIC KIDNEY DISEASE, WITH LONG-TERM CURRENT USE OF INSULIN (HCC): ICD-10-CM

## 2023-12-17 DIAGNOSIS — Z79.4 TYPE 2 DIABETES MELLITUS WITH STAGE 3B CHRONIC KIDNEY DISEASE, WITH LONG-TERM CURRENT USE OF INSULIN (HCC): ICD-10-CM

## 2023-12-18 RX ORDER — ATORVASTATIN CALCIUM 80 MG/1
80 TABLET, FILM COATED ORAL NIGHTLY
Qty: 90 TABLET | Refills: 0 | Status: SHIPPED | OUTPATIENT
Start: 2023-12-18

## 2023-12-18 RX ORDER — ATORVASTATIN CALCIUM 80 MG/1
80 TABLET, FILM COATED ORAL NIGHTLY
Qty: 30 TABLET | Refills: 0 | Status: SHIPPED | OUTPATIENT
Start: 2023-12-18

## 2023-12-18 NOTE — TELEPHONE ENCOUNTER
From: Rondi Cowden  To: Keven Amaro  Sent: 12/16/2023 1:01 PM CST  Subject: Refill Request    Dr. Lennie Arora, would you please send a renewal prescription for my Atorvastatin Calcium 80 MG to [facility] in [place] [address]. Tonight would be my second time of not taking it. Thank you.

## 2023-12-18 NOTE — TELEPHONE ENCOUNTER
Per message from pharmacy:  Patient taking differently: 30 Units/day Subcutaneous 3 times daily with meals, Sliding Scale, up to 30 units daily, Reported on 6/23/2023

## 2023-12-18 NOTE — TELEPHONE ENCOUNTER
LOV 11/22/23    Last Refill 4/29/23 15mL Refill 1    Lab 11/15/23     Future Appointments   Date Time Provider Argenis Lucas   12/27/2023 10:00 AM Christie Tadeo MD [facility] [place]

## 2023-12-18 NOTE — TELEPHONE ENCOUNTER
Cholesterol Medication Protocol [id number]/17/2023 02:47 PM   Protocol Details ALT < 80    ALT resulted within past year    Lipid panel within past 12 months    Appointment within past 12 or next 3 months          LOV 11/15/23    Last Refill pt reported medication    Labs 11/15/23     Future Appointments   Date Time Provider Argenis Lucas   12/27/2023 10:00 AM Christie Tadeo MD [facility] LARRY Garcia

## 2023-12-18 NOTE — TELEPHONE ENCOUNTER
Last OV 11/15/23  Last lab:  11/15/23 cmp  ALT  13 - 56 U/L 21   2/23/23 lipid      Last refilled 9-6-2022  #90  0 refill by Dr Kenyetta Villegas, per dispense history subsequent refills from a different provider     Please advise if you will be taking over med management.

## 2023-12-20 RX ORDER — INSULIN LISPRO 100 [IU]/ML
30 INJECTION, SOLUTION INTRAVENOUS; SUBCUTANEOUS DAILY
Qty: 15 ML | Refills: 0 | Status: SHIPPED | OUTPATIENT
Start: 2023-12-20

## 2023-12-20 NOTE — TELEPHONE ENCOUNTER
Called patient, she states she is taking Humalog on a sliding scale:    5-8 units BID in the am and 8-10 units in the pm (depending on sugars)      Forward to Dr. Girma Catherine, please advise on refills. Thank you.

## 2023-12-27 ENCOUNTER — LAB ENCOUNTER (OUTPATIENT)
Dept: LAB | Age: 83
End: 2023-12-27
Attending: INTERNAL MEDICINE
Payer: MEDICARE

## 2023-12-27 ENCOUNTER — OFFICE VISIT (OUTPATIENT)
Dept: NEPHROLOGY | Facility: CLINIC | Age: 83
End: 2023-12-27
Payer: MEDICARE

## 2023-12-27 VITALS — WEIGHT: 147.38 LBS | DIASTOLIC BLOOD PRESSURE: 62 MMHG | SYSTOLIC BLOOD PRESSURE: 112 MMHG | BODY MASS INDEX: 24 KG/M2

## 2023-12-27 DIAGNOSIS — N18.30 STAGE 3 CHRONIC KIDNEY DISEASE, UNSPECIFIED WHETHER STAGE 3A OR 3B CKD (HCC): Primary | ICD-10-CM

## 2023-12-27 DIAGNOSIS — N18.30 STAGE 3 CHRONIC KIDNEY DISEASE, UNSPECIFIED WHETHER STAGE 3A OR 3B CKD (HCC): ICD-10-CM

## 2023-12-27 DIAGNOSIS — I48.91 A-FIB (HCC): ICD-10-CM

## 2023-12-27 DIAGNOSIS — Z01.818 PRE-OP TESTING: ICD-10-CM

## 2023-12-27 LAB
ALBUMIN SERPL-MCNC: 3.7 G/DL (ref 3.4–5)
ALBUMIN/GLOB SERPL: 1.3 {RATIO} (ref 1–2)
ALP LIVER SERPL-CCNC: 52 U/L
ALT SERPL-CCNC: 25 U/L
ANION GAP SERPL CALC-SCNC: 2 MMOL/L (ref 0–18)
AST SERPL-CCNC: 26 U/L (ref 15–37)
BASOPHILS # BLD AUTO: 0.04 X10(3) UL (ref 0–0.2)
BASOPHILS NFR BLD AUTO: 0.5 %
BILIRUB SERPL-MCNC: 0.5 MG/DL (ref 0.1–2)
BILIRUB UR QL STRIP.AUTO: NEGATIVE
BUN BLD-MCNC: 31 MG/DL (ref 9–23)
CALCIUM BLD-MCNC: 9.3 MG/DL (ref 8.5–10.1)
CHLORIDE SERPL-SCNC: 111 MMOL/L (ref 98–112)
CO2 SERPL-SCNC: 30 MMOL/L (ref 21–32)
COLOR UR AUTO: YELLOW
CREAT BLD-MCNC: 1.29 MG/DL
CREAT UR-SCNC: 110 MG/DL
EGFRCR SERPLBLD CKD-EPI 2021: 41 ML/MIN/1.73M2 (ref 60–?)
EOSINOPHIL # BLD AUTO: 0.25 X10(3) UL (ref 0–0.7)
EOSINOPHIL NFR BLD AUTO: 3.1 %
ERYTHROCYTE [DISTWIDTH] IN BLOOD BY AUTOMATED COUNT: 14.3 %
FASTING STATUS PATIENT QL REPORTED: NO
GLOBULIN PLAS-MCNC: 2.9 G/DL (ref 2.8–4.4)
GLUCOSE BLD-MCNC: 145 MG/DL (ref 70–99)
GLUCOSE UR STRIP.AUTO-MCNC: NORMAL MG/DL
HCT VFR BLD AUTO: 37.4 %
HGB BLD-MCNC: 11.5 G/DL
HYALINE CASTS #/AREA URNS AUTO: PRESENT /LPF
IMM GRANULOCYTES # BLD AUTO: 0.03 X10(3) UL (ref 0–1)
IMM GRANULOCYTES NFR BLD: 0.4 %
KETONES UR STRIP.AUTO-MCNC: NEGATIVE MG/DL
LEUKOCYTE ESTERASE UR QL STRIP.AUTO: 500
LYMPHOCYTES # BLD AUTO: 2 X10(3) UL (ref 1–4)
LYMPHOCYTES NFR BLD AUTO: 24.7 %
MAGNESIUM SERPL-MCNC: 2.1 MG/DL (ref 1.6–2.6)
MCH RBC QN AUTO: 26.2 PG (ref 26–34)
MCHC RBC AUTO-ENTMCNC: 30.7 G/DL (ref 31–37)
MCV RBC AUTO: 85.2 FL
MONOCYTES # BLD AUTO: 0.83 X10(3) UL (ref 0.1–1)
MONOCYTES NFR BLD AUTO: 10.2 %
NEUTROPHILS # BLD AUTO: 4.95 X10 (3) UL (ref 1.5–7.7)
NEUTROPHILS # BLD AUTO: 4.95 X10(3) UL (ref 1.5–7.7)
NEUTROPHILS NFR BLD AUTO: 61.1 %
NITRITE UR QL STRIP.AUTO: NEGATIVE
OSMOLALITY SERPL CALC.SUM OF ELEC: 305 MOSM/KG (ref 275–295)
PH UR STRIP.AUTO: 6.5 [PH] (ref 5–8)
PHOSPHATE SERPL-MCNC: 4.4 MG/DL (ref 2.5–4.9)
PLATELET # BLD AUTO: 330 10(3)UL (ref 150–450)
POTASSIUM SERPL-SCNC: 4.6 MMOL/L (ref 3.5–5.1)
PROT SERPL-MCNC: 6.6 G/DL (ref 6.4–8.2)
PROT UR-MCNC: 23.9 MG/DL
PTH-INTACT SERPL-MCNC: 68.4 PG/ML (ref 18.5–88)
RBC # BLD AUTO: 4.39 X10(6)UL
RBC UR QL AUTO: NEGATIVE
SODIUM SERPL-SCNC: 143 MMOL/L (ref 136–145)
SP GR UR STRIP.AUTO: 1.02 (ref 1–1.03)
UROBILINOGEN UR STRIP.AUTO-MCNC: NORMAL MG/DL
VIT D+METAB SERPL-MCNC: 16.3 NG/ML (ref 30–100)
WBC # BLD AUTO: 8.1 X10(3) UL (ref 4–11)
WBC #/AREA URNS AUTO: >50 /HPF
YEAST UR QL: PRESENT /HPF

## 2023-12-27 PROCEDURE — 83735 ASSAY OF MAGNESIUM: CPT

## 2023-12-27 PROCEDURE — 36415 COLL VENOUS BLD VENIPUNCTURE: CPT

## 2023-12-27 PROCEDURE — 82570 ASSAY OF URINE CREATININE: CPT

## 2023-12-27 PROCEDURE — 81001 URINALYSIS AUTO W/SCOPE: CPT

## 2023-12-27 PROCEDURE — 83970 ASSAY OF PARATHORMONE: CPT

## 2023-12-27 PROCEDURE — 82306 VITAMIN D 25 HYDROXY: CPT

## 2023-12-27 PROCEDURE — 80053 COMPREHEN METABOLIC PANEL: CPT

## 2023-12-27 PROCEDURE — 84100 ASSAY OF PHOSPHORUS: CPT

## 2023-12-27 PROCEDURE — 99204 OFFICE O/P NEW MOD 45 MIN: CPT | Performed by: INTERNAL MEDICINE

## 2023-12-27 PROCEDURE — 84156 ASSAY OF PROTEIN URINE: CPT

## 2023-12-27 PROCEDURE — 85025 COMPLETE CBC W/AUTO DIFF WBC: CPT

## 2023-12-27 RX ORDER — AMLODIPINE BESYLATE 10 MG/1
5 TABLET ORAL DAILY
Qty: 90 TABLET | Refills: 1 | Status: SHIPPED | OUTPATIENT
Start: 2023-12-27

## 2023-12-27 NOTE — PROGRESS NOTES
Nephrology Consult Note      REASON FOR CONSULT:  CKD         HPI:   Leidy Dejesus is a 80year old female with   Chief Complaint   Patient presents with    Consult     Elevated creatinine and BUN, low GFR     Angie Mishra MD    79 y/o female wit hx of DM/HTN- both longstanding- 20+ yrs, HL, afib s/p ablation, bladder cancer - follows w/ Dr. Farideh Ryan presents for initial evaluation. She is here w/ her daughter  Reports being in her usual state of health  Has chronic LE edema  Denies any cp/sob  No hematuria    No sig nsaid use    No sig etoh use; past smoker- quit ~ 20 yrs ago (in her 82[address]); no illicits  NO family  hx of kidney disease      ROS:   See above; 12 systems reviewed and otherwise unremarkable.      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 BCG    Congestive heart disease ([address]     Diabetes ([address]     IDDM     Diabetes mellitus ([address]     CLARKE (dyspnea on exertion) 09/27/2022    Elevated cholesterol     Essential hypertension     High blood pressure     High cholesterol     Hyperlipidemia     Osteoporosis     Type 2 diabetes mellitus with hyperglycemia ([address] 11/05/2021    Visual impairment     glasses    Wears glasses          PSH:  Past Surgical History:   Procedure Laterality Date    CATARACT      COLONOSCOPY      COLONOSCOPY      CYSTOSCOPY,INSERT URETERAL STENT      CYSTOURETHROSCOPY  11/25/2020    Cystoscopy Procedure Dr Arminda Hogan  - recurrent tumor on BTS    CYSTOURETHROSCOPY,BIOPSY  12/16/2019    BBx of scar sites, [facility]    CYSTOURETHROSCOPY,FULGUR .5-2CM Divya Reason  04/16/2020    TURBT, [facility]    CYSTOURETHROSCOPY,FULGUR .5-2CM Divya Reason  07/30/2020    TURBT with Gemcitabine    CYSTOURETHROSCOPY,FULGUR >5CM LESN  10/03/2019    TURBT with bilateral RPGs ([facility])    FRACTURE SURGERY      left wrist    HYSTERECTOMY      age 32    OTHER SURGICAL HISTORY  04/20/2021    BTS Cysto, Dr Soliz [place] 08/17/2021    BTS [facility]    OTHER SURGICAL HISTORY  11/16/2021    BTS [facility] Dr. Jony Cain     OTHER SURGICAL HISTORY  03/08/2022    BTS Cysto- Dr. Jony Cain         Medications (Active prior to today's visit):  Current Outpatient Medications   Medication Sig Dispense Refill    HUMALOG KWIKPEN 100 UNIT/ML Subcutaneous Solution Pen-injector INJECT 30 UNITS INTO THE SKIN DAILY 15 mL 0    atorvastatin 80 MG Oral Tab Take 1 tablet (80 mg total) by mouth nightly. 30 tablet 0    atorvastatin 80 MG Oral Tab Take 1 tablet (80 mg total) by mouth nightly. 90 tablet 0    risedronate 35 MG Oral Tab Take 1 tablet (35 mg total) by mouth every 7 days. 12 tablet 3    amLODIPine 10 MG Oral Tab Take 1 tablet (10 mg total) by mouth daily. 90 tablet 1    Continuous Blood Gluc  (FREESTYLE NATALIE 2 READER) Does not apply Device 1 each daily. 1 each 1    fenofibrate 145 MG Oral Tab Take 1 tablet (145 mg total) by mouth daily. traMADol 50 MG Oral Tab Take 1 tablet (50 mg total) by mouth 3 (three) times daily as needed. insulin glargine (LANTUS SOLOSTAR) 100 UNIT/ML Subcutaneous Solution Pen-injector Inject 8 Units into the skin nightly. 1 mL 0    Insulin Pen Needle (BD PEN NEEDLE PRISCILLA U/F) 32G X 4 MM Does not apply Misc Inject 1 Pen into the skin daily. 90 each 0    Continuous Blood Gluc  (FREESTYLE NATALIE 2 READER) Does not apply Device 1 Device daily as needed. 1 each 0    gabapentin 100 MG Oral Cap Take 2 capsules (200 mg total) by mouth every evening. 180 capsule 0    METOPROLOL SUCCINATE ER 25 MG Oral Tablet 24 Hr Take 1 tablet (25 mg total) by mouth daily (Patient taking differently: Take 2 tablets (50 mg total) by mouth 2 (two) times daily.) 90 tablet 0    metFORMIN HCl 1000 MG Oral Tab Take 1 tablet (1,000 mg total) by mouth daily with breakfast. 180 tablet 2    PROBIOTIC PRODUCT OR Take 1 capsule by mouth daily.  100 billion      flecainide 50 MG Oral Tab Take 1 tablet (50 mg total) by mouth every 12 (twelve) hours. 60 tablet 0    hydrALAZINE 25 MG Oral Tab Take 3 tablets (75 mg total) by mouth 2 (two) times daily. lisinopril 20 MG Oral Tab Take 1 tablet (20 mg total) by mouth every evening. 30 tablet 0    furosemide 40 MG Oral Tab Take 1 tablet (40 mg total) by mouth daily. Potassium Chloride ER 20 MEQ Oral Tab CR Take 1 tablet by mouth daily. fenofibrate micronized 134 MG Oral Cap Take 1 capsule (134 mg total) by mouth daily with breakfast. (Patient not taking: Reported on 11/15/2023) 90 capsule 1    apixaban 5 MG Oral Tab Take 1 tablet (5 mg total) by mouth 2 (two) times daily. 60 tablet 2    Cholecalciferol (VITAMIN D3) 250 MCG (35676 UT) Oral Tab Take 1,000 Units by mouth daily. Calcium Citrate-Vitamin D 315-250 MG-UNIT Oral Tab Take 1 tablet by mouth daily. Allergies:  No Known Allergies    Social History:  Social History     Socioeconomic History    Marital status:    Tobacco Use    Smoking status: Former     Packs/day: 1.00     Years: 49.00     Additional pack years: 0.00     Total pack years: 49.00     Types: Cigarettes    Smokeless tobacco: Never    Tobacco comments:     quit 15 years    Vaping Use    Vaping Use: Never used   Substance and Sexual Activity    Alcohol use: No    Drug use: No          Family History:  Family History   Problem Relation Age of Onset    Diabetes Father     Stroke Father     Colon Polyps Mother     Diabetes Mother     Heart Attack Mother     Other (Other) Son         Afib            PHYSICAL EXAM:   There were no vitals taken for this visit. Wt Readings from Last 6 Encounters:   11/15/23 150 lb (68 kg)   06/23/23 155 lb (70.3 kg)   06/14/23 154 lb 12.8 oz (70.2 kg)   03/08/23 154 lb 3.2 oz (69.9 kg)   03/01/23 155 lb 4.8 oz (70.4 kg)   02/23/23 153 lb (69.4 kg)     General: Alert and oriented in no apparent distress.   HEENT: No scleral icterus, MMM  Neck: Supple, no FLORIN or thyromegaly  Cardiac: Regular rate and rhythm, S1, S2 normal, no murmur or rub  Lungs: Clear without wheezes, rales, rhonchi. Abdomen: Soft, non-tender. + bowel sounds, no palpable organomegaly  Extremities: Without clubbing, cyanosis ; + LE edema   Neurologic:  normal affect, cranial nerves grossly intact, moving all extremities  Skin: Warm and dry, no rashes    Labs/imaging reviewed     ASSESSMENT/PLAN:   There are no diagnoses linked to this encounter.     CKD- stg 3; suspect related to DM/HTN/age; past UA is fairly bland; +microalubminuria  DM is fairly well controlled as is her HTN  - reviewed diet  - avoid nsaid  - continue present meds- on lasix/acei  - reviewed role of SGLT2i , but given her ongoing urologic procedures I am reluctant to add on a drug that than make her more prone to UTIs- discussed w/ pt regarding risk/benefits  Bladder Cancer- follows w/ Dr. Robin Tong  HTN- controlled; meds reviewed; will decrease amlodipine by 50%- asked pt to cut her tab in 1/2  DM  HL  Afib- on [facility]; s/p recent ablation - will be coming off BB tomorrow     F/U semi-annually; will f/u with pt regarding pending labs      Sarbjit Ashraf MD  12/27/2023

## 2023-12-28 RX ORDER — FLUCONAZOLE 100 MG/1
100 TABLET ORAL DAILY
Qty: 7 TABLET | Refills: 0 | Status: SHIPPED | OUTPATIENT
Start: 2023-12-28

## 2024-01-04 ENCOUNTER — PATIENT MESSAGE (OUTPATIENT)
Dept: FAMILY MEDICINE CLINIC | Facility: CLINIC | Age: 84
End: 2024-01-04

## 2024-01-04 DIAGNOSIS — I10 ESSENTIAL HYPERTENSION: ICD-10-CM

## 2024-01-04 NOTE — TELEPHONE ENCOUNTER
LOV 11/15/2023  Last refill of Lisinopril 10/2022    MyChart message sent to clarify that she still is using Lisinopril

## 2024-01-04 NOTE — TELEPHONE ENCOUNTER
From: Anastasia Hobson  To: Houston Prado  Sent: 1/4/2024 12:03 PM CST  Subject: Prescription Renewal    Dr. Awad would you kindly send a new prescription to [facility] in [place]. I am completely out of my Lisinopil 20 mg. I accidentally threw out my empty bottle so I don't have a RX number to call into the pharmacist.  Thank you.

## 2024-01-05 ENCOUNTER — MED REC SCAN ONLY (OUTPATIENT)
Dept: FAMILY MEDICINE CLINIC | Facility: CLINIC | Age: 84
End: 2024-01-05

## 2024-01-05 RX ORDER — LISINOPRIL 20 MG/1
20 TABLET ORAL EVERY EVENING
Qty: 30 TABLET | Refills: 0 | Status: SHIPPED | OUTPATIENT
Start: 2024-01-05 | End: 2024-07-03

## 2024-01-08 ENCOUNTER — LAB ENCOUNTER (OUTPATIENT)
Dept: LAB | Facility: HOSPITAL | Age: 84
End: 2024-01-08
Attending: INTERNAL MEDICINE
Payer: MEDICARE

## 2024-01-08 ENCOUNTER — HOSPITAL ENCOUNTER (OUTPATIENT)
Dept: GENERAL RADIOLOGY | Facility: HOSPITAL | Age: 84
Discharge: HOME | End: 2024-01-08
Attending: INTERNAL MEDICINE
Payer: MEDICARE

## 2024-01-08 DIAGNOSIS — I48.91 ATRIAL FIBRILLATION (HCC): ICD-10-CM

## 2024-01-08 DIAGNOSIS — R06.09 DOE (DYSPNEA ON EXERTION): ICD-10-CM

## 2024-01-08 DIAGNOSIS — J81.0 ACUTE PULMONARY EDEMA (HCC): Primary | ICD-10-CM

## 2024-01-08 DIAGNOSIS — I10 BENIGN ESSENTIAL HYPERTENSION: ICD-10-CM

## 2024-01-08 LAB
ALBUMIN SERPL-MCNC: 3.6 G/DL (ref 3.4–5)
ALBUMIN/GLOB SERPL: 1.1 {RATIO} (ref 1–2)
ALP LIVER SERPL-CCNC: 56 U/L
ALT SERPL-CCNC: 20 U/L
AMB EXT ANION GAP: 8
AMB EXT BILIRUBIN, TOTAL: 0.4 MG/DL
AMB EXT BUN: 32 MG/DL
AMB EXT CALCIUM: 9.5
AMB EXT CARBON DIOXIDE: 25
AMB EXT CHLORIDE: 111
AMB EXT CMP ALT: 20 U/L
AMB EXT CMP AST: 16 U/L
AMB EXT GLUCOSE: 103 MG/DL
AMB EXT POSTASSIUM: 4 MMOL/L
AMB EXT SODIUM: 144 MMOL/L
AMB EXT TOTAL PROTEIN: 6.8
ANION GAP SERPL CALC-SCNC: 8 MMOL/L (ref 0–18)
AST SERPL-CCNC: 16 U/L (ref 15–37)
BILIRUB SERPL-MCNC: 0.4 MG/DL (ref 0.1–2)
BUN BLD-MCNC: 32 MG/DL (ref 9–23)
CALCIUM BLD-MCNC: 9.5 MG/DL (ref 8.5–10.1)
CHLORIDE SERPL-SCNC: 111 MMOL/L (ref 98–112)
CO2 SERPL-SCNC: 25 MMOL/L (ref 21–32)
CREAT BLD-MCNC: 1.27 MG/DL
EGFRCR SERPLBLD CKD-EPI 2021: 42 ML/MIN/1.73M2 (ref 60–?)
FASTING STATUS PATIENT QL REPORTED: NO
GLOBULIN PLAS-MCNC: 3.2 G/DL (ref 2.8–4.4)
GLUCOSE BLD-MCNC: 103 MG/DL (ref 70–99)
NT-PROBNP SERPL-MCNC: 1057 PG/ML (ref ?–450)
OSMOLALITY SERPL CALC.SUM OF ELEC: 305 MOSM/KG (ref 275–295)
POTASSIUM SERPL-SCNC: 4 MMOL/L (ref 3.5–5.1)
PROT SERPL-MCNC: 6.8 G/DL (ref 6.4–8.2)
SODIUM SERPL-SCNC: 144 MMOL/L (ref 136–145)
T4 FREE SERPL-MCNC: 1 NG/DL (ref 0.8–1.7)
TSI SER-ACNC: 0.61 MIU/ML (ref 0.36–3.74)

## 2024-01-08 PROCEDURE — 84439 ASSAY OF FREE THYROXINE: CPT

## 2024-01-08 PROCEDURE — 71046 X-RAY EXAM CHEST 2 VIEWS: CPT | Performed by: INTERNAL MEDICINE

## 2024-01-08 PROCEDURE — 83880 ASSAY OF NATRIURETIC PEPTIDE: CPT

## 2024-01-08 PROCEDURE — 84443 ASSAY THYROID STIM HORMONE: CPT

## 2024-01-08 PROCEDURE — 80053 COMPREHEN METABOLIC PANEL: CPT

## 2024-01-08 PROCEDURE — 36415 COLL VENOUS BLD VENIPUNCTURE: CPT

## 2024-01-14 ENCOUNTER — PATIENT MESSAGE (OUTPATIENT)
Dept: FAMILY MEDICINE CLINIC | Facility: CLINIC | Age: 84
End: 2024-01-14

## 2024-01-15 RX ORDER — FENOFIBRATE 145 MG/1
145 TABLET, COATED ORAL DAILY
Qty: 90 TABLET | Refills: 0 | Status: SHIPPED | OUTPATIENT
Start: 2024-01-15

## 2024-01-15 NOTE — TELEPHONE ENCOUNTER
From: Anastasia Hobson  To: Houston Prado  Sent: 1/14/2024 10:07 AM CST  Subject: Prescription Renewal    Dr. Awad, I need a renewal for my Fenofibrate 145 mg. Please send to [facility] in [place]. Thank you.

## 2024-01-17 ENCOUNTER — OFFICE VISIT (OUTPATIENT)
Dept: FAMILY MEDICINE CLINIC | Facility: CLINIC | Age: 84
End: 2024-01-17
Payer: MEDICARE

## 2024-01-17 ENCOUNTER — MED REC SCAN ONLY (OUTPATIENT)
Dept: FAMILY MEDICINE CLINIC | Facility: CLINIC | Age: 84
End: 2024-01-17

## 2024-01-17 VITALS
BODY MASS INDEX: 24.9 KG/M2 | OXYGEN SATURATION: 97 % | SYSTOLIC BLOOD PRESSURE: 138 MMHG | HEIGHT: 64.57 IN | WEIGHT: 147.63 LBS | HEART RATE: 81 BPM | RESPIRATION RATE: 18 BRPM | TEMPERATURE: 97 F | DIASTOLIC BLOOD PRESSURE: 58 MMHG

## 2024-01-17 DIAGNOSIS — Z00.00 MEDICARE ANNUAL WELLNESS VISIT, SUBSEQUENT: Primary | ICD-10-CM

## 2024-01-17 DIAGNOSIS — E78.2 MIXED HYPERLIPIDEMIA: ICD-10-CM

## 2024-01-17 DIAGNOSIS — G62.9 NEUROPATHY: ICD-10-CM

## 2024-01-17 DIAGNOSIS — Z79.4 TYPE 2 DIABETES MELLITUS WITH OTHER NEUROLOGIC COMPLICATION, WITH LONG-TERM CURRENT USE OF INSULIN (HCC): ICD-10-CM

## 2024-01-17 DIAGNOSIS — E11.49 TYPE 2 DIABETES MELLITUS WITH OTHER NEUROLOGIC COMPLICATION, WITH LONG-TERM CURRENT USE OF INSULIN (HCC): ICD-10-CM

## 2024-01-17 PROBLEM — Z85.51 PERSONAL HISTORY OF MALIGNANT NEOPLASM OF BLADDER: Status: ACTIVE | Noted: 2023-01-01

## 2024-01-17 PROBLEM — N18.2 TYPE 2 DIABETES MELLITUS WITH STAGE 2 CHRONIC KIDNEY DISEASE, WITHOUT LONG-TERM CURRENT USE OF INSULIN  (HCC): Status: ACTIVE | Noted: 2023-01-01

## 2024-01-17 PROBLEM — E11.22 TYPE 2 DIABETES MELLITUS WITH STAGE 2 CHRONIC KIDNEY DISEASE, WITHOUT LONG-TERM CURRENT USE OF INSULIN (HCC): Status: ACTIVE | Noted: 2023-01-01

## 2024-01-17 PROBLEM — Z98.1 ARTHRODESIS STATUS: Status: ACTIVE | Noted: 2018-05-08

## 2024-01-17 PROBLEM — E78.00 PURE HYPERCHOLESTEROLEMIA, UNSPECIFIED: Status: ACTIVE | Noted: 2023-01-01

## 2024-01-17 PROBLEM — I50.9 HEART FAILURE, UNSPECIFIED (HCC): Status: ACTIVE | Noted: 2023-01-01

## 2024-01-17 PROBLEM — N18.32 CHRONIC KIDNEY DISEASE, STAGE 3B (HCC): Status: ACTIVE | Noted: 2023-01-01

## 2024-01-17 PROBLEM — N18.2 TYPE 2 DIABETES MELLITUS WITH STAGE 2 CHRONIC KIDNEY DISEASE, WITHOUT LONG-TERM CURRENT USE OF INSULIN (HCC): Status: ACTIVE | Noted: 2023-01-01

## 2024-01-17 PROBLEM — Z91.81 HISTORY OF FALLING: Status: RESOLVED | Noted: 2023-01-01 | Resolved: 2024-01-17

## 2024-01-17 PROBLEM — I48.91 UNSPECIFIED ATRIAL FIBRILLATION (HCC): Status: ACTIVE | Noted: 2023-01-01

## 2024-01-17 PROBLEM — N39.41 URGE INCONTINENCE: Status: ACTIVE | Noted: 2023-11-22

## 2024-01-17 PROBLEM — C67.6: Status: ACTIVE | Noted: 2023-04-11

## 2024-01-17 PROBLEM — Z79.84 LONG TERM (CURRENT) USE OF ORAL HYPOGLYCEMIC DRUGS: Status: ACTIVE | Noted: 2023-01-01

## 2024-01-17 PROBLEM — E11.22 TYPE 2 DIABETES MELLITUS WITH STAGE 2 CHRONIC KIDNEY DISEASE, WITHOUT LONG-TERM CURRENT USE OF INSULIN  (HCC): Status: ACTIVE | Noted: 2023-01-01

## 2024-01-17 PROCEDURE — G0439 PPPS, SUBSEQ VISIT: HCPCS | Performed by: FAMILY MEDICINE

## 2024-01-17 PROCEDURE — 1125F AMNT PAIN NOTED PAIN PRSNT: CPT | Performed by: FAMILY MEDICINE

## 2024-01-17 PROCEDURE — 99213 OFFICE O/P EST LOW 20 MIN: CPT | Performed by: FAMILY MEDICINE

## 2024-01-17 RX ORDER — PREGABALIN 25 MG/1
25 CAPSULE ORAL 2 TIMES DAILY
Qty: 30 CAPSULE | Refills: 0 | Status: SHIPPED | OUTPATIENT
Start: 2024-01-17

## 2024-01-17 RX ORDER — TOLTERODINE TARTRATE 2 MG/1
2 TABLET, EXTENDED RELEASE ORAL DAILY
COMMUNITY
Start: 2023-11-22 | End: 2024-11-21

## 2024-01-17 RX ORDER — FENOFIBRATE 145 MG/1
145 TABLET, COATED ORAL DAILY
Qty: 90 TABLET | Refills: 0 | Status: SHIPPED | OUTPATIENT
Start: 2024-01-17

## 2024-01-17 NOTE — PROGRESS NOTES
Subjective:   Anastasia Hobson is a 83 year old female who presents for a Medicare Subsequent Annual Wellness visit (Pt already had Initial Annual Wellness) and scheduled follow up of multiple significant but stable problems.   She had cervical spine surgery but continues to have neuropathy and is so unhappy with her chronic pain that she feels she is becoming depressed. She describes electric shocks going into her hand R>L    Pt states that she ha not noticed any change with surgery and feels her left hand is being affected. Pt is diabetic and takes metformin therefore will check B12 level and she is advised to start taking this ASAP    History/Other:   Fall Risk Assessment:   She has been screened for Falls and is High Risk. Fall Prevention information provided to patient in After Visit Summary.    Do you feel unsteady when standing or walking?: Yes  Do you worry about falling?: Yes  Have you fallen in the past year?: Yes  How many times have you fallen?: (P) 1  Were you injured?: (P) Yes     Cognitive Assessment:   She had a completely normal cognitive assessment - see flowsheet entries     Functional Ability/Status:   Anastasia Hobson has some abnormal functions as listed below:  She has Meal Preparation difficulties based on screening of functional status.She has difficulties Shopping for Groceries based on screening of functional status. She has Hearing problems based on screening of functional status.She has Vision problems based on screening of functional status. She has problems with Memory based on screening of functional status.       Depression Screening (PHQ-2/PHQ-9): PHQ-9 TOTAL SCORE: 6, done 1/17/2024   Trouble falling or staying asleep, or sleeping too much: Nearly every day   Feeling tired or having little energy: Nearly every day     Last [place] Suicide Screening on 1/17/2024 was No Risk.          Advanced Directives:   She does have a Living Will but we do NOT have it on file in  Epic.    She does have a POA but we do NOT have it on file in [facility].    Patient has Advance Care Planning documents but we do not have a copy in EMR. Discussed Advanced Care Planning with patient and instructed patient to get our office a copy to be scanned into EMR.      Patient Active Problem List   Diagnosis    Osteoarthritis of spine with radiculopathy, cervical region    Primary hypertension    Type 2 diabetes mellitus without complication, with long-term current use of insulin (HCC)    Mixed hyperlipidemia    Arthrodesis status    Tremor of unknown origin    Personal history of colonic polyps    Malignant neoplasm of lateral wall of urinary bladder (HCC)    Stage 3b chronic kidney disease (HCC)    Atrial fibrillation with rapid ventricular response (HCC)    Advance care planning    Acute pulmonary edema (HCC)    Other constipation    Functional diarrhea    Type 2 diabetes mellitus with diabetic chronic kidney disease (HCC)    At high risk for falls    Hypoglycemia    Chronic bronchitis, unspecified chronic bronchitis type (HCC)    Persistent atrial fibrillation (HCC)    Hyperlipidemia    Unspecified atrial fibrillation (HCC)    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)    Urge incontinence    Heart failure, unspecified (HCC)    Chronic kidney disease, stage 3b (HCC)    Type 2 diabetes mellitus with stage 2 chronic kidney disease, without long-term current use of insulin  (HCC)    Long term (current) use of oral hypoglycemic drugs    Personal history of malignant neoplasm of bladder    Pure hypercholesterolemia, unspecified     Allergies:  She has No Known Allergies.    Current Medications:  Outpatient Medications Marked as Taking for the 1/17/24 encounter (Office Visit) with Houston Prado MD   Medication Sig    tolterodine 2 MG Oral Tab Take 1 tablet (2 mg total) by mouth daily.    pregabalin (LYRICA) 25 MG Oral Cap Take 1 capsule (25 mg total) by mouth 2 (two) times daily.     fenofibrate 145 MG Oral Tab Take 1 tablet (145 mg total) by mouth daily.    lisinopril 20 MG Oral Tab Take 1 tablet (20 mg total) by mouth every evening.    fluconazole 100 MG Oral Tab Take 1 tablet (100 mg total) by mouth daily.    amLODIPine 10 MG Oral Tab Take 0.5 tablets (5 mg total) by mouth daily.    HUMALOG KWIKPEN 100 UNIT/ML Subcutaneous Solution Pen-injector INJECT 30 UNITS INTO THE SKIN DAILY (Patient taking differently: Inject 5-10 Units/day into the skin daily.)    atorvastatin 80 MG Oral Tab Take 1 tablet (80 mg total) by mouth nightly.    risedronate 35 MG Oral Tab Take 1 tablet (35 mg total) by mouth every 7 days.    Continuous Blood Gluc  (FREESTYLE NATALIE 2 READER) Does not apply Device 1 each daily.    insulin glargine (LANTUS SOLOSTAR) 100 UNIT/ML Subcutaneous Solution Pen-injector Inject 8 Units into the skin nightly. (Patient taking differently: Inject 8-10 Units into the skin nightly.)    Insulin Pen Needle (BD PEN NEEDLE PRISCILLA U/F) 32G X 4 MM Does not apply Misc Inject 1 Pen into the skin daily.    Continuous Blood Gluc  (FREESTYLE NATALIE 2 READER) Does not apply Device 1 Device daily as needed.    gabapentin 100 MG Oral Cap Take 2 capsules (200 mg total) by mouth every evening.    metFORMIN HCl 1000 MG Oral Tab Take 1 tablet (1,000 mg total) by mouth daily with breakfast.    PROBIOTIC PRODUCT OR Take 1 capsule by mouth daily. 100 billion    hydrALAZINE 25 MG Oral Tab Take 2 tablets (50 mg total) by mouth 3 (three) times daily.    furosemide 40 MG Oral Tab Take 1 tablet (40 mg total) by mouth daily.    Potassium Chloride ER 20 MEQ Oral Tab CR Take 1 tablet by mouth daily.    apixaban 5 MG Oral Tab Take 1 tablet (5 mg total) by mouth 2 (two) times daily.    Cholecalciferol (VITAMIN D3) 250 MCG (68295 UT) Oral Tab Take 1,000 Units by mouth daily.    Calcium Citrate-Vitamin D 315-250 MG-UNIT Oral Tab Take 1 tablet by mouth daily.       Medical History:  She  has a past medical  history of Arrhythmia, Bladder cancer (HCC) (10/2019), Congestive heart disease (HCC), Diabetes (HCC), Diabetes mellitus (HCC), CLARKE (dyspnea on exertion) (09/27/2022), Elevated cholesterol, Essential hypertension, High blood pressure, High cholesterol, History of falling (01/01/2023), Hyperlipidemia, Osteoporosis, Type 2 diabetes mellitus with hyperglycemia (HCC) (11/05/2021), Visual impairment, and Wears glasses.  Surgical History:  She  has a past surgical history that includes Fracture surgery; colonoscopy; cystourethroscopy,fulgur >5cm lesn (10/03/2019); cystourethroscopy,biopsy (12/16/2019); cystourethroscopy,fulgur .5-2cm lesn (04/16/2020); cystourethroscopy,fulgur .5-2cm lesn (07/30/2020); cystourethroscopy (11/25/2020); hysterectomy; cystoscopy,insert ureteral stent; other surgical history (04/20/2021); other surgical history (08/17/2021); other surgical history (11/16/2021); other surgical history (03/08/2022); cataract; colonoscopy; and cervical spine surgery (10/06/2023).   Family History:  Her family history includes Colon Polyps in her mother; Diabetes in her father and mother; Heart Attack in her mother; Other in her son; Stroke in her father.  Social History:  She  reports that she has quit smoking. Her smoking use included cigarettes. She has a 49 pack-year smoking history. She has never used smokeless tobacco. She reports that she does not drink alcohol and does not use drugs.    Tobacco:  She smoked tobacco in the past but quit greater than 12 months ago.  Social History    Tobacco Use      Smoking status: Former        Packs/day: 1.00        Years: 49.00        Additional pack years: 0.00        Total pack years: 49.00        Types: Cigarettes      Smokeless tobacco: Never      Tobacco comments: quit 15 years        CAGE Alcohol Screen:   CAGE screening score of 0 on 1/16/2024, showing low risk of alcohol abuse.      Patient Care Team:  Houston Prado MD as PCP - General (Family  Medicine)    Review of Systems     As per HPI and all other systems reviewed and are negative    Objective:   Physical Exam  General Appearance:  Alert, cooperative, no distress, appears stated age   Head:  Normocephalic, without obvious abnormality, atraumatic   Eyes:  PERRL, conjunctiva/corneas clear, EOM's intact both eyes   Ears:  Normal TM's and external ear canals, both ears   Nose: Nares normal, septum midline,mucosa normal, no drainage or sinus tenderness   Throat: Lips, mucosa, and tongue normal; teeth and gums normal   Neck: Supple, symmetrical, trachea midline, no adenopathy;  thyroid: not enlarged, symmetric, no tenderness/mass/nodules; no carotid bruit or JVD   Back:   Symmetric, no curvature, ROM normal, no CVA tenderness   Lungs:   Clear to auscultation bilaterally, respirations unlabored   Heart:  Regular rate and rhythm, S1 and S2 normal, no murmur, rub, or gallop   Abdomen:   Soft, non-tender, bowel sounds active all four quadrants,  no masses, no organomegaly   Pelvic: Deferred   Extremities: Extremities normal, atraumatic, no cyanosis or edema   Pulses: 2+ and symmetric   Skin: Skin color, texture, turgor normal, no rashes or lesions   Lymph nodes: Cervical, supraclavicular, and axillary nodes normal   Neurologic: Normal       /58 (BP Location: Right arm, Patient Position: Sitting, Cuff Size: adult)   Pulse 81   Temp 97.1 °F (36.2 °C) (Temporal)   Resp 18   Ht 5' 4.57\" (1.64 m)   Wt 147 lb 9.6 oz (67 kg)   SpO2 97%   BMI 24.89 kg/m²  Estimated body mass index is 24.89 kg/m² as calculated from the following:    Height as of this encounter: 5' 4.57\" (1.64 m).    Weight as of this encounter: 147 lb 9.6 oz (67 kg).    Medicare Hearing Assessment:   Hearing Screening    Time taken: 1/17/2024 12:36 PM  Entry User: Ruth Good CMA  Screening Method: Finger Rub  Finger Rub Result: Pass               Visual Acuity:   Right Eye Visual Acuity: Corrected Right Eye Chart Acuity: 20/40   Left  Eye Visual Acuity: Corrected Left Eye Chart Acuity: 20/40   Both Eyes Visual Acuity: Corrected Both Eyes Chart Acuity: 20/40            Assessment & Plan:   Anastasia Hobson is a 83 year old female who presents for a Medicare Assessment.     1. Medicare annual wellness visit, subsequent (Primary)  2. Neuropathy  -     B12 AND FOLATE [7065] [Q]; Future; Expected date: 01/17/2024  -     Pregabalin; Take 1 capsule (25 mg total) by mouth 2 (two) times daily.  Dispense: 30 capsule; Refill: 0  3. Mixed hyperlipidemia  -     Lipid Panel; Future; Expected date: 01/18/2024  4. Type 2 diabetes mellitus with other neurologic complication, with long-term current use of insulin (HCC)  -     Hemoglobin A1C; Future; Expected date: 01/18/2024  Other orders  -     Fenofibrate; Take 1 tablet (145 mg total) by mouth daily.  Dispense: 90 tablet; Refill: 0    The patient indicates understanding of these issues and agrees to the plan.  Lab work ordered.  Reinforced healthy diet, lifestyle, and exercise.      Return in about 2 weeks (around 1/31/2024) for video visit .     Houston Prado MD, 1/17/2024     Supplementary Documentation:   General Health:  In the past six months, have you lost more than 10 pounds without trying?: 2 - No  Has your appetite been poor?: No  Type of Diet: Diabetic;Low Salt  How would you describe your daily physical activity?: None  How would you describe your current health state?: Fair  On a scale of 0 to 10, with 0 being no pain and 10 being severe pain, what is your pain level?: 8 - (Severe)  In the past six months, have you experienced urine leakage?: 1-Yes  At any time do you feel concerned for the safety/well-being of yourself and/or your children, in your home or elsewhere?: No  Have you had any immunizations at another office such as Influenza, Hepatitis B, Tetanus, or Pneumococcal?: No       Anastasia Hobson's SCREENING SCHEDULE   Tests on this list are recommended by your physician but may  not be covered, or covered at this frequency, by your insurer.   Please check with your insurance carrier before scheduling to verify coverage.   PREVENTATIVE SERVICES FREQUENCY &  COVERAGE DETAILS LAST COMPLETION DATE   Diabetes Screening    Fasting Blood Sugar /  Glucose    One screening every 12 months if never tested or if previously tested but not diagnosed with pre-diabetes   One screening every 6 months if diagnosed with pre-diabetes Lab Results   Component Value Date     (H) 01/08/2024        Cardiovascular Disease Screening    Lipid Panel  Cholesterol  Lipoprotein (HDL)  Triglycerides Covered every 5 years for all Medicare beneficiaries without apparent signs or symptoms of cardiovascular disease Lab Results   Component Value Date    CHOLEST 156 02/23/2023    HDL 78 (H) 02/23/2023    LDL 60 02/23/2023    TRIG 99 02/23/2023         Electrocardiogram (EKG)   Covered if needed at Welcome to Medicare, and non-screening if indicated for medical reasons 07/20/2023      Ultrasound Screening for Abdominal Aortic Aneurysm (AAA) Covered once in a lifetime for one of the following risk factors    Men who are 65-75 years old and have ever smoked    Anyone with a family history -     Colorectal Cancer Screening  Covered for ages 50-85; only need ONE of the following:    Colonoscopy   Covered every 10 years    Covered every 2 years if patient is at high risk or previous colonoscopy was abnormal 04/30/2019    Health Maintenance   Topic Date Due    Colorectal Cancer Screening  04/30/2029       Flexible Sigmoidoscopy   Covered every 4 years -    Fecal Occult Blood Test Covered annually -   Bone Density Screening    Bone density screening    Covered every 2 years after age 65 if diagnosed with risk of osteoporosis or estrogen deficiency.    Covered yearly for long-term glucocorticoid medication use (Steroids) Last Dexa Scan:    XR DEXA BONE DENSITOMETRY (CPT=77080) 08/30/2023      No recommendations at this time   Pap  and Pelvic    Pap   Covered every 2 years for women at normal risk; Annually if at high risk -  No recommendations at this time    Chlamydia Annually if high risk -  No recommendations at this time   Screening Mammogram    Mammogram     Recommend annually for all female patients aged 40 and older    One baseline mammogram covered for patients aged 35-39 -    No recommendations at this time    Immunizations    Influenza Covered once per flu season  Please get every year 11/15/2023  No recommendations at this time    Pneumococcal Each vaccine ([id number] & [id number]) covered once after 65 Prevnar 13: 03/11/2019    [id number]: 03/11/2019     No recommendations at this time    Hepatitis B One screening covered for patients with certain risk factors   -  No recommendations at this time    Tetanus Toxoid Not covered by Medicare Part B unless medically necessary (cut with metal); may be covered with your pharmacy prescription benefits -    Tetanus, Diptheria and Pertusis TD and TDaP Not covered by Medicare Part B -  No recommendations at this time    Zoster Not covered by Medicare Part B; may be covered with your pharmacy  prescription benefits -  No recommendations at this time     Diabetes      Hemoglobin A1C Annually; if last result is elevated, may be asked to retest more frequently.    Medicare covers every 3 months Lab Results   Component Value Date     (H) 07/20/2023    A1C 5.5 11/15/2023       No recommendations at this time    Creat/alb ratio Annually Lab Results   Component Value Date    MICROALBCREA 266.7 (H) 11/15/2023       LDL Annually Lab Results   Component Value Date    LDL 60 02/23/2023       Dilated Eye Exam Annually Last Diabetic Eye Exam:  Last Dilated Eye Exam: 11/14/23  Eye Exam shows Diabetic Retinopathy?: No       Annual Monitoring of Persistent Medications (ACE/ARB, digoxin diuretics, anticonvulsants)    Potassium Annually Lab Results   Component Value Date    K 4.0 01/08/2024          Creatinine   Annually Lab Results   Component Value Date    CREATSERUM 1.27 (H) 01/08/2024         BUN Annually Lab Results   Component Value Date    BUN 32 (H) 01/08/2024       Drug Serum Conc Annually No results found for: \"DIGOXIN\", \"DIG\", \"VALP\"           Chronic Obstructive Pulmonary Disease (COPD)    Spirometry Annually Spirometry date:

## 2024-01-18 ENCOUNTER — NURSE ONLY (OUTPATIENT)
Dept: FAMILY MEDICINE CLINIC | Facility: CLINIC | Age: 84
End: 2024-01-18
Payer: MEDICARE

## 2024-01-18 DIAGNOSIS — E11.49 TYPE 2 DIABETES MELLITUS WITH OTHER NEUROLOGIC COMPLICATION, WITH LONG-TERM CURRENT USE OF INSULIN (HCC): ICD-10-CM

## 2024-01-18 DIAGNOSIS — E78.2 MIXED HYPERLIPIDEMIA: ICD-10-CM

## 2024-01-18 DIAGNOSIS — G62.9 NEUROPATHY: ICD-10-CM

## 2024-01-18 DIAGNOSIS — Z79.4 TYPE 2 DIABETES MELLITUS WITH OTHER NEUROLOGIC COMPLICATION, WITH LONG-TERM CURRENT USE OF INSULIN (HCC): ICD-10-CM

## 2024-01-18 LAB
EST. AVERAGE GLUCOSE BLD GHB EST-MCNC: 169 MG/DL (ref 68–126)
FOLATE SERPL-MCNC: 36.2 NG/ML (ref 8.7–?)
HBA1C MFR BLD: 7.5 % (ref ?–5.7)
VIT B12 SERPL-MCNC: 382 PG/ML (ref 193–986)

## 2024-01-18 PROCEDURE — 80061 LIPID PANEL: CPT | Performed by: FAMILY MEDICINE

## 2024-01-18 PROCEDURE — 82607 VITAMIN B-12: CPT | Performed by: FAMILY MEDICINE

## 2024-01-18 PROCEDURE — 82746 ASSAY OF FOLIC ACID SERUM: CPT | Performed by: FAMILY MEDICINE

## 2024-01-18 PROCEDURE — 83036 HEMOGLOBIN GLYCOSYLATED A1C: CPT | Performed by: FAMILY MEDICINE

## 2024-01-18 NOTE — PROGRESS NOTES
Anastasia Hobson present in office for nurse visit.  Labs as ordered by Dr. Awad; 22 g, Right AC, lavender tube, and green tubex2     All questions/concerns addressed. Patient left in stable condition.

## 2024-01-19 LAB
CHOLEST SERPL-MCNC: 151 MG/DL (ref ?–200)
FASTING PATIENT LIPID ANSWER: YES
HDLC SERPL-MCNC: 76 MG/DL (ref 40–59)
LDLC SERPL CALC-MCNC: 57 MG/DL (ref ?–100)
NONHDLC SERPL-MCNC: 75 MG/DL (ref ?–130)
TRIGL SERPL-MCNC: 97 MG/DL (ref 30–149)
VLDLC SERPL CALC-MCNC: 14 MG/DL (ref 0–30)

## 2024-01-25 ENCOUNTER — PATIENT MESSAGE (OUTPATIENT)
Dept: FAMILY MEDICINE CLINIC | Facility: CLINIC | Age: 84
End: 2024-01-25

## 2024-01-26 NOTE — TELEPHONE ENCOUNTER
From: Anastasia Hobson  To: Houston Prado  Sent: 1/25/2024 5:06 PM CST  Subject: Renewal    Dr. Awad, would you kindly send a prescription to [facility] in [place] for a 3 month supply of my freestyle arlette 2 sensors. Thank you.

## 2024-01-29 ENCOUNTER — TELEPHONE (OUTPATIENT)
Dept: FAMILY MEDICINE CLINIC | Facility: CLINIC | Age: 84
End: 2024-01-29

## 2024-01-29 NOTE — TELEPHONE ENCOUNTER
Received fax from [employer] regarding PA request: [employer] NATALIE 2 SENSOR     ePA requested

## 2024-01-30 NOTE — TELEPHONE ENCOUNTER
Received fax from [employer] regarding Notice of Denial of Medicare Part D Rx Drug coverage    Freestyle Shilo 2 Sensor -   Not covered under Medicare Part D, may be covered under Medicare Part B. \"Please have your pharmacy submit your Rx to Part B pharmacy benefit.\"    Pt with Medicare Part B insurance  Will need to contact pharmacy

## 2024-01-30 NOTE — TELEPHONE ENCOUNTER
Called [place] [facility] ph#[phone] - confirmed pt picked up Rx on 01/27/24, pharmacy has been running Rx through Medicare Part B    No further actions required at this time

## 2024-02-01 ENCOUNTER — VIRTUAL PHONE E/M (OUTPATIENT)
Dept: FAMILY MEDICINE CLINIC | Facility: CLINIC | Age: 84
End: 2024-02-01
Payer: MEDICARE

## 2024-02-01 DIAGNOSIS — G89.29 CHRONIC LEFT SHOULDER PAIN: ICD-10-CM

## 2024-02-01 DIAGNOSIS — M79.602 LEFT ARM PAIN: ICD-10-CM

## 2024-02-01 DIAGNOSIS — M25.512 CHRONIC LEFT SHOULDER PAIN: ICD-10-CM

## 2024-02-01 DIAGNOSIS — G62.9 NEUROPATHY: Primary | ICD-10-CM

## 2024-02-01 PROCEDURE — 99214 OFFICE O/P EST MOD 30 MIN: CPT | Performed by: FAMILY MEDICINE

## 2024-02-01 NOTE — PROGRESS NOTES
Telehealth outside of [facility]  Telehealth Verbal Consent   I conducted a telehealth visit with Anastasia Hobson today, 02/01/24, which was completed using two-way, real-time interactive audio and video communication. This has been done in good jennifer to provide continuity of care in the best interest of the provider-patient relationship, due to the COVID - public health crisis/national emergency where restrictions of face-to-face office visits are ongoing. Every conscious effort was taken to allow for sufficient and adequate time to complete the visit.  The patient was made aware of the limitations of the telehealth visit, including treatment limitations as no physical exam could be performed.  The patient was advised to call 911 or to go to the ER in case there was an emergency.  The patient was also advised of the potential privacy & security concerns related to the telehealth platform.   The patient was made aware of where to find [facility]'s notice of privacy practices, telehealth consent form and other related consent forms and documents.  which are located on the [facility] website. The patient verbally agreed to telehealth consent form, related consents and the risks discussed.    Lastly, the patient confirmed that they were in [place].   Included in this visit, time may have been spent reviewing labs, medications, radiology tests and decision making. Appropriate medical decision-making and tests are ordered as detailed in the plan of care above.  Coding/billing information is submitted for this visit based on complexity of care and/or time spent for the visit.  Anastasia Hobson is a 83 year old female.    Chief Complaint   Patient presents with    Follow - Up     Neuropathy and lyrica use       HPI:   Patient is here for follow up of left arm pain and a neuropathy.  She states that the Lyrica has done nothing for her pain and she continues to have significant left shoulder pain into her left arm.  Patient  requests imaging and x-ray will be ordered.  She has no other questions at this time.  Recommendation is for her to see the neurologist to determine etiology of her pain as surgeon does not feel that it is surgically related    Patient Active Problem List   Diagnosis    Osteoarthritis of spine with radiculopathy, cervical region    Primary hypertension    Type 2 diabetes mellitus without complication, with long-term current use of insulin (HCC)    Mixed hyperlipidemia    Arthrodesis status    Tremor of unknown origin    Personal history of colonic polyps    Malignant neoplasm of lateral wall of urinary bladder (HCC)    Stage 3b chronic kidney disease (HCC)    Atrial fibrillation with rapid ventricular response (HCC)    Advance care planning    Acute pulmonary edema (HCC)    Other constipation    Functional diarrhea    Type 2 diabetes mellitus with diabetic chronic kidney disease (HCC)    At high risk for falls    Hypoglycemia    Chronic bronchitis, unspecified chronic bronchitis type (HCC)    Persistent atrial fibrillation (HCC)    Hyperlipidemia    Unspecified atrial fibrillation (HCC)    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 6 each 3    tolterodine 2 MG Oral Tab Take 1 tablet (2 mg total) by mouth daily.      fenofibrate 145 MG Oral Tab Take 1 tablet (145 mg total) by mouth daily. 90 tablet 0    lisinopril 20 MG Oral Tab Take 1 tablet (20 mg  total) by mouth every evening. 30 tablet 0    fluconazole 100 MG Oral Tab Take 1 tablet (100 mg total) by mouth daily. 7 tablet 0    amLODIPine 10 MG Oral Tab Take 0.5 tablets (5 mg total) by mouth daily. 90 tablet 1    HUMALOG KWIKPEN 100 UNIT/ML Subcutaneous Solution Pen-injector INJECT 30 UNITS INTO THE SKIN DAILY (Patient taking differently: Inject 5-10 Units/day into the skin daily.) 15 mL 0    atorvastatin 80 MG Oral Tab Take 1 tablet (80 mg total) by mouth nightly. 90 tablet 0    risedronate 35 MG Oral Tab Take 1 tablet (35 mg total) by mouth every 7 days. 12 tablet 3    Continuous Blood Gluc  (FREESTYLE NATALIE 2 READER) Does not apply Device 1 each daily. 1 each 1    insulin glargine (LANTUS SOLOSTAR) 100 UNIT/ML Subcutaneous Solution Pen-injector Inject 8 Units into the skin nightly. (Patient taking differently: Inject 8-10 Units into the skin nightly.) 1 mL 0    Insulin Pen Needle (BD PEN NEEDLE PRISCILLA U/F) 32G X 4 MM Does not apply Misc Inject 1 Pen into the skin daily. 90 each 0    Continuous Blood Gluc  (FREESTYLE NATALIE 2 READER) Does not apply Device 1 Device daily as needed. 1 each 0    gabapentin 100 MG Oral Cap Take 2 capsules (200 mg total) by mouth every evening. 180 capsule 0    metFORMIN HCl 1000 MG Oral Tab Take 1 tablet (1,000 mg total) by mouth daily with breakfast. 180 tablet 2    PROBIOTIC PRODUCT OR Take 1 capsule by mouth daily. 100 billion      hydrALAZINE 25 MG Oral Tab Take 2 tablets (50 mg total) by mouth 3 (three) times daily.      furosemide 40 MG Oral Tab Take 1 tablet (40 mg total) by mouth daily.      Potassium Chloride ER 20 MEQ Oral Tab CR Take 1 tablet by mouth daily.      apixaban 5 MG Oral Tab Take 1 tablet (5 mg total) by mouth 2 (two) times daily. 60 tablet 2    Cholecalciferol (VITAMIN D3) 250 MCG (34716 UT) Oral Tab Take 1,000 Units by mouth daily.      Calcium Citrate-Vitamin D 315-250 MG-UNIT Oral Tab Take 1 tablet by mouth daily.        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    Congestive heart disease (HCC)     Diabetes (HCC)     IDDM     Diabetes mellitus (HCC)     CLARKE (dyspnea on exertion) 09/27/2022    Elevated cholesterol     Essential hypertension     High blood pressure     High cholesterol     History of falling 01/01/2023    Hyperlipidemia     Osteoporosis     Type 2 diabetes mellitus with hyperglycemia (HCC) 11/05/2021    Visual impairment     glasses    Wears glasses       Social History:  Social History     Socioeconomic History    Marital status:    Tobacco Use    Smoking status: Former     Packs/day: 1.00     Years: 49.00     Additional pack years: 0.00     Total pack years: 49.00     Types: Cigarettes    Smokeless tobacco: Never    Tobacco comments:     quit 15 years    Vaping Use    Vaping Use: Never used   Substance and Sexual Activity    Alcohol use: No    Drug use: No     Social Determinants of Health     Financial Resource Strain: Low Risk  (2/27/2023)    Financial Resource Strain     Difficulty of Paying Living Expenses: Not very hard     Med Affordability: No   Transportation Needs: No Transportation Needs (2/27/2023)    Transportation Needs     Lack of Transportation: No     Family History   Problem Relation Age of Onset    Diabetes Father     Stroke Father     Colon Polyps Mother     Diabetes Mother     Heart Attack Mother     Other (Other) Son         Afib        Allergies  No Known Allergies    REVIEW OF SYSTEMS:   As per HPI all other systems are negative    EXAM:   GENERAL Pt is speaking in full sentences without any cognitive impairment. Further physical exam could not be performed due to interview being conducted over telemedicine medium      ASSESSMENT AND PLAN:     Encounter Diagnoses   Name Primary?    Neuropathy Yes    Chronic left shoulder pain     Left arm pain        No orders of the defined types were placed in  this encounter.      Meds & Refills for this Visit:  Requested Prescriptions      No prescriptions requested or ordered in this encounter       Imaging & Consults:  NEURO - INTERNAL  XR SHOULDER, COMPLETE (MIN 2 VIEWS), LEFT (CPT=73030)  XR HUMERUS (MIN 2 VIEWS), LEFT (CPT=73060)    No follow-ups on file.  There are no Patient Instructions on file for this visit.

## 2024-02-14 DIAGNOSIS — E11.22 TYPE 2 DIABETES MELLITUS WITH STAGE 3B CHRONIC KIDNEY DISEASE, WITH LONG-TERM CURRENT USE OF INSULIN (HCC): ICD-10-CM

## 2024-02-14 DIAGNOSIS — N18.32 TYPE 2 DIABETES MELLITUS WITH STAGE 3B CHRONIC KIDNEY DISEASE, WITH LONG-TERM CURRENT USE OF INSULIN (HCC): ICD-10-CM

## 2024-02-14 DIAGNOSIS — Z79.4 TYPE 2 DIABETES MELLITUS WITH STAGE 3B CHRONIC KIDNEY DISEASE, WITH LONG-TERM CURRENT USE OF INSULIN (HCC): ICD-10-CM

## 2024-02-14 RX ORDER — INSULIN LISPRO 100 [IU]/ML
INJECTION, SOLUTION INTRAVENOUS; SUBCUTANEOUS DAILY
Qty: 10 ML | Refills: 0 | Status: SHIPPED | OUTPATIENT
Start: 2024-02-14

## 2024-02-14 NOTE — TELEPHONE ENCOUNTER
Pt failed refill protocol for the following reasons:        Last refill: 12/20/23  Last appt: 1/17/24  Next appt:   Future Appointments   Date Time Provider Department Center   2/15/2024 11:00 AM Iván Wild MD [facility]         Forward to Dr. Awad, please advise on refills. Thank you.

## 2024-02-15 ENCOUNTER — PROCEDURE VISIT (OUTPATIENT)
Dept: NEUROLOGY | Facility: CLINIC | Age: 84
End: 2024-02-15
Payer: MEDICARE

## 2024-02-15 DIAGNOSIS — S12.401A CLOSED NONDISPLACED FRACTURE OF FIFTH CERVICAL VERTEBRA, UNSPECIFIED FRACTURE MORPHOLOGY, INITIAL ENCOUNTER (HCC): ICD-10-CM

## 2024-02-15 DIAGNOSIS — R20.0 BILATERAL HAND NUMBNESS: Primary | ICD-10-CM

## 2024-02-15 PROCEDURE — 95886 MUSC TEST DONE W/N TEST COMP: CPT | Performed by: OTHER

## 2024-02-15 PROCEDURE — 95912 NRV CNDJ TEST 11-12 STUDIES: CPT | Performed by: OTHER

## 2024-02-15 NOTE — PATIENT INSTRUCTIONS
Refill policies:    Allow 2-3 business days for refills; controlled substances may take longer.  Contact your pharmacy at least 5 days prior to running out of medication and have them send an electronic request or submit request through the “request refill” option in your [facility] account.  Refills are not addressed on weekends; covering physicians do not authorize routine medications on weekends.  No narcotics or controlled substances are refilled after noon on Fridays or by on call physicians.  By law, narcotics must be electronically prescribed.  A 30 day supply with no refills is the maximum allowed.  If your prescription is due for a refill, you may be due for a follow up appointment.  To best provide you care, patients receiving routine medications need to be seen at least once a year.  Patients receiving narcotic/controlled substance medications need to be seen at least once every 3 months.  In the event that your preferred pharmacy does not have the requested medication in stock (e.g. Backordered), it is your responsibility to find another pharmacy that has the requested medication available.  We will gladly send a new prescription to that pharmacy at your request.    Scheduling Tests:    If your physician has ordered radiology tests such as MRI or CT scans, please contact Central Scheduling at [phone] right away to schedule the test.  Once scheduled, the [facility] Centralized Referral Team will work with your insurance carrier to obtain pre-certification or prior authorization.  Depending on your insurance carrier, approval may take 3-10 days.  It is highly recommended patients assure they have received an authorization before having a test performed.  If test is done without insurance authorization, patient may be responsible for the entire amount billed.      Precertification and Prior Authorizations:  If your physician has recommended that you have a procedure or additional testing performed the [facility]  Centralized Referral Team will contact your insurance carrier to obtain pre-certification or prior authorization.    You are strongly encouraged to contact your insurance carrier to verify that your procedure/test has been approved and is a COVERED benefit.  Although the [facility] Centralized Referral Team does its due diligence, the insurance carrier gives the disclaimer that \"Although the procedure is authorized, this does not guarantee payment.\"    Ultimately the patient is responsible for payment.   Thank you for your understanding in this matter.  Paperwork Completion:  If you require FMLA or disability paperwork for your recovery, please make sure to either drop it off or have it faxed to our office at [phone]. Be sure the form has your name and date of birth on it.  The form will be faxed to our Forms Department and they will complete it for you.  There is a 25$ fee for all forms that need to be filled out.  Please be aware there is a 10-14 day turnaround time.  You will need to sign a release of information (ALLAN) form if your paperwork does not come with one.  You may call the Forms Department with any questions at [phone].  Their fax number is [phone].

## 2024-02-15 NOTE — PROCEDURES
[facility]  [address], [place] [address]   [phone]        Full Name: Anastasia Hobson Gender: Female  Patient ID: [id number] YOB: 1940      Visit Date: 2/15/2024 10:57 AM  Age: 83 Years  Examining Physician: Iván Wild  Referring Physician: Iván Toribio  Height: 5 feet 4 inch  Weight: 147 lbs  History: Bilateral hand numbness    This is a 83 year old female who presents for evaluation of numbness in both hands. She had surgery 10/2023 after accident, falling down and passing out and then had fracture in cervical spine; had C4-6 fusion - states now having pain left shoulder and lightning pains into all fingers when she bends chin to chest.     focused exam  motor: 5/5 R side and left side not able to lift up shoulder to 90 degrees, frequently spasming as trying to perform movements and limited by pain  sensory: intact to light touch throughout  DTR: 2+ symmetric in UE      Sensory NCS      Nerve / Sites Rec. Site Onset Lat Peak Lat NP Amp PP Amp Segments Distance Peak Diff Velocity Comment     ms ms µV µV  cm ms m/s    R Median - Dig II (Antidromic)      Wrist Index 3.39 4.38 42.4 70.7 Wrist - Index 14  41    L Median - Dig II (Antidromic)      Wrist Index 3.33 4.22 49.0 78.6 Wrist - Index 14  42    R Ulnar - Dig V (Antidromic)      Wrist Dig V 2.55 3.70 33.3 48.2 Wrist - Dig V 13  51    L Ulnar - Dig V (Antidromic)      Wrist Dig V 2.66 3.54 28.9 51.1 Wrist - Dig V 13  49    R Radial - Superficial (Antidromic)      Forearm Wrist 1.82 2.50 38.6 48.9 Forearm - Wrist 10  55    L Radial - Superficial (Antidromic)      Forearm Wrist 1.82 2.40 32.7 39.2 Forearm - Wrist 10  55    R Median, Ulnar - Transcarpal comparison      Median Palm Wrist 1.88 2.40 47.3 44.6 Median Palm - Wrist 8  43       Ulnar Palm Wrist 1.82 2.34 12.5 7.9 Ulnar Palm - Wrist 8  44          Median Palm - Ulnar Palm  0.05           Ref.  ?0.40     L Median, Ulnar -  Transcarpal comparison      Median Palm Wrist 1.98 2.55 54.8 58.6 Median Palm - Wrist 8  40       Ulnar Palm Wrist 1.82 2.40 21.8 10.4 Ulnar Palm - Wrist 8  44          Median Palm - Ulnar Palm  0.16           Ref.  ?0.40         Motor NCS      Nerve / Sites Muscle Latency Amplitude Segments Dist. Lat Diff Velocity Comments     ms mV  cm ms m/s    R Median - APB      Wrist APB 4.44 8.2 Wrist - APB 7         Elbow APB 9.13 7.5 Elbow - Wrist 23 4.69 49.1    L Median - APB      Wrist APB 4.04 5.9 Wrist - APB 7         Elbow APB 8.81 5.8 Elbow - Wrist 23.5 4.77 49.3    R Ulnar - ADM      Wrist ADM 3.29 7.8 Wrist - ADM 7         B.Elbow ADM 7.31 7.3 B.Elbow - Wrist 20 4.02 49.7    L Ulnar - ADM      Wrist ADM 3.31 6.7 Wrist - ADM 7         B.Elbow ADM 8.06 7.0 B.Elbow - Wrist 21 4.75 44.2        F  Wave      Nerve M Latency F Latency    ms ms   R Median - APB 5.0 29.5   Ref.     R Ulnar - ADM 3.8 29.6   Ref.     L Median - APB 4.4 29.6   Ref.     L Ulnar - ADM 4.0 31.2   Ref.         EMG Summary Table     Spontaneous MUAP Recruitment   Muscle IA Fib PSW Fasc H.F. Amp Dur. PPP Pattern   L. Deltoid N None None None None N N N N   L. Biceps brachii N None None None None N N N N   L. Triceps brachii N None None None None N N N N   L. Extensor digitorum communis N None None None None N N N N   L. First dorsal interosseous N None None None None N N N N   R. Deltoid N None None None None N N N N   R. Triceps brachii N None None None None N N N N   R. Biceps brachii N None None None None N N N N   R. Extensor digitorum communis N None None None None N N N N   R. First dorsal interosseous N None None None None N N N N   L. Cervical paraspinals N None None None None N N N N   R. Cervical paraspinals N None None None None N N N N       Summary    The motor conduction test was unremarkable in all 4 of the tested nerves: R Median - APB, L Median - APB, R Ulnar - ADM, L Ulnar - ADM.    The sensory conduction test was unremarkable in  all 8 of the tested nerves: R Median - Dig II (Antidromic), L Median - Dig II (Antidromic), R Ulnar - Dig V (Antidromic), L Ulnar - Dig V (Antidromic), R Radial - Superficial (Antidromic), L Radial - Superficial (Antidromic), R Median, Ulnar - Transcarpal comparison, L Median, Ulnar - Transcarpal comparison.    The F wave study was unremarkable in all 4 of the tested nerves: R Median - APB, R Ulnar - ADM, L Median - APB, L Ulnar - ADM    The needle EMG study was normal in all 12 tested muscles: L. Deltoid, L. Biceps brachii, L. Triceps brachii, L. Extensor digitorum communis, L. First dorsal interosseous, R. Deltoid, R. Triceps brachii, R. Biceps brachii, R. Extensor digitorum communis, R. First dorsal interosseous, L. Cervical paraspinals, R. Cervical paraspinals.            Conclusion:   This study is mildly abnormal.  There is evidence for slowing of bilateral median sensory responses in upper extremities. However, on direct comparison across the wrist, there is no evidence for slowing of median relative to ulnar nerve to suggest entrapment neuropathy across the wrist.  This is of unclear significance. However, there is no suggestion for a primary demyelinating neuropathy or myopathy or acute cervical radiculopathy. Of note, this test would not rule out a small fiber neuropathy or central etiology.     Iván Wild MD, Neurology  [facility]  Pager [phone]  2/15/2024

## 2024-02-19 ENCOUNTER — TELEPHONE (OUTPATIENT)
Dept: FAMILY MEDICINE CLINIC | Facility: CLINIC | Age: 84
End: 2024-02-19

## 2024-02-19 RX ORDER — GABAPENTIN 100 MG/1
200 CAPSULE ORAL EVERY EVENING
Qty: 180 CAPSULE | Refills: 0 | Status: SHIPPED | OUTPATIENT
Start: 2024-02-19

## 2024-02-26 DIAGNOSIS — I10 ESSENTIAL HYPERTENSION: ICD-10-CM

## 2024-02-26 RX ORDER — LISINOPRIL 20 MG/1
20 TABLET ORAL EVERY EVENING
Qty: 90 TABLET | Refills: 0 | Status: SHIPPED | OUTPATIENT
Start: 2024-02-26

## 2024-02-26 NOTE — TELEPHONE ENCOUNTER
Pt failed refill protocol for the following reasons:      Hypertension Medications Protocol Ufikls7202/26/2024 03:27 PM   Protocol Details EGFRCR or GFRNAA > 50          Last refill: 1/05/2024 #30 with 0 refills  Last appt: 2/01/2024 Telemed  Next appt: No future appointments.      Forward to Dr. Awad, please advise on refills. Thank you.

## 2024-03-04 ENCOUNTER — LAB ENCOUNTER (OUTPATIENT)
Dept: LAB | Facility: HOSPITAL | Age: 84
End: 2024-03-04
Attending: INTERNAL MEDICINE
Payer: MEDICARE

## 2024-03-04 DIAGNOSIS — R06.09 DOE (DYSPNEA ON EXERTION): ICD-10-CM

## 2024-03-04 DIAGNOSIS — I10 ESSENTIAL HYPERTENSION, BENIGN: Primary | ICD-10-CM

## 2024-03-04 LAB — NT-PROBNP SERPL-MCNC: 615 PG/ML (ref ?–450)

## 2024-03-04 PROCEDURE — 36415 COLL VENOUS BLD VENIPUNCTURE: CPT

## 2024-03-04 PROCEDURE — 83880 ASSAY OF NATRIURETIC PEPTIDE: CPT

## 2024-03-07 ENCOUNTER — TELEPHONE (OUTPATIENT)
Dept: FAMILY MEDICINE CLINIC | Facility: CLINIC | Age: 84
End: 2024-03-07

## 2024-03-07 NOTE — TELEPHONE ENCOUNTER
Lab Frequency Next Occurrence   CT GATED HEART (PULMONARY VEIN) W CONTRAST(CPT=75572) Once 07/07/2023   XR Shoulder left complete (Min 2 views) - EMG Ortho Consult Only Once 02/01/2024          Letter mailed to patient reminding them they have outstanding orders.

## 2024-04-14 DIAGNOSIS — E78.2 MIXED HYPERLIPIDEMIA: ICD-10-CM

## 2024-04-15 RX ORDER — ATORVASTATIN CALCIUM 80 MG/1
80 TABLET, FILM COATED ORAL NIGHTLY
Qty: 90 TABLET | Refills: 0 | Status: SHIPPED | OUTPATIENT
Start: 2024-04-15

## 2024-04-15 NOTE — TELEPHONE ENCOUNTER
Cholesterol Medication Protocol Passed04/14/2024 08:19 AM   Protocol Details ALT < 80    ALT resulted within past year    Lipid panel within past 12 months    In person appointment or virtual visit in the past 12 mos or appointment in next 3 mos      Refilled per protocol  atorvastatin 80 MG Oral Tab   Last refilled on 12/18/23 #90 with 0 rf.  LOV- 1/17/24  Last labs- 2/26/24    Sent to pharmacy

## 2024-04-17 DIAGNOSIS — N18.32 TYPE 2 DIABETES MELLITUS WITH STAGE 3B CHRONIC KIDNEY DISEASE, WITH LONG-TERM CURRENT USE OF INSULIN (HCC): ICD-10-CM

## 2024-04-17 DIAGNOSIS — Z79.4 TYPE 2 DIABETES MELLITUS WITH STAGE 3B CHRONIC KIDNEY DISEASE, WITH LONG-TERM CURRENT USE OF INSULIN (HCC): ICD-10-CM

## 2024-04-17 DIAGNOSIS — E11.22 TYPE 2 DIABETES MELLITUS WITH STAGE 3B CHRONIC KIDNEY DISEASE, WITH LONG-TERM CURRENT USE OF INSULIN (HCC): ICD-10-CM

## 2024-04-17 RX ORDER — INSULIN LISPRO 100 [IU]/ML
INJECTION, SOLUTION INTRAVENOUS; SUBCUTANEOUS
Qty: 15 ML | Refills: 0 | Status: SHIPPED | OUTPATIENT
Start: 2024-04-17

## 2024-04-17 NOTE — TELEPHONE ENCOUNTER
Diabetes Medication Protocol Iavyzq0904/17/2024 08:24 AM   Protocol Details EGFRCR or GFRNAA > 50    Last A1C < 7.5 and within past 6 months    In person appointment or virtual visit in the past 6 mos or appointment in next 3 mos    Microalbumin procedure in past 12 months or taking ACE/ARB    GFR in the past 12 months   Routing to provider per protocol.   Insulin Lispro, 1 Unit Dial, (HUMALOG KWIKPEN) 100 UNIT/ML Subcutaneous Solution Pen-injector   Last refilled on 2/14/24 for #10mL  with 0 rf.   Last labs 1/18/24.   Last seen on 1/17/24.     No future appointments.       Thank you.

## 2024-04-23 ENCOUNTER — TELEPHONE (OUTPATIENT)
Dept: FAMILY MEDICINE CLINIC | Facility: CLINIC | Age: 84
End: 2024-04-23

## 2024-04-23 DIAGNOSIS — N18.32 TYPE 2 DIABETES MELLITUS WITH STAGE 3B CHRONIC KIDNEY DISEASE, WITH LONG-TERM CURRENT USE OF INSULIN (HCC): ICD-10-CM

## 2024-04-23 DIAGNOSIS — Z79.4 TYPE 2 DIABETES MELLITUS WITH STAGE 3B CHRONIC KIDNEY DISEASE, WITH LONG-TERM CURRENT USE OF INSULIN (HCC): ICD-10-CM

## 2024-04-23 DIAGNOSIS — E11.22 TYPE 2 DIABETES MELLITUS WITH STAGE 3B CHRONIC KIDNEY DISEASE, WITH LONG-TERM CURRENT USE OF INSULIN (HCC): ICD-10-CM

## 2024-04-23 RX ORDER — INSULIN LISPRO 100 [IU]/ML
INJECTION, SOLUTION INTRAVENOUS; SUBCUTANEOUS
Qty: 15 ML | Refills: 0 | Status: SHIPPED | OUTPATIENT
Start: 2024-04-23

## 2024-04-23 RX ORDER — INSULIN LISPRO 100 [IU]/ML
INJECTION, SOLUTION INTRAVENOUS; SUBCUTANEOUS
Qty: 15 ML | Refills: 0 | Status: SHIPPED | OUTPATIENT
Start: 2024-04-23 | End: 2024-04-23

## 2024-04-23 NOTE — TELEPHONE ENCOUNTER
Pharmacy requesting script resent for Humalog 10-12 units TID with meals.    Discussed with Dr Awad who states ok to resend with TID instruction     Script sent

## 2024-04-23 NOTE — TELEPHONE ENCOUNTER
Pharmacy states that patient was told to take 10-12 units with every meal    HUMALOG KWIKPEN 100 UNIT/ML Subcutaneous Solution Pen-injector 15 mL 0 4/17/2024 --    Sig: Inject 5-10 Units/day into the skin daily.        They will need updated directions sent in order for insurance to cover    Please adv  Thank you

## 2024-05-19 DIAGNOSIS — E11.21 TYPE 2 DIABETES MELLITUS WITH NEPHROPATHY (HCC): ICD-10-CM

## 2024-05-20 RX ORDER — PEN NEEDLE, DIABETIC 32GX 5/32"
1 NEEDLE, DISPOSABLE MISCELLANEOUS DAILY
Qty: 90 EACH | Refills: 0 | Status: SHIPPED | OUTPATIENT
Start: 2024-05-20 | End: 2025-05-20

## 2024-05-26 ENCOUNTER — PATIENT MESSAGE (OUTPATIENT)
Dept: FAMILY MEDICINE CLINIC | Facility: CLINIC | Age: 84
End: 2024-05-26

## 2024-05-27 NOTE — TELEPHONE ENCOUNTER
From: Anastasia Hobson  To: [place] Prado  Sent: 5/26/2024 9:31 AM CDT  Subject: Shilo II Sensors    When I called [facility] to find out why I haven't received my refill, I was told Medicare would not approve it because they are waiting for a required medicare record from you. I only have 5 days left on my last sensor. Would you please take care of this as soon as possible? I have never had a problem with this with any other physician. So say the least, I am very frustrated.

## 2024-05-28 DIAGNOSIS — I10 ESSENTIAL HYPERTENSION: ICD-10-CM

## 2024-05-28 RX ORDER — AMLODIPINE BESYLATE 10 MG/1
10 TABLET ORAL DAILY
Qty: 90 TABLET | Refills: 0 | Status: SHIPPED | OUTPATIENT
Start: 2024-05-28

## 2024-05-28 RX ORDER — LISINOPRIL 20 MG/1
20 TABLET ORAL EVERY EVENING
Qty: 90 TABLET | Refills: 0 | Status: SHIPPED | OUTPATIENT
Start: 2024-05-28

## 2024-05-31 NOTE — TELEPHONE ENCOUNTER
ePA requested for Continuous Blood Gluc Sensor (FREESTYLE NATALIE 2 SENSOR)    Reviewed ePA questions with pt over phone   Pt reports blood sugars are up and down, \"all over the place\"  Pt reports more than 2-3 times of blood sugars in 50s  Has been low enough where she needed treatment  ePA answers submitted    Awaiting PA response at this time

## 2024-06-03 ENCOUNTER — HOSPITAL ENCOUNTER (OUTPATIENT)
Dept: LAB | Facility: HOSPITAL | Age: 84
Discharge: HOME | End: 2024-06-03
Attending: INTERNAL MEDICINE
Payer: MEDICARE

## 2024-06-03 LAB
ANION GAP SERPL CALC-SCNC: 6 MMOL/L (ref 0–18)
BUN BLD-MCNC: 44 MG/DL (ref 9–23)
CALCIUM BLD-MCNC: 10.1 MG/DL (ref 8.5–10.1)
CHLORIDE SERPL-SCNC: 109 MMOL/L (ref 98–112)
CO2 SERPL-SCNC: 28 MMOL/L (ref 21–32)
CREAT BLD-MCNC: 1.63 MG/DL
EGFRCR SERPLBLD CKD-EPI 2021: 31 ML/MIN/1.73M2 (ref 60–?)
FASTING STATUS PATIENT QL REPORTED: NO
GLUCOSE BLD-MCNC: 142 MG/DL (ref 70–99)
NT-PROBNP SERPL-MCNC: 725 PG/ML (ref ?–450)
OSMOLALITY SERPL CALC.SUM OF ELEC: 310 MOSM/KG (ref 275–295)
POTASSIUM SERPL-SCNC: 3.9 MMOL/L (ref 3.5–5.1)
SODIUM SERPL-SCNC: 143 MMOL/L (ref 136–145)

## 2024-06-03 PROCEDURE — 83880 ASSAY OF NATRIURETIC PEPTIDE: CPT | Performed by: INTERNAL MEDICINE

## 2024-06-03 PROCEDURE — 36415 COLL VENOUS BLD VENIPUNCTURE: CPT | Performed by: INTERNAL MEDICINE

## 2024-06-03 PROCEDURE — 80048 BASIC METABOLIC PNL TOTAL CA: CPT | Performed by: INTERNAL MEDICINE

## 2024-06-03 NOTE — TELEPHONE ENCOUNTER
Received fax from [facility] regarding medical records request  \"Medical documentation or chart notes is being requested in order to pay for CGM reader and/or supplies. The physician records MUST include applicable coverage criteria.  Beneficiary is insulin treated\"    LOV 01/17/24 office notes faxed back to #[phone]      Left detailed message to voicemail (per verbal release form consent with confirmed identifying message) of notes below/above. Patient was advised to call pharmacy regarding this Rx, and/or to call office back with any questions/concerns.

## 2024-06-03 NOTE — TELEPHONE ENCOUNTER
Received fax from [employer] regarding pt's PA for Rx Continuous Blood Gluc Sensor (FREESTYLE NATALIE 2 SENSOR)    Denied - \"not covered under Medicare Part D plan... however, may be covered under Medicare part B. Please have your pharmacy resubmit your Rx to the Part B pharmacy benefit\"

## 2024-06-03 NOTE — TELEPHONE ENCOUNTER
Called [facility] pharmacy #[phone] - pharmacist states CGM order was fine, but due to Medicare Part B new policy - Rx needs to be re-authorized every 6 months - needs office notes refaxed    Ph# provided - [phone] - \"audit and recovery dept\" - left VM to call office back    Received call back - was advised need to send fax request, our office fax# provided  Awaiting fax at this time

## 2024-06-09 NOTE — TELEPHONE ENCOUNTER
Called and scheduled Pt.
Chelita Pool MD  to [facility]        2:33 PM  Yes, I'd be happy to see her. When she calls to schedule the  will tell her what they told you, so she'll just need to tell them you're her daughter and my patient and I said I'd see her.   Yoselyn Wiseman
Pt would like to be a new pt for [address]. She stated [address] told her Daughter Arnaldo Truk that she would see pt. Just wanted to double check before scheduling.    Please return call to [phone]
Walk in

## 2024-06-17 ENCOUNTER — MED REC SCAN ONLY (OUTPATIENT)
Dept: FAMILY MEDICINE CLINIC | Facility: CLINIC | Age: 84
End: 2024-06-17

## 2024-06-18 DIAGNOSIS — N18.32 TYPE 2 DIABETES MELLITUS WITH STAGE 3B CHRONIC KIDNEY DISEASE, WITH LONG-TERM CURRENT USE OF INSULIN (HCC): ICD-10-CM

## 2024-06-18 DIAGNOSIS — E11.22 TYPE 2 DIABETES MELLITUS WITH STAGE 3B CHRONIC KIDNEY DISEASE, WITH LONG-TERM CURRENT USE OF INSULIN (HCC): ICD-10-CM

## 2024-06-18 DIAGNOSIS — Z79.4 TYPE 2 DIABETES MELLITUS WITH STAGE 3B CHRONIC KIDNEY DISEASE, WITH LONG-TERM CURRENT USE OF INSULIN (HCC): ICD-10-CM

## 2024-06-18 RX ORDER — INSULIN LISPRO 100 [IU]/ML
INJECTION, SOLUTION INTRAVENOUS; SUBCUTANEOUS
Qty: 15 ML | Refills: 0 | Status: SHIPPED | OUTPATIENT
Start: 2024-06-18

## 2024-06-18 NOTE — TELEPHONE ENCOUNTER
Pt failed refill protocol for the following reasons:      Diabetes Medication Protocol [id number]/18/2024 01:19 PM   Protocol Details EGFRCR or GFRNAA > 50          Last refill: 4/23/2024 #15ml with 0 refills  Last appt: 2/01/2024 Telemed  Next appt: No future appointments.      Forward to Dr. Awad, please advise on refills. Thank you.

## 2024-07-01 DIAGNOSIS — E11.9 TYPE 2 DIABETES MELLITUS WITHOUT COMPLICATION, WITH LONG-TERM CURRENT USE OF INSULIN (HCC): ICD-10-CM

## 2024-07-01 DIAGNOSIS — Z79.4 TYPE 2 DIABETES MELLITUS WITHOUT COMPLICATION, WITH LONG-TERM CURRENT USE OF INSULIN (HCC): ICD-10-CM

## 2024-07-01 RX ORDER — INSULIN GLARGINE 100 [IU]/ML
INJECTION, SOLUTION SUBCUTANEOUS
Qty: 15 ML | Refills: 0 | Status: SHIPPED | OUTPATIENT
Start: 2024-07-01

## 2024-07-01 NOTE — TELEPHONE ENCOUNTER
Diabetes Medication Protocol [id number]/01/2024 10:28 AM   Protocol Details EGFRCR or GFRNAA > 50    Last A1C < 7.5 and within past 6 months    In person appointment or virtual visit in the past 6 mos or appointment in next 3 mos    Microalbumin procedure in past 12 months or taking ACE/ARB    GFR in the past 12 months      Routing to provider per protocol.   insulin glargine (LANTUS SOLOSTAR) 100 UNIT/ML Subcutaneous Solution Pen-injector   Last refilled on 11/15/23 for #1mL  with 0 rf.   Last labs 6/3/24.   Last seen on 1/17/24.       Future Appointments   Date Time Provider Department Center   7/23/2024  2:30 PM Jacque Urena APRN [facility]          Thank you.

## 2024-07-29 ENCOUNTER — TELEPHONE (OUTPATIENT)
Dept: FAMILY MEDICINE CLINIC | Facility: CLINIC | Age: 84
End: 2024-07-29

## 2024-07-29 RX ORDER — TRAMADOL HYDROCHLORIDE 50 MG/1
50 TABLET ORAL EVERY 8 HOURS PRN
Qty: 30 TABLET | Refills: 0 | OUTPATIENT
Start: 2024-07-29

## 2024-07-29 RX ORDER — TRAMADOL HYDROCHLORIDE 100 MG/1
100 TABLET, EXTENDED RELEASE ORAL
Refills: 0 | Status: CANCELLED | OUTPATIENT
Start: 2024-07-29

## 2024-07-29 NOTE — TELEPHONE ENCOUNTER
Patient is requesting RX for traMADol 50 MG Oral Tab, previously prescribed for neck pain. Patient states that the pain is back and her appointment with her orthopedic is not for another 2 weeks. Please advise.     Pharmacy: [id number] - [place], [place] - [address] [facility] [phone], [phone] [[id number]]

## 2024-07-29 NOTE — TELEPHONE ENCOUNTER
Please see note below - \"Patient is requesting RX for traMADol 50 MG Oral Tab, previously prescribed for neck pain. Patient states that the pain is back and her appointment with her orthopedic is not for another 2 weeks. Please advise\"    LOV/phone visit 02/01/24  Last MAWV 01/17/24  Last refill on (Historical), for #?, with ? refills  traMADol 50 MG Oral Tab     Future Appointments   Date Time Provider Department Center   10/23/2024  1:00 PM Jacque Urena APRN [facility]     Order(s) pending, please review. Thank you.  Juan Diamond

## 2024-07-29 NOTE — TELEPHONE ENCOUNTER
Advised patient of Dr. Awad's note below. Patient verbalized understanding and states has been taking 3000 mg of tylenol everyday - \"not doing the trick\"    Pt would like to take tramadol 100 mg in am, 100 mg in pm, and 100 mg in evening (TID)    Pt reports this doesn't take all the pain away - but does not want higher dose than that    Please advise, thank you

## 2024-07-31 ENCOUNTER — HOSPITAL ENCOUNTER (INPATIENT)
Facility: HOSPITAL | Age: 84
LOS: 8 days | Discharge: HOME | End: 2024-08-08
Attending: EMERGENCY MEDICINE | Admitting: HOSPITALIST
Payer: MEDICARE

## 2024-07-31 ENCOUNTER — APPOINTMENT (OUTPATIENT)
Dept: MRI IMAGING | Facility: HOSPITAL | Age: 84
End: 2024-07-31
Attending: EMERGENCY MEDICINE
Payer: MEDICARE

## 2024-07-31 DIAGNOSIS — Z79.01 ANTICOAGULATED: ICD-10-CM

## 2024-07-31 DIAGNOSIS — E11.65 TYPE 2 DIABETES MELLITUS WITH HYPERGLYCEMIA, UNSPECIFIED WHETHER LONG TERM INSULIN USE (HCC): ICD-10-CM

## 2024-07-31 DIAGNOSIS — R22.1 MASS OF LEFT SIDE OF NECK: Primary | ICD-10-CM

## 2024-07-31 DIAGNOSIS — R79.89 AZOTEMIA: ICD-10-CM

## 2024-07-31 DIAGNOSIS — C67.2 MALIGNANT NEOPLASM OF LATERAL WALL OF URINARY BLADDER (HCC): ICD-10-CM

## 2024-07-31 DIAGNOSIS — I48.20 ATRIAL FIBRILLATION, CHRONIC (HCC): ICD-10-CM

## 2024-07-31 LAB
ALBUMIN SERPL-MCNC: 5 G/DL (ref 3.2–4.8)
ALBUMIN/GLOB SERPL: 1.9 {RATIO} (ref 1–2)
ALP LIVER SERPL-CCNC: 46 U/L
ALT SERPL-CCNC: 15 U/L
ANION GAP SERPL CALC-SCNC: 8 MMOL/L (ref 0–18)
AST SERPL-CCNC: 27 U/L (ref ?–34)
BASOPHILS # BLD AUTO: 0.04 X10(3) UL (ref 0–0.2)
BASOPHILS NFR BLD AUTO: 0.5 %
BILIRUB SERPL-MCNC: 0.7 MG/DL (ref 0.2–1.1)
BUN BLD-MCNC: 29 MG/DL (ref 9–23)
CALCIUM BLD-MCNC: 10.6 MG/DL (ref 8.7–10.4)
CHLORIDE SERPL-SCNC: 105 MMOL/L (ref 98–112)
CO2 SERPL-SCNC: 27 MMOL/L (ref 21–32)
CREAT BLD-MCNC: 1.44 MG/DL
EGFRCR SERPLBLD CKD-EPI 2021: 36 ML/MIN/1.73M2 (ref 60–?)
EOSINOPHIL # BLD AUTO: 0.13 X10(3) UL (ref 0–0.7)
EOSINOPHIL NFR BLD AUTO: 1.7 %
ERYTHROCYTE [DISTWIDTH] IN BLOOD BY AUTOMATED COUNT: 13.8 %
GLOBULIN PLAS-MCNC: 2.6 G/DL (ref 2–3.5)
GLUCOSE BLD-MCNC: 137 MG/DL (ref 70–99)
GLUCOSE BLD-MCNC: 208 MG/DL (ref 70–99)
HCT VFR BLD AUTO: 39 %
HGB BLD-MCNC: 12.9 G/DL
IMM GRANULOCYTES # BLD AUTO: 0.01 X10(3) UL (ref 0–1)
IMM GRANULOCYTES NFR BLD: 0.1 %
LYMPHOCYTES # BLD AUTO: 2.18 X10(3) UL (ref 1–4)
LYMPHOCYTES NFR BLD AUTO: 28.9 %
MCH RBC QN AUTO: 27.4 PG (ref 26–34)
MCHC RBC AUTO-ENTMCNC: 33.1 G/DL (ref 31–37)
MCV RBC AUTO: 82.8 FL
MONOCYTES # BLD AUTO: 1.04 X10(3) UL (ref 0.1–1)
MONOCYTES NFR BLD AUTO: 13.8 %
NEUTROPHILS # BLD AUTO: 4.15 X10 (3) UL (ref 1.5–7.7)
NEUTROPHILS # BLD AUTO: 4.15 X10(3) UL (ref 1.5–7.7)
NEUTROPHILS NFR BLD AUTO: 55 %
OSMOLALITY SERPL CALC.SUM OF ELEC: 298 MOSM/KG (ref 275–295)
PLATELET # BLD AUTO: 326 10(3)UL (ref 150–450)
POTASSIUM SERPL-SCNC: 3.6 MMOL/L (ref 3.5–5.1)
PROT SERPL-MCNC: 7.6 G/DL (ref 5.7–8.2)
RBC # BLD AUTO: 4.71 X10(6)UL
SODIUM SERPL-SCNC: 140 MMOL/L (ref 136–145)
WBC # BLD AUTO: 7.6 X10(3) UL (ref 4–11)

## 2024-07-31 PROCEDURE — 72156 MRI NECK SPINE W/O & W/DYE: CPT | Performed by: EMERGENCY MEDICINE

## 2024-07-31 PROCEDURE — 99223 1ST HOSP IP/OBS HIGH 75: CPT | Performed by: HOSPITALIST

## 2024-07-31 RX ORDER — METHYLPREDNISOLONE SODIUM SUCCINATE 125 MG/2ML
40 INJECTION, POWDER, LYOPHILIZED, FOR SOLUTION INTRAMUSCULAR; INTRAVENOUS ONCE
Status: DISCONTINUED | OUTPATIENT
Start: 2024-07-31 | End: 2024-07-31

## 2024-07-31 RX ORDER — SODIUM CHLORIDE 9 MG/ML
INJECTION, SOLUTION INTRAVENOUS CONTINUOUS
Status: DISCONTINUED | OUTPATIENT
Start: 2024-07-31 | End: 2024-08-01

## 2024-07-31 RX ORDER — DIAZEPAM 2 MG/1
2 TABLET ORAL 3 TIMES DAILY PRN
Qty: 20 TABLET | Refills: 0 | Status: SHIPPED | OUTPATIENT
Start: 2024-07-31 | End: 2024-08-07

## 2024-07-31 RX ORDER — ACETAMINOPHEN 500 MG
1000 TABLET ORAL EVERY 6 HOURS PRN
Status: DISCONTINUED | OUTPATIENT
Start: 2024-07-31 | End: 2024-08-02

## 2024-07-31 RX ORDER — TRAMADOL HYDROCHLORIDE 50 MG/1
TABLET ORAL EVERY 6 HOURS PRN
Qty: 30 TABLET | Refills: 0 | Status: SHIPPED | OUTPATIENT
Start: 2024-07-31 | End: 2024-08-07

## 2024-07-31 RX ORDER — MORPHINE SULFATE 4 MG/ML
4 INJECTION, SOLUTION INTRAMUSCULAR; INTRAVENOUS EVERY 2 HOUR PRN
Status: DISCONTINUED | OUTPATIENT
Start: 2024-07-31 | End: 2024-08-08

## 2024-07-31 RX ORDER — NICOTINE POLACRILEX 4 MG
15 LOZENGE BUCCAL
Status: DISCONTINUED | OUTPATIENT
Start: 2024-07-31 | End: 2024-08-08

## 2024-07-31 RX ORDER — NICOTINE POLACRILEX 4 MG
30 LOZENGE BUCCAL
Status: DISCONTINUED | OUTPATIENT
Start: 2024-07-31 | End: 2024-08-08

## 2024-07-31 RX ORDER — DEXTROSE MONOHYDRATE 25 G/50ML
50 INJECTION, SOLUTION INTRAVENOUS
Status: DISCONTINUED | OUTPATIENT
Start: 2024-07-31 | End: 2024-08-08

## 2024-07-31 RX ORDER — HYDROMORPHONE HYDROCHLORIDE 1 MG/ML
0.5 INJECTION, SOLUTION INTRAMUSCULAR; INTRAVENOUS; SUBCUTANEOUS EVERY 30 MIN PRN
Status: DISCONTINUED | OUTPATIENT
Start: 2024-07-31 | End: 2024-08-01

## 2024-07-31 RX ORDER — PREDNISONE 20 MG/1
40 TABLET ORAL DAILY
Qty: 8 TABLET | Refills: 0 | Status: SHIPPED | OUTPATIENT
Start: 2024-08-01 | End: 2024-08-05

## 2024-07-31 RX ORDER — GADOTERATE MEGLUMINE 376.9 MG/ML
15 INJECTION INTRAVENOUS
Status: COMPLETED | OUTPATIENT
Start: 2024-07-31 | End: 2024-07-31

## 2024-07-31 RX ORDER — METHYLPREDNISOLONE SODIUM SUCCINATE 40 MG/ML
40 INJECTION, POWDER, LYOPHILIZED, FOR SOLUTION INTRAMUSCULAR; INTRAVENOUS ONCE
Status: COMPLETED | OUTPATIENT
Start: 2024-07-31 | End: 2024-07-31

## 2024-07-31 RX ORDER — TRAMADOL HYDROCHLORIDE 50 MG/1
50 TABLET ORAL EVERY 6 HOURS PRN
Status: ON HOLD | COMMUNITY
End: 2024-08-08

## 2024-07-31 RX ORDER — GABAPENTIN 100 MG/1
100 CAPSULE ORAL NIGHTLY
Qty: 90 CAPSULE | Refills: 0 | Status: SHIPPED | OUTPATIENT
Start: 2024-07-31

## 2024-07-31 RX ORDER — MORPHINE SULFATE 2 MG/ML
2 INJECTION, SOLUTION INTRAMUSCULAR; INTRAVENOUS EVERY 2 HOUR PRN
Status: DISCONTINUED | OUTPATIENT
Start: 2024-07-31 | End: 2024-08-08

## 2024-07-31 RX ORDER — TRAMADOL HYDROCHLORIDE 50 MG/1
50 TABLET ORAL 2 TIMES DAILY PRN
Refills: 0 | Status: CANCELLED | OUTPATIENT
Start: 2024-07-31

## 2024-07-31 RX ORDER — ONDANSETRON 2 MG/ML
4 INJECTION INTRAMUSCULAR; INTRAVENOUS EVERY 4 HOURS PRN
Status: DISCONTINUED | OUTPATIENT
Start: 2024-07-31 | End: 2024-08-01

## 2024-07-31 RX ORDER — MORPHINE SULFATE 2 MG/ML
1 INJECTION, SOLUTION INTRAMUSCULAR; INTRAVENOUS EVERY 2 HOUR PRN
Status: DISCONTINUED | OUTPATIENT
Start: 2024-07-31 | End: 2024-08-08

## 2024-07-31 RX ORDER — SODIUM CHLORIDE 9 MG/ML
1000 INJECTION, SOLUTION INTRAVENOUS ONCE
Status: COMPLETED | OUTPATIENT
Start: 2024-07-31 | End: 2024-07-31

## 2024-07-31 RX ORDER — ONDANSETRON 2 MG/ML
4 INJECTION INTRAMUSCULAR; INTRAVENOUS ONCE
Status: COMPLETED | OUTPATIENT
Start: 2024-07-31 | End: 2024-07-31

## 2024-07-31 NOTE — ED QUICK NOTES
Rounding Completed    Plan of Care reviewed. Waiting for MRI, patient and family aware of estimate of time.  Pain has decreased after medications    Bed is locked and in lowest position. Call light within reach.

## 2024-07-31 NOTE — ED INITIAL ASSESSMENT (HPI)
Patient arrives to the ED with c/o neck pain. Surgery in October to repair broken neck - C4,5,6 - since then chronic severe pain.

## 2024-07-31 NOTE — TELEPHONE ENCOUNTER
Per Dr. Awad: That is a very high dose. I am happy to do 50mg twice a day but more than that she will need to see pain specialist     Left detailed message to voicemail (per verbal release form consent with confirmed identifying message) of Dr. Awad's note below. Patient was advised to call office back if agreeable to Rx 50 mg BID

## 2024-07-31 NOTE — DISCHARGE INSTRUCTIONS
Neurologic Instructions    Call your doctor if you have:  increased pain or headache.   trouble seeing, walking or using your arms or legs.   dizziness or passing out.   any change in behavior (agitated or sleepy).   trouble being awakened from sleep.   numbness in your face, arm or leg.   extreme weakness.   trouble talking.   nausea and vomiting.   any new or severe symptoms.    Take your medicines as prescribed. Most important, see a doctor again as discussed. If you have problems that we have not discussed, call or visit your doctor right away. If you cannot reach your doctor, return to the Emergency Department.     Continue acetaminophen 1000 mg every 6-8 hours for pain.    Hold your metformin for 2 days because of the intravenous contrast you were administered tonight for your MRI scan.    You may take a warm shower but do not sit in a hot tub or use a heating pad.  Use ice where it hurts, 20 minutes on and 20 minutes off.    Begin gabapentin 100 mg at bedtime for the first couple of nights and then gradually increase in 100 mg increments every couple of days until you are taking 300 mg at bedtime.    The gabapentin, tramadol and Valium will make you drowsy.  When you first take the Valium, make sure that you space it at least an hour from other medications to see how you react to it.    Return to the emergency department for new or worsening symptoms.

## 2024-07-31 NOTE — ED PROVIDER NOTES
Patient Seen in: [facility] Emergency Department      History     Chief Complaint   Patient presents with    Neck Pain     Stated Complaint: neck pain    Subjective:   HPI    83-year-old female with a history of fracture of C5 vertebrae after a fall several months ago presents to the emergency department complaining of a several week history of increasing left-sided posterior neck pain to the extent that she is not able to function due to pain intensity.  She is anticoagulated and unable to take anti-inflammatory medications.  She was in a soft cervical collar for about 4 months postop and has been going to physical therapy but at therapy recently they have had difficulty working with her because of the degree of pain that she is in.  She denies any specific reinjury.  She did have some pain radiating into the right arm and forearm today but denies any finger pain or paresthesias in either extremity.  No arm weakness.  She currently is using acetaminophen 1000 mg every 8 hours.  She had some leftover tramadol and took 100 mg today prior to coming to the emergency department.  In the past she has been prescribed gabapentin but is not currently taking it.  No fever.  No pain with swallowing.    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    Bloating    Blurred vision    Congestive heart disease (HCC)    Constipation    Diabetes (HCC)    IDDM     Diabetes mellitus (HCC)    Diarrhea, unspecified    CLARKE (dyspnea on exertion)    Easy bruising    Elevated cholesterol    Essential hypertension    Fatigue    Flatulence/gas pain/belching    Frequent urination    taking furosemide daily    Headache disorder    Hearing loss    Heart palpitations    Heartburn    occasionally    High blood pressure    High cholesterol    History of falling    Hyperlipidemia    Irregular bowel habits    Itch of skin    right hand only     Leaking of urine    sometimes    Leg swelling    Osteoporosis    Sleep disturbance    Stool incontinence    when I have diarrhea    Type 2 diabetes mellitus with hyperglycemia (HCC)    Visual impairment    glasses    Wears glasses              Past Surgical History:   Procedure Laterality Date    Cataract      Cervical spine surgery  10/06/2023    Colonoscopy      Colonoscopy      Cystoscopy,insert ureteral stent      Cystourethroscopy  2020    Cystoscopy Procedure Dr Josh Hernandez  - recurrent tumor on BTS    Cystourethroscopy,biopsy  2019    BBx of scar sites, NSC    Cystourethroscopy,fulgur .5-2cm lesn  2020    TURBT, NSC    Cystourethroscopy,fulgur .5-2cm lesn  2020    TURBT with Gemcitabine    Cystourethroscopy,fulgur >5cm lesn  10/03/2019    [facility]URBT with bilateral [facility] [place] [facility]    Fracture surgery      left wrist    Hysterectomy      age 31    Other surgical history  2021    BTS Cysto, Dr Hernandez    Other surgical history  2021    BTS Cysto Caterize Dr. Hernandez    Other surgical history  2021    BTS Cysto W/ caterigayathri Hernandez     Other surgical history  2022    BTS Cysto- Dr. Hernandez    Spine surgery procedure unlisted  10/06/2003    Tonsillectomy  1946    Total abdom hysterectomy  1971                Social History     Socioeconomic History    Marital status:    Tobacco Use    Smoking status: Former     Current packs/day: 0.00     Average packs/day: 1.1 packs/day for 65.3 years (73.5 ttl pk-yrs)     Types: Cigarettes     Quit date: 2007     Years since quittin.0    Smokeless tobacco: Never    Tobacco comments:     quit 15 years    Vaping Use    Vaping status: Never Used   Substance and Sexual Activity    Alcohol use: No    Drug use: No     Social Determinants of Health     Financial Resource Strain: Low Risk  (2023)    Financial Resource Strain     Difficulty of Paying Living Expenses: Not very hard     Med Affordability: No   Transportation Needs: No  Transportation Needs (2/27/2023)    Transportation Needs     Lack of Transportation: No              Review of Systems    Positive for stated Chief Complaint: Neck Pain    Other systems are as noted in HPI.  Constitutional and vital signs reviewed.      All other systems reviewed and negative except as noted above.    Physical Exam     ED Triage Vitals [07/31/24 1533]   /78   Pulse 90   Resp 22   Temp 98.2 °F (36.8 °C)   Temp src Temporal   SpO2 98 %   O2 Device None (Room air)       Current Vitals:   Vital Signs  BP: (!) 164/71  Pulse: 75  Resp: 15  Temp: 98.2 °F (36.8 °C)  Temp src: Temporal  MAP (mmHg): 98    Oxygen Therapy  SpO2: 100 %  O2 Device: None (Room air)            Physical Exam    General appearance: This is an older female who is awake and conversive sitting on a gurney.  Vital signs were reviewed per nurses notes.  HEENT: Normocephalic atraumatic.  Anicteric sclera.  Oral mucosa is moist.  Neck: No midline crepitations or step-offs posteriorly.  No adenopathy or thyromegaly.  There is significant left trapezius muscle spasm and tenderness on palpation.  Lungs are clear to auscultation.  Heart exam: Normal S1-S2 without extra sounds or murmurs.  Regular rate and rhythm.  Chest and abdomen are nontender.  Extremities are atraumatic.  Skin is dry without rashes or lesions.  Neuroexam: Alert and oriented x 4.  Motor strength is generally weak and both upper extremities all motor groups however there is no lateralizing of the patient's symptoms including no unilateral weakness or disparity.      ED Course     Labs Reviewed   COMP METABOLIC PANEL (14) - Abnormal; Notable for the following components:       Result Value    Glucose 137 (*)     BUN 29 (*)     Creatinine 1.44 (*)     Calcium, Total 10.6 (*)     Calculated Osmolality 298 (*)     eGFR-Cr 36 (*)     Alkaline Phosphatase 46 (*)     Albumin 5.0 (*)     All other components within normal limits   POCT GLUCOSE - Abnormal; Notable for the  following components:    POC Glucose 208 (*)     All other components within normal limits   CBC W/ DIFFERENTIAL - Abnormal; Notable for the following components:    Monocyte Absolute 1.04 (*)     All other components within normal limits   CBC WITH DIFFERENTIAL WITH PLATELET    Narrative:     The following orders were created for panel order CBC With Differential With Platelet.  Procedure                               Abnormality         Status                     ---------                               -----------         ------                     CBC W/ DIFFERENTIAL[[id number]]          Abnormal            Final result                 Please view results for these tests on the individual orders.   RAINBOW DRAW LAVENDER   RAINBOW DRAW LIGHT GREEN             Intravenous access was obtained.  Laboratory studies were drawn.  The patient was treated with Dilaudid, Zofran, Solu-Medrol.    The patient states that she has an appointment with a pain specialist in about 2 weeks.  She is requesting pain management.    MRI SPINE CERVICAL (W+WO) (CPT=72156)    Result Date: 7/31/2024  PROCEDURE:  MRI SPINE CERVICAL (W+WO) (CPT=72156)  COMPARISON:  External Exams, CT, CT SPINE CERVICAL (CPT=72125), 10/03/2023, 4:53 PM.  External Exams, MR, MRI SPINE CERVICAL (CPT=72141), 10/03/2023, 8:05 PM.  INDICATIONS:  History of cervical spine fracture several months ago with operative care and hardware.  Over the past 2 months has had increasing left-sided posterior neck gk  TECHNIQUE:  Multiplanar T1 and T2 weighted images including fat suppression sequences.  Images acquired in sagittal and axial planes.  Intravenous gadolinium was administered followed by multiplanar post-infusion T1 weighted sequences.   PATIENT STATED HISTORY:(As transcribed by Technologist)  Patient complain of neck pain radiates down mostly to the left arm.   CONTRAST USED:  15 mL of Dotarem  FINDINGS: Patient motion noted.  Repeat sequences were attempted.   There is a partially imaged invasive/irregular soft tissue mass in the left lower neck measuring up to 4.5 x 3.5 cm that is concerning for a primary or metastatic soft tissue neoplasm.  Clinical correlation and direct inspection is suggested.  Anterior cervical fusion hardware noted spanning C4-C6.  Susceptibility artifact in the surgical hardware limits evaluation of surrounding structures.  There is straightening of the normal cervical lordosis with anatomic alignment.  Vertebral body heights are well-maintained.  Mild to moderate degenerative disc space loss, disc desiccation, endplate spurring, and degenerative endplate marrow signal changes most pronounced at C6-7.  No focal worrisome marrow signal abnormality is seen.  Evaluation of the cervical spinal cord is limited due to the extent of artifact patient motion.  There is no obvious myelomalacia or focal spinal cord signal abnormality identified within the limitations of the exam.  No focal mass or fluid collection is seen in the cervical spinal canal.  The paraspinal soft tissues are unremarkable.  The craniocervical junction is unremarkable.  C2-3:  Small broad-based central disc protrusion.  Minimal facet arthropathy. There is no significant spinal canal or neural foraminal stenosis.  C3-4: There is a small posterior disk osteophyte complex with mild uncovertebral and facet joint degenerative changes.  There is no significant spinal canal or neural foraminal stenosis.  C4-5:  At the fused level, there is no high-grade spinal canal stenosis or significant neural foraminal narrowing.  C5-6:  At the fused level, there is no high-grade spinal canal stenosis or significant neural foraminal narrowing.  C6-7: There is a posterior disk osteophyte complex with mild-to-moderate uncovertebral and facet joint degenerative changes.  There is no significant spinal canal stenosis.  There is moderate to severe right neural foraminal stenosis.   C7-T1: There is a minimal  posterior disk osteophyte complex with minimal uncovertebral and facet joint degenerative changes.  There is no significant spinal canal or neural foraminal stenosis.             CONCLUSION:   1. There is a partially imaged invasive/irregular soft tissue mass in the left lower neck measuring up to 4.5 x 3.5 cm that is concerning for a primary or metastatic soft tissue neoplasm.  Clinical correlation and direct inspection is suggested.  2. Postoperative changes from anterior cervical spinal fusion at C4-C6.  Artifact from the hardware limits evaluation of spinal cord.  3. Multilevel degenerative changes as above.   Please see above for further details.  Critical results were discussed with Dr. Atkinson at 2218 hours on 7/31/2024. Critical results were read back.  LOCATION:  [place]   Dictated by (CST): Domenic Iqbal MD on 7/31/2024 at 10:13 PM     Finalized by (CST): Domenic Iqbal MD on 7/31/2024 at 10:21 PM       I personally reviewed the images myself and went over results with patient.    I viewed the MRI films myself.  I agree with the radiologist reading that there is a soft tissue mass at the base of the left neck that is concerning for neoplasm.  No other acute changes were noted.    Test results and treatment plan were discussed with the patient.  She is continuing to complain of significant pain in the neck.  Hospitalization for expedited biopsy was recommended.  Case was discussed with [facility] hospitalist Dr. Doan and ENT specialist Dr. Casa Nguyen.         MDM      #1.  Mass on the left side of the neck.  Concerning for malignancy.  Whether this is the actual cause of the patient's pain or not he is uncertain.  Patient does have a history of traumatic C5 fracture with subsequent hardware placement.  2.  Azotemia.  3.  Type 2 diabetes mellitus.  4.  Anticoagulated.  Last Eliquis dose was this morning.  5.  Atrial fibrillation.  Admission disposition: 7/31/2024 10:45 PM                                         Medical Decision Making      Disposition and Plan     Clinical Impression:  1. Mass of left side of neck    2. Azotemia    3. Type 2 diabetes mellitus with hyperglycemia, unspecified whether long term insulin use (HCC)    4. Anticoagulated    5. Atrial fibrillation, chronic (HCC)         Disposition:  Admit  7/31/2024 10:45 pm    Follow-up:  [facility], [address] [place] [id number] [phone]  Call  choose option 2 for neurosurgery or pain follow up          Medications Prescribed:  Current Discharge Medication List        START taking these medications    Details   !! gabapentin 100 MG Oral Cap Take 1 capsule (100 mg total) by mouth nightly.  Qty: 90 capsule, Refills: 0      traMADol 50 MG Oral Tab Take 1-2 tablets ( mg total) by mouth every 6 (six) hours as needed for Pain.  Qty: 30 tablet, Refills: 0      diazePAM 2 MG Oral Tab Take 1 tablet (2 mg total) by mouth 3 (three) times daily as needed for Anxiety.  Qty: 20 tablet, Refills: 0      predniSONE 20 MG Oral Tab Take 2 tablets (40 mg total) by mouth daily for 4 days.  Qty: 8 tablet, Refills: 0       !! - Potential duplicate medications found. Please discuss with provider.                            Hospital Problems       Present on Admission  Date Reviewed: 7/23/2024            ICD-10-CM Noted POA    * (Principal) Mass of left side of neck R22.1 7/31/2024 Unknown

## 2024-08-01 ENCOUNTER — APPOINTMENT (OUTPATIENT)
Dept: CT IMAGING | Facility: HOSPITAL | Age: 84
End: 2024-08-01
Attending: OTOLARYNGOLOGY
Payer: MEDICARE

## 2024-08-01 LAB
ALBUMIN SERPL-MCNC: 4.7 G/DL (ref 3.2–4.8)
ALBUMIN/GLOB SERPL: 2 {RATIO} (ref 1–2)
ALP LIVER SERPL-CCNC: 39 U/L
ALT SERPL-CCNC: 15 U/L
ANION GAP SERPL CALC-SCNC: 7 MMOL/L (ref 0–18)
AST SERPL-CCNC: 25 U/L (ref ?–34)
BASOPHILS # BLD AUTO: 0.01 X10(3) UL (ref 0–0.2)
BASOPHILS NFR BLD AUTO: 0.1 %
BILIRUB SERPL-MCNC: 0.6 MG/DL (ref 0.2–1.1)
BUN BLD-MCNC: 23 MG/DL (ref 9–23)
CALCIUM BLD-MCNC: 10.1 MG/DL (ref 8.7–10.4)
CHLORIDE SERPL-SCNC: 106 MMOL/L (ref 98–112)
CO2 SERPL-SCNC: 24 MMOL/L (ref 21–32)
CREAT BLD-MCNC: 1.39 MG/DL
EGFRCR SERPLBLD CKD-EPI 2021: 38 ML/MIN/1.73M2 (ref 60–?)
EOSINOPHIL # BLD AUTO: 0 X10(3) UL (ref 0–0.7)
EOSINOPHIL NFR BLD AUTO: 0 %
ERYTHROCYTE [DISTWIDTH] IN BLOOD BY AUTOMATED COUNT: 13.8 %
EST. AVERAGE GLUCOSE BLD GHB EST-MCNC: 151 MG/DL (ref 68–126)
GLOBULIN PLAS-MCNC: 2.3 G/DL (ref 2–3.5)
GLUCOSE BLD-MCNC: 163 MG/DL (ref 70–99)
GLUCOSE BLD-MCNC: 200 MG/DL (ref 70–99)
GLUCOSE BLD-MCNC: 208 MG/DL (ref 70–99)
GLUCOSE BLD-MCNC: 223 MG/DL (ref 70–99)
GLUCOSE BLD-MCNC: 240 MG/DL (ref 70–99)
GLUCOSE BLD-MCNC: 361 MG/DL (ref 70–99)
HBA1C MFR BLD: 6.9 % (ref ?–5.7)
HCT VFR BLD AUTO: 39.3 %
HGB BLD-MCNC: 12.7 G/DL
IMM GRANULOCYTES # BLD AUTO: 0.02 X10(3) UL (ref 0–1)
IMM GRANULOCYTES NFR BLD: 0.3 %
LYMPHOCYTES # BLD AUTO: 1.11 X10(3) UL (ref 1–4)
LYMPHOCYTES NFR BLD AUTO: 14.7 %
MCH RBC QN AUTO: 27.1 PG (ref 26–34)
MCHC RBC AUTO-ENTMCNC: 32.3 G/DL (ref 31–37)
MCV RBC AUTO: 83.8 FL
MONOCYTES # BLD AUTO: 0.4 X10(3) UL (ref 0.1–1)
MONOCYTES NFR BLD AUTO: 5.3 %
NEUTROPHILS # BLD AUTO: 5.99 X10 (3) UL (ref 1.5–7.7)
NEUTROPHILS # BLD AUTO: 5.99 X10(3) UL (ref 1.5–7.7)
NEUTROPHILS NFR BLD AUTO: 79.6 %
OSMOLALITY SERPL CALC.SUM OF ELEC: 295 MOSM/KG (ref 275–295)
PLATELET # BLD AUTO: 282 10(3)UL (ref 150–450)
POTASSIUM SERPL-SCNC: 4.8 MMOL/L (ref 3.5–5.1)
PROT SERPL-MCNC: 7 G/DL (ref 5.7–8.2)
RBC # BLD AUTO: 4.69 X10(6)UL
SODIUM SERPL-SCNC: 137 MMOL/L (ref 136–145)
WBC # BLD AUTO: 7.5 X10(3) UL (ref 4–11)

## 2024-08-01 PROCEDURE — 70491 CT SOFT TISSUE NECK W/DYE: CPT | Performed by: OTOLARYNGOLOGY

## 2024-08-01 PROCEDURE — 99232 SBSQ HOSP IP/OBS MODERATE 35: CPT | Performed by: HOSPITALIST

## 2024-08-01 RX ORDER — AMLODIPINE BESYLATE 10 MG/1
10 TABLET ORAL DAILY
Status: DISCONTINUED | OUTPATIENT
Start: 2024-08-01 | End: 2024-08-08

## 2024-08-01 RX ORDER — OXYBUTYNIN CHLORIDE 5 MG/1
5 TABLET ORAL 2 TIMES DAILY
Status: DISCONTINUED | OUTPATIENT
Start: 2024-08-01 | End: 2024-08-08

## 2024-08-01 RX ORDER — ATORVASTATIN CALCIUM 80 MG/1
80 TABLET, FILM COATED ORAL NIGHTLY
Status: DISCONTINUED | OUTPATIENT
Start: 2024-08-01 | End: 2024-08-08

## 2024-08-01 RX ORDER — FENOFIBRATE 67 MG/1
134 CAPSULE ORAL
Status: DISCONTINUED | OUTPATIENT
Start: 2024-08-01 | End: 2024-08-08

## 2024-08-01 RX ORDER — HYDRALAZINE HYDROCHLORIDE 50 MG/1
50 TABLET, FILM COATED ORAL 3 TIMES DAILY
Status: DISCONTINUED | OUTPATIENT
Start: 2024-08-01 | End: 2024-08-08

## 2024-08-01 RX ORDER — TRAMADOL HYDROCHLORIDE 50 MG/1
50 TABLET ORAL EVERY 12 HOURS PRN
Status: DISCONTINUED | OUTPATIENT
Start: 2024-08-01 | End: 2024-08-02

## 2024-08-01 RX ORDER — LISINOPRIL 20 MG/1
20 TABLET ORAL EVERY EVENING
Status: DISCONTINUED | OUTPATIENT
Start: 2024-08-01 | End: 2024-08-08

## 2024-08-01 RX ORDER — INSULIN DEGLUDEC 100 U/ML
5 INJECTION, SOLUTION SUBCUTANEOUS NIGHTLY
Status: DISCONTINUED | OUTPATIENT
Start: 2024-08-01 | End: 2024-08-08

## 2024-08-01 RX ORDER — GABAPENTIN 100 MG/1
200 CAPSULE ORAL EVERY EVENING
Status: DISCONTINUED | OUTPATIENT
Start: 2024-08-01 | End: 2024-08-08

## 2024-08-01 RX ORDER — ENOXAPARIN SODIUM 100 MG/ML
30 INJECTION SUBCUTANEOUS DAILY
Status: DISCONTINUED | OUTPATIENT
Start: 2024-08-01 | End: 2024-08-01

## 2024-08-01 RX ORDER — ENOXAPARIN SODIUM 100 MG/ML
30 INJECTION SUBCUTANEOUS DAILY
Status: DISCONTINUED | OUTPATIENT
Start: 2024-08-01 | End: 2024-08-06

## 2024-08-01 NOTE — PROGRESS NOTES
[facility]   part of [facility]     Hospitalist Progress Note     Anastasia Hobson Patient Status:  Inpatient    1940 MRN [id number]   Location [facility] 3SW-A Attending Sanjay Scott MD   Hosp Day # 1 PCP Houston Prado MD     Chief Complaint: Neck pain    Subjective:     Patient still having neck pain.  Denies tingling, numbness to left arm.  She sees ortho spine as outpatient and has an appointment next week.  No other acute complaints.      Objective:    Review of Systems:   A comprehensive review of systems was completed; pertinent positive and negatives stated in subjective.    Vital signs:  Temp:  [98.2 °F (36.8 °C)-98.4 °F (36.9 °C)] 98.4 °F (36.9 °C)  Pulse:  [63-92] 92  Resp:  [14-22] 18  BP: (147-169)/() 153/67  SpO2:  [90 %-100 %] 99 %    Physical Exam:    General: No acute distress, pleasant   Respiratory: No wheezes, no rhonchi  Cardiovascular: S1, S2, regular rate and rhythm  Abdomen: Soft, Non-tender, non-distended, positive bowel sounds  Neuro: No new focal deficits.   Extremities: No edema    Diagnostic Data:    Labs:  Recent Labs   Lab 24  0541   WBC 7.6 7.5   HGB 12.9 12.7   MCV 82.8 83.8   .0 282.0       Recent Labs   Lab 24  0541   * 223*   BUN 29* 23   CREATSERUM 1.44* 1.39*   CA 10.6* 10.1   ALB 5.0* 4.7    137   K 3.6 4.8    106   CO2 27.0 24.0   ALKPHO 46* 39*   AST 27 25   ALT 15 15   BILT 0.7 0.6   TP 7.6 7.0       Estimated Creatinine Clearance: 28.7 mL/min (A) (based on SCr of 1.39 mg/dL (H)).    No results for input(s): \"TROP\", \"TROPHS\", \"CK\" in the last 168 hours.    No results for input(s): \"PTP\", \"INR\" in the last 168 hours.               Microbiology    No results found for this visit on 24.      Imaging: Reviewed in Epic.    Medications:    amLODIPine  10 mg Oral Daily    atorvastatin  80 mg Oral Nightly    fenofibrate micronized  134 mg Oral Daily with breakfast     gabapentin  200 mg Oral QPM    hydrALAZINE  50 mg Oral TID    insulin degludec  5 Units Subcutaneous Nightly    lisinopril  20 mg Oral QPM    oxybutynin  5 mg Oral BID    lidocaine-menthol  1 patch Transdermal Daily    insulin aspart  1-68 Units Subcutaneous TID CC    insulin aspart  1-10 Units Subcutaneous TID AC and HS       Assessment & Plan:      #Left neck pain possibly d/t invasive/irregular soft tissue mass in the left lower neck measuring up to 4.5 x 3.5 cm that is concerning for a primary or metastatic soft tissue neoplasm  #History of ACDF C4-6  -Admit  -Pain control  -ENT consult - CT scan ordered, pending  -Patient follows with ortho spine as outpatient     #Hypercalcemia - resolved  #Chronic kidney disease  -IVF  -Monitor UOP, creatinine and electrolytes     #Atrial fibrillation on DOAC  -Hold DOAC in case procedure is needed  -Telemetry      #Essential hypertension  -Norvasc  -Hydralazine  -Lisinopril   -Monitor hemodynamics     #Dyslipidemia  -Lipitor     #Diabetes mellitus  -Tresiba  -Correctional  -Carb  -A1c - 6.9     #Bladder cancer      Sanjay Scott MD    Supplementary Documentation:     Quality:  DVT Mechanical Prophylaxis:   SCDs,    DVT Pharmacologic Prophylaxis   Medication   None                Code Status: DNAR/Selective Treatment  Correia: No urinary catheter in place  Correia Duration (in days):   Central line:    DOMINIQUE:     Discharge is dependent on: CT findings   At this point Ms. Hobson is expected to be discharge to: Home    The 21st Century Cures Act makes medical notes like these available to patients in the interest of transparency. Please be advised this is a medical document. Medical documents are intended to carry relevant information, facts as evident, and the clinical opinion of the practitioner. The medical note is intended as peer to peer communication and may appear blunt or direct. It is written in medical language and may contain abbreviations or verbiage that are unfamiliar.              **Certification      PHYSICIAN Certification of Need for Inpatient Hospitalization - Initial Certification    Patient will require inpatient services that will reasonably be expected to span two midnight's based on the clinical documentation in H+P.   Based on patients current state of illness, I anticipate that, after discharge, patient will require TBD.

## 2024-08-01 NOTE — ED QUICK NOTES
Orders for admission, patient is aware of plan and ready to go upstairs. Any questions, please call ED RN Maru at extension [phone].     Patient Covid vaccination status: Fully vaccinated     COVID Test Ordered in ED: None    COVID Suspicion at Admission: N/A    Running Infusions:      Mental Status/LOC at time of transport: A&O  x4    Other pertinent information:   CIWA score: N/A   NIH score:  N/A

## 2024-08-01 NOTE — PLAN OF CARE
A&O x 4. VSS. On RA. No c/o pain this am. Lido patch to left side neck/shoulder. Denies N/T to BUE's. Up min with walker. Voiding without issue. Plan for CT of neck with contrast. Will continue to monitor.

## 2024-08-01 NOTE — CDS QUERY
Dear Dr. Scott:  Please clarify chronic kidney disease:     (  x) Chronic Kidney Disease Stage 3 (moderate) with eGFR 30-59  (  ) Other (please specify):          GFR CR (mL/min/1.73m2) 38L 36L     ed impression: mass of left side of neck; azotemia; dm2 with hyperglycemia; chronic afib     h&p: Left neck pain possibly d/t invasive/irregular soft tissue mass in the left lower neck measuring up to 4.5 x 3.5 cm that is concerning for a primary or metastatic soft tissue neoplasm; hypercalcemia; ckd; afib on doac; htn; dyslipidemia ; dm; bladder cancer    Treatment:  monitor uop, creatinine and electrolytes      If you have any questions, please contact Clinical : Almita Mccarty RN, CDS  at # [phone]. Thank You!    THIS FORM IS A PERMANENT PART OF THE MEDICAL RECORD

## 2024-08-01 NOTE — H&P
[facility]     Anastasia Hobson Patient Status:  Emergency    1940 MRN [id number]   [facility] EMERGENCY DEPARTMENT Attending Luisana Atkinson MD   Hosp Day # 1 PCP Houston Prado MD     Chief Complaint: Neck pain     Subjective:    History of Present Illness:     Anastasia Hobson is a 83 year old female with a history of atrial fibrillation on DOAC, essential hypertension, dyslipidemia, diabetes mellitus, bladder cancer and cervical fracture sp C4-6 ACDF. Patient presents with left neck pain for the last three weeks - though review of outpatient records reveals complaints for several months. Patient states she has had progressively worsening left neck pain. No falls or injuries. No numbness, tingling or weakness. She has felt unsteady with coordination of legs. No chest pain or shortness of breath. No nausea, vomiting, diarrhea. Pain rated 7/10.    History/Other:    Past Medical 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    Bloating    Blurred vision    Congestive heart disease (HCC)    Constipation    Diabetes (HCC)    IDDM     Diabetes mellitus (HCC)    Diarrhea, unspecified    CLARKE (dyspnea on exertion)    Easy bruising    Elevated cholesterol    Essential hypertension    Fatigue    Flatulence/gas pain/belching    Frequent urination    taking furosemide daily    Headache disorder    Hearing loss    Heart palpitations    Heartburn    occasionally    High blood pressure    High cholesterol    History of falling    Hyperlipidemia    Irregular bowel habits    Itch of skin    right hand only    Leaking of urine    sometimes    Leg swelling    Osteoporosis    Sleep disturbance    Stool incontinence    when I have diarrhea    Type 2 diabetes mellitus with hyperglycemia (HCC)    Visual impairment    glasses    Wears glasses     Past Surgical History:    Past Surgical History:   Procedure Laterality Date    Cataract      Cervical spine surgery  10/06/2023    Colonoscopy      Colonoscopy      Cystoscopy,insert ureteral stent      Cystourethroscopy  11/25/2020    Cystoscopy Procedure Dr Josh Hernandez  - recurrent tumor on BTS    Cystourethroscopy,biopsy  12/16/2019    BBx of scar sites, NSC    Cystourethroscopy,fulgur .5-2cm lesn  04/16/2020    TURBT, NSC    Cystourethroscopy,fulgur .5-2cm lesn  07/30/2020    TURBT with Gemcitabine    Cystourethroscopy,fulgur >5cm lesn  10/03/2019    TURBT with bilateral RPGs (NSC)    Fracture surgery      left wrist    Hysterectomy      age 31    Other surgical history  04/20/2021    BTS CystoDr Hernandez    Other surgical history  08/17/2021    BTS Cysto Caterize Dr. Hernandez    Other surgical history  11/16/2021    BTS Cysto W/ caterize Dr. Hernandez     Other surgical history  03/08/2022    BTS Cysto- Dr. Hernandez    Spine surgery procedure unlisted  10/06/2003    Tonsillectomy  1946    Total abdom hysterectomy  1971      Family History:   Family History   Problem Relation Age of Onset    Diabetes Father     Stroke Father     Colon Polyps Mother     Diabetes Mother     Heart Attack Mother     Other (Other) Son         Afib     Social History:    reports that she quit smoking about 17 years ago. Her smoking use included cigarettes. She has a 73.5 pack-year smoking history. She has never used smokeless tobacco. She reports that she does not drink alcohol and does not use drugs.     Allergies: No Known Allergies    Medications:          Review of Systems:   A comprehensive review of systems was completed.    Pertinent positives and negatives noted in the HPI.    Objective:   Physical Exam:    BP (!) 169/73 (BP Location: Left arm)   Pulse 89   Temp 98.3 °F (36.8 °C) (Oral)   Resp 20   Ht 5' 6\" (1.676 m)   Wt 159 lb 6.4 oz (72.3 kg)   SpO2 100%   BMI 25.73 kg/m²   General: No acute distress, Alert  Neck: tenderness to palpation of upper trap  distribution, limited ROM of neck d/t pain  Respiratory: No rhonchi, no wheezes  Cardiovascular: S1, S2.   Abdomen: Soft, Non-tender, non-distended, positive bowel sounds  Neuro: No new focal deficits, moves all ext  Extremities: No edema    Results:    Labs:      Labs Last 24 Hours:    Recent Labs   Lab 07/31/24  1737   RBC 4.71   HGB 12.9   HCT 39.0   MCV 82.8   MCH 27.4   MCHC 33.1   RDW 13.8   NEPRELIM 4.15   WBC 7.6   .0       Recent Labs   Lab 07/31/24  1737   *   BUN 29*   CREATSERUM 1.44*   EGFRCR 36*   CA 10.6*   ALB 5.0*      K 3.6      CO2 27.0   ALKPHO 46*   AST 27   ALT 15   BILT 0.7   TP 7.6       No results found for: \"PT\", \"INR\"    No results for input(s): \"TROP\", \"TROPHS\", \"CK\" in the last 168 hours.    No results for input(s): \"TROP\", \"PBNP\" in the last 168 hours.    No results for input(s): \"PCT\" in the last 168 hours.    Imaging: Imaging data reviewed in Epic.    Assessment & Plan:      #Left neck pain possibly d/t invasive/irregular soft tissue mass in the left lower neck measuring up to 4.5 x 3.5 cm that is concerning for a primary or metastatic soft tissue neoplasm  #History of ACDF C4-6  -Admit  -Pain control  -ENT consult, likely to need IR for aspiration/biopsy, will hold DOAC    #Hypercalcemia   #Chronic kidney disease  -IVF  -Hold Lasix  -Monitor UOP, creatinine and electrolytes    #Atrial fibrillation on DOAC  -Hold DOAC  -Telemetry     #Essential hypertension  -Norvasc  -Hydralazine  -Lisinopril   -Monitor hemodynamics    #Dyslipidemia  -Lipitor    #Diabetes mellitus  -Tresiba  -Correctional  -Carb  -A1c    #Bladder cancer    Plan of care discussed with patient and ER.    Adonay Andersen MD    Supplementary Documentation:     The 21st Century Cures Act makes medical notes like these available to patients in the interest of transparency. Please be advised this is a medical document. Medical documents are intended to carry relevant information, facts as evident,  and the clinical opinion of the practitioner. The medical note is intended as peer to peer communication and may appear blunt or direct. It is written in medical language and may contain abbreviations or verbiage that are unfamiliar.

## 2024-08-01 NOTE — PLAN OF CARE
Alert and oriented x4.  VSS.  On room air.  , Telemonitoring in progress.  SCD's on BLE, Tolerating diet.  Voiding per bathroom with 1 person assist with walker.  Patient c/o left sided neck pain 10/10 Pain controlled with prn morphine, dilaudid, scheduled gabapentin and non-pharmalogical interventions.  Patient oriented to room, call light and bed controls.  Patient has decreased strength to bilateral upper extremities.  She has generalized tremors to bilateral upper and lower extremities, which she says are normal for her.  Ivf infusing without difficulty.  Call light within reach, patient informed to call for assist.  Plan is pain control, and assessment by ENT in the am.

## 2024-08-01 NOTE — ED QUICK NOTES
Rounding Completed, bedside report given to receiving RN    Plan of Care reviewed. Waiting for MRI.  Patient is comfortable at this time    Bed is locked and in lowest position. Call light within reach.

## 2024-08-01 NOTE — CONSULTS
[facility]  Report of Consultation    Anastasia Hobson Patient Status:  Inpatient    1940 MRN [id number]   [facility] 3SW-A Attending Sanjay Scott MD   Hosp Day # 1 PCP Houston Prado MD     Reason for Consultation:  Neck pain, mass    History of Present Illness:  Anastasia Hobson is a a(n) 83 year old female s/p C-spine surgery in 2023 who has had chronic issues with left neck and shoulder pain and was admitted from the ER last night for pain management and incidental finding of possible soft tissue mass in the left neck noted on MRI.    She explains she has had progressively worse left neck and shoulder pain for 3 months that starts at the occiput and radiates down the posterior left neck to her left shoulder and arm.  She has not noted any neck swelling or erythema.  She has intermittent voice hoarseness and dysphagia but no breathing problems.  She relates a history of smoking 1 to 2 packs/day for about 40 years but quit 20 years ago.  C-spine surgery was performed at [facility] 2023.  She has had multiple imaging studies of the neck pre and postop and does not recall any concern for mass lesions on previous imaging.      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    Bloating    Blurred vision    Congestive heart disease (HCC)    Constipation    Diabetes (HCC)    IDDM     Diabetes mellitus (HCC)    Diarrhea, unspecified    CLARKE (dyspnea on exertion)    Easy bruising    Elevated cholesterol    Essential hypertension    Fatigue    Flatulence/gas pain/belching    Frequent urination    taking furosemide daily    Headache disorder    Hearing loss    Heart palpitations    Heartburn    occasionally    High blood pressure    High cholesterol    History of falling    Hyperlipidemia    Irregular bowel habits    Itch of skin    right hand only    Leaking of  urine    sometimes    Leg swelling    Osteoporosis    Sleep disturbance    Stool incontinence    when I have diarrhea    Type 2 diabetes mellitus with hyperglycemia (HCC)    Visual impairment    glasses    Wears glasses     Past Surgical History:   Procedure Laterality Date    Cataract      Cervical spine surgery  10/06/2023    Colonoscopy      Colonoscopy      Cystoscopy,insert ureteral stent      Cystourethroscopy  11/25/2020    Cystoscopy Procedure Dr Josh Hernandez  - recurrent tumor on BTS    Cystourethroscopy,biopsy  12/16/2019    BBx of scar sites, NSC    Cystourethroscopy,fulgur .5-2cm lesn  04/16/2020    TURBT, NSC    Cystourethroscopy,fulgur .5-2cm lesn  07/30/2020    TURBT with Gemcitabine    Cystourethroscopy,fulgur >5cm lesn  10/03/2019    TURBT with bilateral RPGs (NSC)    Fracture surgery      left wrist    Hysterectomy      age 31    Other surgical history  04/20/2021    BTS CystoDr Hernandez    Other surgical history  08/17/2021    BTS Cysto Caterize Dr. Hernandez    Other surgical history  11/16/2021    BTS Cysto W/ carley Hernandez     Other surgical history  03/08/2022    BTS Cysto- Dr. Hernandez    Spine surgery procedure unlisted  10/06/2003    Tonsillectomy  1946    Total abdom hysterectomy  1971     Family History   Problem Relation Age of Onset    Diabetes Father     Stroke Father     Colon Polyps Mother     Diabetes Mother     Heart Attack Mother     Other (Other) Son         Marilee      reports that she quit smoking about 17 years ago. Her smoking use included cigarettes. She has a 73.5 pack-year smoking history. She has never used smokeless tobacco. She reports that she does not drink alcohol and does not use drugs.    Allergies:  No Known Allergies    Medications:    Current Facility-Administered Medications:     amLODIPine (Norvasc) tab 10 mg, 10 mg, Oral, Daily    atorvastatin (Lipitor) tab 80 mg, 80 mg, Oral, Nightly    fenofibrate micronized (Lofibra) cap 134 mg, 134 mg, Oral, Daily with breakfast     gabapentin (Neurontin) cap 200 mg, 200 mg, Oral, QPM    hydrALAZINE (Apresoline) tab 50 mg, 50 mg, Oral, TID    insulin degludec (Tresiba) 100 units/mL flextouch 5 Units, 5 Units, Subcutaneous, Nightly    lisinopril (Prinivil; Zestril) tab 20 mg, 20 mg, Oral, QPM    oxybutynin (Ditropan) tab 5 mg, 5 mg, Oral, BID    traMADol (Ultram) tab 50 mg, 50 mg, Oral, Q12H PRN    lidocaine-menthol 4-1 % patch 1 patch, 1 patch, Transdermal, Daily    glucose (Dex4) 15 GM/59ML oral liquid 15 g, 15 g, Oral, Q15 Min PRN **OR** glucose (Glutose) 40% oral gel 15 g, 15 g, Oral, Q15 Min PRN **OR** glucose-vitamin C (Dex-4) chewable tab 4 tablet, 4 tablet, Oral, Q15 Min PRN **OR** dextrose 50% injection 50 mL, 50 mL, Intravenous, Q15 Min PRN **OR** glucose (Dex4) 15 GM/59ML oral liquid 30 g, 30 g, Oral, Q15 Min PRN **OR** glucose (Glutose) 40% oral gel 30 g, 30 g, Oral, Q15 Min PRN **OR** glucose-vitamin C (Dex-4) chewable tab 8 tablet, 8 tablet, Oral, Q15 Min PRN    sodium chloride 0.9% infusion, , Intravenous, Continuous    acetaminophen (Tylenol Extra Strength) tab 1,000 mg, 1,000 mg, Oral, Q6H PRN    morphINE PF 2 MG/ML injection 1 mg, 1 mg, Intravenous, Q2H PRN **OR** morphINE PF 2 MG/ML injection 2 mg, 2 mg, Intravenous, Q2H PRN **OR** morphINE PF 4 MG/ML injection 4 mg, 4 mg, Intravenous, Q2H PRN    insulin aspart (NovoLOG) 100 Units/mL FlexPen 1-68 Units, 1-68 Units, Subcutaneous, TID CC    insulin aspart (NovoLOG) 100 Units/mL FlexPen 1-10 Units, 1-10 Units, Subcutaneous, TID AC and HS    Review of Systems:  Denies fevers, chills, nausea, emesis, neck swelling or erythema, breathing problems, active dysphagia    Physical Exam:  Blood pressure (!) 162/58, pulse 81, temperature 98.4 °F (36.9 °C), temperature source Oral, resp. rate 18, height 5' 6\" (1.676 m), weight 159 lb 6.4 oz (72.3 kg), SpO2 98%.    Gen- AOx3, NAD, voice normal    Neuro- CN II-XII intact b/l    Head / Face- NCAT, no tenderness with palpation of frontal or  maxillary sinuses, TMJs without tenderness to palpation.    Eyes- PERRL, EOMI, no nystagmus    Ears-  Pinna normal bilat    Nose- external nose without deformity, nasal septum midline, nasal mucosa moist without lesion, inferior turbinates normal    Oral Cavity- Lips, tongue, gums, hard palate, buccal surfaces, floor of mouth normal. Dentition: good (upper and lower implants)    Oropharynx- Tonsils surgically absent, soft palate and uvula normal    Neck- limited mobility in all directions.  Parotid and submandibular glands normal to palpation, no palp LAD bilateral, thyroid normal.  No palp masses or lesions on either side of neck.  Slight fullness to left supraclavicular area compared to right, but both sides soft to palp.  Well healed transvers incisional scar on anterior neck to right of midline near larynx c/w h/o C-spine surgery.       Laboratory Data:  Lab Results   Component Value Date    WBC 7.5 08/01/2024    HGB 12.7 08/01/2024    HCT 39.3 08/01/2024    .0 08/01/2024    CREATSERUM 1.39 08/01/2024    BUN 23 08/01/2024     08/01/2024    K 4.8 08/01/2024     08/01/2024    CO2 24.0 08/01/2024     08/01/2024    CA 10.1 08/01/2024    ALB 4.7 08/01/2024    ALKPHO 39 08/01/2024    BILT 0.6 08/01/2024    TP 7.0 08/01/2024    AST 25 08/01/2024    ALT 15 08/01/2024    PGLU 240 08/01/2024       Imaging:  MRI SPINE CERVICAL (W+WO) (CPT=72156)    Result Date: 7/31/2024  CONCLUSION:   1. There is a partially imaged invasive/irregular soft tissue mass in the left lower neck measuring up to 4.5 x 3.5 cm that is concerning for a primary or metastatic soft tissue neoplasm.  Clinical correlation and direct inspection is suggested.  2. Postoperative changes from anterior cervical spinal fusion at C4-C6.  Artifact from the hardware limits evaluation of spinal cord.  3. Multilevel degenerative changes as above.   Please see above for further details.  Critical results were discussed with Dr. Atkinson at  2218 hours on 7/31/2024. Critical results were read back.  LOCATION:  [facility]   Dictated by (CST): Domenic Iqbal MD on 7/31/2024 at 10:13 PM     Finalized by (CST): Domenic Iqbal MD on 7/31/2024 at 10:21 PM         Impression and Plan:  Patient Active Problem List   Diagnosis    Osteoarthritis of spine with radiculopathy, cervical region    Primary hypertension    Type 2 diabetes mellitus without complication, with long-term current use of insulin (HCC)    Mixed hyperlipidemia    Arthrodesis status    Tremor of unknown origin    Personal history of colonic polyps    Malignant neoplasm of lateral wall of urinary bladder (HCC)    Stage 3b chronic kidney disease (HCC)    Atrial fibrillation with rapid ventricular response (HCC)    Advance care planning    Acute pulmonary edema (HCC)    Other constipation    Functional diarrhea    Type 2 diabetes mellitus with diabetic chronic kidney disease (HCC)    At high risk for falls    Hypoglycemia    Chronic bronchitis, unspecified chronic bronchitis type (HCC)    Persistent atrial fibrillation (HCC)    Hyperlipidemia    Unspecified atrial fibrillation (HCC)    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)    Urge incontinence    Heart failure, unspecified (HCC)    Chronic kidney disease, stage 3b (HCC)    Type 2 diabetes mellitus with stage 2 chronic kidney disease, without long-term current use of insulin (HCC)    Long term (current) use of oral hypoglycemic drugs    Personal history of malignant neoplasm of bladder    Pure hypercholesterolemia, unspecified    Mass of left side of neck    Azotemia    Type 2 diabetes mellitus with hyperglycemia, unspecified whether long term insulin use (HCC)    Anticoagulated    Atrial fibrillation, chronic (HCC)       82 y/o F former smoker with h/o C-spine fusion 9 months ago and now a 3-month h/o progressive, left, posterolateral neck pain radiating to the left arm.  MRI demonstrated a left soft tissue abnormality.  No  obvious abnormalities on exam today.  Low concern for infectious process (afebrile, no leukocytosis).    -rec CT neck w contrast to better eval area in question, which is not well defined on MRI    -rec spine surgery eval for her C-spine hardware and poss relation to her posterior neck/shoulder pain      Manjinder Domingo MD, FACS  Otolaryngology - Head & Neck Surgery  [facility]  [phone]     8/1/2024  7:23 AM

## 2024-08-02 LAB
GLUCOSE BLD-MCNC: 132 MG/DL (ref 70–99)
GLUCOSE BLD-MCNC: 133 MG/DL (ref 70–99)
GLUCOSE BLD-MCNC: 182 MG/DL (ref 70–99)
GLUCOSE BLD-MCNC: 223 MG/DL (ref 70–99)

## 2024-08-02 PROCEDURE — 99232 SBSQ HOSP IP/OBS MODERATE 35: CPT | Performed by: HOSPITALIST

## 2024-08-02 RX ORDER — ACETAMINOPHEN 500 MG
500 TABLET ORAL EVERY 6 HOURS PRN
Status: DISCONTINUED | OUTPATIENT
Start: 2024-08-02 | End: 2024-08-08

## 2024-08-02 RX ORDER — HYDROCODONE BITARTRATE AND ACETAMINOPHEN 5; 325 MG/1; MG/1
1 TABLET ORAL EVERY 4 HOURS PRN
Status: DISCONTINUED | OUTPATIENT
Start: 2024-08-02 | End: 2024-08-08

## 2024-08-02 RX ORDER — TRAMADOL HYDROCHLORIDE 50 MG/1
50 TABLET ORAL EVERY 12 HOURS PRN
Status: DISCONTINUED | OUTPATIENT
Start: 2024-08-02 | End: 2024-08-08

## 2024-08-02 RX ORDER — HYDROCODONE BITARTRATE AND ACETAMINOPHEN 5; 325 MG/1; MG/1
1 TABLET ORAL EVERY 6 HOURS PRN
Status: DISCONTINUED | OUTPATIENT
Start: 2024-08-02 | End: 2024-08-02

## 2024-08-02 NOTE — PLAN OF CARE
Dr. Domingo paged for update on rounding. Per MD, Dr. Rogers will be covering pt now. Dr. Rogers paged to find out if rounding today, awaiting response.

## 2024-08-02 NOTE — PLAN OF CARE
Patient is alert and oriented x 4. Vitals stable on room air. Pain managed by prn meds. Denies numbness & tingling. Voiding without difficulty up minimal assist with walker. Plan for speech to see & f/u with spine outpatient. Plan of care discussed with patient.

## 2024-08-02 NOTE — PROGRESS NOTES
[facility]   part of [facility]     Hospitalist Progress Note     Anastasia Hobson Patient Status:  Inpatient    1940 MRN [id number]   Location [facility] 3SW-A Attending Sanjay Scott MD   Hosp Day # 2 PCP Houston Prado MD     Chief Complaint: Neck pain    Subjective:     Patient continues to have pain in her neck.  Denies tingling/numbness to left arm, no weakness.  No other acute complaints.      Objective:    Review of Systems:   A comprehensive review of systems was completed; pertinent positive and negatives stated in subjective.    Vital signs:  Temp:  [97.5 °F (36.4 °C)-98.4 °F (36.9 °C)] 98 °F (36.7 °C)  Pulse:  [73-92] 79  Resp:  [18-20] 18  BP: (134-156)/(50-73) 134/59  SpO2:  [92 %-99 %] 93 %    Physical Exam:    General: No acute distress, pleasant   Respiratory: No wheezes, no rhonchi  Cardiovascular: S1, S2, regular rate and rhythm  Abdomen: Soft, Non-tender, non-distended, positive bowel sounds  Neuro: No new focal deficits.   Extremities: No edema    Diagnostic Data:    Labs:  Recent Labs   Lab 24  1737 24  0541   WBC 7.6 7.5   HGB 12.9 12.7   MCV 82.8 83.8   .0 282.0       Recent Labs   Lab 24  1737 24  0541   * 223*   BUN 29* 23   CREATSERUM 1.44* 1.39*   CA 10.6* 10.1   ALB 5.0* 4.7    137   K 3.6 4.8    106   CO2 27.0 24.0   ALKPHO 46* 39*   AST 27 25   ALT 15 15   BILT 0.7 0.6   TP 7.6 7.0       Estimated Creatinine Clearance: 28.7 mL/min (A) (based on SCr of 1.39 mg/dL (H)).    No results for input(s): \"TROP\", \"TROPHS\", \"CK\" in the last 168 hours.    No results for input(s): \"PTP\", \"INR\" in the last 168 hours.               Microbiology    No results found for this visit on 24.      Imaging: Reviewed in Epic.    Medications:    amLODIPine  10 mg Oral Daily    atorvastatin  80 mg Oral Nightly    fenofibrate micronized  134 mg Oral Daily with breakfast    gabapentin  200 mg Oral QPM    hydrALAZINE  50 mg  Oral TID    insulin degludec  5 Units Subcutaneous Nightly    lisinopril  20 mg Oral QPM    oxybutynin  5 mg Oral BID    lidocaine-menthol  1 patch Transdermal Daily    enoxaparin  30 mg Subcutaneous Daily    insulin aspart  1-68 Units Subcutaneous TID CC    insulin aspart  1-10 Units Subcutaneous TID AC and HS       Assessment & Plan:      #Left neck pain possibly d/t invasive/irregular soft tissue mass in the left lower neck measuring up to 4.5 x 3.5 cm that is concerning for a primary or metastatic soft tissue neoplasm  -Pain control  -ENT consult   -CT reviewed, concern for malignancy  -Depending on location of mass, IR vs ENT for biopsy ,will await their recommendations       #Chronic Neck Pain  #History of ACDF C4-6  Patient follows with spine surgery as outpatient, she was seeking a 2nd opinion and has a schedule next week      #Hypercalcemia - resolved  #CKD 3B  Cr at baseline, monitor      #Atrial fibrillation on DOAC  -Hold DOAC in case biopsy is needed  -Telemetry      #Essential hypertension  -Norvasc  -Hydralazine  -Lisinopril   -Monitor hemodynamics     #Dyslipidemia  -Lipitor     #Diabetes mellitus  -Tresiba  -Correctional  -Carb  -A1c - 6.9     #Bladder cancer      Sanjay Scott MD    Supplementary Documentation:     Quality:  DVT Mechanical Prophylaxis:   SCDs,    DVT Pharmacologic Prophylaxis   Medication    enoxaparin (Lovenox) 30 MG/0.3ML SUBQ injection 30 mg                Code Status: DNAR/Selective Treatment  Correia: No urinary catheter in place  Correia Duration (in days):   Central line:    DOMINIQUE:     Discharge is dependent on: CT findings   At this point Ms. Hobson is expected to be discharge to: Home    The 21st Century Cures Act makes medical notes like these available to patients in the interest of transparency. Please be advised this is a medical document. Medical documents are intended to carry relevant information, facts as evident, and the clinical opinion of the practitioner. The medical  note is intended as peer to peer communication and may appear blunt or direct. It is written in medical language and may contain abbreviations or verbiage that are unfamiliar.             **Certification      PHYSICIAN Certification of Need for Inpatient Hospitalization - Initial Certification    Patient will require inpatient services that will reasonably be expected to span two midnight's based on the clinical documentation in H+P.   Based on patients current state of illness, I anticipate that, after discharge, patient will require TBD.

## 2024-08-02 NOTE — PLAN OF CARE
A&O x 4. VSS. On RA. No c/o pain this am. Lido patch to left side neck/shoulder, Norco PRN. Denies N/T to BUE's. Up min with walker. Voiding without issue. Plan ENT to f/u re: CT results. Will continue to monitor.

## 2024-08-02 NOTE — PROGRESS NOTES
[facility]  Otolaryngology-Head and Neck Surgery     Consultation Follow-up    Anastasia Hobson Patient Status:  Inpatient    1940 MRN [id number]   Location [facility] 3SW-A Attending Sanjay Scott MD   Hosp Day # 2 PCP Houston Prado MD     History of Present Illness:  Anastasia Hobson is a a(n) 83 year old female s/p C-spine surgery in 2023 who has had chronic issues with left neck and shoulder pain and was admitted from the ER last night for pain management and incidental finding of possible soft tissue mass in the left neck noted on MRI. She explains she has had progressively worse left neck and shoulder pain for 3 months that starts at the occiput and radiates down the posterior left neck to her left shoulder and arm.  She has not noted any neck swelling or erythema.  She has intermittent voice hoarseness and dysphagia but no breathing problems.  She relates a history of smoking 1 to 2 packs/day for about 40 years but quit 20 years ago. C-spine surgery was performed at [facility] 2023.  She has had multiple imaging studies of the neck pre and postop and does not recall any concern for mass lesions on previous imaging.    Interval History: Patient reported that her neck pain is about the same and primarily located in the left postero-lateral neck. She does confirm she has been having dysphagia to solids for a year, and she has not undergone work-up for it. She has no throat pain, odynophagia, hemoptysis, or respiratory distress. She feels that she has been able to feel the \"lump\" in her left supraclavicular fossa for a few months. She tells me that she is undergoing active treatment for recurrent bladder cancer.     Past Medical 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    Bloating    Blurred vision    Congestive heart disease (HCC)    Constipation     Diabetes (HCC)    IDDM     Diabetes mellitus (HCC)    Diarrhea, unspecified    CLARKE (dyspnea on exertion)    Easy bruising    Elevated cholesterol    Essential hypertension    Fatigue    Flatulence/gas pain/belching    Frequent urination    taking furosemide daily    Headache disorder    Hearing loss    Heart palpitations    Heartburn    occasionally    High blood pressure    High cholesterol    History of falling    Hyperlipidemia    Irregular bowel habits    Itch of skin    right hand only    Leaking of urine    sometimes    Leg swelling    Osteoporosis    Sleep disturbance    Stool incontinence    when I have diarrhea    Type 2 diabetes mellitus with hyperglycemia (HCC)    Visual impairment    glasses    Wears glasses     Past Surgical History:   Procedure Laterality Date    Cataract      Cervical spine surgery  10/06/2023    Colonoscopy      Colonoscopy      Cystoscopy,insert ureteral stent      Cystourethroscopy  11/25/2020    Cystoscopy Procedure Dr Josh Hernandez  - recurrent tumor on BTS    Cystourethroscopy,biopsy  12/16/2019    BBx of scar sites, NSC    Cystourethroscopy,fulgur .5-2cm lesn  04/16/2020    TURBT, NSC    Cystourethroscopy,fulgur .5-2cm lesn  07/30/2020    TURBT with Gemcitabine    Cystourethroscopy,fulgur >5cm lesn  10/03/2019    TURBT with bilateral RPGs (NSC)    Fracture surgery      left wrist    Hysterectomy      age 31    Other surgical history  04/20/2021    BTS CystoDr Hernandez    Other surgical history  08/17/2021    BTS Cysto Caterize Dr. Hernandez    Other surgical history  11/16/2021    BTS Cysto W/ caterigayathri Hernandez     Other surgical history  03/08/2022    BTS Cysto- Dr. Hernandez    Spine surgery procedure unlisted  10/06/2003    Tonsillectomy  1946    Total abdom hysterectomy  1971     No Known Allergies  Family History   Problem Relation Age of Onset    Diabetes Father     Stroke Father     Colon Polyps Mother     Diabetes Mother     Heart Attack Mother     Other (Other) Son         Afib     Patient   reports that she quit smoking about 17 years ago. Her smoking use included cigarettes. She has a 73.5 pack-year smoking history. She has never used smokeless tobacco. She reports that she does not drink alcohol and does not use drugs.    Medications:    Current Facility-Administered Medications:     traMADol (Ultram) tab 50 mg, 50 mg, Oral, Q12H PRN    acetaminophen (Tylenol Extra Strength) tab 500 mg, 500 mg, Oral, Q6H PRN    HYDROcodone-acetaminophen (Norco) 5-325 MG per tab 1 tablet, 1 tablet, Oral, Q4H PRN    amLODIPine (Norvasc) tab 10 mg, 10 mg, Oral, Daily    atorvastatin (Lipitor) tab 80 mg, 80 mg, Oral, Nightly    fenofibrate micronized (Lofibra) cap 134 mg, 134 mg, Oral, Daily with breakfast    gabapentin (Neurontin) cap 200 mg, 200 mg, Oral, QPM    hydrALAZINE (Apresoline) tab 50 mg, 50 mg, Oral, TID    insulin degludec (Tresiba) 100 units/mL flextouch 5 Units, 5 Units, Subcutaneous, Nightly    lisinopril (Prinivil; Zestril) tab 20 mg, 20 mg, Oral, QPM    oxybutynin (Ditropan) tab 5 mg, 5 mg, Oral, BID    lidocaine-menthol 4-1 % patch 1 patch, 1 patch, Transdermal, Daily    enoxaparin (Lovenox) 30 MG/0.3ML SUBQ injection 30 mg, 30 mg, Subcutaneous, Daily    glucose (Dex4) 15 GM/59ML oral liquid 15 g, 15 g, Oral, Q15 Min PRN **OR** glucose (Glutose) 40% oral gel 15 g, 15 g, Oral, Q15 Min PRN **OR** glucose-vitamin C (Dex-4) chewable tab 4 tablet, 4 tablet, Oral, Q15 Min PRN **OR** dextrose 50% injection 50 mL, 50 mL, Intravenous, Q15 Min PRN **OR** glucose (Dex4) 15 GM/59ML oral liquid 30 g, 30 g, Oral, Q15 Min PRN **OR** glucose (Glutose) 40% oral gel 30 g, 30 g, Oral, Q15 Min PRN **OR** glucose-vitamin C (Dex-4) chewable tab 8 tablet, 8 tablet, Oral, Q15 Min PRN    morphINE PF 2 MG/ML injection 1 mg, 1 mg, Intravenous, Q2H PRN **OR** morphINE PF 2 MG/ML injection 2 mg, 2 mg, Intravenous, Q2H PRN **OR** morphINE PF 4 MG/ML injection 4 mg, 4 mg, Intravenous, Q2H PRN    insulin aspart (NovoLOG) 100  Units/mL FlexPen 1-68 Units, 1-68 Units, Subcutaneous, TID CC    insulin aspart (NovoLOG) 100 Units/mL FlexPen 1-10 Units, 1-10 Units, Subcutaneous, TID AC and HS    Review of Systems:  Pertinent items are noted in HPI.    Physical Exam:  Blood pressure 146/77, pulse 71, temperature 98.3 °F (36.8 °C), temperature source Oral, resp. rate 20, height 5' 6\" (1.676 m), weight 159 lb 6.4 oz (72.3 kg), SpO2 94%.  Gen- AOx3, NAD, voice normal  Neuro- CN II-XII intact b/l  Head / Face- NCAT, no tenderness with palpation of frontal or maxillary sinuses, TMJs without tenderness to palpation.  Eyes- PERRL, EOMI, no nystagmus  Ears- Pinna normal bilat  Nose- external nose without deformity, nasal septum midline, nasal mucosa moist without lesion, inferior turbinates normal  Oral Cavity- Lips, tongue, gums, hard palate, buccal surfaces, floor of mouth normal. Dentition: good (upper and lower implants)  Oropharynx- Tonsils surgically absent, soft palate and uvula normal  Neck- limited mobility in all directions.  Parotid and submandibular glands normal to palpation, no palp LAD bilateral, thyroid normal.  No palp masses or lesions on either side of neck.  Slight fullness to left supraclavicular area compared to right, but both sides soft to palp.  Well healed transvers incisional scar on anterior neck to right of midline near larynx c/w h/o C-spine surgery.     Laboratory Data:  Recent Labs   Lab 07/31/24 1737 08/01/24  0541   RBC 4.71 4.69   HGB 12.9 12.7   HCT 39.0 39.3   MCV 82.8 83.8   MCH 27.4 27.1   MCHC 33.1 32.3   RDW 13.8 13.8   NEPRELIM 4.15 5.99   WBC 7.6 7.5   .0 282.0     Recent Labs   Lab 07/31/24 1737 08/01/24  0541   * 223*   BUN 29* 23   CREATSERUM 1.44* 1.39*   EGFRCR 36* 38*   CA 10.6* 10.1    137   K 3.6 4.8    106   CO2 27.0 24.0     Imaging:  CT neck with contrast 8/1/24:  Visualized intracranial contents are within normal limits.  Paranasal sinuses are clear.      The oropharynx  is partially obscured by dental amalgam/hardware.      Nasopharynx and visualized portions of the oropharynx and oral cavity are within normal limits.  Parotid and submandibular glands are normal.  Hypopharynx, larynx, and subglottic airway are normal.      There is a soft tissue mass centered within the left supraclavicular station measuring 4.2 x 2.9 x 4.2 cm (series 3, image 68).  This is bordered posteriorly by the left subclavian artery, medially by the left common carotid artery and left internal   jugular vein and anteriorly by the left clavicle (partially).  No regional vessel occlusion.  No locally invasive features.      No additional lymphadenopathy of the neck.      Parapharyngeal fat planes are preserved.  Moderate calcified plaque of bilateral carotid bulbs without high-grade vessel stenosis.      Few subcentimeter hypoattenuating nodules of the thyroid gland.  No abnormality of the visualized superior mediastinum.  The visualized lungs are clear.      ACDF spans C4 through C6.  No aggressive osseous lesions.     Conclusion:  Soft tissue density mass centered within the left supraclavicular station, 4.2 x 2.9 x 4.2 cm.  This is highly suspicious for a malignant process, either primary or metastatic.  Recommend direct tissue sampling for further characterization.     Procedures:  None    Impression and Plan:  Patient Active Problem List   Diagnosis    Osteoarthritis of spine with radiculopathy, cervical region    Primary hypertension    Type 2 diabetes mellitus without complication, with long-term current use of insulin (HCC)    Mixed hyperlipidemia    Arthrodesis status    Tremor of unknown origin    Personal history of colonic polyps    Malignant neoplasm of lateral wall of urinary bladder (HCC)    Stage 3b chronic kidney disease (HCC)    Atrial fibrillation with rapid ventricular response (HCC)    Advance care planning    Acute pulmonary edema (HCC)    Other constipation    Functional diarrhea     Type 2 diabetes mellitus with diabetic chronic kidney disease (HCC)    At high risk for falls    Hypoglycemia    Chronic bronchitis, unspecified chronic bronchitis type (HCC)    Persistent atrial fibrillation (HCC)    Hyperlipidemia    Unspecified atrial fibrillation (HCC)    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)    Urge incontinence    Heart failure, unspecified (HCC)    Chronic kidney disease, stage 3b (HCC)    Type 2 diabetes mellitus with stage 2 chronic kidney disease, without long-term current use of insulin (HCC)    Long term (current) use of oral hypoglycemic drugs    Personal history of malignant neoplasm of bladder    Pure hypercholesterolemia, unspecified    Mass of left side of neck    Azotemia    Type 2 diabetes mellitus with hyperglycemia, unspecified whether long term insulin use (HCC)    Anticoagulated    Atrial fibrillation, chronic (HCC)       84 y/o F former smoker with h/o C-spine fusion 9 months ago and now a 3-month h/o progressive, left, posterolateral neck pain radiating to the left arm. MRI demonstrated a left lower neck soft tissue abnormality, which was later on confirmed on CT neck to be a left supraclavicular mass.     Our recommendations are:  - Recommend consultation to Interventional Radiology for consideration of core biopsy of the left supraclavicular mass to obtain pathologic diagnosis. Metastatic disease remains high on the differential   - Supraclavicular fossa is an unusual location for head and neck malignancy to metastasize to, especially in the absence of other lymphadenopathy in the neck (verified both on exam and CT neck). She does have a history of bladder cancer. In any event, recommend IR-guided biopsy to obtain tissue samples  - Spine surgery eval for her C-spine hardware and poss relation to her posterior neck/shoulder pain  - Given chronic dysphagia, can consider in-house SLP evaluation    - Outpatient [facility] follow-up: Yes at the time of  discharge . If yes, our team will arrange/request follow-up.     Total time spent with patient:  30 Minutes  Time spent on counseling/coordination of care:  45 Minutes    Anderson Rogers MD  Otolaryngology--Head and Neck Surgery  [facility]  8/2/2024  6:25 PM

## 2024-08-02 NOTE — SLP NOTE
ADULT SWALLOWING EVALUATION    ASSESSMENT    ASSESSMENT/OVERALL IMPRESSION:  Order received for BSE due to patient c/o dysphagia.  Patient currently admitted with mass on left side of neck. ENT on consult. Patient has had significant pain x3 weeks.  CT soft tissue neck completed yesterday and reported soft tissue mass centered within left supraclavicular station with no locally invasive features and nasopharynx/oropharynx/hypopharynx/subglottic airway are within normal limits.   Medical history includes afib, HTN, DM, bladder cancer, and cervical fracture s/p ACDF C4-C6.   No previous SLP consult noted within EMR.     Patient received awake, alert, and pleasant. Vocal quality clear and speech intelligibility 100%. Oral motor exam WFL.  Patient reported difficulty with piece of chicken she ate last night. Reported occasional globus sensation more recently.  Patient with no acute respiratory concerns or issues.  Patient able to self administer PO trials without difficulty. Patient denied reduced PO intake or associated pain with swallowing.     Patient exhibited functional oropharyngeal swallow with no immediate or overt clinical s/s aspiration observed or suspected across multiple trials of various textures.  Time spent with patient on education and counseling on recommended swallow strategies and patient returned demonstrated without difficulty.  Recommend small bites at a time, alternate solids and liquids, and moisten dry foods with sauce/broth/gravy.  Video swallow study to be completed if CXR declines, increase in clinical signs of aspiration, and/or MD desires. This can be done as outpatient as well, if needed.  Patient expressed understanding and agreement.        RECOMMENDATIONS   Diet Recommendations - Solids: Regular  Diet Recommendations - Liquids: Thin Liquids    Compensatory Strategies Recommended: Alternate consistencies;Small bites and sips;Extra sauce/gravy  Aspiration Precautions: Upright  position;Slow rate;Small bites and sips  Medication Administration Recommendations: One pill at a time  Treatment Plan/Recommendations: Aspiration precautions    HISTORY   MEDICAL HISTORY  Reason for Referral: R/O aspiration    Problem List  Principal Problem:    Mass of left side of neck  Active Problems:    Azotemia    Type 2 diabetes mellitus with hyperglycemia, unspecified whether long term insulin use (HCC)    Anticoagulated    Atrial fibrillation, chronic (HCC)      Past Medical History  Past Medical History:    Arrhythmia    Back pain    Bladder cancer (HCC)    high grade superficial TCC, recurrent July 2020 after BCG    Bloating    Blurred vision    Congestive heart disease (HCC)    Constipation    Diabetes (HCC)    IDDM     Diabetes mellitus (HCC)    Diarrhea, unspecified    CLARKE (dyspnea on exertion)    Easy bruising    Elevated cholesterol    Essential hypertension    Fatigue    Flatulence/gas pain/belching    Frequent urination    taking furosemide daily    Headache disorder    Hearing loss    Heart palpitations    Heartburn    occasionally    High blood pressure    High cholesterol    History of falling    Hyperlipidemia    Irregular bowel habits    Itch of skin    right hand only    Leaking of urine    sometimes    Leg swelling    Osteoporosis    Sleep disturbance    Stool incontinence    when I have diarrhea    Type 2 diabetes mellitus with hyperglycemia (HCC)    Visual impairment    glasses    Wears glasses       Prior Living Situation: Home with support  Diet Prior to Admission: Regular;Thin liquids       Patient/Family Goals: plan for left side neck mass    SWALLOWING HISTORY  Current Diet Consistency: Regular;Thin liquids  Dysphagia History: as per above  Imaging Results: as per above      OBJECTIVE   ORAL MOTOR EXAMINATION  Dentition: Natural  Symmetry: Within Functional Limits  Strength: Within Functional Limits  Tone: Within Functional Limits  Range of Motion: Within Functional Limits  Rate of  Motion: Within Functional Limits    Voice Quality: Clear  Respiratory Status: Unlabored  Consistencies Trialed: Thin liquids;Hard solid  Method of Presentation: Self presentation  Patient Positioning: Upright;Midline    Oral Phase of Swallow: Within Functional Limits                      Pharyngeal Phase of Swallow: Within Functional Limits           (Please note: Silent aspiration cannot be evaluated clinically. Videofluoroscopic Swallow Study is required to rule-out silent aspiration.)    Esophageal Phase of Swallow: No complaints consistent with possible esophageal involvement                GOALS  Goal #1 The patient will tolerate regular consistency and thin liquids without overt signs or symptoms of aspiration with 90 % accuracy over 1-2 session(s).  In Progress   Goal #2 The patient/family/caregiver will demonstrate understanding and implementation of aspiration precautions and swallow strategies independently over 1-2 session(s).    In Progress     FOLLOW UP  Treatment Plan/Recommendations: Aspiration precautions     Follow Up Needed (Documentation Required): Yes  SLP Follow-up Date: 08/05/24    Thank you for your referral.   If you have any questions, please contact WILLIAM Roberto MS CCC-SLP/L, pager [phone]  Speech-LanguagePathologist

## 2024-08-03 LAB
ANION GAP SERPL CALC-SCNC: 4 MMOL/L (ref 0–18)
BUN BLD-MCNC: 27 MG/DL (ref 9–23)
CALCIUM BLD-MCNC: 9.8 MG/DL (ref 8.7–10.4)
CHLORIDE SERPL-SCNC: 107 MMOL/L (ref 98–112)
CO2 SERPL-SCNC: 29 MMOL/L (ref 21–32)
CREAT BLD-MCNC: 1.31 MG/DL
EGFRCR SERPLBLD CKD-EPI 2021: 40 ML/MIN/1.73M2 (ref 60–?)
ERYTHROCYTE [DISTWIDTH] IN BLOOD BY AUTOMATED COUNT: 13.8 %
GLUCOSE BLD-MCNC: 123 MG/DL (ref 70–99)
GLUCOSE BLD-MCNC: 143 MG/DL (ref 70–99)
GLUCOSE BLD-MCNC: 186 MG/DL (ref 70–99)
GLUCOSE BLD-MCNC: 213 MG/DL (ref 70–99)
GLUCOSE BLD-MCNC: 272 MG/DL (ref 70–99)
HCT VFR BLD AUTO: 36.2 %
HGB BLD-MCNC: 11.9 G/DL
MAGNESIUM SERPL-MCNC: 1.9 MG/DL (ref 1.6–2.6)
MCH RBC QN AUTO: 27.2 PG (ref 26–34)
MCHC RBC AUTO-ENTMCNC: 32.9 G/DL (ref 31–37)
MCV RBC AUTO: 82.8 FL
OSMOLALITY SERPL CALC.SUM OF ELEC: 298 MOSM/KG (ref 275–295)
PLATELET # BLD AUTO: 278 10(3)UL (ref 150–450)
POTASSIUM SERPL-SCNC: 4 MMOL/L (ref 3.5–5.1)
RBC # BLD AUTO: 4.37 X10(6)UL
SODIUM SERPL-SCNC: 140 MMOL/L (ref 136–145)
WBC # BLD AUTO: 6.5 X10(3) UL (ref 4–11)

## 2024-08-03 PROCEDURE — 99232 SBSQ HOSP IP/OBS MODERATE 35: CPT | Performed by: HOSPITALIST

## 2024-08-03 NOTE — PROGRESS NOTES
[facility]   part of [facility]     Hospitalist Progress Note     Anastasia Hobson Patient Status:  Inpatient    1940 MRN [id number]   Location [facility] 3SW-A Attending Sanjay Scott MD   Hosp Day # 3 PCP Houston Prado MD     Chief Complaint: Neck pain    Subjective:     Patient neck pain was better overnight.  She slept well.  Began having more pain again this morning.  Denies fever, chills, n/v.  No other acute complaints.      Objective:    Review of Systems:   A comprehensive review of systems was completed; pertinent positive and negatives stated in subjective.    Vital signs:  Temp:  [97.8 °F (36.6 °C)-98.3 °F (36.8 °C)] 98.1 °F (36.7 °C)  Pulse:  [61-82] 70  Resp:  [18-20] 18  BP: ()/(53-88) 92/53  SpO2:  [88 %-98 %] 92 %    Physical Exam:    General: No acute distress, pleasant   Respiratory: No wheezes, no rhonchi  Cardiovascular: S1, S2, regular rate and rhythm  Abdomen: Soft, Non-tender, non-distended, positive bowel sounds  Neuro: No new focal deficits.   Extremities: No edema    Diagnostic Data:    Labs:  Recent Labs   Lab 24  0541 24  0446   WBC 7.6 7.5 6.5   HGB 12.9 12.7 11.9*   MCV 82.8 83.8 82.8   .0 282.0 278.0       Recent Labs   Lab 24  0541 24  0446   * 223* 143*   BUN 29* 23 27*   CREATSERUM 1.44* 1.39* 1.31*   CA 10.6* 10.1 9.8   ALB 5.0* 4.7  --     137 140   K 3.6 4.8 4.0    106 107   CO2 27.0 24.0 29.0   ALKPHO 46* 39*  --    AST 27 25  --    ALT 15 15  --    BILT 0.7 0.6  --    TP 7.6 7.0  --        Estimated Creatinine Clearance: 30.5 mL/min (A) (based on SCr of 1.31 mg/dL (H)).    No results for input(s): \"TROP\", \"TROPHS\", \"CK\" in the last 168 hours.    No results for input(s): \"PTP\", \"INR\" in the last 168 hours.               Microbiology    No results found for this visit on 24.      Imaging: Reviewed in Epic.    Medications:    amLODIPine  10 mg Oral Daily     atorvastatin  80 mg Oral Nightly    fenofibrate micronized  134 mg Oral Daily with breakfast    gabapentin  200 mg Oral QPM    hydrALAZINE  50 mg Oral TID    insulin degludec  5 Units Subcutaneous Nightly    lisinopril  20 mg Oral QPM    oxybutynin  5 mg Oral BID    lidocaine-menthol  1 patch Transdermal Daily    enoxaparin  30 mg Subcutaneous Daily    insulin aspart  1-68 Units Subcutaneous TID CC    insulin aspart  1-10 Units Subcutaneous TID AC and HS       Assessment & Plan:      #Left neck pain possibly d/t invasive/irregular soft tissue mass in the left lower neck measuring up to 4.5 x 3.5 cm that is concerning for a primary or metastatic soft tissue neoplasm  -Pain control  -ENT consult   -CT reviewed, concern for malignancy  -IR consult for Biopsy     #Chronic Neck Pain  #History of ACDF C4-6  Patient follows with spine surgery as outpatient, she was seeking a 2nd opinion and has a schedule next week   Spine consult      #Hypercalcemia - resolved  #CKD 3B  Cr at baseline, monitor      #Atrial fibrillation on DOAC  -Hold DOAC in case biopsy is needed  -Telemetry      #Essential hypertension  -Norvasc  -Hydralazine  -Lisinopril   -Monitor hemodynamics     #Dyslipidemia  -Lipitor     #Diabetes mellitus  -Tresiba  -Correctional  -Carb  -A1c - 6.9     #Bladder cancer    Discussed plan of care with patient, daughter, reviewed CT images.        Sanjay Scott MD    Supplementary Documentation:     Quality:  DVT Mechanical Prophylaxis:   SCDs,    DVT Pharmacologic Prophylaxis   Medication    enoxaparin (Lovenox) 30 MG/0.3ML SUBQ injection 30 mg                Code Status: DNAR/Selective Treatment  Correia: No urinary catheter in place  Correia Duration (in days):   Central line:    DOMINIQUE:     Discharge is dependent on: CT findings   At this point Ms. Hobson is expected to be discharge to: Home    The 21st Century Cures Act makes medical notes like these available to patients in the interest of transparency. Please be advised  this is a medical document. Medical documents are intended to carry relevant information, facts as evident, and the clinical opinion of the practitioner. The medical note is intended as peer to peer communication and may appear blunt or direct. It is written in medical language and may contain abbreviations or verbiage that are unfamiliar.             **Certification      PHYSICIAN Certification of Need for Inpatient Hospitalization - Initial Certification    Patient will require inpatient services that will reasonably be expected to span two midnight's based on the clinical documentation in H+P.   Based on patients current state of illness, I anticipate that, after discharge, patient will require TBD.

## 2024-08-03 NOTE — PLAN OF CARE
A&Ox4. VSS. On room air. . IS encouraged. Telemetry monitoring - NSR. Ankle pumps encouraged. Tolerating regular diet. Last BM 7/31. Voiding freely. Pain controlled. Ambulating with minimal assist. Plan is to dc home when medically clear. Patient updated on plan of care. Safety precautions in place. Call light within reach.

## 2024-08-03 NOTE — PLAN OF CARE
Patient A&Ox4 . Vital signs stable . On room air , o2 sats in the 90's . Up with min with assist with walker . Neck pain is controlled with oral meds .denies any numbness or tingling . Voiding in bathroom . Safety precautions in place . Reminded to \"call don't fall' . Will continue to monitor .

## 2024-08-04 LAB
GLUCOSE BLD-MCNC: 141 MG/DL (ref 70–99)
GLUCOSE BLD-MCNC: 153 MG/DL (ref 70–99)
GLUCOSE BLD-MCNC: 194 MG/DL (ref 70–99)
GLUCOSE BLD-MCNC: 222 MG/DL (ref 70–99)

## 2024-08-04 PROCEDURE — 99232 SBSQ HOSP IP/OBS MODERATE 35: CPT | Performed by: HOSPITALIST

## 2024-08-04 RX ORDER — POLYETHYLENE GLYCOL 3350 17 G/17G
17 POWDER, FOR SOLUTION ORAL DAILY PRN
Status: DISCONTINUED | OUTPATIENT
Start: 2024-08-04 | End: 2024-08-08

## 2024-08-04 RX ORDER — CLOTRIMAZOLE 10 MG/1
1 LOZENGE ORAL
Status: DISCONTINUED | OUTPATIENT
Start: 2024-08-04 | End: 2024-08-04

## 2024-08-04 RX ORDER — BISACODYL 10 MG
10 SUPPOSITORY, RECTAL RECTAL
Status: DISCONTINUED | OUTPATIENT
Start: 2024-08-04 | End: 2024-08-08

## 2024-08-04 RX ORDER — DOCUSATE SODIUM 100 MG/1
100 CAPSULE, LIQUID FILLED ORAL 2 TIMES DAILY
Status: DISCONTINUED | OUTPATIENT
Start: 2024-08-04 | End: 2024-08-08

## 2024-08-04 NOTE — PROGRESS NOTES
[facility]   part of [facility]     Hospitalist Progress Note     Anastasia Hobson Patient Status:  Inpatient    1940 MRN [id number]   Location [facility] 3SW-A Attending Sanjay Scott MD   Hosp Day # 4 PCP Houston Prado MD     Chief Complaint: Neck pain    Subjective:     Continued neck pain today, slightly better overall.  Having a cough when she swallows.  No other acute complaints .     Objective:    Review of Systems:   A comprehensive review of systems was completed; pertinent positive and negatives stated in subjective.    Vital signs:  Temp:  [97.4 °F (36.3 °C)-98 °F (36.7 °C)] 97.4 °F (36.3 °C)  Pulse:  [52-74] 69  Resp:  [14-18] 14  BP: (137-152)/(52-92) 149/66  SpO2:  [94 %-100 %] 97 %    Physical Exam:    General: No acute distress, pleasant   Respiratory: No wheezes, no rhonchi  Cardiovascular: S1, S2, regular rate and rhythm  Abdomen: Soft, Non-tender, non-distended, positive bowel sounds  Neuro: No new focal deficits.   Extremities: No edema    Diagnostic Data:    Labs:  Recent Labs   Lab 24  1737 24  0541 24  0446   WBC 7.6 7.5 6.5   HGB 12.9 12.7 11.9*   MCV 82.8 83.8 82.8   .0 282.0 278.0       Recent Labs   Lab 24  1737 24  0541 24  0446   * 223* 143*   BUN 29* 23 27*   CREATSERUM 1.44* 1.39* 1.31*   CA 10.6* 10.1 9.8   ALB 5.0* 4.7  --     137 140   K 3.6 4.8 4.0    106 107   CO2 27.0 24.0 29.0   ALKPHO 46* 39*  --    AST 27 25  --    ALT 15 15  --    BILT 0.7 0.6  --    TP 7.6 7.0  --        Estimated Creatinine Clearance: 30.5 mL/min (A) (based on SCr of 1.31 mg/dL (H)).    No results for input(s): \"TROP\", \"TROPHS\", \"CK\" in the last 168 hours.    No results for input(s): \"PTP\", \"INR\" in the last 168 hours.               Microbiology    No results found for this visit on 24.      Imaging: Reviewed in Epic.    Medications:    amLODIPine  10 mg Oral Daily    atorvastatin  80 mg Oral Nightly     fenofibrate micronized  134 mg Oral Daily with breakfast    gabapentin  200 mg Oral QPM    hydrALAZINE  50 mg Oral TID    insulin degludec  5 Units Subcutaneous Nightly    lisinopril  20 mg Oral QPM    oxybutynin  5 mg Oral BID    lidocaine-menthol  1 patch Transdermal Daily    enoxaparin  30 mg Subcutaneous Daily    insulin aspart  1-68 Units Subcutaneous TID CC    insulin aspart  1-10 Units Subcutaneous TID AC and HS       Assessment & Plan:      #Left neck pain possibly d/t invasive/irregular soft tissue mass in the left lower neck measuring up to 4.5 x 3.5 cm that is concerning for a primary or metastatic soft tissue neoplasm  -Pain control  -ENT consult   -CT reviewed, concern for malignancy  -IR consult for Biopsy     #Chronic Neck Pain  #History of ACDF C4-6  Patient follows with spine surgery as outpatient, she was seeking a 2nd opinion and has a schedule next week   ENT would like spine consult for now    #Dysphagia  Speech eval     #Hypercalcemia - resolved  #CKD 3B  Cr at baseline, monitor      #Atrial fibrillation on DOAC  -Hold DOAC in case biopsy is needed  -Telemetry      #Essential hypertension  -Norvasc  -Hydralazine  -Lisinopril   -Monitor hemodynamics     #Dyslipidemia  -Lipitor     #Diabetes mellitus  -Tresiba  -Correctional  -Carb  -A1c - 6.9     #Bladder cancer    Discussed plan of care with patient, daughter, nurse         Sanjay Scott MD    Supplementary Documentation:     Quality:  DVT Mechanical Prophylaxis:   SCDs,    DVT Pharmacologic Prophylaxis   Medication    enoxaparin (Lovenox) 30 MG/0.3ML SUBQ injection 30 mg                Code Status: DNAR/Selective Treatment  Correia: No urinary catheter in place  Correia Duration (in days):   Central line:    DOMINIQUE:     Discharge is dependent on: CT findings   At this point Ms. Hobson is expected to be discharge to: Home    The 21st Century Cures Act makes medical notes like these available to patients in the interest of transparency. Please be advised  this is a medical document. Medical documents are intended to carry relevant information, facts as evident, and the clinical opinion of the practitioner. The medical note is intended as peer to peer communication and may appear blunt or direct. It is written in medical language and may contain abbreviations or verbiage that are unfamiliar.             **Certification      PHYSICIAN Certification of Need for Inpatient Hospitalization - Initial Certification    Patient will require inpatient services that will reasonably be expected to span two midnight's based on the clinical documentation in H+P.   Based on patients current state of illness, I anticipate that, after discharge, patient will require TBD.

## 2024-08-04 NOTE — SLP NOTE
SPEECH DAILY NOTE - INPATIENT    ASSESSMENT & PLAN   ASSESSMENT  Order received for repeat BSSE as pt is still complaining of globus sensation and difficulty swallowing. Per the pt's RN, pt has been eating well and is being given throat lozenge for discomfort.   Pt stated that a small pill got stuck this morning, otherwise she has been very careful to take small bites and chew well. Pt observed with thin liquids by straw. Pt with no overt s/s of aspiration. Pt stated that liquids are less of a concern.   Pt to have biopsy of neck mass tomorrow. Recommend VFSS to visualize pharyngeal function more objectively following neck biopsy. Will follow up 8/5/24 to touch base with pt's RN regarding schedule of neck mass biopsy. Recommend to continue regular consistency with thin liquids as tolerated.    Diet Recommendations - Solids: Regular  Diet Recommendations - Liquids: Thin Liquids    Compensatory Strategies Recommended: Alternate consistencies;Small bites and sips;Extra sauce/gravy  Aspiration Precautions: Upright position;Slow rate;Small bites and sips  Medication Administration Recommendations: One pill at a time    Patient Experiencing Pain: Yes     Pain Location: neck  Pain Control: PO meds  Nursing Aware of Pain?: Yes    Treatment Plan  Treatment Plan/Recommendations: Dysphagia therapy;Videofluoroscopic swallow study    Interdisciplinary Communication: Discussed with RN            GOALS  Goal #1 VFSS pending biopsy schedule.  In Progress     FOLLOW UP  Follow Up Needed (Documentation Required): Yes  SLP Follow-up Date: 08/05/24           If you have any questions, please contact WILLIAM Bill

## 2024-08-04 NOTE — PLAN OF CARE
A&Ox4. VSS. On room air. . Tolerating diet. Last BM 7/31, refusing laxatives. Constipation education provided. Voiding. Pain managed with PO medication. Ambulating with min assist. Plan is for biopsy. Patient updated and in agreement with plan of care. Safety precautions in place. Instructed patient to call for assistance, call light within reach.

## 2024-08-04 NOTE — PLAN OF CARE
Patient A&Ox4 . Vital signs stable , on room air .  Neck Pain is controlled with oral meds . Gets up with min assist with walker . Voiding in bathroom . Last BM 7/31 refused laxatives . Plan for possible biopsy Monday . All safety precautions in place . Reminded to call for assist . Will continue to monitor .

## 2024-08-05 ENCOUNTER — APPOINTMENT (OUTPATIENT)
Dept: ULTRASOUND IMAGING | Facility: HOSPITAL | Age: 84
End: 2024-08-05
Attending: HOSPITALIST
Payer: MEDICARE

## 2024-08-05 LAB
GLUCOSE BLD-MCNC: 132 MG/DL (ref 70–99)
GLUCOSE BLD-MCNC: 152 MG/DL (ref 70–99)
GLUCOSE BLD-MCNC: 172 MG/DL (ref 70–99)
GLUCOSE BLD-MCNC: 182 MG/DL (ref 70–99)

## 2024-08-05 PROCEDURE — 99232 SBSQ HOSP IP/OBS MODERATE 35: CPT | Performed by: HOSPITALIST

## 2024-08-05 PROCEDURE — 99221 1ST HOSP IP/OBS SF/LOW 40: CPT | Performed by: NEUROLOGICAL SURGERY

## 2024-08-05 PROCEDURE — 20206 BIOPSY MUSCLE PERQ NEEDLE: CPT | Performed by: HOSPITALIST

## 2024-08-05 PROCEDURE — 76942 ECHO GUIDE FOR BIOPSY: CPT | Performed by: HOSPITALIST

## 2024-08-05 PROCEDURE — 0JB53ZX EXCISION OF LEFT NECK SUBCUTANEOUS TISSUE AND FASCIA, PERCUTANEOUS APPROACH, DIAGNOSTIC: ICD-10-PCS | Performed by: RADIOLOGY

## 2024-08-05 NOTE — SLP NOTE
SPEECH DAILY NOTE - INPATIENT    ASSESSMENT & PLAN   ASSESSMENT  Patient is an 82 yo woman seen to monitor current diet tolerance and reinforce compensatory strategies. Patient received alert in chair and tolerating RA. Patient denied experiencing any recent s/s aspiration and reported good tolerance of baseline diet with use of compensatory strategies to alleviate globus sensation. She reported having a chronic cough, but stated this was not exacerbated by PO intake. No cough observed during SLP session. She reported that she alternates consistencies and puts a lot of sauce on her food, which helps her swallow food without feeling like it is getting stuck. Patient observed to consume PO trials of thin liquid via straw and a hard solid. Patient independently alternated consistencies and took small bites of a hard solid. No overt clinical s/s aspiration observed. Discussed performing a VFSS tomorrow with patient to visualize oropharyngeal swallow function given patient's reported symptoms and the \"mass on the left supraclavicular station\" as seen on soft tissue CT of neck. Patient agreeable to participate.     Recommend patient continue a regular diet and thin liquids. Recommend patient continue to utilize compensatory strategies and aspiration precautions listed below. SLP to follow up to initiate an instrumental exam and make recommendations pending results the exam. Education provided re: results and recommendations.     Diet Recommendations - Solids: Regular  Diet Recommendations - Liquids: Thin Liquids    Compensatory Strategies Recommended: Alternate consistencies;Small bites and sips;Extra sauce/gravy  Aspiration Precautions: Upright position  Medication Administration Recommendations: One pill at a time    Patient Experiencing Pain: No     Pain Location: neck  Pain Control: PO meds  Nursing Aware of Pain?: Yes    Treatment Plan  Treatment Plan/Recommendations: Videofluoroscopic swallow  study    Interdisciplinary Communication: Discussed with RN            GOALS  Goal #1 VFSS pending biopsy schedule. Not addressed     FOLLOW UP  Follow Up Needed (Documentation Required): Yes  SLP Follow-up Date: 08/06/24       Session: 2    If you have any questions, please contact Yuko MACEDO

## 2024-08-05 NOTE — PLAN OF CARE
Alert and oriented x4. Room air. Continuous pulse oximeter. Telemetry monitoring. Cardiac electrolyte protocol. Lovenox for anticoagulation. Last bowel movement on 7/31. Bowel regimen started. 1800 diet with accuchecks and carb count. Regular diet with thin liquids. Up with min assist and walker. Plan is for Spine consult on 8/5 and potential biopsy. Resides at home with family.

## 2024-08-05 NOTE — PROGRESS NOTES
[facility]   part of [facility]     Hospitalist Progress Note     Anastasia Hobson Patient Status:  Inpatient    1940 MRN [id number]   Location [facility] 3SW-A Attending Sanjay Scott MD   Hosp Day # 5 PCP Houston Prado MD     Chief Complaint: Neck pain    Subjective:     Continued neck pain today.  Denies fever, chills, n/v.  No other acute complaints.      Objective:    Review of Systems:   A comprehensive review of systems was completed; pertinent positive and negatives stated in subjective.    Vital signs:  Temp:  [97.6 °F (36.4 °C)-98.1 °F (36.7 °C)] 97.7 °F (36.5 °C)  Pulse:  [43-95] 43  Resp:  [14-18] 14  BP: (133-155)/(42-61) 155/61  SpO2:  [93 %-100 %] 99 %    Physical Exam:    General: No acute distress, pleasant   Respiratory: No wheezes, no rhonchi  Cardiovascular: S1, S2, regular rate and rhythm  Abdomen: Soft, Non-tender, non-distended, positive bowel sounds  Neuro: No new focal deficits.   Extremities: No edema    Diagnostic Data:    Labs:  Recent Labs   Lab 24  1737 24  0541 24  0446   WBC 7.6 7.5 6.5   HGB 12.9 12.7 11.9*   MCV 82.8 83.8 82.8   .0 282.0 278.0       Recent Labs   Lab 24  1737 24  0541 24  0446   * 223* 143*   BUN 29* 23 27*   CREATSERUM 1.44* 1.39* 1.31*   CA 10.6* 10.1 9.8   ALB 5.0* 4.7  --     137 140   K 3.6 4.8 4.0    106 107   CO2 27.0 24.0 29.0   ALKPHO 46* 39*  --    AST 27 25  --    ALT 15 15  --    BILT 0.7 0.6  --    TP 7.6 7.0  --        Estimated Creatinine Clearance: 30.5 mL/min (A) (based on SCr of 1.31 mg/dL (H)).    No results for input(s): \"TROP\", \"TROPHS\", \"CK\" in the last 168 hours.    No results for input(s): \"PTP\", \"INR\" in the last 168 hours.             Microbiology    No results found for this visit on 24.      Imaging: Reviewed in Epic.    Medications:    docusate sodium  100 mg Oral BID    amLODIPine  10 mg Oral Daily    atorvastatin  80 mg Oral Nightly     fenofibrate micronized  134 mg Oral Daily with breakfast    gabapentin  200 mg Oral QPM    hydrALAZINE  50 mg Oral TID    insulin degludec  5 Units Subcutaneous Nightly    lisinopril  20 mg Oral QPM    oxybutynin  5 mg Oral BID    lidocaine-menthol  1 patch Transdermal Daily    enoxaparin  30 mg Subcutaneous Daily    insulin aspart  1-68 Units Subcutaneous TID CC    insulin aspart  1-10 Units Subcutaneous TID AC and HS       Assessment & Plan:      #Left neck pain possibly d/t invasive/irregular soft tissue mass in the left lower neck measuring up to 4.5 x 3.5 cm that is concerning for a primary or metastatic soft tissue neoplasm  -Pain control  -ENT consult   -CT reviewed, concern for malignancy  -IR consult for Biopsy - to be done today    #Chronic Neck Pain  #History of ACDF C4-6  Patient follows with spine surgery as outpatient, she was seeking a 2nd opinion and has a schedule next week   Spine surgery eval appreciated     #Dysphagia  Speech eval     #Hypercalcemia - resolved  #CKD 3B  Cr at baseline, monitor      #Atrial fibrillation on DOAC  -Hold DOAC in case biopsy is needed  -Telemetry      #Essential hypertension  -Norvasc  -Hydralazine  -Lisinopril   -Monitor hemodynamics     #Dyslipidemia  -Lipitor     #Diabetes mellitus  -Tresiba  -Correctional  -Carb  -A1c - 6.9     #Bladder cancer    Discussed plan of care with patient, daughter, nurse         Sanjay Scott MD    Supplementary Documentation:     Quality:  DVT Mechanical Prophylaxis:   SCDs,    DVT Pharmacologic Prophylaxis   Medication    enoxaparin (Lovenox) 30 MG/0.3ML SUBQ injection 30 mg                Code Status: DNAR/Selective Treatment  Correia: No urinary catheter in place  Correia Duration (in days):   Central line:    DOMINIQUE:     Discharge is dependent on: CT findings   At this point Ms. Hobson is expected to be discharge to: Home    The 21st Century Cures Act makes medical notes like these available to patients in the interest of transparency.  Please be advised this is a medical document. Medical documents are intended to carry relevant information, facts as evident, and the clinical opinion of the practitioner. The medical note is intended as peer to peer communication and may appear blunt or direct. It is written in medical language and may contain abbreviations or verbiage that are unfamiliar.             **Certification      PHYSICIAN Certification of Need for Inpatient Hospitalization - Initial Certification    Patient will require inpatient services that will reasonably be expected to span two midnight's based on the clinical documentation in H+P.   Based on patients current state of illness, I anticipate that, after discharge, patient will require TBD.

## 2024-08-05 NOTE — PROCEDURES
[facility]   part of [facility]  Procedure Note    Anastasia Hobson Patient Status:  Inpatient    1940 MRN [id number]   Location [facility] 3SW-A Attending Sanjay Scott MD   Hosp Day # 5 PCP Houtson Prado MD     Procedure: Left neck mass biopsy    Pre-Procedure Diagnosis:  Left neck mass    Post-Procedure Diagnosis: Same    Anesthesia:  Local    Findings:  No complication    Specimens: 4 18 gauge samples    Blood Loss:  < 5 cc    Tourniquet Time: None  Complications:  None  Drains:  None    Secondary Diagnosis:  n/A    Guerrero Jones MD  2024

## 2024-08-05 NOTE — CONSULTS
[facility]  STEPHEN Neurosurgery Consult    Anastasia Hobson Patient Status:  Inpatient    1940 MRN [id number]   Location [facility] 3SW-A Attending Sanjay Scott MD   Hosp Day # 5 PCP Houston Prado MD     REASON FOR CONSULTATION:  Mass of left side of neck    HISTORY OF PRESENT ILLNESS     Anastasia Hobson is a very pleasant 83 year old female with PMH of atrial fibrillation on DOAC, HTN, DLD, DM, bladder cancer, and cervical fracture s/p C4-6 ACDF (Dr. Toribio, [facility], 10/2023) who presented to ED with neck pain. Patient reports ~2 months of worsening left-sided neck pain. She has been in PT since her cervical spine surgery and recently noticed progression of pain, as well as intermittent swelling near her left clavicle. She also endorses some mild dysphagia. She has no radiating arm pain. No numbness, tingling, or weakness of upper or lower extremities. No changes in bowel or bladder habits. Denies gait instability but since hospital admission has been ambulating with a walker. MRI cervical spine obtained in ED shows a soft tissue mass within the left supraclavicular station, without evidence of associated spinal cord compression or stenosis. Neurosurgery is consulted for this.     PAST MEDICAL HISTORY     Past Medical History:    Arrhythmia    Back pain    Bladder cancer (HCC)    high grade superficial TCC, recurrent 2020 after BCG    Bloating    Blurred vision    Congestive heart disease (HCC)    Constipation    Diabetes (HCC)    IDDM     Diabetes mellitus (HCC)    Diarrhea, unspecified    CLARKE (dyspnea on exertion)    Easy bruising    Elevated cholesterol    Essential hypertension    Fatigue    Flatulence/gas pain/belching    Frequent urination    taking furosemide daily    Headache disorder    Hearing loss    Heart palpitations    Heartburn    occasionally    High blood pressure    High cholesterol    History of falling    Hyperlipidemia    Irregular bowel habits    Itch of  skin    right hand only    Leaking of urine    sometimes    Leg swelling    Osteoporosis    Sleep disturbance    Stool incontinence    when I have diarrhea    Type 2 diabetes mellitus with hyperglycemia (HCC)    Visual impairment    glasses    Wears glasses     PAST SURGICAL HISTORY:  Past Surgical History:   Procedure Laterality Date    Cataract      Cervical spine surgery  10/06/2023    Colonoscopy      Colonoscopy      Cystoscopy,insert ureteral stent      Cystourethroscopy  11/25/2020    Cystoscopy Procedure Dr Josh Hernandez  - recurrent tumor on BTS    Cystourethroscopy,biopsy  12/16/2019    BBx of scar sites, NSC    Cystourethroscopy,fulgur .5-2cm lesn  04/16/2020    TURBT, NSC    Cystourethroscopy,fulgur .5-2cm lesn  07/30/2020    TURBT with Gemcitabine    Cystourethroscopy,fulgur >5cm lesn  10/03/2019    TURBT with bilateral RPGs (NSC)    Fracture surgery      left wrist    Hysterectomy      age 31    Other surgical history  04/20/2021    BTS CystDr David carter    Other surgical history  08/17/2021    BTS Cysto Caterize Dr. Hernandez    Other surgical history  11/16/2021    BTS Cysto W/ caterigayathri Hernandez     Other surgical history  03/08/2022    BTS Cysto- Dr. Hernandez    Spine surgery procedure unlisted  10/06/2003    Tonsillectomy  1946    Total abdom hysterectomy  1971     FAMILY HISTORY:  family history includes Colon Polyps in her mother; Diabetes in her father and mother; Heart Attack in her mother; Other in her son; Stroke in her father.    SOCIAL HISTORY:   reports that she quit smoking about 17 years ago. Her smoking use included cigarettes. She has a 73.5 pack-year smoking history. She has never used smokeless tobacco. She reports that she does not drink alcohol and does not use drugs.    ALLERGIES     No Known Allergies    MEDICATIONS     Medications Prior to Admission   Medication Sig Dispense Refill Last Dose    traMADol 50 MG Oral Tab Take 1 tablet (50 mg total) by mouth every 6 (six) hours as needed for Pain.    7/31/2024    LANTUS SOLOSTAR 100 UNIT/ML Subcutaneous Solution Pen-injector INJECT 8 UNITS INTO THE SKIN NIGHTLY. PEN EXPIRES 28 DAYS AFTER FIRST USE 15 mL 0 7/31/2024    Insulin Lispro, 1 Unit Dial, (HUMALOG KWIKPEN) 100 UNIT/ML Subcutaneous Solution Pen-injector INJECT 10-12 UNITS INTO THE SKIN THREE TIMES DAILY WITH MEALS AS DIRECTED 15 mL 0 7/31/2024    LISINOPRIL 20 MG Oral Tab TAKE ONE TABLET BY MOUTH EVERY EVENING 90 tablet 0 7/31/2024    AMLODIPINE 10 MG Oral Tab Take 1 tablet (10 mg total) by mouth daily. 90 tablet 0 7/31/2024    Insulin Pen Needle (BD PEN NEEDLE PRISCILLA U/F) 32G X 4 MM Does not apply Misc Inject 1 Pen into the skin daily. 90 each 0 7/31/2024    ATORVASTATIN 80 MG Oral Tab Take 1 tablet (80 mg total) by mouth nightly. 90 tablet 0 7/30/2024    Continuous Blood Gluc Sensor (FREESTYLE NATALIE 2 SENSOR) Does not apply Misc 1 Device by Subdermal route every 14 (fourteen) days. 6 each 3 7/31/2024    tolterodine 2 MG Oral Tab Take 1 tablet (2 mg total) by mouth daily.   7/31/2024    fenofibrate 145 MG Oral Tab Take 1 tablet (145 mg total) by mouth daily. 90 tablet 0 7/31/2024    risedronate 35 MG Oral Tab Take 1 tablet (35 mg total) by mouth every 7 days. 12 tablet 3 Past Week    Continuous Blood Gluc  (FREESTYLE NATALIE 2 READER) Does not apply Device 1 each daily. 1 each 1 7/31/2024    Continuous Blood Gluc  (FREESTYLE NATALIE 2 READER) Does not apply Device 1 Device daily as needed. 1 each 0 7/31/2024    metFORMIN HCl 1000 MG Oral Tab Take 1 tablet (1,000 mg total) by mouth daily with breakfast. 180 tablet 2 Past Week    PROBIOTIC PRODUCT OR Take 1 capsule by mouth daily. 100 billion   7/31/2024    hydrALAZINE 25 MG Oral Tab Take 2 tablets (50 mg total) by mouth 3 (three) times daily.   7/31/2024    furosemide 40 MG Oral Tab Take 0.5 tablets (20 mg total) by mouth daily.   7/31/2024    Potassium Chloride ER 20 MEQ Oral Tab CR Take 1 tablet by mouth daily.   7/31/2024    apixaban 5 MG  Oral Tab Take 1 tablet (5 mg total) by mouth 2 (two) times daily. 60 tablet 2 7/31/2024    Cholecalciferol (VITAMIN D3) 250 MCG (57709 UT) Oral Tab Take 1,000 Units by mouth daily.   7/31/2024    Calcium Citrate-Vitamin D 315-250 MG-UNIT Oral Tab Take 1 tablet by mouth daily.   7/31/2024    polyethylene glycol, PEG 3350-KCl-NaBcb-NaCl-NaSulf, 236 g Oral Recon Soln Take as directed by provider 4000 mL 0 Unknown    GABAPENTIN 100 MG Oral Cap Take 2 capsules (200 mg total) by mouth every evening. 180 capsule 0 Unknown    fluconazole 100 MG Oral Tab Take 1 tablet (100 mg total) by mouth daily. 7 tablet 0 Unknown     Current Facility-Administered Medications   Medication Dose Route Frequency    benzocaine-menthol (Cepacol) lozenge 1 lozenge  1 lozenge Oral PRN    docusate sodium (Colace) cap 100 mg  100 mg Oral BID    polyethylene glycol (PEG 3350) (Miralax) 17 g oral packet 17 g  17 g Oral Daily PRN    bisacodyl (Dulcolax) 10 MG rectal suppository 10 mg  10 mg Rectal Daily PRN    traMADol (Ultram) tab 50 mg  50 mg Oral Q12H PRN    acetaminophen (Tylenol Extra Strength) tab 500 mg  500 mg Oral Q6H PRN    HYDROcodone-acetaminophen (Norco) 5-325 MG per tab 1 tablet  1 tablet Oral Q4H PRN    amLODIPine (Norvasc) tab 10 mg  10 mg Oral Daily    atorvastatin (Lipitor) tab 80 mg  80 mg Oral Nightly    fenofibrate micronized (Lofibra) cap 134 mg  134 mg Oral Daily with breakfast    gabapentin (Neurontin) cap 200 mg  200 mg Oral QPM    hydrALAZINE (Apresoline) tab 50 mg  50 mg Oral TID    insulin degludec (Tresiba) 100 units/mL flextouch 5 Units  5 Units Subcutaneous Nightly    lisinopril (Prinivil; Zestril) tab 20 mg  20 mg Oral QPM    oxybutynin (Ditropan) tab 5 mg  5 mg Oral BID    lidocaine-menthol 4-1 % patch 1 patch  1 patch Transdermal Daily    enoxaparin (Lovenox) 30 MG/0.3ML SUBQ injection 30 mg  30 mg Subcutaneous Daily    glucose (Dex4) 15 GM/59ML oral liquid 15 g  15 g Oral Q15 Min PRN    Or    glucose (Glutose) 40%  oral gel 15 g  15 g Oral Q15 Min PRN    Or    glucose-vitamin C (Dex-4) chewable tab 4 tablet  4 tablet Oral Q15 Min PRN    Or    dextrose 50% injection 50 mL  50 mL Intravenous Q15 Min PRN    Or    glucose (Dex4) 15 GM/59ML oral liquid 30 g  30 g Oral Q15 Min PRN    Or    glucose (Glutose) 40% oral gel 30 g  30 g Oral Q15 Min PRN    Or    glucose-vitamin C (Dex-4) chewable tab 8 tablet  8 tablet Oral Q15 Min PRN    morphINE PF 2 MG/ML injection 1 mg  1 mg Intravenous Q2H PRN    Or    morphINE PF 2 MG/ML injection 2 mg  2 mg Intravenous Q2H PRN    Or    morphINE PF 4 MG/ML injection 4 mg  4 mg Intravenous Q2H PRN    insulin aspart (NovoLOG) 100 Units/mL FlexPen 1-68 Units  1-68 Units Subcutaneous TID CC    insulin aspart (NovoLOG) 100 Units/mL FlexPen 1-10 Units  1-10 Units Subcutaneous TID AC and HS       REVIEW OF SYSTEMS     Comprehensive Review of Systems obtained, and is negative other than that mentioned in the History of Present Illness.      PHYSICAL EXAMINATION     VITALS: /63 (BP Location: Right arm)   Pulse 65   Temp 97.7 °F (36.5 °C) (Oral)   Resp 16   Ht 66\"   Wt 159 lb 6.4 oz (72.3 kg)   SpO2 (!) 83%   BMI 25.73 kg/m²     GENERAL:  No acute distress, non-toxic appearing, speech fluent, mood appropriate    HEENT:  Normocephalic, atraumatic    RESP: Non-labored, easy, even    CV: NSR on tele    NEUROLOGICAL:  Alert and oriented x3.  Speech fluent. Comprehension intact.   Face is symmetrical. No clonus. Negative Stringer's. Sensation to light touch is intact bilaterally.  CASTANEDA x 4.  Gait deferred.       UPPER EXTREMITY STRENGTH:    Deltoid  Biceps  Triceps     Finger abduction     Right 5 5 5 5 5     Left 5 5 5 5 5     LOWER EXTREMITY STRENGTH:    Iliospoas  Hamstrings  Quads  D-flexion  P-flexion EHL     Right 5 5 5 5 5 5     Left 5 5 5 5 5 5     DIAGNOSTIC DATA     Lab Results   Component Value Date    PGLU 152 08/05/2024     IMAGING     CT SOFT TISSUE OF NECK(CONTRAST ONLY)  (CPT=70491)    Result Date: 8/1/2024  CONCLUSION:   Soft tissue density mass centered within the left supraclavicular station, 4.2 x 2.9 x 4.2 cm.  This is highly suspicious for a malignant process, either primary or metastatic.  Recommend direct tissue sampling for further characterization.   LOCATION:  [facility]    Dictated by (CST): Rosemarie Ashton MD on 8/01/2024 at 7:03 PM     Finalized by (CST): Rosemarie Ashton MD on 8/01/2024 at 7:09 PM       MRI SPINE CERVICAL (W+WO) (CPT=72156)    Result Date: 7/31/2024  CONCLUSION:   1. There is a partially imaged invasive/irregular soft tissue mass in the left lower neck measuring up to 4.5 x 3.5 cm that is concerning for a primary or metastatic soft tissue neoplasm.  Clinical correlation and direct inspection is suggested.  2. Postoperative changes from anterior cervical spinal fusion at C4-C6.  Artifact from the hardware limits evaluation of spinal cord.  3. Multilevel degenerative changes as above.   Please see above for further details.  Critical results were discussed with Dr. Atkinson at 2218 hours on 7/31/2024. Critical results were read back.  LOCATION:  [facility]   Dictated by (CST): Domenic Iqbal MD on 7/31/2024 at 10:13 PM     Finalized by (CST): Domenic Iqbal MD on 7/31/2024 at 10:21 PM         ASSESSMENT & PLAN     ASSESSMENT:  1. Mass of left side of neck      PLAN:  No acute surgical intervention is indicated at this time  Agree with IR biopsy   Medical management per hospitalist  PT/OT    Thank you very much for the consult.    Marily Felton, APRN-NP  [facility]  8/5/2024 8:24 AM     Total visit time: 15 minutes; More than 50% spent coordinating care, counseling, reviewing imaging and discussing medication therapy.   Is this a shared or split note between Advanced Practice Provider and Physician? Yes

## 2024-08-06 ENCOUNTER — APPOINTMENT (OUTPATIENT)
Dept: GENERAL RADIOLOGY | Facility: HOSPITAL | Age: 84
End: 2024-08-06
Attending: HOSPITALIST
Payer: MEDICARE

## 2024-08-06 ENCOUNTER — APPOINTMENT (OUTPATIENT)
Dept: GENERAL RADIOLOGY | Facility: HOSPITAL | Age: 84
End: 2024-08-06
Attending: STUDENT IN AN ORGANIZED HEALTH CARE EDUCATION/TRAINING PROGRAM
Payer: MEDICARE

## 2024-08-06 ENCOUNTER — APPOINTMENT (OUTPATIENT)
Dept: CV DIAGNOSTICS | Facility: HOSPITAL | Age: 84
End: 2024-08-06
Attending: INTERNAL MEDICINE
Payer: MEDICARE

## 2024-08-06 LAB
ANION GAP SERPL CALC-SCNC: 7 MMOL/L (ref 0–18)
ATRIAL RATE: 117 BPM
BUN BLD-MCNC: 26 MG/DL (ref 9–23)
CALCIUM BLD-MCNC: 9.8 MG/DL (ref 8.7–10.4)
CHLORIDE SERPL-SCNC: 106 MMOL/L (ref 98–112)
CO2 SERPL-SCNC: 25 MMOL/L (ref 21–32)
CREAT BLD-MCNC: 1.58 MG/DL
EGFRCR SERPLBLD CKD-EPI 2021: 32 ML/MIN/1.73M2 (ref 60–?)
GLUCOSE BLD-MCNC: 159 MG/DL (ref 70–99)
GLUCOSE BLD-MCNC: 160 MG/DL (ref 70–99)
GLUCOSE BLD-MCNC: 221 MG/DL (ref 70–99)
GLUCOSE BLD-MCNC: 221 MG/DL (ref 70–99)
GLUCOSE BLD-MCNC: 245 MG/DL (ref 70–99)
MAGNESIUM SERPL-MCNC: 2 MG/DL (ref 1.6–2.6)
OSMOLALITY SERPL CALC.SUM OF ELEC: 298 MOSM/KG (ref 275–295)
POTASSIUM SERPL-SCNC: 4.6 MMOL/L (ref 3.5–5.1)
Q-T INTERVAL: 332 MS
QRS DURATION: 102 MS
QTC CALCULATION (BEZET): 490 MS
R AXIS: -9 DEGREES
SODIUM SERPL-SCNC: 138 MMOL/L (ref 136–145)
T AXIS: 184 DEGREES
VENTRICULAR RATE: 131 BPM

## 2024-08-06 PROCEDURE — 74230 X-RAY XM SWLNG FUNCJ C+: CPT | Performed by: HOSPITALIST

## 2024-08-06 PROCEDURE — 99233 SBSQ HOSP IP/OBS HIGH 50: CPT | Performed by: HOSPITALIST

## 2024-08-06 PROCEDURE — 93306 TTE W/DOPPLER COMPLETE: CPT | Performed by: INTERNAL MEDICINE

## 2024-08-06 PROCEDURE — 71045 X-RAY EXAM CHEST 1 VIEW: CPT | Performed by: STUDENT IN AN ORGANIZED HEALTH CARE EDUCATION/TRAINING PROGRAM

## 2024-08-06 RX ORDER — METOPROLOL TARTRATE 1 MG/ML
5 INJECTION, SOLUTION INTRAVENOUS ONCE
Status: COMPLETED | OUTPATIENT
Start: 2024-08-06 | End: 2024-08-06

## 2024-08-06 RX ORDER — MAGNESIUM HYDROXIDE/ALUMINUM HYDROXICE/SIMETHICONE 120; 1200; 1200 MG/30ML; MG/30ML; MG/30ML
30 SUSPENSION ORAL ONCE
Status: COMPLETED | OUTPATIENT
Start: 2024-08-06 | End: 2024-08-06

## 2024-08-06 RX ORDER — AMIODARONE HYDROCHLORIDE 200 MG/1
400 TABLET ORAL 3 TIMES DAILY
Status: DISCONTINUED | OUTPATIENT
Start: 2024-08-06 | End: 2024-08-08

## 2024-08-06 NOTE — PROGRESS NOTES
Patient's HR sustaining in 120's and patient flipped into a fib. EKG shows a fib with RVR. Patient reporting heartburn. Hospitalist notified and IV metoprolol and PO maalox ordered and given.

## 2024-08-06 NOTE — VIDEO SWALLOW STUDY NOTE
ADULT VIDEOFLUOROSCOPIC SWALLOWING STUDY    Admission Date: 7/31/2024  Evaluation Date: 08/06/24  Radiologist: Tenzin TSAI   Diet Recommendations - Solids: Regular  Diet Recommendations - Liquids: Thin Liquids    Further Follow-up:  Follow Up Needed (Documentation Required): No  SLP Follow-up Date:  (None)  Compensatory Strategies Recommended: Alternate consistencies;Extra sauce/gravy;Small bites and sips  Aspiration Precautions: Upright position  Medication Administration Recommendations: One pill at a time (with thin liquid or whole in puree)  Treatment Plan/Recommendations: No further inpatient SLP service warranted    HISTORY   Background/Objective Information: Patient is an 84 yo woman with left sided mass on her neck complaint of frequent globus sensation with PO intake. A videofluoroscopic swallow study was ordered to visualize oropharyngeal swallow and r/o aspiration. See inpatient daily note from yesterday for additional background information.     Problem List  Principal Problem:    Mass of left side of neck  Active Problems:    Azotemia    Type 2 diabetes mellitus with hyperglycemia, unspecified whether long term insulin use (HCC)    Anticoagulated    Atrial fibrillation, chronic (HCC)      Past Medical History  Past Medical History:    Arrhythmia    Back pain    Bladder cancer (HCC)    high grade superficial TCC, recurrent July 2020 after BCG    Bloating    Blurred vision    Congestive heart disease (HCC)    Constipation    Diabetes (HCC)    IDDM     Diabetes mellitus (HCC)    Diarrhea, unspecified    CLARKE (dyspnea on exertion)    Easy bruising    Elevated cholesterol    Essential hypertension    Fatigue    Flatulence/gas pain/belching    Frequent urination    taking furosemide daily    Headache disorder    Hearing loss    Heart palpitations    Heartburn    occasionally    High blood pressure    High cholesterol    History of falling    Hyperlipidemia    Irregular bowel habits    Itch of skin     right hand only    Leaking of urine    sometimes    Leg swelling    Osteoporosis    Sleep disturbance    Stool incontinence    when I have diarrhea    Type 2 diabetes mellitus with hyperglycemia (HCC)    Visual impairment    glasses    Wears glasses       Current Diet Consistency: Regular;Thin liquids  Prior Level of Function: Independent  Prior Living Situation: Home with support  History of Recent: No recent respiratory difficulty  Precautions: Aspiration  Imaging results:   08/01/2024  CONCLUSION:       Soft tissue density mass centered within the left supraclavicular station, 4.2 x 2.9 x 4.2 cm.  This is highly suspicious for a malignant process, either primary or metastatic.  Recommend direct tissue sampling for further characterization.        LOCATION:  [facility]          Dictated by (CST): Rosemarie Ashton MD on 8/01/2024 at 7:03 PM      Finalized by (CST): Rosemarie Ashton MD on 8/01/2024 at 7:09 PM      Reason for Referral: R/O aspiration      Family/Patient Goals:  None stated     ASSESSMENT   DYSPHAGIA ASSESSMENT  Test completed in conjunction with Radiologist.  Patient Positioned: Midline;Upright.  Patient Viewed: Lateral.  Patient Alertness: Fully alert.  Consistencies Presented: Thin liquids;Puree;Hard solid (barium tablet) to assess oropharyngeal swallow function and assess for compensatory strategies to improve safe swallow function.    THIN LIQUIDS  Method of Presentation: Cup;Straw  Oral Phase of Swallow (VFSS - Thin Liquids): Within Functional Limits  Triggered at: Valleculae  Laryngeal Penetration: None  Tracheal Aspiration: None        PUREE  Oral Phase of Swallow (VFSS - Puree): Within Functional Limits  Triggered at: Base of tongue  Laryngeal Penetration: None  Tracheal Aspiration: None     HARD SOLID  Oral Phase of Swallow (VFSS - Hard Solid): Within Functional Limits  Triggered at: Valleculae (piecemealed)  Laryngeal Penetration: None  Tracheal Aspiration: None  Penetration Aspiration Scale Score:  Score 1: Material does not enter airway       Overall Impression: Patient presented with a largely intact oropharyngeal swallow. Bolus acceptance was adequate without evidence of anterior bolus loss. Mastication and AP transit were thorough and efficient without evidence of oral residue. Phayrngeal swallow initiation was timely, with initiation beginning when the bolus was at the level of the base of tongue or the vallecula across all consistences. No penetration or aspiration observed. No residue observed. Base of tongue retraction, hyolaryngeal excursion, and epiglottic inversion were all adequate in strength and timing. A barium tablet was provided with thin liquid water given patient's reported difficulty with swallowing pills. The tablet passed through the pharyngeal cavity and into the esophagus without any slowing or stasis.    Recommend patient continue a regular diet and thin liquids. Recommend patient continue to alternate consistencies, consume solid foods with extra sauces, and take small bites and sips to alleviate her globus sensation. All PO intake should be consumed in an upright position. Oral medication should be taken one at a time with either a thin liquid or with puree. Discussed recommendation to utilize oral rinses or sprays to treat patient's reported xerostomia. No further inpatient SLP services warranted as patient is exhibiting good tolerance of baseline diet without any s/s aspiration. Education provided results and recommendations.             GOALS  Goal #1 VFSS pending biopsy schedule. Met         EDUCATION/INSTRUCTION  Reviewed results and recommendations with patient/family/caregiver.  Agreement/Understanding verbalized and all questions answered to their apparent satisfaction.    INTERDISCIPLINARY COMMUNICATION  Reviewed results with Radiologist; agreement verbalized.    Patient Experiencing Pain: No     Pain Location: neck  Pain Control: PO meds  Nursing Aware of Pain?:  Yes    FOLLOW UP  Treatment Plan/Recommendations: No further inpatient SLP service warranted           Thank you for your referral.   If you have any questions, please contact Yuko MACEDO

## 2024-08-06 NOTE — PLAN OF CARE
Alert and oriented x 4.On room air. Telemetry monitoring, Afib with RVR. Cardiology consulted. Dry, hoarse cough Pain managed on PO medications. Denies numbness/ tingling. Bandaid to left side neck clean, dry, intact. Voiding without difficulty. Last BM 08/06. Dysphagia, one pill at a time, xray video swallowing test done.  SCD's on bilaterally. Safety precautions in place. Plan for transfer to a cardiac unit. Report called to ELENA Jain. Patient transferred to room 7608. Plan of care discussed with patient.

## 2024-08-06 NOTE — CONSULTS
Cardiology Consultation    Anastasia Hobson Patient Status:  Inpatient    1940 MRN [id number]   [facility] 7NE-A Attending Sanjay Scott MD   Hosp Day # 6 PCP Houston Prado MD     Reason for Consultation:  PAF      History of Present Illness:  Anastasia Hobson is a a(n) 83 year old female. Very sweet woman, follows with Dr. Daniels.  PAF with previous DCC's, but none since ablation in .  She remains on eliquis.  She presented 24 with left neck pain, ultimately found to have a mass.  She underwent biopsy on .  On  pm, just before midnight, she went into afib, rate 120's.  She was started on IV diltiazem, but still tachy so transferred to [facility].  She is Asx at this point, though still tachy.    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    Bloating    Blurred vision    Congestive heart disease (HCC)    Constipation    Diabetes (HCC)    IDDM     Diabetes mellitus (HCC)    Diarrhea, unspecified    CLARKE (dyspnea on exertion)    Easy bruising    Elevated cholesterol    Essential hypertension    Fatigue    Flatulence/gas pain/belching    Frequent urination    taking furosemide daily    Headache disorder    Hearing loss    Heart palpitations    Heartburn    occasionally    High blood pressure    High cholesterol    History of falling    Hyperlipidemia    Irregular bowel habits    Itch of skin    right hand only    Leaking of urine    sometimes    Leg swelling    Osteoporosis    Sleep disturbance    Stool incontinence    when I have diarrhea    Type 2 diabetes mellitus with hyperglycemia (HCC)    Visual impairment    glasses    Wears glasses     Past Surgical History:   Procedure Laterality Date    Cataract      Cervical spine surgery  10/06/2023    Colonoscopy      Colonoscopy      Cystoscopy,insert ureteral stent      Cystourethroscopy  2020    Cystoscopy  Procedure Dr Josh Hernandez  - recurrent tumor on BTS    Cystourethroscopy,biopsy  12/16/2019    BBx of scar sites, NSC    Cystourethroscopy,fulgur .5-2cm lesn  04/16/2020    TURBT, NSC    Cystourethroscopy,fulgur .5-2cm lesn  07/30/2020    TURBT with Gemcitabine    Cystourethroscopy,fulgur >5cm lesn  10/03/2019    TURBT with bilateral RPGs (NSC)    Fracture surgery      left wrist    Hysterectomy      age 31    Other surgical history  04/20/2021    BTS Dr David Loaiza    Other surgical history  08/17/2021    BTS Cysto Caterize Dr. Hernandez    Other surgical history  11/16/2021    BTS Cysto W/ catmarquis Hernandez     Other surgical history  03/08/2022    BTS Cysto- Dr. Hernandez    Spine surgery procedure unlisted  10/06/2003    Tonsillectomy  1946    Total abdom hysterectomy  1971     Family History   Problem Relation Age of Onset    Diabetes Father     Stroke Father     Colon Polyps Mother     Diabetes Mother     Heart Attack Mother     Other (Other) Son         Afib         Allergies:  No Known Allergies    Medications:   amiodarone  400 mg Oral TID    metoprolol  5 mg Intravenous Once    docusate sodium  100 mg Oral BID    [Held by provider] amLODIPine  10 mg Oral Daily    atorvastatin  80 mg Oral Nightly    fenofibrate micronized  134 mg Oral Daily with breakfast    gabapentin  200 mg Oral QPM    hydrALAZINE  50 mg Oral TID    insulin degludec  5 Units Subcutaneous Nightly    lisinopril  20 mg Oral QPM    oxybutynin  5 mg Oral BID    lidocaine-menthol  1 patch Transdermal Daily    enoxaparin  30 mg Subcutaneous Daily    insulin aspart  1-68 Units Subcutaneous TID CC    insulin aspart  1-10 Units Subcutaneous TID AC and HS       Continuous Infusions:   dilTIAZem 5 mg/hr (08/06/24 0241)       Social History:   reports that she quit smoking about 17 years ago. Her smoking use included cigarettes. She has a 73.5 pack-year smoking history. She has never used smokeless tobacco. She reports that she does not drink alcohol and does not use  drugs.    Review of Systems:  All systems were reviewed and are negative except as described above in HPI.    Physical Exam:      Temp:  [97.7 °F (36.5 °C)-98.3 °F (36.8 °C)] 97.7 °F (36.5 °C)  Pulse:  [] 102  Resp:  [16-20] 18  BP: (114-166)/(59-96) 123/68  SpO2:  [85 %-99 %] 85 %    Last 3 Weights   07/31/24 2339 159 lb 6.4 oz (72.3 kg)   07/31/24 1533 150 lb (68 kg)   07/31/24 1732 150 lb (68 kg)   07/23/24 1412 150 lb 12.8 oz (68.4 kg)           General: No apparent distress  HEENT: No focal deficits.  Neck: supple. JVP normal  Cardiac: Irregular rhythm, S1, S2 normal,   No rub or gallop.  No murmur.  Lungs: CTA  Abdomen: Soft, non-tender.   Extremities: No edema  Neurologic: no focal deficits  Skin: Warm and dry.          Telemetry: fib    Laboratories and Data:  Diagnostics:    EKG, 8/6/2024:  fib, ST depression    CXR, 8/6/2024:      Labs:   HEM:  Recent Labs   Lab 07/31/24 1737 08/01/24  0541 08/03/24  0446   WBC 7.6 7.5 6.5   HGB 12.9 12.7 11.9*   .0 282.0 278.0       Chem:  Recent Labs   Lab 07/31/24 1737 08/01/24  0541 08/03/24  0446    137 140   K 3.6 4.8 4.0    106 107   CO2 27.0 24.0 29.0   BUN 29* 23 27*   CREATSERUM 1.44* 1.39* 1.31*   CA 10.6* 10.1 9.8   MG  --   --  1.9   * 223* 143*       Recent Labs   Lab 07/31/24 1737 08/01/24  0541   ALT 15 15   AST 27 25   ALB 5.0* 4.7       No results for input(s): \"PTP\", \"INR\" in the last 168 hours.    No results for input(s): \"TROP\", \"CK\" in the last 168 hours.      Impression:   PAF - s/p DCC's, then ablation in July 2023.  Clinically had been in NSR until 8/5 pm.  Tachy, on IV cardizem.  Left neck mass, s/p biopsy on 8/5.  HTN  diabetes    Plan:  Begin eliquis if ok with others, otherwise IV heparin.  Rate control with IV diltiazem, IV lopressor.  Amio load 400 mg BID.  Echo wit doppler.    Joaquin Ballesteros MD  8/6/2024  11:16 AM  C5

## 2024-08-06 NOTE — PLAN OF CARE
Biopsy completed.  Left neck dressing bandage clean, dry, intact. Color, movement, sensation intact to all extremities.  Bilateral hand tremors noted.  States this is her baseline.  Up with assist x1 to chair and restroom.  States pain controlled on oral pain medication.  Difficulty swallowing.  States this is ongoing.  Nonproductive dry cough noted.  Encouraged to eat softer foods.  Swallow eval planned for tomorrow.  Instructed on plan of care, call for all asst needed, discomfort felt, call don't fall protocol.  Verbalized understanding and in agreement w/plan.  Safety precautions maintained.

## 2024-08-06 NOTE — PLAN OF CARE
Patient is alert and oriented x 4. Vitals stable on room air. Pain managed by prn meds. Denies numbness or tingling. Patient has nonproductive dry hoarse cough. Some difficulty swallowing pills. Denies sob. Hospitalist notified & CXR ordered. Bandaid to left neck c/d/I. Tolerating regular diet. Up x1 assist. Plan for swallow study tmrw & CXR. Plan of care discussed with patient.

## 2024-08-06 NOTE — PROGRESS NOTES
[facility]   part of [facility]     Hospitalist Progress Note     Anastasia Hobson Patient Status:  Inpatient    1940 MRN [id number]   Location [facility] 3SW-A Attending Sanjay Scott MD   Hosp Day # 6 PCP Houston Prado MD     Chief Complaint: Neck pain    Subjective:     Patient felt palpitations overnight, went into a. Fib rvr.    Denies cp, sob, fever, chills, n/v.  Still with pain to left neck.  No other acute complaints.      Objective:    Review of Systems:   A comprehensive review of systems was completed; pertinent positive and negatives stated in subjective.    Vital signs:  Temp:  [97.7 °F (36.5 °C)-98.3 °F (36.8 °C)] 97.8 °F (36.6 °C)  Pulse:  [] 70  Resp:  [16-20] 20  BP: (114-166)/(59-96) 114/84  SpO2:  [83 %-99 %] 99 %    Physical Exam:    General: No acute distress, pleasant   Respiratory: No wheezes, no rhonchi  Cardiovascular: S1, S2, irregularly irregular   Abdomen: Soft, Non-tender, non-distended, + bowel sounds  Neuro: No new focal deficits.   Extremities: No edema    Diagnostic Data:    Labs:  Recent Labs   Lab 24  0541 24  0446   WBC 7.6 7.5 6.5   HGB 12.9 12.7 11.9*   MCV 82.8 83.8 82.8   .0 282.0 278.0       Recent Labs   Lab 24  0541 24  0446   * 223* 143*   BUN 29* 23 27*   CREATSERUM 1.44* 1.39* 1.31*   CA 10.6* 10.1 9.8   ALB 5.0* 4.7  --     137 140   K 3.6 4.8 4.0    106 107   CO2 27.0 24.0 29.0   ALKPHO 46* 39*  --    AST 27 25  --    ALT 15 15  --    BILT 0.7 0.6  --    TP 7.6 7.0  --        Estimated Creatinine Clearance: 30.5 mL/min (A) (based on SCr of 1.31 mg/dL (H)).    No results for input(s): \"TROP\", \"TROPHS\", \"CK\" in the last 168 hours.    No results for input(s): \"PTP\", \"INR\" in the last 168 hours.             Microbiology    No results found for this visit on 24.      Imaging: Reviewed in Epic.    Medications:    docusate sodium  100 mg Oral BID     [Held by provider] amLODIPine  10 mg Oral Daily    atorvastatin  80 mg Oral Nightly    fenofibrate micronized  134 mg Oral Daily with breakfast    gabapentin  200 mg Oral QPM    hydrALAZINE  50 mg Oral TID    insulin degludec  5 Units Subcutaneous Nightly    lisinopril  20 mg Oral QPM    oxybutynin  5 mg Oral BID    lidocaine-menthol  1 patch Transdermal Daily    enoxaparin  30 mg Subcutaneous Daily    insulin aspart  1-68 Units Subcutaneous TID CC    insulin aspart  1-10 Units Subcutaneous TID AC and HS       Assessment & Plan:      #Paroxysmal a. Fib with RVR  Patient with hx of ablation and cardioversion  Was sinus throughout admission until today  Started on cardizem drip  Started on Amiodarone   Resume home eliquis  Echo ordered  Move to cardiac telemetry floor  Cardiology consult   Monitor on telemetry     #Soft tissue mass - to left neck  ENT eval appreciated   S/p IR guided biopsy on 8.5  F/u biopsy report    #Left neck pain   2/2 soft tissue mass vs chronic pain from ACDF surgery history  Patient follows with neuro surgery and pain specialist  Continue pain control     #Chronic Neck Pain  #History of ACDF C4-6  Patient follows with spine surgery as outpatient, she was seeking a 2nd opinion and has a schedule next week   Spine surgery eval appreciated     #Dysphagia  Speech eval     #Hypercalcemia - resolved  #CKD 3B -  Cr at baseline  #Essential hypertension - hydralazine, lisinopril, amlodipine held  #Dyslipidemia - Lipitor     #Diabetes mellitus  -Tresiba  -Correctional  -Carb  -A1c - 6.9     #Bladder cancer    Discussed plan of care with patient, daughter, nurse         Sanjay Scott MD    Supplementary Documentation:     Quality:  DVT Mechanical Prophylaxis:   SCDs,    DVT Pharmacologic Prophylaxis   Medication    enoxaparin (Lovenox) 30 MG/0.3ML SUBQ injection 30 mg                Code Status: DNAR/Selective Treatment  Correia: No urinary catheter in place  Correia Duration (in days):   Central line:    DOMINIQUE:      Discharge is dependent on: CT findings   At this point Ms. Hobson is expected to be discharge to: Home    The 21st Century Cures Act makes medical notes like these available to patients in the interest of transparency. Please be advised this is a medical document. Medical documents are intended to carry relevant information, facts as evident, and the clinical opinion of the practitioner. The medical note is intended as peer to peer communication and may appear blunt or direct. It is written in medical language and may contain abbreviations or verbiage that are unfamiliar.             **Certification      PHYSICIAN Certification of Need for Inpatient Hospitalization - Initial Certification    Patient will require inpatient services that will reasonably be expected to span two midnight's based on the clinical documentation in H+P.   Based on patients current state of illness, I anticipate that, after discharge, patient will require TBD.

## 2024-08-07 PROBLEM — I48.91 ATRIAL FIBRILLATION WITH RVR (HCC): Status: ACTIVE | Noted: 2022-09-13

## 2024-08-07 LAB
ANION GAP SERPL CALC-SCNC: 7 MMOL/L (ref 0–18)
BUN BLD-MCNC: 25 MG/DL (ref 9–23)
CALCIUM BLD-MCNC: 9.8 MG/DL (ref 8.7–10.4)
CHLORIDE SERPL-SCNC: 108 MMOL/L (ref 98–112)
CO2 SERPL-SCNC: 22 MMOL/L (ref 21–32)
CREAT BLD-MCNC: 1.58 MG/DL
EGFRCR SERPLBLD CKD-EPI 2021: 32 ML/MIN/1.73M2 (ref 60–?)
ERYTHROCYTE [DISTWIDTH] IN BLOOD BY AUTOMATED COUNT: 14.1 %
GLUCOSE BLD-MCNC: 129 MG/DL (ref 70–99)
GLUCOSE BLD-MCNC: 137 MG/DL (ref 70–99)
GLUCOSE BLD-MCNC: 159 MG/DL (ref 70–99)
GLUCOSE BLD-MCNC: 208 MG/DL (ref 70–99)
GLUCOSE BLD-MCNC: 239 MG/DL (ref 70–99)
HCT VFR BLD AUTO: 38.4 %
HGB BLD-MCNC: 12.3 G/DL
MAGNESIUM SERPL-MCNC: 2 MG/DL (ref 1.6–2.6)
MCH RBC QN AUTO: 27 PG (ref 26–34)
MCHC RBC AUTO-ENTMCNC: 32 G/DL (ref 31–37)
MCV RBC AUTO: 84.4 FL
OSMOLALITY SERPL CALC.SUM OF ELEC: 292 MOSM/KG (ref 275–295)
PLATELET # BLD AUTO: 348 10(3)UL (ref 150–450)
POTASSIUM SERPL-SCNC: 3.6 MMOL/L (ref 3.5–5.1)
POTASSIUM SERPL-SCNC: 5.2 MMOL/L (ref 3.5–5.1)
RBC # BLD AUTO: 4.55 X10(6)UL
SODIUM SERPL-SCNC: 137 MMOL/L (ref 136–145)
WBC # BLD AUTO: 12.1 X10(3) UL (ref 4–11)

## 2024-08-07 PROCEDURE — 99232 SBSQ HOSP IP/OBS MODERATE 35: CPT | Performed by: INTERNAL MEDICINE

## 2024-08-07 RX ORDER — POTASSIUM CHLORIDE 20 MEQ/1
40 TABLET, EXTENDED RELEASE ORAL EVERY 4 HOURS
Status: COMPLETED | OUTPATIENT
Start: 2024-08-07 | End: 2024-08-07

## 2024-08-07 NOTE — PROGRESS NOTES
Progress Note  Anastasia Hobson Patient Status:  Inpatient    1940 MRN [id number]   Location [facility] 7NE-A Attending Patience Rosa MD   Hosp Day # 7 PCP Houston Prado MD     Subjective:  Patient states she feels good today    Objective:  /58 (BP Location: Left arm)   Pulse 113   Temp 97.6 °F (36.4 °C) (Oral)   Resp 18   Ht 5' 6\" (1.676 m)   Wt 159 lb 6.4 oz (72.3 kg)   SpO2 97%   BMI 25.73 kg/m²     Telemetry: afib, HR       Intake/Output: +2260        Last 3 Weights   24 2339 159 lb 6.4 oz (72.3 kg)   24 1533 150 lb (68 kg)   24 1732 150 lb (68 kg)   24 1412 150 lb 12.8 oz (68.4 kg)       Labs:  Recent Labs   Lab 24  0541 24  0446 24  1459 24  0638   * 223* 143* 221* 159*   BUN 29* 23 27* 26* 25*   CREATSERUM 1.44* 1.39* 1.31* 1.58* 1.58*   EGFRCR 36* 38* 40* 32* 32*   CA 10.6* 10.1 9.8 9.8 9.8   ALB 5.0* 4.7  --   --   --     137 140 138 137   K 3.6 4.8 4.0 4.6 3.6    106 107 106 108   CO2 27.0 24.0 29.0 25.0 22.0   ALKPHO 46* 39*  --   --   --    AST 27 25  --   --   --    ALT 15 15  --   --   --    BILT 0.7 0.6  --   --   --    TP 7.6 7.0  --   --   --      Recent Labs   Lab 24  0541 24  0446 24  0638   RBC 4.71 4.69 4.37 4.55   HGB 12.9 12.7 11.9* 12.3   HCT 39.0 39.3 36.2 38.4   MCV 82.8 83.8 82.8 84.4   MCH 27.4 27.1 27.2 27.0   MCHC 33.1 32.3 32.9 32.0   RDW 13.8 13.8 13.8 14.1   NEPRELIM 4.15 5.99  --   --    WBC 7.6 7.5 6.5 12.1*   .0 282.0 278.0 348.0         No results for input(s): \"TROP\", \"TROPHS\", \"CK\" in the last 168 hours.  No results found for: \"PT\", \"INR\"    Diagnostics:   Echo:   Conclusions:     1. Left ventricle: The cavity size was normal. Wall thickness was normal.      Systolic function was hyperdynamic. The estimated ejection fraction was      65-70%, by visual assessment. Unable to assess LV diastolic function due      to  heart rhythm.   2. Left atrium: The left atrial volume was moderately increased.   3. Mitral valve: The annulus was moderately calcified. There was mild      regurgitation. The mean diastolic gradient was 2mm Hg.   4. Pulmonary arteries: Systolic pressure was within the normal range,      estimated to be 23mm Hg. Estimated pulmonary artery diastolic pressure      was 8mm Hg.   5. Inferior vena cava: The IVC was normal-sized. Respirophasic diameter      changes are in the normal range (> 50%).   Impressions:  This study is compared with previous dated 9/14/2022:   *       Review of Systems   Constitutional: Negative.   Cardiovascular:  Positive for irregular heartbeat.   Respiratory: Negative.         Physical Exam:    Gen: Alert, oriented x 3, in no apparent distress  Heent: Pupils equal, reactive. Mucous membranes moist.   Cardiac: irregular rate and rhythm, normal S1,S2  Lungs: Clear to auscultation  Abd: Soft, non tender, non distended  Ext: No edema  Skin: Warm, dry  Neuro: No focal deficits        Medications:     potassium chloride  40 mEq Oral Q4H    amiodarone  400 mg Oral TID    apixaban  5 mg Oral BID    docusate sodium  100 mg Oral BID    [Held by provider] amLODIPine  10 mg Oral Daily    atorvastatin  80 mg Oral Nightly    fenofibrate micronized  134 mg Oral Daily with breakfast    gabapentin  200 mg Oral QPM    hydrALAZINE  50 mg Oral TID    insulin degludec  5 Units Subcutaneous Nightly    lisinopril  20 mg Oral QPM    oxybutynin  5 mg Oral BID    lidocaine-menthol  1 patch Transdermal Daily    insulin aspart  1-68 Units Subcutaneous TID CC    insulin aspart  1-10 Units Subcutaneous TID AC and HS      dilTIAZem 5 mg/hr (08/06/24 2130)           Assessment:  Paroxysmal atrial fibrillation with RVR  HR remain  on cardizem gtt at 5 mg/hr  On amiodarone load 400 mg po BID  On eliquis for AC  S/p DCCV  S/p ablation July 2023  Echo with normal LVEF 65-70%, mod LA dilations, mild MR  Left neck mass  S/p  biopsy 8/5/2024  HTN  Type II DM   Dyslipidemia - on statin   Bladder cancer    Plan:  Add oral cardizem short acting in attempt to wean off of infusion  Continue amiodarone 400 mg po BID  Continue eliquis  Dr. Daniels to review.     Plan of care discussed with patient, RN.    JUSTIN FranksN  8/7/2024  10:22 AM  ____________________________  Pt s data reviewed and discussed with the APN.    Tele: AF      I have personally performed the medical decision making in its entirety. My additions include:  Plan  - Plan for DCCV after discharge and 3 weeks of uninterrupted OAC    R3    ____________________________  Mindy Daniels MD, FACC, FHRS  Cardiac Electrophysiololgy  [facility]  8/7/2024

## 2024-08-07 NOTE — PLAN OF CARE
Assumed pt care approx 1930.  Pt a/o x4  Plan of care reviewed w/ pt  VSS. Afebrile. Remains on room air. BP within parameters. HR is AFib on the monitor. Cards gtt at 5 overnight  Pain to back, left neck controlled w/ prn Norco and Ultram  L neck bandaid in place C/D/I, tender  No complaints nausea this shift  Tolerating diet  Up to washroom w/ SB assist, walker- slightly \"dizzy\" momentarily, resolved  Noted slight dyspnea w/ activity. Dry, hacking cough intermittently   Stool softener administered for constipation  Accuchecks completed, insulin per order- see MAR  All safety precautions maintained  All immediate needs addressed, call light within reach    Problem: PAIN - ADULT  Goal: Verbalizes/displays adequate comfort level or patient's stated pain goal  Description: INTERVENTIONS:  - Encourage pt to monitor pain and request assistance  - Assess pain using appropriate pain scale  - Administer analgesics based on type and severity of pain and evaluate response  - Implement non-pharmacological measures as appropriate and evaluate response  - Consider cultural and social influences on pain and pain management  - Manage/alleviate anxiety  - Utilize distraction and/or relaxation techniques  - Monitor for opioid side effects  - Notify MD/LIP if interventions unsuccessful or patient reports new pain  - Anticipate increased pain with activity and pre-medicate as appropriate  Outcome: Progressing     Problem: CARDIOVASCULAR - ADULT  Goal: Absence of cardiac arrhythmias or at baseline  Description: INTERVENTIONS:  - Continuous cardiac monitoring, monitor vital signs, obtain 12 lead EKG if indicated  - Evaluate effectiveness of antiarrhythmic and heart rate control medications as ordered  - Initiate emergency measures for life threatening arrhythmias  - Monitor electrolytes and administer replacement therapy as ordered  Outcome: Progressing     Problem: RESPIRATORY - ADULT  Goal: Achieves optimal ventilation and  oxygenation  Description: INTERVENTIONS:  - Assess for changes in respiratory status  - Assess for changes in mentation and behavior  - Position to facilitate oxygenation and minimize respiratory effort  - Oxygen supplementation based on oxygen saturation or ABGs  - Provide Smoking Cessation handout, if applicable  - Encourage broncho-pulmonary hygiene including cough, deep breathe, Incentive Spirometry  - Assess the need for suctioning and perform as needed  - Assess and instruct to report SOB or any respiratory difficulty  - Respiratory Therapy support as indicated  - Manage/alleviate anxiety  - Monitor for signs/symptoms of CO2 retention  Outcome: Progressing     Problem: METABOLIC/FLUID AND ELECTROLYTES - ADULT  Goal: Glucose maintained within prescribed range  Description: INTERVENTIONS:  - Monitor Blood Glucose as ordered  - Assess for signs and symptoms of hyperglycemia and hypoglycemia  - Administer ordered medications to maintain glucose within target range  - Assess barriers to adequate nutritional intake and initiate nutrition consult as needed  - Instruct patient on self management of diabetes  Outcome: Progressing

## 2024-08-07 NOTE — PROGRESS NOTES
[facility]   part of [facility]     Hospitalist Progress Note     Anastasia Hobson Patient Status:  Inpatient    1940 MRN [id number]   Location [facility] 3SW-A Attending Sanjay Scott MD   Hosp Day # 7 PCP Houston Prado MD     Chief Complaint: congestion     Subjective:     Patient with no CP/SOB/palpitation. Feels congested at times.       Objective:    Review of Systems:   A comprehensive review of systems was completed; pertinent positive and negatives stated in subjective.    Vital signs:  Temp:  [97.6 °F (36.4 °C)-98.6 °F (37 °C)] 97.6 °F (36.4 °C)  Pulse:  [] 113  Resp:  [15-23] 18  BP: (100-121)/(58-97) 100/58  SpO2:  [97 %-99 %] 97 %    Physical Exam:    General: No acute distress, pleasant   Respiratory: No wheezes, no rhonchi  Cardiovascular: S1, S2, irregularly irregular tachy in low 100s  Abdomen: Soft, Non-tender, non-distended, + bowel sounds  Neuro: No new focal deficits.   Extremities: No edema    Diagnostic Data:    Labs:  Recent Labs   Lab 24  0541 24  0446 24  0638   WBC 7.6 7.5 6.5 12.1*   HGB 12.9 12.7 11.9* 12.3   MCV 82.8 83.8 82.8 84.4   .0 282.0 278.0 348.0       Recent Labs   Lab 24  0541 24  0446 24  1459 24  0638   * 223* 143* 221* 159*   BUN 29* 23 27* 26* 25*   CREATSERUM 1.44* 1.39* 1.31* 1.58* 1.58*   CA 10.6* 10.1 9.8 9.8 9.8   ALB 5.0* 4.7  --   --   --     137 140 138 137   K 3.6 4.8 4.0 4.6 3.6    106 107 106 108   CO2 27.0 24.0 29.0 25.0 22.0   ALKPHO 46* 39*  --   --   --    AST 27 25  --   --   --    ALT 15 15  --   --   --    BILT 0.7 0.6  --   --   --    TP 7.6 7.0  --   --   --        Estimated Creatinine Clearance: 25.3 mL/min (A) (based on SCr of 1.58 mg/dL (H)).    No results for input(s): \"TROP\", \"TROPHS\", \"CK\" in the last 168 hours.    No results for input(s): \"PTP\", \"INR\" in the last 168 hours.             Microbiology    No results  found for this visit on 07/31/24.      Imaging: Reviewed in Epic.    Medications:    potassium chloride  40 mEq Oral Q4H    amiodarone  400 mg Oral TID    apixaban  5 mg Oral BID    docusate sodium  100 mg Oral BID    [Held by provider] amLODIPine  10 mg Oral Daily    atorvastatin  80 mg Oral Nightly    fenofibrate micronized  134 mg Oral Daily with breakfast    gabapentin  200 mg Oral QPM    hydrALAZINE  50 mg Oral TID    insulin degludec  5 Units Subcutaneous Nightly    lisinopril  20 mg Oral QPM    oxybutynin  5 mg Oral BID    lidocaine-menthol  1 patch Transdermal Daily    insulin aspart  1-68 Units Subcutaneous TID CC    insulin aspart  1-10 Units Subcutaneous TID AC and HS       Assessment & Plan:      #Paroxysmal a. Fib with RVR  - Cardizem drip   - Amiodarone per cards  - Eliquis   - Echo with normal EF, mild MR  - sp DCCV, sp ablation 7/2023  - cardiology following   - plan to transition to oral rate control     #Soft tissue mass   -ENT eval appreciated   -S/p IR guided biopsy on 8.5  -F/u biopsy report    #Left neck pain   - 2/2 soft tissue mass vs chronic pain from ACDF surgery history  -Patient follows with neuro surgery and pain specialist  -Continue pain control     #History of ACDF C4-6  Patient follows with spine surgery as outpatient, she was seeking a 2nd opinion and has a schedule next week   Spine surgery eval appreciated     #Dysphagia  - SLP     #Hypercalcemia - resolved  #CKD 3B -  Cr at baseline  #Essential hypertension - hydralazine, lisinopril, amlodipine held  #Dyslipidemia - Lipitor     #Diabetes mellitus  -Tresiba  -Correctional  -Carb  -A1c - 6.9     #Bladder cancer        Patience Rosa MD      Supplementary Documentation:     Quality:  DVT Mechanical Prophylaxis:   SCDs,    DVT Pharmacologic Prophylaxis   Medication    apixaban (Eliquis) tab 5 mg                Code Status: DNAR/Selective Treatment  Correia: No urinary catheter in place  Correia Duration (in days):   Central line:    DOMINIQUE:      Discharge is dependent on: CT findings   At this point Ms. Hobson is expected to be discharge to: Home    The 21st Century Cures Act makes medical notes like these available to patients in the interest of transparency. Please be advised this is a medical document. Medical documents are intended to carry relevant information, facts as evident, and the clinical opinion of the practitioner. The medical note is intended as peer to peer communication and may appear blunt or direct. It is written in medical language and may contain abbreviations or verbiage that are unfamiliar.

## 2024-08-07 NOTE — DIETARY NOTE
[facility]   part of [facility]   CLINICAL NUTRITION    Anastasia Hobson     Admitting diagnosis:  Azotemia [R79.89]  Anticoagulated [Z79.01]  Atrial fibrillation, chronic (HCC) [I48.20]  Mass of left side of neck [R22.1]  Type 2 diabetes mellitus with hyperglycemia, unspecified whether long term insulin use (HCC) [E11.65]    Ht: 167.6 cm (5' 6\")  Wt: 72.3 kg (159 lb 6.4 oz).   Body mass index is 25.73 kg/m².  IBW: 59.1 kg    Wt Readings from Last 6 Encounters:   07/31/24 72.3 kg (159 lb 6.4 oz)   07/31/24 68 kg (150 lb)   07/23/24 68.4 kg (150 lb 12.8 oz)   01/17/24 67 kg (147 lb 9.6 oz)   12/27/23 66.8 kg (147 lb 6 oz)   11/15/23 68 kg (150 lb)        Labs/Meds reviewed    Diet:       Procedures    Regular/General diet Calorie Restriction/Carb Controlled: 1800 kcal/60 grams; Is Patient on Accuchecks? Yes     Percent Meals Eaten (last 3 days)       Date/Time Percent Meals Eaten (%)    08/05/24 0927 100 %    08/05/24 1355 80 %    08/06/24 0815 50 %    08/06/24 1415 100 %    08/07/24 0815 100 %            Pt chart reviewed d/t LOS.  Patient reports good appetite at this time.  Nursing notes reports Percent Meals Eaten (%): 100 % intake for last meal.  Tolerating po diet without diarrhea, emesis, or constipation.   No significant weight changes noted.     PMH includes HTN, DM.  Pt screened for LOS. Pt tolerating % of meals. Ate 100% breakfast this AM (Romansh toast, orange juice). No n/v/d reported. LBM 8/5. NO significant wt changes per EMR    Patient is at low nutrition risk at this time.    Please consult if patient status changes or nutrition issues arise.    Argenis Bell RD, LDN, [facility]  Clinical Dietitian  Phone [phone]

## 2024-08-08 VITALS
HEIGHT: 66 IN | DIASTOLIC BLOOD PRESSURE: 71 MMHG | SYSTOLIC BLOOD PRESSURE: 125 MMHG | TEMPERATURE: 98 F | RESPIRATION RATE: 16 BRPM | BODY MASS INDEX: 25.61 KG/M2 | WEIGHT: 159.38 LBS | HEART RATE: 104 BPM | OXYGEN SATURATION: 98 %

## 2024-08-08 LAB
GLUCOSE BLD-MCNC: 157 MG/DL (ref 70–99)
GLUCOSE BLD-MCNC: 206 MG/DL (ref 70–99)

## 2024-08-08 PROCEDURE — 99239 HOSP IP/OBS DSCHRG MGMT >30: CPT | Performed by: INTERNAL MEDICINE

## 2024-08-08 RX ORDER — HYDROCODONE BITARTRATE AND ACETAMINOPHEN 5; 325 MG/1; MG/1
1 TABLET ORAL EVERY 4 HOURS PRN
Qty: 20 TABLET | Refills: 0 | Status: SHIPPED | OUTPATIENT
Start: 2024-08-08 | End: 2024-08-12

## 2024-08-08 RX ORDER — AMIODARONE HYDROCHLORIDE 200 MG/1
TABLET ORAL
Qty: 73 TABLET | Refills: 0 | Status: SHIPPED | OUTPATIENT
Start: 2024-08-08 | End: 2024-09-02

## 2024-08-08 RX ORDER — DILTIAZEM HYDROCHLORIDE 120 MG/1
120 CAPSULE, EXTENDED RELEASE ORAL DAILY
Status: DISCONTINUED | OUTPATIENT
Start: 2024-08-08 | End: 2024-08-08

## 2024-08-08 RX ORDER — DILTIAZEM HYDROCHLORIDE 120 MG/1
120 CAPSULE, EXTENDED RELEASE ORAL DAILY
Qty: 30 CAPSULE | Refills: 11 | Status: SHIPPED | OUTPATIENT
Start: 2024-08-09 | End: 2025-08-09

## 2024-08-08 RX ORDER — TRAMADOL HYDROCHLORIDE 50 MG/1
50 TABLET ORAL EVERY 6 HOURS PRN
Qty: 15 TABLET | Refills: 0 | Status: SHIPPED | OUTPATIENT
Start: 2024-08-08

## 2024-08-08 NOTE — PROGRESS NOTES
[facility] Cardiology Progress Note    Anastasia Hobson Patient Status:  Inpatient    1940 MRN [id number]   Location [facility] 7NE-A Attending Patience Rosa MD   Hosp Day # 8 PCP Houston Prado MD     Subjective:  No acute events overnight  Up walking in halls, mild dyspnea but no chst pain  C/o neck pain, liocaine patch in place     Objective:  /78 (BP Location: Left arm)   Pulse 103   Temp 98.1 °F (36.7 °C) (Oral)   Resp 16   Ht 5' 6\" (1.676 m)   Wt 159 lb 6.4 oz (72.3 kg)   SpO2 100%   BMI 25.73 kg/m²     Telemetry: aflib       Intake/Output:    Intake/Output Summary (Last 24 hours) at 2024 1022  Last data filed at 2024 1900  Gross per 24 hour   Intake 720 ml   Output --   Net 720 ml       Last 3 Weights   24 2339 159 lb 6.4 oz (72.3 kg)   24 1533 150 lb (68 kg)   24 1732 150 lb (68 kg)   24 1412 150 lb 12.8 oz (68.4 kg)       Labs:  Recent Labs   Lab 24  0446 24  1459 24  0638 24  1634   * 221* 159*  --    BUN 27* 26* 25*  --    CREATSERUM 1.31* 1.58* 1.58*  --    EGFRCR 40* 32* 32*  --    CA 9.8 9.8 9.8  --     138 137  --    K 4.0 4.6 3.6 5.2*    106 108  --    CO2 29.0 25.0 22.0  --      Recent Labs   Lab 24  0446 24  0638   RBC 4.37 4.55   HGB 11.9* 12.3   HCT 36.2 38.4   MCV 82.8 84.4   MCH 27.2 27.0   MCHC 32.9 32.0   RDW 13.8 14.1   WBC 6.5 12.1*   .0 348.0         No results for input(s): \"TROP\", \"TROPHS\", \"CK\" in the last 168 hours.    Diagnostics:             Physical Exam:    Physical Exam  Cardiovascular:      Rate and Rhythm: Normal rate. Rhythm irregular.   Pulmonary:      Effort: Pulmonary effort is normal.      Breath sounds: No wheezing, rhonchi or rales.   Abdominal:      Palpations: Abdomen is soft.   Musculoskeletal:      Right lower leg: No edema.      Left lower leg: No edema.   Skin:     General: Skin is warm and dry.   Neurological:      Mental  Status: She is alert and oriented to person, place, and time.         Medications:   dilTIAZem  30 mg Oral 4 times per day    amiodarone  400 mg Oral TID    apixaban  5 mg Oral BID    docusate sodium  100 mg Oral BID    [Held by provider] amLODIPine  10 mg Oral Daily    atorvastatin  80 mg Oral Nightly    fenofibrate micronized  134 mg Oral Daily with breakfast    gabapentin  200 mg Oral QPM    hydrALAZINE  50 mg Oral TID    insulin degludec  5 Units Subcutaneous Nightly    lisinopril  20 mg Oral QPM    oxybutynin  5 mg Oral BID    lidocaine-menthol  1 patch Transdermal Daily    insulin aspart  1-68 Units Subcutaneous TID CC    insulin aspart  1-10 Units Subcutaneous TID AC and HS      dilTIAZem Stopped (08/07/24 1500)       Assessment:   82 yo presented with Left neck pain, found to have a mass.     1)PAF s/p ablation 2023- pEF, dilt for rate control, Amio loading with plans for outpt DCCV     2) Left neck mass- s/p bipasy 8/5, results pending     3) HTN- controlled    4) DM2    5. HL-stain     6) CKD 3    7)Bladder CA    8) h/o Cervical spine Fx- s/p ACDF 10/2023      Plan:    Change to long acting Cardizem CD 120mg daily  Amio loading/taper  Office follow up 2 weeks, and can discuss plans for DCCV if uninterupted anticoagulation  Neck biopsy results pending.   Ok to discharge from cardiology perspective      Sharee Miranda, RUIZ  8/8/2024  10:22 AM  Ph [phone] ([place])  Ph [phone] ([place])

## 2024-08-08 NOTE — PLAN OF CARE
Assumed care at 0730  Orientated x4, afib (HR 's), RA   Complaints of left neck pain; tolerating pain management     Transitioned to PO Cardizem, Cardizem drip stopped    Up to hallway x2  Neuro intact   Needs met     Plan for continued heart rate management

## 2024-08-08 NOTE — PLAN OF CARE
Assumed care 1900  A+Ox4, RA, AF on tele    -HR 90s  Norco for pain PRN   -see flowsheets  VSS  Call light in reach, needs addressed  Report given 2300

## 2024-08-08 NOTE — CM/SW NOTE
08/08/24 1400   CM/SW Referral Data   Referral Source    Reason for Referral Discharge planning   Informant Patient   Patient Info   Patient lives with Daughter   Patient Status Prior to Admission   Independent with ADLs and Mobility Yes     Noted pt discharging today and has no skilled needs.  Met with patient to confirm and she reports she lives with her daughter and she is independent, but daughter assists her with keeping track of her medications.    / to remain available for support and/or discharge planning.     Denise GARCIAA MSN, RN CTL/  [phone]

## 2024-08-08 NOTE — PLAN OF CARE
Assumed care at 0730  Orientated x4, afib (HR 's), RA   Complaints of left neck pain; tolerating pain management      Transitioned to [facility]     Up to hallway x2  Neuro intact   Needs met     Plan for discharge home if cleared by consults          NURSING DISCHARGE NOTE    Discharged Home via Wheelchair.  Accompanied by Support staff  Belongings Taken by patient/family.    All consults cleared for discharge   Patient educated on discharge paperwork   No further questions from patient

## 2024-08-09 ENCOUNTER — PATIENT OUTREACH (OUTPATIENT)
Dept: CASE MANAGEMENT | Age: 84
End: 2024-08-09

## 2024-08-09 NOTE — PROGRESS NOTES
NCM attempted to contact the patient for HFU. Phone rang a few times then a recording turned on that stated, \"sorry they can't take the call right now, thanks and goodbye\". And then the phone disconnected. NCM will try again later.

## 2024-08-12 ENCOUNTER — TELEPHONE (OUTPATIENT)
Dept: SURGERY | Facility: CLINIC | Age: 84
End: 2024-08-12

## 2024-08-12 ENCOUNTER — TELEPHONE (OUTPATIENT)
Dept: FAMILY MEDICINE CLINIC | Facility: CLINIC | Age: 84
End: 2024-08-12

## 2024-08-12 DIAGNOSIS — C7A.8 NEUROENDOCRINE CARCINOMA (HCC): Primary | ICD-10-CM

## 2024-08-12 DIAGNOSIS — C67.2 MALIGNANT NEOPLASM OF LATERAL WALL OF URINARY BLADDER (HCC): ICD-10-CM

## 2024-08-12 RX ORDER — HYDROCODONE BITARTRATE AND ACETAMINOPHEN 5; 325 MG/1; MG/1
1 TABLET ORAL EVERY 8 HOURS PRN
Qty: 60 TABLET | Refills: 0 | Status: SHIPPED | OUTPATIENT
Start: 2024-08-12

## 2024-08-12 RX ORDER — TRAMADOL HYDROCHLORIDE 50 MG/1
50 TABLET ORAL EVERY 6 HOURS PRN
Qty: 15 TABLET | Refills: 0 | Status: CANCELLED | OUTPATIENT
Start: 2024-08-12

## 2024-08-12 NOTE — TELEPHONE ENCOUNTER
Attempted to contact patient for TCM however unable to reach the pt.  Patient does not have an appointment scheduled at this time.  TCM appointment recommended by 8/15/24 as patient is a High risk for readmission.  Please advise.    BOOK BY DATE (last date for TCM): 8/22/24    Clinical staff:  Please follow-up with patient and try to get them to schedule as patient would greatly benefit from TCM appointment.  Thank you!

## 2024-08-12 NOTE — TELEPHONE ENCOUNTER
Please see other TE 08/12/24  \"Patient's daughter notified of TCM options and given contact information as they were attempting to reach patient.      Patient's daughter declined to schedule patient follow up at this time, states patient is in excruciating pain and is unable to leave the home.    Patient's daughter calling to request refill of HYDROcodone-acetaminophen 5-325 MG Oral Tab AND traMADol 50 MG Oral Tab\"

## 2024-08-12 NOTE — TELEPHONE ENCOUNTER
Advised patient's dtr of Dr. Awad's note below. Patient's dtr verbalized understanding, would like refill for norco - takes every 4 hours. No further questions at this time.    Order(s) pending, please review. Thank you.  Juan Diamond

## 2024-08-12 NOTE — TELEPHONE ENCOUNTER
Msg below noted.    Pt was consulted on in the ER by RUIZ, however no follow-up indicated at this time per Nsgy, and pt awaiting biopsy results at this time.    Was informed that her request to the pt PCP is correct, and could see the Rx request has already been sent to the MD for signature.    Pt daughter acknowledged and is appreciative of call and info.    Advised to call back with any other questions.    Nothing further needed at this time, closing encounter.

## 2024-08-12 NOTE — TELEPHONE ENCOUNTER
Patient's daughter calling, states patient was discharged from hospital late Thursday evening, placed on tramadol and norco by hospital physicians. Patient was unable to get in to pain specialist due to trip to ED.   Patient's daughter notified of TCM options and given contact information as they were attempting to reach patient.     Patient's daughter declined to schedule patient follow up at this time, states patient is in excruciating pain and is unable to leave the home.     Patient's daughter calling to request refill of HYDROcodone-acetaminophen 5-325 MG Oral Tab AND traMADol 50 MG Oral Tab   Pharmacy [id number] - [place], [place] - [address] [facility] [phone], [phone] [[id number]]

## 2024-08-12 NOTE — TELEPHONE ENCOUNTER
Patient's daughter called not sure who to call to request a refill  on Tramadol 50mg and Hydrocodone 5-325mg. Patient was consulted while she was in the  hospital by ALISON MCLEOD

## 2024-08-12 NOTE — TELEPHONE ENCOUNTER
Please let pt know I cannot refill both these meds due to adverse effects on older patients. Please check how often she is taking them Thank you

## 2024-08-12 NOTE — TELEPHONE ENCOUNTER
Please see note below    (Pt's dtr also called Dr. Krishnan/[facility] office - \"pt's dtr called not sure who to call to request refill on tramadol and norco\")    LOV/MAWV 01/17/24  Last refill on 08/08/24, for #20 tabs, with 0 refills  HYDROcodone-acetaminophen 5-325 MG Oral Tab - ordered by Dr. Rosa    Last refill on 08/08/24, for #15 tabs, with 0 refills  traMADol 50 MG Oral Tab - ordered by Dr. Rosa    Future Appointments   Date Time Provider Department Center   10/23/2024  1:00 PM Jacque Urena, RUIZ [facility]     Order(s) pending, please review. Thank you.

## 2024-08-14 NOTE — TELEPHONE ENCOUNTER
Referral placed. Hematology oncologists handle all types of cancers and will be the best place to start

## 2024-08-14 NOTE — TELEPHONE ENCOUNTER
Per Dr. Awad: She will need to see Heme onc. I can place referral     Advised patient's dtr of Dr. Awad's note above. Patient's dtr verbalized understanding, agreeable to referral, but dtr asking- is there a neuro-oncologist?    Please advise, thank you

## 2024-08-14 NOTE — TELEPHONE ENCOUNTER
Advised patient of Dr. Awad's note below. Patient verbalized understanding and is unsure who she is to follow-up with regarding biopsy results and stated will need to make calls. Offered to schedule appt with Dr. Awad - pt declines at this time.   Advised pt to call office when ready to schedule appt with Dr. Awad - she v/u. No further questions at this time.

## 2024-08-14 NOTE — TELEPHONE ENCOUNTER
MCM sent to pt with referral contact info    Advised patient's dtr of Dr. Awad's note below. Patient's dtr verbalized understanding. Advised her that LISBET sent with referral contact info  she v/u. No further questions at this time.

## 2024-08-14 NOTE — TELEPHONE ENCOUNTER
Who are they going to follow up with for the biopsy results? I am happy to see her after she does that follow up as VV. I want to see her incase she needs any home health. Thank you

## 2024-08-14 NOTE — TELEPHONE ENCOUNTER
DAUGHTER CALLED AND ADV THEY HAVE RECEIVED TEST RESULTS OF BIOPSY.    WOULD LIKE TO KNOW WHAT THE NEXT STEP IS.    PLEASE ADV    THANK YOU

## 2024-08-14 NOTE — TELEPHONE ENCOUNTER
Advised patient and dtr of notes below. Patient and dtr verbalized understanding.   Offered to schedule TCM HFU appt with Dr. Awad - pt unable to leave home at this time, dtr does not want pt to take stairs due to safety    Advised pt/dtr will inquire if ok to schedule video visit as HFU appt - rosario v/u  Dtr hesitant to schedule appt with PCP since they are still waiting on biopsy results and that pt will be returning to hospital to remove mass eventually    Advised pt and dtr will discuss with Dr. Awad - they v/u. No further questions at this time.    Please advise, thank you

## 2024-08-19 ENCOUNTER — OFFICE VISIT (OUTPATIENT)
Dept: HEMATOLOGY/ONCOLOGY | Facility: HOSPITAL | Age: 84
End: 2024-08-19
Attending: INTERNAL MEDICINE
Payer: MEDICARE

## 2024-08-19 VITALS
BODY MASS INDEX: 19.87 KG/M2 | OXYGEN SATURATION: 96 % | DIASTOLIC BLOOD PRESSURE: 83 MMHG | HEART RATE: 75 BPM | HEIGHT: 64.33 IN | WEIGHT: 116.38 LBS | SYSTOLIC BLOOD PRESSURE: 123 MMHG | TEMPERATURE: 98 F

## 2024-08-19 DIAGNOSIS — C67.2 MALIGNANT NEOPLASM OF LATERAL WALL OF URINARY BLADDER (HCC): ICD-10-CM

## 2024-08-19 DIAGNOSIS — Z79.4 TYPE 2 DIABETES MELLITUS WITH STAGE 3B CHRONIC KIDNEY DISEASE, WITH LONG-TERM CURRENT USE OF INSULIN (HCC): ICD-10-CM

## 2024-08-19 DIAGNOSIS — C76.0 HEAD AND NECK CANCER (HCC): Primary | ICD-10-CM

## 2024-08-19 DIAGNOSIS — R11.0 NAUSEA: ICD-10-CM

## 2024-08-19 DIAGNOSIS — G89.3 CANCER ASSOCIATED PAIN: ICD-10-CM

## 2024-08-19 DIAGNOSIS — E11.22 TYPE 2 DIABETES MELLITUS WITH STAGE 3B CHRONIC KIDNEY DISEASE, WITH LONG-TERM CURRENT USE OF INSULIN (HCC): ICD-10-CM

## 2024-08-19 DIAGNOSIS — N18.32 TYPE 2 DIABETES MELLITUS WITH STAGE 3B CHRONIC KIDNEY DISEASE, WITH LONG-TERM CURRENT USE OF INSULIN (HCC): ICD-10-CM

## 2024-08-19 PROCEDURE — 99205 OFFICE O/P NEW HI 60 MIN: CPT | Performed by: INTERNAL MEDICINE

## 2024-08-19 PROCEDURE — G2211 COMPLEX E/M VISIT ADD ON: HCPCS | Performed by: INTERNAL MEDICINE

## 2024-08-19 RX ORDER — OXYCODONE HCL 10 MG/1
10 TABLET, FILM COATED, EXTENDED RELEASE ORAL 2 TIMES DAILY
Qty: 60 TABLET | Refills: 0 | Status: SHIPPED | OUTPATIENT
Start: 2024-08-19 | End: 2024-09-18

## 2024-08-19 RX ORDER — HYDRALAZINE HYDROCHLORIDE 50 MG/1
TABLET, FILM COATED ORAL
COMMUNITY
Start: 2024-03-11

## 2024-08-19 RX ORDER — ONDANSETRON HYDROCHLORIDE 8 MG/1
8 TABLET, FILM COATED ORAL EVERY 8 HOURS PRN
Qty: 30 TABLET | Refills: 3 | Status: SHIPPED | OUTPATIENT
Start: 2024-08-19

## 2024-08-19 RX ORDER — HYDROCODONE BITARTRATE AND ACETAMINOPHEN 5; 325 MG/1; MG/1
1 TABLET ORAL EVERY 4 HOURS PRN
Qty: 90 TABLET | Refills: 0 | Status: SHIPPED | OUTPATIENT
Start: 2024-08-19

## 2024-08-19 RX ORDER — INSULIN LISPRO 100 [IU]/ML
INJECTION, SOLUTION INTRAVENOUS; SUBCUTANEOUS
Qty: 15 ML | Refills: 0 | Status: SHIPPED | OUTPATIENT
Start: 2024-08-19

## 2024-08-19 NOTE — PROGRESS NOTES
Hematology/Oncology Initial Consultation Note    Patient Name: Anastasia Hobson  Medical Record Number: [id number]    YOB: 1940   Date of Consultation: 8/19/2024   PCP: Houston Prado MD  Others: Josh Hernandez (urology)    Reason for Consultation:  Anastasia Hobson was seen today for the diagnosis of poorly differentiated carcinoma of neck    Oncologic History:  ~ left neck pain for weeks to months  7/31/24: MRI cervical spine with partially imaged left neck mass  8/1/24: CT with soft tissue mass centered within left supraclavicular station, 4.2x2.9x4.2cm  8/5/24: Neck mass biopsy with path positive for poorly differentiated carcinoma with focal neuroendocrine differentiated. Notably p16 positive.    History of Present Illness:      84 y/o F PMH hx urothelial carcinoma, afib on apixaban, CKD stage 3, who presents for evaluation of new poorly differentiated carcinoma of neck    - last cystoscopy was done 6/2024 with FELY. The last time disease was found was on cystoscopy 11/2023, found to have high grade papillary urothelial carcinoma s/p LD BCG x 6 with FELY on subsequent cystoscopy 3/2024  - having a lot of pain in her neck not adequately relieved with current regimen, taking Norco 5-325mg q4h around the clock   - she was constipated for at least a few weeks but is now going  - having ongoing nausea  - she has poor appetite, but her daughter is forcing her to eat regularly so her weight is stable  - she is able to take care of all of her ADLs, her daughter makes sure she gets up to walk with her walker 3 times a day to keep her physical activity going  - she had a cervical fracture in 10/2023 after she fell, had it surgically fixed and did physical therapy afterwards  - has hx of smoking at least 1ppd for 40 years, quit 25 years ago  - here with daughter Petty today who she lives with, and is very involved in her care    Past Medical History:  Past Medical History:    Arrhythmia    Back pain     Bladder cancer (HCC)    high grade superficial TCC, recurrent July 2020 after BCG    Bloating    Blurred vision    Congestive heart disease (HCC)    Constipation    Diabetes (HCC)    IDDM     Diabetes mellitus (HCC)    Diarrhea, unspecified    CLARKE (dyspnea on exertion)    Easy bruising    Elevated cholesterol    Essential hypertension    Fatigue    Flatulence/gas pain/belching    Frequent urination    taking furosemide daily    Headache disorder    Hearing loss    Heart palpitations    Heartburn    occasionally    High blood pressure    High cholesterol    History of falling    Hyperlipidemia    Irregular bowel habits    Itch of skin    right hand only    Leaking of urine    sometimes    Leg swelling    Osteoporosis    Sleep disturbance    Stool incontinence    when I have diarrhea    Type 2 diabetes mellitus with hyperglycemia (HCC)    Visual impairment    glasses    Wears glasses       Past Surgical History:   Procedure Laterality Date    Cataract      Cervical spine surgery  10/06/2023    Colonoscopy      Colonoscopy      Cystoscopy,insert ureteral stent      Cystourethroscopy  11/25/2020    Cystoscopy Procedure Dr Josh Hernandez  - recurrent tumor on BTS    Cystourethroscopy,biopsy  12/16/2019    BBx of scar sites, NSC    Cystourethroscopy,fulgur .5-2cm lesn  04/16/2020    TURBT, NSC    Cystourethroscopy,fulgur .5-2cm lesn  07/30/2020    TURBT with Gemcitabine    Cystourethroscopy,fulgur >5cm lesn  10/03/2019    TURBT with bilateral RPGs (NSC)    Fracture surgery      left wrist    Hysterectomy      age 31    Other surgical history  04/20/2021    BTS Dr David Loaiza    Other surgical history  08/17/2021    BTS Cysto Jie Hernandez    Other surgical history  11/16/2021    BTS Cysto W/ caterigayathri Hernandez     Other surgical history  03/08/2022    BTS Cysto- Dr. Hernandez    Spine surgery procedure unlisted  10/06/2003    Tonsillectomy  1946    Total abdom hysterectomy  1971       Home Medications:  No outpatient medications have  been marked as taking for the 24 encounter (Appointment) with José Manuel Fernandez MD.     -------  Current Outpatient Medications on File Prior to Visit   Medication Sig Dispense Refill    HYDROcodone-acetaminophen 5-325 MG Oral Tab Take 1 tablet by mouth every 8 (eight) hours as needed. 60 tablet 0    traMADol 50 MG Oral Tab Take 1 tablet (50 mg total) by mouth every 6 (six) hours as needed for Pain. 15 tablet 0    amiodarone 200 MG Oral Tab Take 2 tablets (400 mg total) by mouth in the morning, at noon, and at bedtime for 4 days, THEN 2 tablets (400 mg total) 2 (two) times daily for 7 days, THEN 2 tablets (400 mg total) daily for 7 days, THEN 1 tablet (200 mg total) daily for 7 days. 73 tablet 0    dilTIAZem HCl ER Beads 120 MG Oral Capsule SR 24 Hr Take 1 capsule (120 mg total) by mouth daily. 30 capsule 11    gabapentin 100 MG Oral Cap Take 1 capsule (100 mg total) by mouth nightly. 90 capsule 0    [] traMADol 50 MG Oral Tab Take 1-2 tablets ( mg total) by mouth every 6 (six) hours as needed for Pain. 30 tablet 0    [] diazePAM 2 MG Oral Tab Take 1 tablet (2 mg total) by mouth 3 (three) times daily as needed for Anxiety. 20 tablet 0    [] predniSONE 20 MG Oral Tab Take 2 tablets (40 mg total) by mouth daily for 4 days. 8 tablet 0    LANTUS SOLOSTAR 100 UNIT/ML Subcutaneous Solution Pen-injector INJECT 8 UNITS INTO THE SKIN NIGHTLY. *PEN EXPIRES 28 DAYS AFTER FIRST USE 15 mL 0    Insulin Lispro, 1 Unit Dial, (HUMALOG KWIKPEN) 100 UNIT/ML Subcutaneous Solution Pen-injector INJECT 10-12 UNITS INTO THE SKIN THREE TIMES DAILY WITH MEALS AS DIRECTED 15 mL 0    LISINOPRIL 20 MG Oral Tab TAKE ONE TABLET BY MOUTH EVERY EVENING 90 tablet 0    Insulin Pen Needle (BD PEN NEEDLE PRISCILLA U/F) 32G X 4 MM Does not apply Misc Inject 1 Pen into the skin daily. 90 each 0    ATORVASTATIN 80 MG Oral Tab Take 1 tablet (80 mg total) by mouth nightly. 90 tablet 0    Continuous Blood Gluc Sensor (FREESTYLE NATALIE 2  SENSOR) Does not apply Misc 1 Device by Subdermal route every 14 (fourteen) days. 6 each 3    tolterodine 2 MG Oral Tab Take 1 tablet (2 mg total) by mouth daily.      fenofibrate 145 MG Oral Tab Take 1 tablet (145 mg total) by mouth daily. 90 tablet 0    risedronate 35 MG Oral Tab Take 1 tablet (35 mg total) by mouth every 7 days. 12 tablet 3    Continuous Blood Gluc  (FREESTYLE NATALIE 2 READER) Does not apply Device 1 each daily. 1 each 1    Continuous Blood Gluc  (FREESTYLE NATALIE 2 READER) Does not apply Device 1 Device daily as needed. 1 each 0    metFORMIN HCl 1000 MG Oral Tab Take 1 tablet (1,000 mg total) by mouth daily with breakfast. 180 tablet 2    PROBIOTIC PRODUCT OR Take 1 capsule by mouth daily. 100 billion      hydrALAZINE 25 MG Oral Tab Take 2 tablets (50 mg total) by mouth 3 (three) times daily.      furosemide 40 MG Oral Tab Take 0.5 tablets (20 mg total) by mouth daily.      Potassium Chloride ER 20 MEQ Oral Tab CR Take 1 tablet by mouth daily.      apixaban 5 MG Oral Tab Take 1 tablet (5 mg total) by mouth 2 (two) times daily. 60 tablet 2    Cholecalciferol (VITAMIN D3) 250 MCG (80933 UT) Oral Tab Take 1,000 Units by mouth daily.      Calcium Citrate-Vitamin D 315-250 MG-UNIT Oral Tab Take 1 tablet by mouth daily.       Current Facility-Administered Medications on File Prior to Visit   Medication Dose Route Frequency Provider Last Rate Last Admin    [COMPLETED] potassium chloride (Klor-Con M20) tab 40 mEq  40 mEq Oral Q4H Patience Rosa MD   40 mEq at 08/07/24 1156    [COMPLETED] metoprolol (Lopressor) 5 mg/5mL injection 5 mg  5 mg Intravenous Once Schiazza, Stephy, DO   5 mg at 08/06/24 0034    [COMPLETED] alum-mag hydroxide-simethicone (Maalox) 200-200-20 MG/5ML oral suspension 30 mL  30 mL Oral Once Schiazza, Stephy, DO   30 mL at 08/06/24 0035    [COMPLETED] dilTIAZem 5 mg BOLUS FROM BAG infusion  5 mg Intravenous Once Schiazza, Stephy, DO   5 mg at 08/06/24 0215    [COMPLETED]  metoprolol (Lopressor) 5 mg/5mL injection 5 mg  5 mg Intravenous Once Joaquin Ballesteros MD   5 mg at 24 1137    [COMPLETED] iopamidol 76% (ISOVUE-370) injection for power injector  50 mL Intravenous ONCE PRN Sanjay Scott MD   50 mL at 24 1815    [COMPLETED] ondansetron (Zofran) 4 MG/2ML injection 4 mg  4 mg Intravenous Once Luisana Atkinson MD   4 mg at 24 1738    [COMPLETED] methylPREDNISolone sodium succinate (Solu-MEDROL) injection 40 mg  40 mg Intravenous Once Luisana Atkinson MD   40 mg at 24 1827    [COMPLETED] gadoterate meglumine (Dotarem) 7.5 MMOL/15ML injection 15 mL  15 mL Intravenous ONCE PRN Luisana Atkinson MD   15 mL at 24 2212    [COMPLETED] sodium chloride 0.9% infusion 1,000 mL  1,000 mL Intravenous Once Luisana Atkinson  mL/hr at 24 2241 1,000 mL at 24 2241       Allergies:   No Known Allergies    Psychosocial History:  Social History     Social History Narrative    Not on file     Social History     Socioeconomic History    Marital status:    Tobacco Use    Smoking status: Former     Current packs/day: 0.00     Average packs/day: 1.1 packs/day for 65.3 years (73.5 ttl pk-yrs)     Types: Cigarettes     Quit date: 2007     Years since quittin.1    Smokeless tobacco: Never    Tobacco comments:     quit 15 years    Vaping Use    Vaping status: Never Used   Substance and Sexual Activity    Alcohol use: No    Drug use: No     Social Determinants of Health     Financial Resource Strain: Low Risk  (2023)    Financial Resource Strain     Difficulty of Paying Living Expenses: Not very hard     Med Affordability: No   Food Insecurity: No Food Insecurity (2024)    Food Insecurity     Food Insecurity: Never true   Transportation Needs: No Transportation Needs (2024)    Transportation Needs     Lack of Transportation: No   Housing Stability: Low Risk  (2024)    Housing Stability     Housing Instability: No        Family Medical History:  Family History   Problem Relation Age of Onset    Diabetes Father     Stroke Father     Colon Polyps Mother     Diabetes Mother     Heart Attack Mother     Other (Other) Son         Afib       Review of Systems:  A 10-point ROS was done with pertinent positives and negative per the HPI    Vital Signs:  Height: --  Weight: --  BSA (Calculated - sq m): --  Pulse: --  BP: --  Temp: --  Do Not Use - Resp Rate: --  SpO2: --    Wt Readings from Last 6 Encounters:   07/31/24 72.3 kg (159 lb 6.4 oz)   07/31/24 68 kg (150 lb)   07/23/24 68.4 kg (150 lb 12.8 oz)   01/17/24 67 kg (147 lb 9.6 oz)   12/27/23 66.8 kg (147 lb 6 oz)   11/15/23 68 kg (150 lb)       ECOG PS: 1-2    Physical Examination:  General: Patient is alert and oriented, not in acute distress  Psych: Tearful affect  Eyes: EOMI, PERRL  ENT: Slightly palpable L supraclavicular mass  CV: no LE edema  Respiratory: Non-labored respirations  GI/Abd: Soft, non-tender with normoactive bowel sounds, no hepatosplenomegaly  Neurological: Grossly intact   LAD: No axillary lymphadenopathy felt but she has tenderness of L axilla palpation  Skin: no rashes or petechiae    Laboratory:  Lab Results   Component Value Date    WBC 12.1 (H) 08/07/2024    WBC 6.5 08/03/2024    WBC 7.5 08/01/2024    HGB 12.3 08/07/2024    HGB 11.9 (L) 08/03/2024    HGB 12.7 08/01/2024    HCT 38.4 08/07/2024    MCV 84.4 08/07/2024    MCH 27.0 08/07/2024    MCHC 32.0 08/07/2024    RDW 14.1 08/07/2024    .0 08/07/2024    .0 08/03/2024    .0 08/01/2024     Lab Results   Component Value Date     (H) 08/07/2024    BUN 25 (H) 08/07/2024    BUNCREA 26.1 (H) 04/06/2021    CREATSERUM 1.58 (H) 08/07/2024    CREATSERUM 1.58 (H) 08/06/2024    CREATSERUM 1.31 (H) 08/03/2024    ANIONGAP 7 08/07/2024    GFRNAA 40 (L) 02/23/2022    GFRAA 47 (L) 02/23/2022    CA 9.8 08/07/2024    OSMOCALC 292 08/07/2024    ALKPHO 39 (L) 08/01/2024    AST 25 08/01/2024    ALT  15 2024    BILT 0.6 2024    TP 7.0 2024    ALB 4.7 2024    GLOBULIN 2.3 2024     2024    K 5.2 (H) 2024     2024    CO2 22.0 2024     Lab Results   Component Value Date    PTT 32.8 09/15/2022       Imagin/1/24 CT neck:  Soft tissue density mass centered within the left supraclavicular station, 4.2 x 2.9 x 4.2 cm.  This is highly suspicious for a malignant process, either primary or metastatic.     Impression & Plan:     Poorly differentiated carcinoma of neck  - poorly differentiated on path with focal neuroendocrine differentiation likely secondary to the poorly differentiated nature of the cancer rather than being a primary neuroendocrine tumor. Notably p16 positive  - needs PET scan for metastatic disease staging; scheduled for   - if localized disease, likely represents primary H&N cancer and can likely be treated with chemo-RT. Otherwise if metastatic, discussed likely incurable with palliative intent of treatment  - discussed will plan transition of care to Dr Reid who is our H&N ca specialist    Hx bladder ca  - last cystoscopy was done 2024 with FELY. The last time disease was found was on cystoscopy 2023, found to have high grade papillary urothelial carcinoma s/p LD BCG x 6 with FELY on subsequent cystoscopy 3/2024    Cancer associated pain  - start oxycontin 10mg BID. Discussed can take Norco 5-325mg 1-2 tabs q4h PRN for pain. Palliative care referral to Franc[facility] for pain management    Nausea  - start 8mg zofran q8h PRN    Atrial fibrllation  - on apixaban    José Manuel Fernandez MD  Hematology/Medical Oncology  [facility]

## 2024-08-19 NOTE — PROGRESS NOTES
Pt here for new consult regarding head/neck cancer. Medications and medical history reviewed. Pt c/o worsening neck pain, using prescribed norco. Pt states PCP did not refill tramadol and will need refill for norco. Pt states she is unable to do her daily activities d/t increased pain. Pt tearful in exam room. Pt and daughter inquiring about more information regarding diagnosis and next steps.       Education Record    Learner:  Patient and Family Member    Disease / Diagnosis: head/neck cancer    Barriers / Limitations:  None   Comments:    Method:  Discussion   Comments:    General Topics:  Infection, Medication, Pain, Side effects and symptom management, and Plan of care reviewed   Comments:    Outcome:  Observed demonstration and Shows understanding   Comments:

## 2024-08-19 NOTE — TELEPHONE ENCOUNTER
Humalog Kwikpen 100 Unit/Ml Inj Lill     Pt failed refill protocol for the following reasons:  Diabetes Medication Protocol [id number]/19/2024 06:49 AM   Protocol Details In person appointment or virtual visit in the past 6 mos or appointment in next 3 mos    EGFRCR or GFRNAA > 50    Last A1C < 7.5 and within past 6 months    Microalbumin procedure in past 12 months or taking ACE/ARB    GFR in the past 12 months     Last refill: 6/18/2024, 15 mL, 0 refills  Last appt: 2/1/2024  Next appt: N/A  Last Px: 1/17/2024    Forward to Dr. Denae Prado, please advise on refills. Thank you.

## 2024-08-20 ENCOUNTER — TELEPHONE (OUTPATIENT)
Dept: HEMATOLOGY/ONCOLOGY | Facility: HOSPITAL | Age: 84
End: 2024-08-20

## 2024-08-20 DIAGNOSIS — C76.0 HEAD AND NECK CANCER (HCC): ICD-10-CM

## 2024-08-20 DIAGNOSIS — G89.3 CANCER ASSOCIATED PAIN: ICD-10-CM

## 2024-08-20 RX ORDER — HYDROCODONE BITARTRATE AND ACETAMINOPHEN 5; 325 MG/1; MG/1
1 TABLET ORAL EVERY 4 HOURS PRN
Qty: 90 TABLET | Refills: 0 | OUTPATIENT
Start: 2024-08-20

## 2024-08-20 NOTE — TELEPHONE ENCOUNTER
Pt's daughter called asked to speak to Nurse Amador. She advised she spoke to her yesterday and have additional questions    Please give her a call back

## 2024-08-20 NOTE — TELEPHONE ENCOUNTER
Spoke with patient's daughter Petty. Informed daughter that Oxycodone and Norco prescriptions are approved, daughter states she is waiting for the medications to be in stock to pick them up. RN clarified directions for patient PET scan tomorrow, daughter stated understanding.

## 2024-08-21 ENCOUNTER — HOSPITAL ENCOUNTER (OUTPATIENT)
Dept: NUCLEAR MEDICINE | Facility: HOSPITAL | Age: 84
Discharge: HOME | End: 2024-08-21
Attending: INTERNAL MEDICINE
Payer: MEDICARE

## 2024-08-21 DIAGNOSIS — C76.0 HEAD AND NECK CANCER (HCC): ICD-10-CM

## 2024-08-21 LAB — GLUCOSE BLD-MCNC: 178 MG/DL (ref 70–99)

## 2024-08-21 PROCEDURE — 82962 GLUCOSE BLOOD TEST: CPT

## 2024-08-21 PROCEDURE — 78815 PET IMAGE W/CT SKULL-THIGH: CPT | Performed by: INTERNAL MEDICINE

## 2024-08-22 NOTE — PROGRESS NOTES
[facility] Note    Visit Diagnosis:  1. Carcinoma (HCC)        History of Present Illness: 83-year-old female with a past medical history of: A-fib on Eliquis, urothelial carcinoma, CKD 3, GERD and diabetes was referred by Dr. Fall to discuss a possible having a cancer.    -Cystoscopy June 2024 with NAD.  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  -MRI cervical spine on 7/31/2024 incidentally noted a left lower neck mass measuring 4.5 x 3.5 cm.  -CT neck on 8/1/2024 4.2 x 2.9 x 4.2 cm left supraclavicular mass.  -Met with ENT,  and patient-tonsils were surgically absent on exam.  -Left neck mass biopsy on 8/5/2024 showed a poorly differentiated carcinoma with focal neuroendocrine differentiation.  IHC was positive for cytokeratin AE 1/3, p16, p63 and weakly positive for synaptophysin.  -PET/CT on 8/21/24: focal marked metabolic activity in the left supraclavicular mass.  This mass is 4.9 x 4.2 cm with an SUV of 8.8.  No other lesions. LLL atelectasis or PNA  -She has a poor appetite but her daughter gets her to eat.  She uses her walker and here for most of her ADLs.  She has a history of smoking 1 pack/day for 40 years but quit about 25 years ago.  She was with her daughter Petty who is involved with her care.   -She is following with palliative care due to significant pain.     Review of Systems: 12 Point ROS was completed and pertinent positives are in the HPI        Past Medical History:    Arrhythmia    Back pain    Bladder cancer (HCC)    high grade superficial TCC, recurrent July 2020 after BCG    Bloating    Blurred vision    Congestive heart disease (HCC)    Constipation    Diabetes (HCC)    IDDM     Diabetes mellitus (HCC)    Diarrhea, unspecified    CLARKE (dyspnea on exertion)    Easy bruising    Elevated cholesterol    Essential hypertension    Fatigue    Flatulence/gas pain/belching    Frequent  urination    taking furosemide daily    Headache disorder    Hearing loss    Heart palpitations    Heartburn    occasionally    High blood pressure    High cholesterol    History of falling    Hyperlipidemia    Irregular bowel habits    Itch of skin    right hand only    Leaking of urine    sometimes    Leg swelling    Osteoporosis    Sleep disturbance    Stool incontinence    when I have diarrhea    Type 2 diabetes mellitus with hyperglycemia (HCC)    Visual impairment    glasses    Wears glasses     Past Surgical History:   Procedure Laterality Date    Cataract      Cervical spine surgery  10/06/2023    Colonoscopy      Colonoscopy      Cystoscopy,insert ureteral stent      Cystourethroscopy  2020    Cystoscopy Procedure Dr Josh Hernandez  - recurrent tumor on BTS    Cystourethroscopy,biopsy  2019    BBx of scar sites, NSC    Cystourethroscopy,fulgur .5-2cm lesn  2020    TURBT, NSC    Cystourethroscopy,fulgur .5-2cm lesn  2020    TURBT with Gemcitabine    Cystourethroscopy,fulgur >5cm lesn  10/03/2019    TURBT with bilateral RPGs ([facility])    Fracture surgery      left wrist    Hysterectomy      age 31    Other surgical history  2021    BTS Dr David Loaiza    Other surgical history  2021    BTS Cysto Caterize Dr. Hernandez    Other surgical history  2021    BTS Cysto W/ caterigayathri Hernandez     Other surgical history  2022    BTS Cysto- Dr. Hernandez    Spine surgery procedure unlisted  10/06/2003    Tonsillectomy  1946    Total abdom hysterectomy  1971     Social History     Socioeconomic History    Marital status:    Tobacco Use    Smoking status: Former     Current packs/day: 0.00     Average packs/day: 1.1 packs/day for 65.3 years (73.5 ttl pk-yrs)     Types: Cigarettes     Quit date: 2007     Years since quittin.1    Smokeless tobacco: Never    Tobacco comments:     quit 15 years    Vaping Use    Vaping status: Never Used   Substance and Sexual Activity    Alcohol use: No     Drug use: No      Family History   Problem Relation Age of Onset    Diabetes Father     Stroke Father     Colon Polyps Mother     Diabetes Mother     Heart Attack Mother     Other (Other) Son         Afib       Physical Exam  Height: 163.4 cm (5' 4.33\") (08/23 0953)  Weight: 69.4 kg (153 lb) (08/23 0953)  BSA (Calculated - sq m): 1.75 sq meters (08/23 0953)  Pulse: 68 (08/23 0953)  BP: 126/69 (08/23 0953)  Temp: 97.7 °F (36.5 °C) (08/23 0953)  Do Not Use - Resp Rate: --  SpO2: 93 % (08/23 0953)    General: NAD, AOX3  HEENT: clear op, mmm, no jvd, no scleral icterus  Small L supraclavicular fullness   CV: RRR S1S2 no murmurs  Extremities: No edema   Lungs: CTAB, no increased work of breathing  Abd: soft nt nd +BS no hepatosplenomegaly  Neuro: CN: II-XII grossly intact    Results:  Lab Results   Component Value Date    WBC 12.1 (H) 08/07/2024    HGB 12.3 08/07/2024    HCT 38.4 08/07/2024    MCV 84.4 08/07/2024    .0 08/07/2024     Lab Results   Component Value Date     08/07/2024    K 5.2 (H) 08/07/2024    CO2 22.0 08/07/2024     08/07/2024    BUN 25 (H) 08/07/2024    PHOS 4.4 12/27/2023    ALB 4.7 08/01/2024       No results found for: \"LDH\"    Radiology:   PET/CT  CONCLUSION:    1. Focal marked increased metabolic activity is associated with the left supraclavicular mass which has undergone previous biopsy.   2. There are no other metabolically active lesions detected.   3. Dependent consolidation left lower lobe and left effusion are noted.  There is only low-level metabolic activity associated with this finding which is most likely related to the atelectasis or pneumonia.   4. Incidental CT findings are described above.       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     Electronically signed by Goldberg, Cathryn A, MD on 8/12/2024 at 1040        Final Diagnosis Comment      The tumor is composed of sheets  of malignant cells with pleomorphic nuclei and variable amounts of eosinophilic cytoplasm.  There are areas of necrosis.     Immunohistochemistry is performed to evaluate the tumor phenotype.  The malignant cells are positive for cytokeratin AE 1/3, p16, p63 and weakly positive for synaptophysin.  A rare malignant cell stains with p40.  They are negative for all other markers tested.     The morphologic and immunohistochemical findings are consistent with a poorly differentiated carcinoma with focal neuroendocrine differentiation.  Dr. Eden has reviewed the case and concurs with the above diagnosis.         Assessment and Plan:  Left supraclavicular poorly differentiated carcinoma, p16 positive  -Her PET/CT does not show evidence of distant disease or tumor origin.  It is relatively rare for head and neck cancer to metastasize without any cervical lymphadenopathy or primary lesion (her tonsils are out). Given that there is no primary site we could consider an EGD given her dysphagia and full ENT exam.  We would likely consider treating this with chemoradiation.  Given her age and performance status we discussed with RT alone versus concurrent chemoradiation with weekly carboplatin plus paclitaxel. She resides in [place] and is worried about the travel. We discussed that we can check her Tempus to assess for PDL1, MSI and tumor origin as well.     Plan  -Tempus XT PDL1 MSI and Tumor origin today  -Refer to GI for dysphagia   -Refer to ENT for ENT exam  -Refer to radiation oncology  -Review at tumor board    Cancer related Pain: following with palliative     Bladder cancer: high-grade papillary urothelial carcinoma status post BCG x 6 with FELY.    [facility]

## 2024-08-23 ENCOUNTER — TELEPHONE (OUTPATIENT)
Facility: LOCATION | Age: 84
End: 2024-08-23

## 2024-08-23 ENCOUNTER — OFFICE VISIT (OUTPATIENT)
Dept: HEMATOLOGY/ONCOLOGY | Facility: HOSPITAL | Age: 84
End: 2024-08-23
Attending: INTERNAL MEDICINE
Payer: MEDICARE

## 2024-08-23 VITALS
BODY MASS INDEX: 26.12 KG/M2 | HEART RATE: 68 BPM | TEMPERATURE: 98 F | OXYGEN SATURATION: 93 % | RESPIRATION RATE: 16 BRPM | WEIGHT: 153 LBS | DIASTOLIC BLOOD PRESSURE: 69 MMHG | HEIGHT: 64.33 IN | SYSTOLIC BLOOD PRESSURE: 126 MMHG

## 2024-08-23 DIAGNOSIS — G89.3 NEOPLASM RELATED PAIN: Primary | ICD-10-CM

## 2024-08-23 DIAGNOSIS — C80.1 CARCINOMA (HCC): Primary | ICD-10-CM

## 2024-08-23 DIAGNOSIS — C76.0 HEAD AND NECK CANCER (HCC): ICD-10-CM

## 2024-08-23 DIAGNOSIS — Z51.5 PALLIATIVE CARE BY SPECIALIST: ICD-10-CM

## 2024-08-23 DIAGNOSIS — R11.0 NAUSEA: ICD-10-CM

## 2024-08-23 DIAGNOSIS — K59.03 DRUG-INDUCED CONSTIPATION: ICD-10-CM

## 2024-08-23 DIAGNOSIS — R63.0 LACK OF APPETITE: ICD-10-CM

## 2024-08-23 PROCEDURE — 99215 OFFICE O/P EST HI 40 MIN: CPT | Performed by: INTERNAL MEDICINE

## 2024-08-23 PROCEDURE — 99215 OFFICE O/P EST HI 40 MIN: CPT | Performed by: NURSE PRACTITIONER

## 2024-08-23 RX ORDER — PROCHLORPERAZINE MALEATE 10 MG
10 TABLET ORAL EVERY 6 HOURS PRN
Qty: 30 TABLET | Refills: 1 | Status: SHIPPED | OUTPATIENT
Start: 2024-08-23

## 2024-08-23 RX ORDER — GABAPENTIN 100 MG/1
200 CAPSULE ORAL NIGHTLY
Qty: 60 CAPSULE | Refills: 1 | Status: SHIPPED | OUTPATIENT
Start: 2024-08-23

## 2024-08-23 RX ORDER — HYDROMORPHONE HYDROCHLORIDE 2 MG/1
2 TABLET ORAL EVERY 4 HOURS PRN
Qty: 30 TABLET | Refills: 0 | Status: SHIPPED | OUTPATIENT
Start: 2024-08-23

## 2024-08-23 NOTE — TELEPHONE ENCOUNTER
Doctor sent message to staff and Pt with cancer needs an appointment within 10 days. Pt was left a voicemail to call back and schedule.

## 2024-08-23 NOTE — PROGRESS NOTES
Pt here to see Dr. Ellsworth.    Outpatient Oncology Care Plan  Problem list:  knowledge deficit    Problems related to:    disease/disease progression    Interventions:  provided general teaching    Expected outcomes:  understands plan of care    Progress towards outcome:  making progress    Education Record    Learner:  Patient and Family Member  Barriers / Limitations:  None  Method:  Brief focused  Outcome:  Shows understanding  Comments:

## 2024-08-23 NOTE — CONSULTS
Palliative Care Consult Note     Patient Name: Anastasia Hobson   YOB: 1940   Medical Record Number: [id number]   CSN: [id number]   Date of visit: 8/23/2024     Reason for Consult: Referred by Dr. Fernandez for Symptom management     Chief Complaint/Reason for Visit:  Initial visit for symptoms     History of Present Illness:         Anastasia Hobson is a 83 year old female with newly diagnosed head and neck cancer.  She complains of L shoulder and neck pain that is constant and varies in intensity.  Its deep achy pain with episodes of sharp stabbing that lingers.  This pain wakes her up at night.  She is currently living with her daughter Petty.  Petty is with her today and is exhausted due to care burden and seeing her mom in pain.  The patient has been using oxycontin 10mg TID (by mistake) for the last couple of days with no benefit.  She has been using norco 5mg every 4 hrs including during the night with little benefit.  She wakes frequently at night to urinate due to diuretic use.  When she gets up, this causes pain.  She has been nauseated without relief from zofran.  She thinks this started with increased norco use.  She struggles with appetite due to nausea, pain and being a picky eater.  She struggles with constipation.  She uses no regular laxatives.  Her last BM was 3 days ago.       Problem List:  Patient Active Problem List   Diagnosis    Osteoarthritis of spine with radiculopathy, cervical region    Primary hypertension    Type 2 diabetes mellitus without complication, with long-term current use of insulin (HCC)    Mixed hyperlipidemia    Arthrodesis status    Tremor of unknown origin    Personal history of colonic polyps    Malignant neoplasm of lateral wall of urinary bladder (HCC)    Stage 3b chronic kidney disease (HCC)    Atrial fibrillation with RVR ([facility])    Advance care planning    Acute pulmonary edema (HCC)    Other constipation    Functional diarrhea    Type 2 diabetes  mellitus with diabetic chronic kidney disease (HCC)    At high risk for falls    Hypoglycemia    Chronic bronchitis, unspecified chronic bronchitis type (HCC)    Persistent atrial fibrillation (HCC)    Hyperlipidemia    Unspecified atrial fibrillation (HCC)    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)    Urge incontinence    Heart failure, unspecified (HCC)    Chronic kidney disease, stage 3b (HCC)    Type 2 diabetes mellitus with stage 2 chronic kidney disease, without long-term current use of insulin (HCC)    Long term (current) use of oral hypoglycemic drugs    Personal history of malignant neoplasm of bladder    Pure hypercholesterolemia, unspecified    Mass of left side of neck    Azotemia    Type 2 diabetes mellitus with hyperglycemia, unspecified whether long term insulin use (HCC)    Anticoagulated    Atrial fibrillation, chronic (HCC)    Palliative care by specialist      Medical History:  Past Medical History:    Arrhythmia    Back pain    Bladder cancer (HCC)    high grade superficial TCC, recurrent July 2020 after BCG    Bloating    Blurred vision    Congestive heart disease (HCC)    Constipation    Diabetes (HCC)    IDDM     Diabetes mellitus (HCC)    Diarrhea, unspecified    CLARKE (dyspnea on exertion)    Easy bruising    Elevated cholesterol    Essential hypertension    Fatigue    Flatulence/gas pain/belching    Frequent urination    taking furosemide daily    Headache disorder    Hearing loss    Heart palpitations    Heartburn    occasionally    High blood pressure    High cholesterol    History of falling    Hyperlipidemia    Irregular bowel habits    Itch of skin    right hand only    Leaking of urine    sometimes    Leg swelling    Osteoporosis    Sleep disturbance    Stool incontinence    when I have diarrhea    Type 2 diabetes mellitus with hyperglycemia (HCC)    Visual impairment    glasses    Wears glasses     Surgical History:  Past Surgical History:   Procedure Laterality  Date    Cataract      Cervical spine surgery  10/06/2023    Colonoscopy      Colonoscopy      Cystoscopy,insert ureteral stent      Cystourethroscopy  11/25/2020    Cystoscopy Procedure Dr Josh Hernandez  - recurrent tumor on BTS    Cystourethroscopy,biopsy  12/16/2019    BBx of scar sites, NSC    Cystourethroscopy,fulgur .5-2cm lesn  04/16/2020    TURBT, NSC    Cystourethroscopy,fulgur .5-2cm lesn  07/30/2020    TURBT with Gemcitabine    Cystourethroscopy,fulgur >5cm lesn  10/03/2019    TURBT with bilateral RPGs (NSC)    Fracture surgery      left wrist    Hysterectomy      age 31    Other surgical history  04/20/2021    BTS CystoDr Hernandez    Other surgical history  08/17/2021    BTS Cysto Caterize Dr. Hernandez    Other surgical history  11/16/2021    BTS Cysto W/ caterize Dr. Hernandez     Other surgical history  03/08/2022    BTS Cysto- Dr. Hernandez    Spine surgery procedure unlisted  10/06/2003    Tonsillectomy  1946    Total abdom hysterectomy  1971       Allergies:  No Known Allergies    Palliative Care Social History:    Marital Status:   Children:  son and daughter  Living Situation: she lives with her daughter, Petty.    She uses a walker around home and wheelchair outside due to endurance issues and balance      Medications:      Review of Systems:  General:  Fatigue.  pain  Respiratory:  Denies SOB, denies cough  Cardiac:  Denies chest pain, heart palpitations  Abdomen:  Denies constipation, diarrhea.  Denies pain.    Psych:  No complaints.  Not Sleeping well    Palliative Performance Scale:   70%    Physical Examination:  General: Patient is alert and oriented, not in acute distress.  Respiratory: Normal excursions and effort  Abdomen: Soft, non tender   Musculoskeletal:  sitting in wheelchair  Psych:  Mood/Affect appropriate    Advanced Directives Discussed and Completed:     HCPOA/Health Surrogate:    There is a completed HCPOA documentation on file in Epic.    I confirmed that patient's HCPOA is: Petty, her  daughter    POLST Discussed and Completed:  There is a Scanned POLST in EMR indicating DNAR/Selective    Palliative Care:  pain is not well controlled with current regimen.  Patient doesn't tolerate morphine so will increase oxycontin to 20mg.  Will stop norco since its not helpful and most likely is contributing to nausea.  Will trial dilaudid.  She can alternate with acetaminophen to enhance pain control and minimize SE.    There seems to be a neuropathic component so will add gabapentin at night.  This will minimize SE and should help with sleep  Discussed trying to take pain medication with food or boost to minimize nausea risk.  Will add compazine to see if this is more effective for patient.    Only take dilaudid if having pain and don't set alarm at night to take.    Patient is hopeful to begin RT soon which should help with pain control and then can consider weaning down/off opioids.    Discussed bowel prophylaxis with miralax and senna.  She will take both today.  She can titrate to optimize bowel pattern.  If this isn't effective, daughter will call.    Will monitor appetite for now as this may improve with better pain control.  May need to consult dietician    Impression/Plan:   1. Neoplasm related pain  Oxycontin 20mg Q 12  Dilaudid 2mg Q 4 prn  Gabapentin 200mg Q HS  Acetaminophen 1G TID prn  RT    2. Drug-induced constipation  Miralax 1-2X daily  Senna 2 tabs 1-2X daily    3. Lack of appetite  May improve with symptom control  Small frequent meals  Protein supplements    4. Nausea  Zofran 8mg Q 8 prn  Compazine 10mg Q 6 prn    5. Palliative care by specialist  Ongoing support    6. Head and neck cancer (HCC)      Planned Follow up: Return in about 2 weeks (around 9/6/2024).    I spent a total of 65 minutes with the patient today, which included all of the following:direct face to face contact, history taking, physical examination, and >50% was spent counseling and coordinating care      The 21st  Century Cures Act makes medical notes like these available to patients in the interest of transparency. Please be advised this is a medical document. Medical documents are intended to carry relevant information, facts as evident, and the clinical opinion of the practitioner. The medical note is intended as peer to peer communication and may appear blunt or direct. It is written in medical language and may contain abbreviations or verbiage that are unfamiliar.        Electronically Signed by:  RUIZ CHINCHILLA Outpatient Palliative Nurse Practitioner

## 2024-08-26 ENCOUNTER — DOCUMENTATION ONLY (OUTPATIENT)
Dept: HEMATOLOGY/ONCOLOGY | Facility: HOSPITAL | Age: 84
End: 2024-08-26

## 2024-08-28 ENCOUNTER — TUMOR BOARD CONFERENCE (OUTPATIENT)
Dept: HEMATOLOGY/ONCOLOGY | Facility: HOSPITAL | Age: 84
End: 2024-08-28

## 2024-08-28 ENCOUNTER — OFFICE VISIT (OUTPATIENT)
Facility: LOCATION | Age: 84
End: 2024-08-28
Payer: MEDICARE

## 2024-08-28 ENCOUNTER — TELEPHONE (OUTPATIENT)
Dept: HEMATOLOGY/ONCOLOGY | Facility: HOSPITAL | Age: 84
End: 2024-08-28

## 2024-08-28 DIAGNOSIS — C77.0 CANCER OF HEAD, FACE, OR NECK LYMPH NODES, SECONDARY (HCC): Primary | ICD-10-CM

## 2024-08-28 PROBLEM — C76.0 HEAD AND NECK CANCER (HCC): Status: ACTIVE | Noted: 2024-08-28

## 2024-08-28 PROCEDURE — 31575 DIAGNOSTIC LARYNGOSCOPY: CPT | Performed by: OTOLARYNGOLOGY

## 2024-08-28 PROCEDURE — 99204 OFFICE O/P NEW MOD 45 MIN: CPT | Performed by: OTOLARYNGOLOGY

## 2024-08-28 NOTE — TELEPHONE ENCOUNTER
Tumor Board    Case reviewed in tumor board. PET does not show evidence of distant disease. Concern for p16+ head and neck cancer vs. Cervical esophageal cancer.     Plan  Agree with ENT evaluation which is scheduled  We need an EGD evaluation for baseline-we will try to expedite appointment   We will plan on definitive treatment with chemoradiation with weekly Carbo AUC 2 + Taxol 50 mg/m2.   She can use a PIV rather than a port. We will need a chemotherapy education.

## 2024-08-28 NOTE — TELEPHONE ENCOUNTER
Spoke with patient's daughter. She verbalized understanding. Appointment scheduled. They will return GI's call to schedule appointment.         We reviewed in her tumor board. Can you update her and daughter.     1. Can you see if we can get her in with GI sooner for an EGD given her dysphagia?   2. She is going to meet with Dr. Cat next week. We will likely plan on chemoradiation with weekly carbo/taxol.   3. Keep ENT appt       I can see her next week. Maybe same day as she sees Rad onc

## 2024-08-28 NOTE — PROGRESS NOTES
Anastasia Hobson is a 83 year old female.   Chief Complaint   Patient presents with    Throat Problem     HPI:   She has a history of left-sided neck mass.  Been having some pain in that area.  She had a CT of the neck in early August which showed a left cervical periclavicular mass.  She then had left-sided neck biopsy which showed poorly differentiated carcinoma.  Her tonsils are surgically absent.  She has no history of sore throat she has no hemoptysis she has no significant dysphagia.     Past Medical History:    Arrhythmia    Back pain    Bladder cancer (HCC)    high grade superficial TCC, recurrent 2020 after BCG    Bloating    Blurred vision    Congestive heart disease (HCC)    Constipation    Diabetes (HCC)    IDDM     Diabetes mellitus (HCC)    Diarrhea, unspecified    CLARKE (dyspnea on exertion)    Easy bruising    Elevated cholesterol    Essential hypertension    Fatigue    Flatulence/gas pain/belching    Frequent urination    taking furosemide daily    Headache disorder    Hearing loss    Heart palpitations    Heartburn    occasionally    High blood pressure    High cholesterol    History of falling    Hyperlipidemia    Irregular bowel habits    Itch of skin    right hand only    Leaking of urine    sometimes    Leg swelling    Osteoporosis    Sleep disturbance    Stool incontinence    when I have diarrhea    Type 2 diabetes mellitus with hyperglycemia (HCC)    Visual impairment    glasses    Wears glasses      Social History:  Social History     Socioeconomic History    Marital status:    Tobacco Use    Smoking status: Former     Current packs/day: 0.00     Average packs/day: 1.1 packs/day for 65.3 years (73.5 ttl pk-yrs)     Types: Cigarettes     Quit date: 2007     Years since quittin.1    Smokeless tobacco: Never    Tobacco comments:     quit 15 years    Vaping Use    Vaping status: Never Used   Substance and Sexual Activity    Alcohol use: No    Drug use: No     Social  Determinants of Health     Financial Resource Strain: Low Risk  (2/27/2023)    Financial Resource Strain     Difficulty of Paying Living Expenses: Not very hard     Med Affordability: No   Food Insecurity: No Food Insecurity (7/31/2024)    Food Insecurity     Food Insecurity: Never true   Transportation Needs: No Transportation Needs (7/31/2024)    Transportation Needs     Lack of Transportation: No   Housing Stability: Low Risk  (7/31/2024)    Housing Stability     Housing Instability: No      Past Surgical History:   Procedure Laterality Date    Cataract      Cervical spine surgery  10/06/2023    Colonoscopy      Colonoscopy      Cystoscopy,insert ureteral stent      Cystourethroscopy  11/25/2020    Cystoscopy Procedure Dr Josh Hernandez  - recurrent tumor on BTS    Cystourethroscopy,biopsy  12/16/2019    BBx of scar sites, NSC    Cystourethroscopy,fulgur .5-2cm lesn  04/16/2020    TURBT, NSC    Cystourethroscopy,fulgur .5-2cm lesn  07/30/2020    TURBT with Gemcitabine    Cystourethroscopy,fulgur >5cm lesn  10/03/2019    TURBT with bilateral RPGs ([facility])    Fracture surgery      left wrist    Hysterectomy      age 31    Other surgical history  04/20/2021    BTS CystoDr Hernnadez    Other surgical history  08/17/2021    BTS Cysto Caterigayathri Hernandez    Other surgical history  11/16/2021    BTS Cysto W/ carley Hernandez     Other surgical history  03/08/2022    BTS Cysto- Dr. Hernandez    Spine surgery procedure unlisted  10/06/2003    Tonsillectomy  1946    Total abdom hysterectomy  1971         REVIEW OF SYSTEMS:   GENERAL HEALTH: feels well otherwise  GENERAL : denies fever, chills, sweats, weight loss, weight gain  SKIN: denies any unusual skin lesions or rashes  RESPIRATORY: denies shortness of breath with exertion  NEURO: denies headaches    EXAM:   There were no vitals taken for this visit.    System Findings Details   Constitutional  Overall appearance - Normal.   Psychiatric  Orientation - Oriented to time, place, person &  situation. Appropriate mood and affect.   Head/Face  Facial features -- Normal. Skull - Normal.   Eyes  Pupils equal ,round ,react to light and accomidate   Ears, Nose, Throat, Neck  Ears are clear no fluid nose is clear oral cavity is free of lesions oropharynx reveals tonsils to be absent otherwise no lesions neck is supple without masses   Neurological  Memory - Normal. Cranial nerves - Cranial nerves II through XII grossly intact.   Lymph Detail  Submental. Submandibular. Anterior cervical. Posterior cervical. Supraclavicular.     Flexible Laryngoscopy Procedure Note (75796)    Due to inability for adequate examination of the larynx and need for magnification to perform the examination, endoscopy was performed.  Risks and benefits were discussed with patient/family and they have given verbal consent to proceed.    Pre-operative Diagnosis:   1. Cancer of head, face, or neck lymph nodes, secondary (HCC)        Post-operative Diagnosis: Same    Procedure: Diagnostic flexible fiberoptic laryngoscopy    Anesthesia: topical lidocaine    Surgeon: Manny Villalta MD    EBL: 0cc    Procedure Detail & Findings: Nose and nasopharynx are clear base of tongue epiglottis and true vocal cords are normal without lesions seen.  Base of tongue is palpated with suboptimal exam otherwise negative.  Base of tongue is soft.    Condition: Stable    Complications: Patient tolerated the procedure well with no immediate complications.      Manny Villalta MD    ASSESSMENT AND PLAN:   1. Cancer of head, face, or neck lymph nodes, secondary (HCC)  CT of the neck reviewed which shows a left supraclavicular mass.  I do not see any significant nasopharyngeal oropharyngeal hypopharyngeal or laryngeal lesions.  She has to follow-up with GI medicine for EGD.  I will be happy to be of any further assistance.    The patient indicates understanding of these issues and agrees to the plan.    No follow-ups on file.    Manny Villalta,  MD  8/28/2024  1:20 PM

## 2024-08-30 NOTE — PROGRESS NOTES
[facility] Note    Visit Diagnosis:  1. Neoplasm related pain    2. Carcinoma of unknown origin (HCC)    3. Kidney pain        History of Present Illness: 83-year-old female with a past medical history of: A-fib on Eliquis, urothelial carcinoma, CKD 3, GERD and diabetes was referred by Dr. Fall to discuss a possible having a cancer.    -Cystoscopy June 2024 with NAD.  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    -MRI cervical spine on 7/31/2024 incidentally noted a left lower neck mass measuring 4.5 x 3.5 cm.    -CT neck on 8/1/2024 4.2 x 2.9 x 4.2 cm left supraclavicular mass.    -Met with ENT,  and patient-tonsils were surgically absent on exam.    -Left neck mass biopsy on 8/5/2024 showed a poorly differentiated carcinoma with focal neuroendocrine differentiation.  IHC was positive for cytokeratin AE 1/3, p16, p63 and weakly positive for synaptophysin  .  -PET/CT on 8/21/24: focal marked metabolic activity in the left supraclavicular mass.  This mass is 4.9 x 4.2 cm with an SUV of 8.8.  No other lesions. LLL atelectasis or PNA    -She has a poor appetite but her daughter gets her to eat.  She uses her walker and here for most of her ADLs.  She has a history of smoking 1 pack/day for 40 years but quit about 25 years ago.  She was with her daughter Petty who is involved with her care. She is following with palliative care due to significant pain.     -Tumor board 8/28/24: Case reviewed in tumor board. PET does not show evidence of distant disease. Concern for p16+ head and neck cancer vs. Cervical esophageal cancer. EGD recommended. ENT eval recommended. We will plan on definitive treatment with chemoradiation with weekly Carbo AUC 2 + Taxol 50 mg/m2.     -ENT Eval with Dr. Villalta on 8/28/244: Flexible laryngoscopy was unremarkable     Interval History  -Rad onc appt today  -GI visit with Dr. Gonsales on  9/6/24  -Plan for chemoRT with Carbo/taxol discussed. Chemo ED discussed.   -Has R kidney pain. No history of nephrolithiasis     Review of Systems: 12 Point ROS was completed and pertinent positives are in the HPI        Past Medical History:    Arrhythmia    Back pain    Bladder cancer (HCC)    high grade superficial TCC, recurrent July 2020 after BCG    Bloating    Blurred vision    Cancer (HCC)    Congestive heart disease (HCC)    Constipation    Diabetes (HCC)    IDDM     Diabetes mellitus (HCC)    Diarrhea, unspecified    CLARKE (dyspnea on exertion)    Easy bruising    Elevated cholesterol    Essential hypertension    Fatigue    Flatulence/gas pain/belching    Frequent urination    taking furosemide daily    Headache disorder    Hearing loss    Heart palpitations    Heartburn    occasionally    High blood pressure    High cholesterol    History of falling    Hyperlipidemia    Irregular bowel habits    Itch of skin    right hand only    Leaking of urine    sometimes    Leg swelling    Osteoporosis    Sleep disturbance    Stool incontinence    when I have diarrhea    Type 2 diabetes mellitus with hyperglycemia (HCC)    Visual impairment    glasses    Wears glasses     Past Surgical History:   Procedure Laterality Date    Cataract      Cervical spine surgery  10/06/2023    Colonoscopy  2019    Colonoscopy      Cystoscopy,insert ureteral stent      Cystourethroscopy  11/25/2020    Cystoscopy Procedure Dr Josh Hernandez  - recurrent tumor on BTS    Cystourethroscopy,biopsy  12/16/2019    BBx of scar sites, NSC    Cystourethroscopy,fulgur .5-2cm lesn  04/16/2020    TURBT, NSC    Cystourethroscopy,fulgur .5-2cm lesn  07/30/2020    TURBT with Gemcitabine    Cystourethroscopy,fulgur >5cm lesn  10/03/2019    TURBT with bilateral RPGs (NSC)    Fracture surgery      left wrist    Hysterectomy      age 31    Other surgical history  04/20/2021    BTS CystoDr Hernandez    Other surgical history  08/17/2021    BTS Cysto Caterize Dr. Hernandez     Other surgical history  2021    BTS Cysto W/ caterigayathri Hernandez     Other surgical history  2022    BTS Cysto- Dr. Hernandez    Spine surgery procedure unlisted  10/06/2003    Tonsillectomy  1946    Total abdom hysterectomy  1971     Social History     Socioeconomic History    Marital status:     Number of children: 3   Occupational History    Occupation: RETIRED   Tobacco Use    Smoking status: Former     Current packs/day: 0.00     Average packs/day: 1.1 packs/day for 65.3 years (73.5 ttl pk-yrs)     Types: Cigarettes     Quit date: 2007     Years since quittin.1    Smokeless tobacco: Never    Tobacco comments:     quit 15 years    Vaping Use    Vaping status: Never Used   Substance and Sexual Activity    Alcohol use: No    Drug use: No    Sexual activity: Not Currently     Partners: Male      Family History   Problem Relation Age of Onset    Diabetes Father     Stroke Father     Colon Polyps Mother     Diabetes Mother     Heart Attack Mother     Other (Other) Son         Afib       Physical Exam  Height: 165.1 cm (5' 5\") (1026)  Weight: 70.4 kg (155 lb 4.8 oz) (1026)  BSA (Calculated - sq m): 1.78 sq meters (1026)  Pulse: 73 (1026)  BP: 134/68 (1026)  Temp: 98.3 °F (36.8 °C) (1026)  Do Not Use - Resp Rate: --  SpO2: 96 % (1026)    General: NAD, AOX3  HEENT: clear op, mmm, no jvd, no scleral icterus  Small L supraclavicular fullness   CV: RRR S1S2 no murmurs  Extremities: No edema   Lungs: CTAB, no increased work of breathing  Abd: soft nt nd +BS no hepatosplenomegaly  Neuro: CN: II-XII grossly intact    Results:  Lab Results   Component Value Date    WBC 12.1 (H) 2024    HGB 12.3 2024    HCT 38.4 2024    MCV 84.4 2024    .0 2024     Lab Results   Component Value Date     2024    K 5.2 (H) 2024    CO2 22.0 2024     2024    BUN 25 (H) 2024    PHOS 4.4 2023    ALB 4.7  08/01/2024       No results found for: \"LDH\"    Radiology:   PET/CT  CONCLUSION:    1. Focal marked increased metabolic activity is associated with the left supraclavicular mass which has undergone previous biopsy.   2. There are no other metabolically active lesions detected.   3. Dependent consolidation left lower lobe and left effusion are noted.  There is only low-level metabolic activity associated with this finding which is most likely related to the atelectasis or pneumonia.   4. Incidental CT findings are described above.       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     Electronically signed by Goldberg, Cathryn A, MD on 8/12/2024 at 1040        Final Diagnosis Comment      The tumor is composed of sheets of malignant cells with pleomorphic nuclei and variable amounts of eosinophilic cytoplasm.  There are areas of necrosis.     Immunohistochemistry is performed to evaluate the tumor phenotype.  The malignant cells are positive for cytokeratin AE 1/3, p16, p63 and weakly positive for synaptophysin.  A rare malignant cell stains with p40.  They are negative for all other markers tested.     The morphologic and immunohistochemical findings are consistent with a poorly differentiated carcinoma with focal neuroendocrine differentiation.  Dr. Eden has reviewed the case and concurs with the above diagnosis.         Assessment and Plan:  Left supraclavicular poorly differentiated carcinoma, p16 positive  -Her PET/CT does not show evidence of distant disease or tumor origin.  It is relatively rare for head and neck cancer to metastasize without any cervical lymphadenopathy or primary lesion (her tonsils are out). ENT evaluation was unremarkable. EGD is pending. Given her significant symptoms, we will plan on definitive treatment to this area with chemoRT with weekly carbo/taxol.     Plan  -Tempus XT PDL1 MSI and Tumor  pending  -GI appt with Dr. Gonsales pending  -Plan for weekly carbo/taxol. Needs education  -Plan for Port given poor IV access     Cancer related Pain: following with palliative. 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    R Kidney pain: UA ordered. She declined CT Kidney stone protocol    GEOVANY Reid MD  [facility]

## 2024-09-02 ENCOUNTER — PATIENT MESSAGE (OUTPATIENT)
Dept: HEMATOLOGY/ONCOLOGY | Facility: HOSPITAL | Age: 84
End: 2024-09-02

## 2024-09-02 DIAGNOSIS — G89.3 NEOPLASM RELATED PAIN: Primary | ICD-10-CM

## 2024-09-02 DIAGNOSIS — C76.0 HEAD AND NECK CANCER (HCC): ICD-10-CM

## 2024-09-03 ENCOUNTER — HOSPITAL ENCOUNTER (OUTPATIENT)
Dept: RADIATION ONCOLOGY | Facility: HOSPITAL | Age: 84
Discharge: HOME | End: 2024-09-03
Attending: RADIOLOGY
Payer: MEDICARE

## 2024-09-03 ENCOUNTER — SOCIAL WORK SERVICES (OUTPATIENT)
Dept: HEMATOLOGY/ONCOLOGY | Facility: HOSPITAL | Age: 84
End: 2024-09-03

## 2024-09-03 ENCOUNTER — OFFICE VISIT (OUTPATIENT)
Dept: HEMATOLOGY/ONCOLOGY | Facility: HOSPITAL | Age: 84
End: 2024-09-03
Attending: INTERNAL MEDICINE
Payer: MEDICARE

## 2024-09-03 ENCOUNTER — TELEPHONE (OUTPATIENT)
Dept: HEMATOLOGY/ONCOLOGY | Facility: HOSPITAL | Age: 84
End: 2024-09-03

## 2024-09-03 VITALS
DIASTOLIC BLOOD PRESSURE: 68 MMHG | SYSTOLIC BLOOD PRESSURE: 134 MMHG | HEART RATE: 73 BPM | HEIGHT: 65 IN | WEIGHT: 155.31 LBS | OXYGEN SATURATION: 96 % | BODY MASS INDEX: 25.88 KG/M2 | TEMPERATURE: 98 F | RESPIRATION RATE: 20 BRPM

## 2024-09-03 DIAGNOSIS — N23 KIDNEY PAIN: ICD-10-CM

## 2024-09-03 DIAGNOSIS — G89.3 NEOPLASM RELATED PAIN: Primary | ICD-10-CM

## 2024-09-03 DIAGNOSIS — C77.0 SECONDARY MALIGNANT NEOPLASM OF LYMPH NODES OF NECK (HCC): Primary | ICD-10-CM

## 2024-09-03 DIAGNOSIS — C80.1 CARCINOMA OF UNKNOWN ORIGIN (HCC): ICD-10-CM

## 2024-09-03 LAB
BILIRUB UR QL STRIP.AUTO: NEGATIVE
COLOR UR AUTO: YELLOW
GLUCOSE UR STRIP.AUTO-MCNC: NORMAL MG/DL
KETONES UR STRIP.AUTO-MCNC: NEGATIVE MG/DL
LEUKOCYTE ESTERASE UR QL STRIP.AUTO: 500
PH UR STRIP.AUTO: 7 [PH] (ref 5–8)
PROT UR STRIP.AUTO-MCNC: NEGATIVE MG/DL
RBC UR QL AUTO: NEGATIVE
SP GR UR STRIP.AUTO: 1.02 (ref 1–1.03)
UROBILINOGEN UR STRIP.AUTO-MCNC: 4 MG/DL
WBC #/AREA URNS AUTO: >50 /HPF

## 2024-09-03 PROCEDURE — 99214 OFFICE O/P EST MOD 30 MIN: CPT

## 2024-09-03 PROCEDURE — 99214 OFFICE O/P EST MOD 30 MIN: CPT | Performed by: INTERNAL MEDICINE

## 2024-09-03 RX ORDER — FENTANYL 12.5 UG/1
1 PATCH TRANSDERMAL
Qty: 5 PATCH | Refills: 0 | Status: SHIPPED | OUTPATIENT
Start: 2024-09-03 | End: 2024-09-09 | Stop reason: DRUGHIGH

## 2024-09-03 RX ORDER — NITROFURANTOIN 25; 75 MG/1; MG/1
100 CAPSULE ORAL 2 TIMES DAILY
Qty: 20 CAPSULE | Refills: 0 | Status: SHIPPED | OUTPATIENT
Start: 2024-09-03

## 2024-09-03 NOTE — TELEPHONE ENCOUNTER
From: Anastasia Hobson  To: YUSUFLUIS MANUEL OCHOA  Sent: 9/2/2024 3:38 PM CDT  Subject: Anastasia Hobson Medication Update    Jhonny Escamilla,  I have some good news and not such good news.  Good news is Mom isn't getting nauseous nearly as much since switching out Norco with Dilaudid. Thank you!  At the beginning the medication you had put in place was working well. We did reduce the oxy in the morning to 10mg because she was feeling overly medicated and too dizzy. Since then it seems as if she is going down hill again. She is now back to taking 20mg in the morning and the evening. The pain is definitely getting worse. There are times where she has a long stretch of intense pain where she is taking dilaudid every 4 hours but suffers until the next dose.  This morning we noticed many more lumps around her neck so maybe that is the cause but not sure. We will be in tomorrow to see the radiologist oncologist and then Dr. Reid afterwards.  We have only been doing either dilaudid or x -strength tylenol within 4 hours. Now I give her a tylenol a couple hours after dilaudid to give her some relief. So far seems to be taking the edge off.   I am attaching my notes so you may get a better sense of what is going on.  Thank you so much for your help!    Petty (Anastasia's daughter)

## 2024-09-03 NOTE — TELEPHONE ENCOUNTER
Spoke with Petty regarding pain control of mother who is with her.  She continues to complain of neck pain despite using oxycontin 20mg BID and using dilaudid 2mg alternating with acetaminophen 500mg for BTP with little benefit.    Patient will be starting chemo/RT soon for her neck mass.      Will transition her to fentanyl patch.  She can continue dilaudid for BTP.    The goal will be to wean as symptoms improve.    Will trial fentanyl 12.5mcg patch.  [place]  [facility] supply sent to [facility]

## 2024-09-03 NOTE — TELEPHONE ENCOUNTER
Spoke with patient and daughter. They verbalized understanding.     Jordon Reid MD  [facility]  Results reviewed. UTI noted-Rx for Macrobid. Should also follow up with Dr. Hernandez if persistent R flank/kidney pain

## 2024-09-03 NOTE — PATIENT INSTRUCTIONS
- WE WILL CALL TO SCHEDULE YOUR CT SIMULATION FOR RADIATION PLANNING.       - IF YOU HAVE ANY QUESTIONS OR CONCERNS REGARDING RADIATION THERAPY, PLEASE CALL [phone].

## 2024-09-03 NOTE — PROGRESS NOTES
Nursing Consultation Note  Patient: Anastasia Hobson  YOB: 1940  Age: 83 year old  Radiation Oncologist: Dr. Bella Cat  Referring Physician: Dr. Reid, Dr. Villalta, Dr. Gonsales, Dr. Rogers  Diagnosis: LEFT NECK/SUPRACLAVICULAR CANCER  Consult Date: 9/3/2024      Chemotherapy: received BCG in 2020 for bladder ca  Labs: Reviewed  Imaging: Reviewed  Is the patient of child-bearing age?         No  Has the patient received radiation therapy in the past? no  Does the patient have an implantable device?Yes GMD   Patient has/has had:     1. Assistive Devices: Walker    2. Flu Vaccination: yes    3. Pneumonia Vaccination:  yes    Vital Signs:   Vitals:    09/03/24 1026   BP: 134/68   Pulse: 73   Resp: 20   Temp: 98.3 °F (36.8 °C)   ,   Wt Readings from Last 6 Encounters:   09/03/24 70.4 kg (155 lb 4.8 oz)   08/23/24 69.4 kg (153 lb)   08/19/24 52.8 kg (116 lb 6.4 oz)   07/31/24 72.3 kg (159 lb 6.4 oz)   07/31/24 68 kg (150 lb)   07/23/24 68.4 kg (150 lb 12.8 oz)       Nursing Note: Pt has remote history of urothelial ca, received BCG x 6 cycles. C/O throat pain, dysphagia in July, had MRI of C-spine on 7/31/24 which showed left lower neck mass. CT of neck done on 8/1/24 showed L supraclavicular mass. Referred to ENT, met with Dr. Rogers. Recommended biopsy, done on 8/5/24. Path showed L neck mass +for poorly diff ca with focal neuroendocrine diff, p16+. Met with Dr. Fernandez then Dr. Reid, ordered PET/CT. Done on 8/21/24. Discussed treatment plan, case discussed in TB. Referred to Rad Onc and GI (Dr. Gonsales on 9/6). Met with Francine PETERSON, Palliative APN regarding pain; on Oxycontin 20mg BID with Gabapentin BID and Dilaudid 2mg q4 PRN and Tylenol 1G TID PRN. Pt here with daughter, AOX4. States some pain relief. Pain felt mainly to L side of neck, radiating to L shoulder. Has some limitation to ROM. C/O xerostomia, denies dysphagia or sore throat. No hoarseness to voice. Appetite low.        Review of Systems    Constitutional: Negative.    HENT:          C/o dry mouth   Eyes: Negative.    Respiratory:  Positive for shortness of breath.    Cardiovascular: Negative.    Gastrointestinal:  Positive for diarrhea.   Endocrine: Negative.    Genitourinary: Negative.         Has chronic urinary incontinence   Musculoskeletal:  Positive for neck pain.   Skin: Negative.    Allergic/Immunologic: Negative.    Neurological:  Positive for dizziness and headaches.   Hematological: Negative.    Psychiatric/Behavioral: Negative.            Allergies:  Allergies   Allergen Reactions    Morphine CONFUSION           Preferred Pharmacy:    [id number] - [place], [place] - [address] [facility] [phone], [phone]  [address] [facility]  [facility] [address]  Phone: [phone] Fax: [phone]    [place], [place] - [address] [place] [address], [address] [phone], [phone]  [address] [place] [address], [address] [place] [address]  Phone: [phone] Fax: [phone]      Past Medical History:    Arrhythmia    Back pain    Bladder cancer (HCC)    high grade superficial TCC, recurrent July 2020 after BCG    Bloating    Blurred vision    Cancer (HCC)    Congestive heart disease (HCC)    Constipation    Diabetes (HCC)    IDDM     Diabetes mellitus (HCC)    Diarrhea, unspecified    CLARKE (dyspnea on exertion)    Easy bruising    Elevated cholesterol    Essential hypertension    Fatigue    Flatulence/gas pain/belching    Frequent urination    taking furosemide daily    Headache disorder    Hearing loss    Heart palpitations    Heartburn    occasionally    High blood pressure    High cholesterol    History of falling    Hyperlipidemia    Irregular bowel habits    Itch of skin    right hand only    Leaking of urine    sometimes    Leg swelling    Osteoporosis    Sleep disturbance    Stool incontinence    when I have diarrhea    Type 2 diabetes mellitus with hyperglycemia (HCC)    Visual impairment    glasses    Wears  glasses       Past Surgical History:   Procedure Laterality Date    Cataract      Cervical spine surgery  10/06/2023    Colonoscopy  2019    Colonoscopy      Cystoscopy,insert ureteral stent      Cystourethroscopy  2020    Cystoscopy Procedure Dr Josh Hernandez  - recurrent tumor on BTS    Cystourethroscopy,biopsy  2019    BBx of scar sites, NSC    Cystourethroscopy,fulgur .5-2cm lesn  2020    TURBT, NSC    Cystourethroscopy,fulgur .5-2cm lesn  2020    TURBT with Gemcitabine    Cystourethroscopy,fulgur >5cm lesn  10/03/2019    TURBT with bilateral RPGs (NSC)    Fracture surgery      left wrist    Hysterectomy      age 31    Other surgical history  2021    BTS Dr David Loaiza    Other surgical history  2021    BTS Cysto Caterize Dr. Hernandez    Other surgical history  2021    BTS Cysto W/ caterigayathri Hernandez     Other surgical history  2022    BTS Cysto- Dr. Hernandez    Spine surgery procedure unlisted  10/06/2003    Tonsillectomy  1946    Total abdom hysterectomy  1971       Social History     Socioeconomic History    Marital status:      Spouse name: Not on file    Number of children: 3    Years of education: Not on file    Highest education level: Not on file   Occupational History    Occupation: RETIRED   Tobacco Use    Smoking status: Former     Current packs/day: 0.00     Average packs/day: 1.1 packs/day for 65.3 years (73.5 ttl pk-yrs)     Types: Cigarettes     Quit date: 2007     Years since quittin.1    Smokeless tobacco: Never    Tobacco comments:     quit 15 years    Vaping Use    Vaping status: Never Used   Substance and Sexual Activity    Alcohol use: No    Drug use: No    Sexual activity: Not Currently     Partners: Male   Other Topics Concern    Caffeine Concern Not Asked    Exercise Not Asked    Seat Belt Not Asked    Special Diet Not Asked    Stress Concern Not Asked    Weight Concern Not Asked   Social History Narrative    , lives with daughter     Has 3 children: 2 sons, 1 daughter     Social Determinants of Health     Financial Resource Strain: Low Risk  (2/27/2023)    Financial Resource Strain     Difficulty of Paying Living Expenses: Not very hard     Med Affordability: No   Food Insecurity: No Food Insecurity (7/31/2024)    Food Insecurity     Food Insecurity: Never true   Transportation Needs: No Transportation Needs (7/31/2024)    Transportation Needs     Lack of Transportation: No     Car Seat: Not on file   Physical Activity: Not on file   Stress: Not on file   Social Connections: Not on file   Housing Stability: Low Risk  (7/31/2024)    Housing Stability     Housing Instability: No     Housing Instability Emergency: Not on file     Crib or Bassinette: Not on file       ECOG:  Grade 1 - No physically strenuous activity, but ambulatory and able to carry out light and sedentary work (e.g. office work, light house work).    Education: YES  Knowledge Deficit Plan Of Care:    Problem:  Knowledge Deficit    Problems related to:    Radiation therapy    Interventions:  Instruct on purpose of radiation therapy    Expected Outcomes:  Knowledge of radiation therapy    Progress Toward Outcome:  Making progress    Pamphlets/Handouts Given to Patient:  Understanding radiation therapy      Are ADL's met?  Yes  Does patient feel safe in their environment?  Yes  Care decisions:  Patient and/or surrogate IS involved in care decisions.  Advanced directives:  Patient HAS advnaced directives and a copy has been requested.  Transportation:  Adequate transportation available for expected visits    Pain:  Pain Loc: Neck (left neck/supraclav area);Pain Score: 7   ;    ;

## 2024-09-03 NOTE — PROGRESS NOTES
Pt completed a distress screening with a score of 7 noted. Concerns for pain, sleep, depression and transportation noted.    SW sent a [employer] message to provide resources and SW contact information. SW provided cancer support resources including [facility] and [employer]. The packet of resources included information on transportation resources, homecare/home health agencies, Facebook support group page and counseling resources. SW reviewed Advance Directives and importance of completion. SW explained role of SW in Cancer Center and social work contact information.    Yanet Stewart [facility] [place] [address]  Ph: [phone] [email or web address]  Fax: [phone]

## 2024-09-03 NOTE — CONSULTS
[facility]  RADIATION ONCOLOGY  CONSULTATION     PATIENT:   Anastasia Lux Samterry      REFERRING MD:  Jordon Reid MD  DIAGNOSIS:   Carcinoma of unknown primary, left supraclavicular nodes    CC:    Discuss chemoradiation therapy    HPI   83 year old here for consultation.    She presents with a left lower neck mass discovered incidentally on an MRI of the cervical spine performed to workup neck pain.  MRI shows a 4.5 x 3.5 cm mass.  There is hardware at C4-C6 from prior anterior cervical fusion.    CT neck with contrast shows a 4.2 x 2.9 x 4.2 cm left supraclavicular mass.  No other adenopathy is seen.  No oral or pharyngeal or laryngeal lesions are seen.    Ultrasound-guided biopsy 8/5/2024 shows poorly differentiated carcinoma with focal neuroendocrine differentiation.  Cells are positive for p16.  Weakly positive for synaptophysin.    PET scan 8/21/2024 shows SUV 8.8 uptake in the left neck mass.  No suspicious uptake in the head and neck or esophagus.  No suspicious lung lesions.  No metastatic disease.    There is a transient history of dysphagia.  This has somehow resolved.  She did have a video swallow on 8/6/2024 that was unremarkable.  Because of this history, she is seeing GI later this week.  Also wondering if there is an occult esophageal malignancy.    She has pain from the mass.  Followed by palliative care service.  No dysphagia.  No weight loss.  Some hoarseness.  She had an office laryngoscopy on 828 by Dr. Villalta that was negative.    PMH  -Arrhythmia, history of superficial bladder cancer status post cystoscopic treatment including BCG, diabetes, high cholesterol, high blood pressure    PSH  -Cervical spine surgery, multiple cystoscopy, hysterectomy    MEDS   Acetaminophen    apixaban (ELIQUIS)    atorvastatin (LIPITOR)    Calcium Citrate-Vitamin D 315-250 MG-UNIT Oral Tab    Continuous Blood Gluc  (FREESTYLE NATALIE 2 READER) Does not apply Device    Continuous Blood Gluc   (FREESTYLE NATALIE 2 READER) Does not apply Device    Continuous Blood Gluc Sensor (FREESTYLE NATALIE 2 SENSOR) Does not apply Misc    dilTIAZem HCl ER Beads (TIAZAC)    fenofibrate (TRICOR)    fentaNYL 12 MCG/HR Transdermal Patch 72 Hr    furosemide (LASIX)    gabapentin (NEURONTIN)    hydrALAZINE 50 MG Oral Tab    HYDROmorphone (DILAUDID)    Insulin Lispro, 1 Unit Dial, (HUMALOG KWIKPEN) 100 UNIT/ML Subcutaneous Solution Pen-injector    Insulin Pen Needle (BD PEN NEEDLE PRISCILLA U/F) 32G X 4 MM Does not apply Misc    LANTUS SOLOSTAR 100 UNIT/ML Subcutaneous Solution Pen-injector    lisinopril (PRINIVIL; ZESTRIL)    metFORMIN HCl (GLUCOPHAGE)    Naloxone HCl    nitrofurantoin monohydrate macro (MACROBID)    ondansetron (ZOFRAN)    Potassium Chloride ER 20 MEQ Oral Tab CR    PROBIOTIC PRODUCT OR    prochlorperazine (COMPAZINE)    risedronate (ACTONEL)    tolterodine (DETROL)    Vitamin D3     SH  -, lives with her daughter and [place]    ROS  -pertinent items in HPI.    PHYSICAL EXAM   ECO  GENERAL: 155 pounds.  HEENT:  Very tender left low supraclavicular neck mass.  I can palpate just the top of the mass.  No other adenopathy.  CHEST:  Regular rate and rhythm. Normal S1 and S2.    Clear to auscultation.   ABD:  Soft, non-tender. No masses.  EXT:  No edema.  NEURO:  Alert and oriented. Cranial nerves grossly intact.    IMPRESSION/PLAN   Poorly differentiated carcinoma with neuroendocrine features, p16 positive, involving a left supraclavicular mass, presumably dexter, with no evidence of a primary site.    She is going for a GI evaluation.  Possibly an EGD.  I suppose a proximal esophageal cancer could be p16 positive.  Would be somewhat unusual to be PET negative.  But may be worth getting anyway.    Unless a primary site is found in the esophagus, plan is for concurrent weekly carboplatin and Taxol with 6-7 weeks of daily radiation therapy limited to the left neck mass.  Given the location, I  would be very reluctant to prophylactically radiate all suspected mucosal sites of the head and neck.  Most head and neck malignancies would involve level 2 and level 3 of the neck.  The nasopharynx appears entirely clear on endoscopy and on PET scan.    I think aggressive local control of the left neck should be pursued, rather than short course palliation of the left neck.    There is likely to be a brisk skin reaction, possible posttreatment hypothyroidism, transient short-term mucositis of the adjacent hypopharynx, and some transient laryngitis from radiation.    To schedule CT simulation then start radiation therapy upon completion of treatment planning and insurance prior authorization    Bella Cat MD  Radiation Oncology    CC: GEOVANY Reid MD    60 minutes were spent with the patient, more than 50 percent on counseling/coordination of care (discuss disease status, goals of therapy, methods of radiation therapy, side effect discussion, role of RT in overall care)

## 2024-09-04 ENCOUNTER — PATIENT MESSAGE (OUTPATIENT)
Dept: HEMATOLOGY/ONCOLOGY | Facility: HOSPITAL | Age: 84
End: 2024-09-04

## 2024-09-04 DIAGNOSIS — G89.3 NEOPLASM RELATED PAIN: ICD-10-CM

## 2024-09-04 RX ORDER — HYDROMORPHONE HYDROCHLORIDE 2 MG/1
TABLET ORAL EVERY 4 HOURS PRN
Qty: 90 TABLET | Refills: 0 | Status: SHIPPED | OUTPATIENT
Start: 2024-09-04

## 2024-09-04 NOTE — TELEPHONE ENCOUNTER
From: Anastasia Hobson  To: FRANCINE OCHOA  Sent: 9/4/2024 7:21 AM CDT  Subject: Help with medication    Good morning Francine,  I need help with my Mom's meds.  I gave her the fentanyl patch last night at 7pm. By 1:00am she was SO happy because her pain level went down to a 1-2. She felt the best she's felt in a month.  Come 5am her pain got up to a 6 and now she is up to an 8.    Taken since 7am:  7pm fentanyl patch  1:00am extra strength tylenol (she wasn't in a lot of pain but wanted to make sure she stayed at a pain level of 1-2)  5:30am Pain level went up to a 6-7. Took 2mg dilaudid    She is leaving the house at 10am to an appointment so I'm afraid to give her any more meds.    I'm planning on giving her another 2mg dilaudid at 9:30am.    Thank you Francine for any suggestions.

## 2024-09-04 NOTE — TELEPHONE ENCOUNTER
I want to give the patch a little more time to regulate.  You can continue using tylenol 1000mg every 8 hours as needed.  The max acetaminophen (tylenol) daily dose is 3000mg.    You can increase dilaudid dose to 2-4mg every 4 hrs as needed.       If pain is not controlled or requiring frequent dilaudid and tylenol dosing, let me know tomorrow.   We can discuss increasing her fentanyl patch dose or making other changes.      I am hopeful pain will improve with start of treatment and then she can be weaned.

## 2024-09-05 ENCOUNTER — TELEPHONE (OUTPATIENT)
Dept: HEMATOLOGY/ONCOLOGY | Facility: HOSPITAL | Age: 84
End: 2024-09-05

## 2024-09-05 DIAGNOSIS — G89.3 NEOPLASM RELATED PAIN: ICD-10-CM

## 2024-09-05 RX ORDER — GABAPENTIN 100 MG/1
200 CAPSULE ORAL NIGHTLY
Qty: 60 CAPSULE | Refills: 1 | OUTPATIENT
Start: 2024-09-05

## 2024-09-05 NOTE — TELEPHONE ENCOUNTER
Spoke with patient and daughter, Petty, about her ongoing neck and shoulder pain.  The patient doesn't feel the fentanyl has offered much benefit.  The patch was placed on Tuesday evening.  Its been less than 48hrs since start of patch.       She is not finding dilaudid 2mg helpful.  Dilaudid 4mg offered better benefit but didn't last until next dose.  She is finding tylenol 1G most beneficial but not lasting until next dose.    She will try taking dilaudid and acetaminophen together to optimize pain control.  She can take upto 4G of acetaminophen and upto 6 doses of dilaudid daily.    If this provides better pain control but still requires frequent dosing, and she is tolerating increased opioid dosing without issues, can increase fentanyl patch.      She will let us know tomorrow how pain is doing with increased dilaudid plus acetaminophen dosing.

## 2024-09-06 ENCOUNTER — HOSPITAL ENCOUNTER (OUTPATIENT)
Dept: RADIATION ONCOLOGY | Facility: HOSPITAL | Age: 84
Discharge: HOME | End: 2024-09-06
Attending: RADIOLOGY
Payer: MEDICARE

## 2024-09-06 ENCOUNTER — TELEPHONE (OUTPATIENT)
Dept: HEMATOLOGY/ONCOLOGY | Facility: HOSPITAL | Age: 84
End: 2024-09-06

## 2024-09-06 NOTE — TELEPHONE ENCOUNTER
Anastasia and her Daughter were in today for radiation and stopped up after treatment and had a few questions. They asked that they get a call back to go over the placement of the port as well as a few things they wanted to pick Nathen brain about. I let them know I would put in a message and somebody would give a call when they get a chance.

## 2024-09-06 NOTE — TELEPHONE ENCOUNTER
Covering for Francine Dietz.    Chart reviewed.    All questions answered re: Fentanyl patch increase that starts tonight. Pt will be increasing Fentanyl dose to 25mcg/hr Q3D. Ok to continue Hydromorphone 2-4mg Q4H/PRN for break through pain. Continue bowel regimen.    Francine - pt requesting a call from you on Monday re: Xanax for RT treatments. I informed pt/dtr that the Rad Onc Physician would most likely prescribe this med, but Anastasia would like to talk to Francine first.     Dr. Reid RNs - Anastasia's daughter requesting a call back re: port placement. No one has contacted them yet and they are wondering if port placement can be done on the same day as other appointments to reduce the number of times Anastasia needs to come back/forth to hospital.    Anastasia appreciative of call and verbalized understanding of above conversation.

## 2024-09-09 ENCOUNTER — TELEPHONE (OUTPATIENT)
Dept: HEMATOLOGY/ONCOLOGY | Facility: HOSPITAL | Age: 84
End: 2024-09-09

## 2024-09-09 DIAGNOSIS — G89.3 NEOPLASM RELATED PAIN: Primary | ICD-10-CM

## 2024-09-09 DIAGNOSIS — C76.0 HEAD AND NECK CANCER (HCC): ICD-10-CM

## 2024-09-09 RX ORDER — FENTANYL 25 UG/1
1 PATCH TRANSDERMAL
Qty: 10 PATCH | Refills: 0 | Status: SHIPPED | OUTPATIENT
Start: 2024-09-09

## 2024-09-09 NOTE — TELEPHONE ENCOUNTER
Spoke with patient regarding her pain.  She feels the increased fentanyl patch has helped pain slightly.  She is now able to tolerate laying in bed so she can sleep better.      She no longer has stabbing pains with gabapentin increase.  The pain remains deep, achy, throbbing.  Its constant and varies in intensity.      The patient feels the pain intensity is a little better so taking dilaudid 4mg with acetaminophen is more effective.  She is using about 5 doses of dilaudid daily and 4 doses of acetaminophen with benefit.    Pain should improve when chemo/RT begins.  Can wean when pain begins to improve.      Currently, patient feels pain is better controlled, so will try to hold off on increasing opioids for now.      Patient is ok with this plan.    Will send refill for fentanyl 25mcg patches to [facility]

## 2024-09-09 NOTE — TELEPHONE ENCOUNTER
Spoke with daughter. Port to be scheduled once provided start date from radiation. Daughter verbalized understanding.

## 2024-09-10 ENCOUNTER — ANESTHESIA EVENT (OUTPATIENT)
Dept: ENDOSCOPY | Facility: HOSPITAL | Age: 84
End: 2024-09-10
Payer: MEDICARE

## 2024-09-10 NOTE — ANESTHESIA PREPROCEDURE EVALUATION
PRE-OP EVALUATION    Patient Name: Anastasia Hobson    Admit Diagnosis: A-fib (HCC) [I48.91]    Pre-op Diagnosis: * No pre-op diagnosis entered *        Anesthesia Procedure: ESOPHAGOGASTRODUODENOSCOPY (EGD)    * No surgeons found in log *    Pre-op vitals reviewed.        Body mass index is 24.69 kg/m².    Current medications reviewed.  Hospital Medications:  No current facility-administered medications on file as of .       Outpatient Medications:     No medications prior to admission.       Allergies: Morphine      Anesthesia Evaluation    Patient summary reviewed.    Anesthetic Complications  (-) history of anesthetic complications         GI/Hepatic/Renal  Comment: H/o bladder cancer           (+) chronic renal disease and CRI                   Cardiovascular  Comment: Echo 8/2024 with normal systolic fxn, mild MR    ECG reviewed.  Exercise tolerance: good     MET: <=4      (+) hypertension and poorly controlled  (+) hyperlipidemia               (+) dysrhythmias (currently NSR) and atrial fibrillation                  Endo/Other      (+) diabetes and well controlled, type 2,                   (+) arthritis       Pulmonary  Comment: Former tobacco abuse      (+) COPD                   Neuro/Psych    Negative neuro/psych ROS.                          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        Past Surgical History:   Procedure Laterality Date    Cataract      Cervical spine surgery  10/06/2023    Colonoscopy  2019    Colonoscopy      Cystoscopy,insert ureteral stent      Cystourethroscopy  11/25/2020    Cystoscopy Procedure Dr Josh Hernandez  - recurrent tumor on BTS    Cystourethroscopy,biopsy  12/16/2019    BB[facility] sites, [facility]    Cystourethroscopy,fulgur .5-2cm lesn  04/16/2020    TURBT, [facility]    Cystourethroscopy,fulgur .5-2cm lesn  07/30/2020    TURBT with Gemcitabine    Cystourethroscopy,fulgur >5cm lesn  10/03/2019    TURBT with bilateral RPGs ([facility])    Fracture surgery      left wrist    Hysterectomy       age 31    Ndsc ablation & rcnstj [facility] w/o bypass      Other surgical history  2021    BTDr David Cervantes    Other surgical history  2021    BTS Josse Hernandez    Other surgical history  2021    BTS Cysto W/ carley Hernandez     Other surgical history  2022    BTS Josse- Dr. Hernandez    Spine surgery procedure unlisted  10/06/2003    CERVICAL    Tonsillectomy  1946    Total abdom hysterectomy  1971     Social History     Socioeconomic History    Marital status:     Number of children: 3   Occupational History    Occupation: RETIRED   Tobacco Use    Smoking status: Former     Current packs/day: 0.00     Average packs/day: 1.1 packs/day for 65.3 years (73.5 ttl pk-yrs)     Types: Cigarettes     Quit date: 2007     Years since quittin.2    Smokeless tobacco: Never    Tobacco comments:     quit 15 years    Vaping Use    Vaping status: Never Used   Substance and Sexual Activity    Alcohol use: No    Drug use: No    Sexual activity: Not Currently     Partners: Male     History   Drug Use No     Available pre-op labs reviewed.  Lab Results   Component Value Date    WBC 12.1 (H) 2024    RBC 4.55 2024    HGB 12.3 2024    HCT 38.4 2024    MCV 84.4 2024    MCH 27.0 2024    MCHC 32.0 2024    RDW 14.1 2024    .0 2024     Lab Results   Component Value Date     2024    K 5.2 (H) 2024     2024    CO2 22.0 2024    BUN 25 (H) 2024    CREATSERUM 1.58 (H) 2024     (H) 2024    CA 9.8 2024            Airway      Mallampati: II  Mouth opening: >3 FB  TM distance: > 6 cm  Neck ROM: full Cardiovascular      Rhythm: regular  Rate: normal     Dental             Pulmonary      Breath sounds clear to auscultation bilaterally.               Other findings              ASA: 3   Plan: MAC  NPO status verified and patient meets guidelines.  Patient has taken beta blockers  in last 24 hours.  Post-procedure pain management plan discussed with surgeon and patient.    Comment:     Plan/risks discussed with: patient                Present on Admission:  **None**

## 2024-09-11 ENCOUNTER — HOSPITAL ENCOUNTER (OUTPATIENT)
Facility: HOSPITAL | Age: 84
Setting detail: HOSPITAL OUTPATIENT SURGERY
Discharge: HOME | End: 2024-09-11
Attending: INTERNAL MEDICINE | Admitting: INTERNAL MEDICINE
Payer: MEDICARE

## 2024-09-11 ENCOUNTER — ANESTHESIA (OUTPATIENT)
Dept: ENDOSCOPY | Facility: HOSPITAL | Age: 84
End: 2024-09-11
Payer: MEDICARE

## 2024-09-11 VITALS
WEIGHT: 143 LBS | TEMPERATURE: 99 F | DIASTOLIC BLOOD PRESSURE: 70 MMHG | HEIGHT: 66 IN | OXYGEN SATURATION: 97 % | BODY MASS INDEX: 22.98 KG/M2 | HEART RATE: 79 BPM | RESPIRATION RATE: 18 BRPM | SYSTOLIC BLOOD PRESSURE: 173 MMHG

## 2024-09-11 DIAGNOSIS — R13.10 DYSPHAGIA, UNSPECIFIED TYPE: ICD-10-CM

## 2024-09-11 LAB
CHLORIDE SERPL-SCNC: 105 MMOL/L (ref 98–112)
CO2 SERPL-SCNC: 25 MMOL/L (ref 21–32)
POTASSIUM SERPL-SCNC: 5.4 MMOL/L (ref 3.5–5.1)
SODIUM SERPL-SCNC: 138 MMOL/L (ref 136–145)

## 2024-09-11 PROCEDURE — 88305 TISSUE EXAM BY PATHOLOGIST: CPT | Performed by: INTERNAL MEDICINE

## 2024-09-11 PROCEDURE — 0DB98ZX EXCISION OF DUODENUM, VIA NATURAL OR ARTIFICIAL OPENING ENDOSCOPIC, DIAGNOSTIC: ICD-10-PCS | Performed by: INTERNAL MEDICINE

## 2024-09-11 PROCEDURE — 0DB78ZX EXCISION OF STOMACH, PYLORUS, VIA NATURAL OR ARTIFICIAL OPENING ENDOSCOPIC, DIAGNOSTIC: ICD-10-PCS | Performed by: INTERNAL MEDICINE

## 2024-09-11 PROCEDURE — 0DB48ZX EXCISION OF ESOPHAGOGASTRIC JUNCTION, VIA NATURAL OR ARTIFICIAL OPENING ENDOSCOPIC, DIAGNOSTIC: ICD-10-PCS | Performed by: INTERNAL MEDICINE

## 2024-09-11 PROCEDURE — 0DB58ZX EXCISION OF ESOPHAGUS, VIA NATURAL OR ARTIFICIAL OPENING ENDOSCOPIC, DIAGNOSTIC: ICD-10-PCS | Performed by: INTERNAL MEDICINE

## 2024-09-11 PROCEDURE — 80051 ELECTROLYTE PANEL: CPT

## 2024-09-11 RX ORDER — SODIUM CHLORIDE, SODIUM LACTATE, POTASSIUM CHLORIDE, CALCIUM CHLORIDE 600; 310; 30; 20 MG/100ML; MG/100ML; MG/100ML; MG/100ML
INJECTION, SOLUTION INTRAVENOUS CONTINUOUS
Status: DISCONTINUED | OUTPATIENT
Start: 2024-09-11 | End: 2024-09-11

## 2024-09-11 RX ORDER — DEXTROSE MONOHYDRATE 25 G/50ML
50 INJECTION, SOLUTION INTRAVENOUS
Status: DISCONTINUED | OUTPATIENT
Start: 2024-09-11 | End: 2024-09-11

## 2024-09-11 RX ORDER — NALOXONE HYDROCHLORIDE 0.4 MG/ML
0.08 INJECTION, SOLUTION INTRAMUSCULAR; INTRAVENOUS; SUBCUTANEOUS ONCE AS NEEDED
Status: DISCONTINUED | OUTPATIENT
Start: 2024-09-11 | End: 2024-09-11

## 2024-09-11 RX ORDER — HYDROMORPHONE HYDROCHLORIDE 1 MG/ML
0.2 INJECTION, SOLUTION INTRAMUSCULAR; INTRAVENOUS; SUBCUTANEOUS EVERY 5 MIN PRN
Status: DISCONTINUED | OUTPATIENT
Start: 2024-09-11 | End: 2024-09-11

## 2024-09-11 RX ORDER — LIDOCAINE HYDROCHLORIDE 10 MG/ML
INJECTION, SOLUTION EPIDURAL; INFILTRATION; INTRACAUDAL; PERINEURAL AS NEEDED
Status: DISCONTINUED | OUTPATIENT
Start: 2024-09-11 | End: 2024-09-11

## 2024-09-11 RX ORDER — HYDROMORPHONE HYDROCHLORIDE 1 MG/ML
INJECTION, SOLUTION INTRAMUSCULAR; INTRAVENOUS; SUBCUTANEOUS
Status: COMPLETED
Start: 2024-09-11 | End: 2024-09-11

## 2024-09-11 RX ORDER — NICOTINE POLACRILEX 4 MG
15 LOZENGE BUCCAL
Status: DISCONTINUED | OUTPATIENT
Start: 2024-09-11 | End: 2024-09-11

## 2024-09-11 RX ORDER — NICOTINE POLACRILEX 4 MG
30 LOZENGE BUCCAL
Status: DISCONTINUED | OUTPATIENT
Start: 2024-09-11 | End: 2024-09-11

## 2024-09-11 RX ORDER — HYDROMORPHONE HYDROCHLORIDE 1 MG/ML
0.6 INJECTION, SOLUTION INTRAMUSCULAR; INTRAVENOUS; SUBCUTANEOUS EVERY 5 MIN PRN
Status: DISCONTINUED | OUTPATIENT
Start: 2024-09-11 | End: 2024-09-11

## 2024-09-11 RX ORDER — HYDROMORPHONE HYDROCHLORIDE 1 MG/ML
0.4 INJECTION, SOLUTION INTRAMUSCULAR; INTRAVENOUS; SUBCUTANEOUS EVERY 5 MIN PRN
Status: DISCONTINUED | OUTPATIENT
Start: 2024-09-11 | End: 2024-09-11

## 2024-09-11 RX ADMIN — SODIUM CHLORIDE, SODIUM LACTATE, POTASSIUM CHLORIDE, CALCIUM CHLORIDE: 600; 310; 30; 20 INJECTION, SOLUTION INTRAVENOUS at 09:31:00

## 2024-09-11 NOTE — H&P
[facility]  Pre-op H and P    Anastasia Hobson Patient Status:  Hospital Outpatient Surgery    1940 MRN [id number]   Location [facility] Attending Bigg Camara MD   Date 2024 PCP Houston Prado MD     CC: Dysphagia, abnormal PET: PET does not show evidence of distant disease per office notes however there is concern for a possible head and neck cancer versus cervical esophagus cancer. 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 Dysphagia, abnormal PET: PET does not show evidence of distant disease per office notes however there is concern for a possible head and neck cancer versus cervical esophagus cancer. 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    Bloating    Blurred vision    Cancer (HCC)    Congestive heart disease (HCC)    Constipation    Diabetes (HCC)    IDDM     Diabetes mellitus (HCC)    Diarrhea, unspecified    CLARKE (dyspnea on exertion)    Easy bruising    Elevated cholesterol    Essential hypertension    Exposure to medical diagnostic radiation    Fatigue    Flatulence/gas pain/belching    Frequent urination    taking furosemide daily    Headache disorder    Hearing loss    Heart palpitations    Heartburn    occasionally    High blood pressure    High cholesterol    History of falling    Hyperlipidemia    Irregular bowel habits    Itch of skin    right hand only    Leaking of urine    sometimes    Leg swelling    Osteoporosis    Sleep disturbance    Stool incontinence    when I have diarrhea    Type 2 diabetes mellitus with hyperglycemia (HCC)    Visual impairment    glasses    Wears glasses     Past  Surgical History:   Procedure Laterality Date    Cataract      Cervical spine surgery  10/06/2023    Colonoscopy  2019    Colonoscopy      Cystoscopy,insert ureteral stent      Cystourethroscopy  11/25/2020    Cystoscopy Procedure Dr Josh Hernandez  - recurrent tumor on BTS    Cystourethroscopy,biopsy  12/16/2019    BBx of scar sites, [facility]    Cystourethroscopy,fulgur .5-2cm lesn  04/16/2020    TURBT, [facility]    Cystourethroscopy,fulgur .5-2cm lesn  07/30/2020    TURBT with Gemcitabine    Cystourethroscopy,fulgur >5cm lesn  10/03/2019    TURBT with bilateral RPGs ([facility])    Fracture surgery      left wrist    Hysterectomy      age 31    Ndsc ablation & rcnstj [facility] w/o bypass      Other surgical history  04/20/2021    BTS CystoDr Hernandez    Other surgical history  08/17/2021    BTS Cysto Caterize Dr. Hernandez    Other surgical history  11/16/2021    BTS Cysto W/ caterize Dr. Hernandez     Other surgical history  03/08/2022    BTS Cysto- Dr. Hernandez    Spine surgery procedure unlisted  10/06/2003    CERVICAL    Tonsillectomy  1946    Total abdom hysterectomy  1971     Family History   Problem Relation Age of Onset    Diabetes Father     Stroke Father     Colon Polyps Mother     Diabetes Mother     Heart Attack Mother     Other (Other) Son         Marilee      reports that she quit smoking about 17 years ago. Her smoking use included cigarettes. She has a 73.5 pack-year smoking history. She has never used smokeless tobacco. She reports that she does not drink alcohol and does not use drugs.    Allergies:  Allergies   Allergen Reactions    Morphine CONFUSION       Medications:    Current Facility-Administered Medications:     glucose (Dex4) 15 GM/59ML oral liquid 15 g, 15 g, Oral, Q15 Min PRN **OR** glucose (Glutose) 40% oral gel 15 g, 15 g, Oral, Q15 Min PRN **OR** glucose-vitamin C (Dex-4) chewable tab 4 tablet, 4 tablet, Oral, Q15 Min PRN **OR** dextrose 50% injection 50 mL, 50 mL, Intravenous, Q15 Min PRN **OR** glucose (Dex4) 15 GM/59ML oral  liquid 30 g, 30 g, Oral, Q15 Min PRN **OR** glucose (Glutose) 40% oral gel 30 g, 30 g, Oral, Q15 Min PRN **OR** glucose-vitamin C (Dex-4) chewable tab 8 tablet, 8 tablet, Oral, Q15 Min PRN    lactated ringers infusion, , Intravenous, Continuous    Physical Exam:    Blood pressure (!) 193/73, pulse 80, temperature 98.5 °F (36.9 °C), temperature source Temporal, resp. rate 18, height 5' 6\" (1.676 m), weight 143 lb (64.9 kg), SpO2 94%.    General: Appears alert, oriented x3 and in no acute distress.  CV: Normal rate   Lungs: Normal effort   Skin: Warm and dry.  Laboratory Data:       Imaging:    Assessment/Plan/Procedure:  Patient Active Problem List   Diagnosis    Osteoarthritis of spine with radiculopathy, cervical region    Primary hypertension    Type 2 diabetes mellitus without complication, with long-term current use of insulin (HCC)    Mixed hyperlipidemia    Arthrodesis status    Tremor of unknown origin    Personal history of colonic polyps    Malignant neoplasm of lateral wall of urinary bladder (HCC)    Stage 3b chronic kidney disease (HCC)    Atrial fibrillation with RVR (HCC)    Advance care planning    Acute pulmonary edema (HCC)    Other constipation    Functional diarrhea    Type 2 diabetes mellitus with diabetic chronic kidney disease (HCC)    At high risk for falls    Hypoglycemia    Chronic bronchitis, unspecified chronic bronchitis type (HCC)    Persistent atrial fibrillation (HCC)    Hyperlipidemia    Unspecified atrial fibrillation (HCC)    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)    Urge incontinence    Heart failure, unspecified (HCC)    Chronic kidney disease, stage 3b (HCC)    Type 2 diabetes mellitus with stage 2 chronic kidney disease, without long-term current use of insulin (HCC)    Long term (current) use of oral hypoglycemic drugs    Personal history of malignant neoplasm of bladder    Pure hypercholesterolemia, unspecified    Mass of left side of neck    Azotemia     Type 2 diabetes mellitus with hyperglycemia, unspecified whether long term insulin use (HCC)    Anticoagulated    Atrial fibrillation, chronic (HCC)    Palliative care by specialist    Head and neck cancer (HCC)       Dysphagia, abnormal PET: PET does not show evidence of distant disease per office notes however there is concern for a possible head and neck cancer versus cervical esophagus cancer. 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

## 2024-09-11 NOTE — OPERATIVE REPORT
Anastasia Hobson Patient Status:  Hospital Outpatient Surgery    1940 MRN [id number]   [facility] END[facility] Attending No att. providers found   Date 2024 PCP Houston Prado MD     PREOPERATIVE DIAGNOSIS/INDICATION: Abnormal PET CT  POSTOPERTATIVE DIAGNOSIS: Normal  PROCEDURE PERFORMED: EGD with biopsy  TIME OUT WAS PERFORMED    SEDATION: MAC sedation provided by General Anesthesia    INFORMED CONSENT: Risks, benefits and alternatives to the procedure were explained to the patient including but not limited to bleeding, infection, perforation, adverse drug reactions, pancreatitis and the need for hospitalization and surgery if this occurs, the patient understands and agrees to procedure.  PROCEDURE DESCRIPTION: A regular upper endoscope was introduced into the patient’s mouth, hypo pharynx, esophagus, stomach and the first and second portion of the duodenum, retroflexion of the endoscope was performed in the stomach. Careful examination of the above described areas was performed on withdrawal of the endoscope. The patient tolerated the procedure well, there were no immediate complication immediately following the procedure, and the patient was transferred to recovery in stable condition.  FINDINGS:  ESOPHAGUS: Normal  EGJ: Normal  STOMACH: Normal  DUODENUM: Normal  THERAPEUTICS: Cold forceps biopsies were performed from duodenum, antrum, EGJ, esophagus  RECOMMENDATIONS:   Post EGD precautions, watch for bleeding, infection, perforation, adverse drug reactions   Follow biopsies.    Bigg Camara MD  2024  10:25 AM

## 2024-09-11 NOTE — DISCHARGE INSTRUCTIONS
Per Dr. Camara you may resume eliquis tomorrow 9/12      Home Care Instructions for Gastroscopy with Sedation    Diet:  - Resume your regular diet as tolerated unless otherwise instructed.  - Start with light meals to minimize bloating.  - Do not drink alcohol today.    Medication:  - If you have questions about resuming your normal medications, please contact your Primary Care Physician.    Activities:  - Take it easy today. Do not return to work today.  - Do not drive today.  - Do not operate any machinery today (including kitchen equipment).    Gastroscopy:  - You may have a sore throat for 2-3 days following the exam. This is normal. Gargling with warm salt water (1/2 tsp salt to 1 glass warm water) or using throat lozenges will help.  - If you experience any sharp pain in your neck, abdomen or chest, vomiting of blood, oral temperature over 100 degrees Fahrenheit, light-headedness or dizziness, or any other problems, contact your doctor.    **If unable to reach your doctor, please go to the [facility] Emergency Room**    - Your referring physician will receive a full report of your examination.  - If you do not hear from your doctor's office within two weeks of your biopsy, please call them for your results.

## 2024-09-11 NOTE — ANESTHESIA POSTPROCEDURE EVALUATION
[facility]    Anastasia Hobson Patient Status:  Hospital Outpatient Surgery   Age/Gender 83 year old female MRN [id number]   Location [facility] ENDOSCOPY [facility]IN [facility] Attending Bigg Camara MD   Hosp Day # 0 PCP Houston Prado MD       Anesthesia Post-op Note    ESOPHAGOGASTRODUODENOSCOPY (EGD) with biopsies    Procedure Summary       Date: 09/11/24 Room / Location:  ENDOSCOPY 02 /  ENDOSCOPY    Anesthesia Start: 0908 Anesthesia Stop: 0931    Procedure: ESOPHAGOGASTRODUODENOSCOPY (EGD) with biopsies Diagnosis:       Dysphagia, unspecified type      (Normal)    Surgeons: Bigg Camara MD Anesthesiologist: Rinku Loaiza MD    Anesthesia Type: MAC ASA Status: 3            Anesthesia Type: MAC    Vitals Value Taken Time   /70 09/11/24 0931   Temp  09/11/24 0931   Pulse 74 09/11/24 0931   Resp 12 09/11/24 0931   SpO2 98 % 09/11/24 0931   Vitals shown include unfiled device data.    Patient Location: Endoscopy    Anesthesia Type: MAC    Airway Patency: patent    Postop Pain Control: adequate    Mental Status: mildly sedated but able to meaningfully participate in the post-anesthesia evaluation    Nausea/Vomiting: none    Cardiopulmonary/Hydration status: stable euvolemic    Complications: no apparent anesthesia related complications    Postop vital signs: stable    Dental Exam: Unchanged from Preop    Patient to be discharged home when criteria met.

## 2024-09-11 NOTE — PROGRESS NOTES
Patient takes dilaudid q 4 at home. Dose that was due given in recovery. Patient pain score back to baseline. Dr. Law tsai with discharge BP. Patient asymptomatic

## 2024-09-12 ENCOUNTER — TELEPHONE (OUTPATIENT)
Dept: HEMATOLOGY/ONCOLOGY | Facility: HOSPITAL | Age: 84
End: 2024-09-12

## 2024-09-13 ENCOUNTER — LAB ENCOUNTER (OUTPATIENT)
Dept: LAB | Age: 84
End: 2024-09-13
Attending: INTERNAL MEDICINE
Payer: MEDICARE

## 2024-09-13 ENCOUNTER — PATIENT MESSAGE (OUTPATIENT)
Dept: HEMATOLOGY/ONCOLOGY | Facility: HOSPITAL | Age: 84
End: 2024-09-13

## 2024-09-13 DIAGNOSIS — C76.0 HEAD AND NECK CANCER (HCC): ICD-10-CM

## 2024-09-13 DIAGNOSIS — G89.3 NEOPLASM RELATED PAIN: ICD-10-CM

## 2024-09-13 DIAGNOSIS — G89.3 NEOPLASM RELATED PAIN: Primary | ICD-10-CM

## 2024-09-13 LAB
BASOPHILS # BLD AUTO: 0.08 X10(3) UL (ref 0–0.2)
BASOPHILS NFR BLD AUTO: 0.9 %
EOSINOPHIL # BLD AUTO: 0.34 X10(3) UL (ref 0–0.7)
EOSINOPHIL NFR BLD AUTO: 3.8 %
ERYTHROCYTE [DISTWIDTH] IN BLOOD BY AUTOMATED COUNT: 15.2 %
HCT VFR BLD AUTO: 35 %
HGB BLD-MCNC: 11 G/DL
IMM GRANULOCYTES # BLD AUTO: 0.05 X10(3) UL (ref 0–1)
IMM GRANULOCYTES NFR BLD: 0.6 %
LYMPHOCYTES # BLD AUTO: 1.66 X10(3) UL (ref 1–4)
LYMPHOCYTES NFR BLD AUTO: 18.6 %
MCH RBC QN AUTO: 26.2 PG (ref 26–34)
MCHC RBC AUTO-ENTMCNC: 31.4 G/DL (ref 31–37)
MCV RBC AUTO: 83.3 FL
MONOCYTES # BLD AUTO: 1.27 X10(3) UL (ref 0.1–1)
MONOCYTES NFR BLD AUTO: 14.2 %
NEUTROPHILS # BLD AUTO: 5.53 X10 (3) UL (ref 1.5–7.7)
NEUTROPHILS # BLD AUTO: 5.53 X10(3) UL (ref 1.5–7.7)
NEUTROPHILS NFR BLD AUTO: 61.9 %
PLATELET # BLD AUTO: 472 10(3)UL (ref 150–450)
RBC # BLD AUTO: 4.2 X10(6)UL
WBC # BLD AUTO: 8.9 X10(3) UL (ref 4–11)

## 2024-09-13 PROCEDURE — 36415 COLL VENOUS BLD VENIPUNCTURE: CPT

## 2024-09-13 PROCEDURE — 85025 COMPLETE CBC W/AUTO DIFF WBC: CPT

## 2024-09-13 RX ORDER — HYDROMORPHONE HYDROCHLORIDE 2 MG/1
TABLET ORAL EVERY 4 HOURS PRN
Qty: 90 TABLET | Refills: 0 | OUTPATIENT
Start: 2024-09-13

## 2024-09-13 RX ORDER — FENTANYL 12.5 UG/1
1 PATCH TRANSDERMAL
Qty: 5 PATCH | Refills: 0 | Status: SHIPPED | OUTPATIENT
Start: 2024-09-13

## 2024-09-13 RX ORDER — HYDROMORPHONE HYDROCHLORIDE 4 MG/1
4 TABLET ORAL EVERY 4 HOURS PRN
Qty: 90 TABLET | Refills: 0 | Status: SHIPPED | OUTPATIENT
Start: 2024-09-13

## 2024-09-13 NOTE — TELEPHONE ENCOUNTER
From: Anastasia Hobson  To: YUSUF OCHOA  Sent: 9/13/2024 12:01 PM CDT  Subject: Need Dilaudid Refill    Jhonny Escamilla,  I submitted a refill request this morning for Dilaudid. I wanted to reach out to you directly just to make sure this can be called in today. She won't have enough to get her through the night if the script isn't called in as soon as possible.  Sorry for the rush.

## 2024-09-13 NOTE — PAT NURSING NOTE
PreOp Instructions     You are scheduled for: an Interventional Radiology Procedure     Date of Procedure: 09/19/24     Diet Instructions: Do not eat or drink anything after midnight including gum, mints, candy, etc.     Medications: Medications you are allowed to take can be taken with a sip of water the morning of your procedure, May take your pain medication if needed on the morning of the procedure     Do not take the following Blood Thinner(s): last dose of eliquis 9/17 am     Medications to Stop: Hold lasix, potassium, herbal supplements and vitamins morning of procedure     Diabetic Instructions: Do not take morning dose of short-acting Insulin, Take 1/2 lantus the night before. If you wear a CGM (Continuous Glucose Monitor) you will need to remove the entire device (sensor/transmitter) prior to your procedure. Please plan accordingly.          Skin Prep: Shower with antibacterial soap using a clean washcloth, prior to procedure. Once dried off, no lotions/powders/creams/ointments, etc.          Driving After Procedure: Sedation will be given so you WILL NOT be able to drive home. You will need a responsible adult  to drive you home. You can NOT take uber or taxi unless approved by your physician in advance.     Discharge Teaching: Your nurse will give you specific instructions before discharge, Most people can resume normal activities in 2-3 days, Any questions, please call the physician's office

## 2024-09-13 NOTE — TELEPHONE ENCOUNTER
Spoke with daughter who states she is still giving her mom dilaudid 4mg 5-6X daily for pain reports of 7-9/10 consistently.  Not much change is pain or dilaudid use since increasing fentanyl patch.    She will begin treatment on 9/24.  This hopefully will help with pain control as disease improves    Will increase fentanyl to 37.5mg.  she currently has 25mcg patches   Will send script for 12mcg patches today.    Since patient is consistently using dilaudid 4mg, will send script for 4mg tablets to pharmacy.    She will follow up next week

## 2024-09-16 NOTE — PROGRESS NOTES
Date: 2024    To: Anastasia Hobson  : 1940    I hope this letter finds you doing well.  I am writing to inform you of the following:     The biopsies obtained at the time of your recent endoscopic procedure were benign and showed no evidence of infection or malignancy.          Please call the office at [phone] if there are any questions.    Regards,    Bigg Camara M.D.

## 2024-09-19 ENCOUNTER — OFFICE VISIT (OUTPATIENT)
Dept: HEMATOLOGY/ONCOLOGY | Facility: HOSPITAL | Age: 84
End: 2024-09-19
Attending: NURSE PRACTITIONER
Payer: MEDICARE

## 2024-09-19 ENCOUNTER — HOSPITAL ENCOUNTER (OUTPATIENT)
Dept: INTERVENTIONAL RADIOLOGY/VASCULAR | Facility: HOSPITAL | Age: 84
Discharge: HOME | End: 2024-09-19
Attending: INTERNAL MEDICINE | Admitting: INTERNAL MEDICINE
Payer: MEDICARE

## 2024-09-19 VITALS
OXYGEN SATURATION: 95 % | DIASTOLIC BLOOD PRESSURE: 53 MMHG | RESPIRATION RATE: 13 BRPM | TEMPERATURE: 97 F | SYSTOLIC BLOOD PRESSURE: 135 MMHG | HEART RATE: 67 BPM

## 2024-09-19 DIAGNOSIS — C76.0 HEAD AND NECK CANCER (HCC): Primary | ICD-10-CM

## 2024-09-19 DIAGNOSIS — Z51.5 PALLIATIVE CARE BY SPECIALIST: ICD-10-CM

## 2024-09-19 DIAGNOSIS — G89.3 NEOPLASM RELATED PAIN: Primary | ICD-10-CM

## 2024-09-19 DIAGNOSIS — Z71.9 ENCOUNTER FOR HEALTH EDUCATION: ICD-10-CM

## 2024-09-19 DIAGNOSIS — C80.1 CARCINOMA OF UNKNOWN ORIGIN (HCC): ICD-10-CM

## 2024-09-19 DIAGNOSIS — G89.3 NEOPLASM RELATED PAIN: ICD-10-CM

## 2024-09-19 DIAGNOSIS — C76.0 HEAD AND NECK CANCER (HCC): ICD-10-CM

## 2024-09-19 DIAGNOSIS — K59.03 DRUG-INDUCED CONSTIPATION: ICD-10-CM

## 2024-09-19 LAB
GLUCOSE BLD-MCNC: 152 MG/DL (ref 70–99)
INR: 1.1 (ref 0.8–1.3)

## 2024-09-19 PROCEDURE — 36561 INSERT TUNNELED CV CATH: CPT | Performed by: RADIOLOGY

## 2024-09-19 PROCEDURE — 0JH60XZ INSERTION OF TUNNELED VASCULAR ACCESS DEVICE INTO CHEST SUBCUTANEOUS TISSUE AND FASCIA, OPEN APPROACH: ICD-10-PCS | Performed by: RADIOLOGY

## 2024-09-19 PROCEDURE — 76937 US GUIDE VASCULAR ACCESS: CPT | Performed by: RADIOLOGY

## 2024-09-19 PROCEDURE — 77001 FLUOROGUIDE FOR VEIN DEVICE: CPT | Performed by: RADIOLOGY

## 2024-09-19 PROCEDURE — 82962 GLUCOSE BLOOD TEST: CPT

## 2024-09-19 PROCEDURE — 85610 PROTHROMBIN TIME: CPT | Performed by: RADIOLOGY

## 2024-09-19 PROCEDURE — G2212 PROLONG OUTPT/OFFICE VIS: HCPCS | Performed by: NURSE PRACTITIONER

## 2024-09-19 PROCEDURE — 99215 OFFICE O/P EST HI 40 MIN: CPT | Performed by: NURSE PRACTITIONER

## 2024-09-19 PROCEDURE — 02HV33Z INSERTION OF INFUSION DEVICE INTO SUPERIOR VENA CAVA, PERCUTANEOUS APPROACH: ICD-10-PCS | Performed by: RADIOLOGY

## 2024-09-19 PROCEDURE — 99152 MOD SED SAME PHYS/QHP 5/>YRS: CPT | Performed by: RADIOLOGY

## 2024-09-19 PROCEDURE — 99153 MOD SED SAME PHYS/QHP EA: CPT | Performed by: RADIOLOGY

## 2024-09-19 PROCEDURE — 99214 OFFICE O/P EST MOD 30 MIN: CPT | Performed by: NURSE PRACTITIONER

## 2024-09-19 RX ORDER — LIDOCAINE HYDROCHLORIDE 10 MG/ML
INJECTION, SOLUTION INFILTRATION; PERINEURAL
Status: COMPLETED
Start: 2024-09-19 | End: 2024-09-19

## 2024-09-19 RX ORDER — ONDANSETRON 8 MG/1
8 TABLET, ORALLY DISINTEGRATING ORAL EVERY 8 HOURS PRN
Qty: 60 TABLET | Refills: 1 | Status: SHIPPED | OUTPATIENT
Start: 2024-09-19

## 2024-09-19 RX ORDER — HEPARIN SODIUM 1000 [USP'U]/ML
INJECTION, SOLUTION INTRAVENOUS; SUBCUTANEOUS
Status: COMPLETED
Start: 2024-09-19 | End: 2024-09-19

## 2024-09-19 RX ORDER — MIDAZOLAM HYDROCHLORIDE 1 MG/ML
INJECTION INTRAMUSCULAR; INTRAVENOUS
Status: COMPLETED
Start: 2024-09-19 | End: 2024-09-19

## 2024-09-19 RX ORDER — SODIUM CHLORIDE 9 MG/ML
INJECTION, SOLUTION INTRAVENOUS CONTINUOUS
Status: DISCONTINUED | OUTPATIENT
Start: 2024-09-19 | End: 2024-09-19

## 2024-09-19 RX ADMIN — SODIUM CHLORIDE: 9 INJECTION, SOLUTION INTRAVENOUS at 11:43:00

## 2024-09-19 NOTE — PROGRESS NOTES
IV Chemotherapy Education    Learner:  Patient and Family Member    Barriers / Limitations:  None    Chemotherapy education goals:  Learn the drug names  Administration schedule  Routes of administration   Treatment setting    Drug names:  carboplatin + paclitaxel weekly with RT      Chemotherapy action on cancer / normal cells: Achieved      Treatment Effects on Constitution: Achieved    Anticipated fatigue   Role of activity in recovery   Notify MD/RN of inability to complete ADLs      Treatment Effects on Mucous Membranes: Achieved     Appropriate oral hygiene / signs of stomatitis and thrush  Nausea and vomiting / use of antiemetics  Diarrhea / constipation / dietary changes   Notify MD/RN of above symptoms if they persist > 24 hours      Treatment Effects on Nutritional Status: Achieved    Changes in taste perception / appetite  Access to RD for additional support  Notify MD/RN of weight loss or gain and any appetite changes      Treatment Effects on Hair: Achieved    Potential for hair loss / how to obtain a wig      Treatment Effects on the Skin: Achieved    Anticipated radiation skin changes  Potential nail changes  Notify MD/RN of any new rash      Treatment Effects on Bone Marrow: Achieved    Function of white blood cells / signs of infection  Function of red blood cells / signs of anemia  Function of platelets / signs of bleeding  Notify MD/RN of any chills or fever 100.5 and above  Notify MD/RN of any bleeding      Treatment Effects on Neurological System: Achieved    Potential numbness / tingling in hands or feet  Notify MD/RN of any numbness / tingling at next visit      Treatment Effects on the Bladder and Kidneys: Achieved    Function of the kidneys   Suggested fluid intake  Notify MD/RN if blood appears in urine or if you have a decreased urine output      Infusion reaction: Achieved    Potential for infusion reaction, decreased risk with each treatment, how reactions are managed   Signs / symptoms  include back pain, chills, dizziness, flushing, chest pain/tightness, itching, shortness of breath, nausea, vomiting  Notify MD/RN IMMEDIATELY if you have any of the above signs / symptoms      Teaching Materials Provided:     Chemotherapy information sheets from [employer] and [employer] provided  Dietician information sheet - dietician also notified of baseline dysphagia issues  When to contact the Treatment Team Information Sheet  Side Effect Management Information Sheet  [facility] [employer] [facility] Sheet  [employer] Information sheet    Patient and care partner were given ample opportunity to ask questions. All questions and concerns addressed. We discussed self care techniques, symptom management, fluid, diet, and activities.     Chemotherapy Consent Form signed by the patient.    I spent a total of 60 minutes with the patient, 100 % of that time was spent counseling patient regarding the above documented side effects and management, when to call provider and contact information.     Encounter Times  PreCharting: 3 minutes    Reviewing/Obtaining:   minutes      Medical Exam:   minutes    Plan:   minutes      Notes: 2 minutes    Counseling/Education: 60 minutes      Referring/Communicating:   minutes    Ind Interpretation:   minutes      Care Coordination: 10 minutes       My total time spent caring for the patient on the day of the encounter: 75 minutes.     Electronically signed:     Jannet Orlando DNP, NP-C  Nurse Practitioner  [facility]

## 2024-09-19 NOTE — PROGRESS NOTES
Pt received s/p PAC insertion with Dr. Dave. Right upper chest and neck dressings CDI, no bleeding or swelling present. Recovery complete. Discharge instructions given to pt and pt's daughter. PAC card given to pt. IV discontinued, pt taken down to [facility] via wheelchair for discharge.

## 2024-09-19 NOTE — DISCHARGE INSTRUCTIONS
Do not drive for 24 hours.    No heavy lifting or strenuous activity for 48 hours.    Do not drink alcohol for the next 24 hours.    Keep the \"pink\" dressing clean and dry for 5 days. Do not shower until this \"pink\" dressing is removed.    The chemo nurse will remove this dressing if you have chemo before 5 days. If you have not had a chemo session in 5 days, remove the \"pink\" dressing.     After the \"pink\" dressing is removed you may shower. Do not submerge the port site until it is completely healed.     The small white dressing on your neck can be removed tomorrow.     If you have a fever >100.4 degrees, chills, signs of infection including redness, swelling, thick yellow drainage and/or a foul smell coming from the port site call your oncologist right away.     Do not make any personal/business decisions and/or sign any legal documents for the next 24 hours.

## 2024-09-19 NOTE — PROCEDURES
[facility]   part of [facility]  Procedure Note    Anastasia Hobson Patient Status:  Outpatient    1940 MRN [id number]   Location [facility] AMBULATORY CARE CENTER Attending Jordon Reid MD   Hosp Day # 0 PCP Houston Prado MD     Procedure: Chest port placement    Pre-Procedure Diagnosis:  Cancer    Post-Procedure Diagnosis: Same    Anesthesia:  Local and Sedation    Findings:  8f power port to R int jug vein, tip to SVC.  Ok to use    Specimens: None    Blood Loss:  Minimal    Tourniquet Time: None  Complications:  None  Drains:  None    Secondary Diagnosis:  None    Viet Dave MD  2024

## 2024-09-19 NOTE — PROGRESS NOTES
Palliative Care Follow Up Note     Patient Name: Anastasia Hobson   YOB: 1940   Medical Record Number: [id number]   CSN: [id number]   Date of visit: 9/19/2024     Chief Complaint/Reason for Visit:  Follow up visit for symptoms     History of Present Illness:         Anastasia Hobson is a 84 year old female with head and neck cancer beginning chemo/RT.  She complains of L shoulder and neck pain that is constant and varies in intensity.  Its deep achy pain with episodes of sharp stabbing pain.  She feels the increased fentanyl patch has helped keep her pain more tolerable at 4/10.  She is still using 6 doses of dilaudid 4mg daily.  When she skips a dose, the pain intensifies and is hard to control again.  She feels this is the best her pain has been allowing her to be more active and sleep at night.  She is happy with plan.  Patient is more fatigued and more forgetful with dose increase but this is tolerable in exchange for better pain control.  Her daughter, Petty is with her today and is exhausted due to care burden of medication schedule with dilaudid and tylenol dosing.     She has periodic nausea controlled with zofran.  She struggles with constipation.  She uses no regular laxatives due to fear of diarrhea.  She has small BMs with effort but no \"descent\" BM in a while.         Problem List:  Patient Active Problem List   Diagnosis    Osteoarthritis of spine with radiculopathy, cervical region    Primary hypertension    Type 2 diabetes mellitus without complication, with long-term current use of insulin (HCC)    Mixed hyperlipidemia    Arthrodesis status    Tremor of unknown origin    Personal history of colonic polyps    Malignant neoplasm of lateral wall of urinary bladder (HCC)    Stage 3b chronic kidney disease (HCC)    Atrial fibrillation with RVR (HCC)    Advance care planning    Acute pulmonary edema (HCC)    Other constipation    Functional diarrhea    Type 2 diabetes mellitus with  diabetic chronic kidney disease (HCC)    At high risk for falls    Hypoglycemia    Chronic bronchitis, unspecified chronic bronchitis type (HCC)    Persistent atrial fibrillation (HCC)    Hyperlipidemia    Unspecified atrial fibrillation (HCC)    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)    Urge incontinence    Heart failure, unspecified (HCC)    Chronic kidney disease, stage 3b (HCC)    Type 2 diabetes mellitus with stage 2 chronic kidney disease, without long-term current use of insulin (HCC)    Long term (current) use of oral hypoglycemic drugs    Personal history of malignant neoplasm of bladder    Pure hypercholesterolemia, unspecified    Mass of left side of neck    Azotemia    Type 2 diabetes mellitus with hyperglycemia, unspecified whether long term insulin use (HCC)    Anticoagulated    Atrial fibrillation, chronic ([facility])    Palliative care by specialist    Head and neck cancer ([facility])      Medical History:  Past Medical History:    Arrhythmia    Back pain    Bladder cancer (HCC)    high grade superficial TCC, recurrent July 2020 after BCG    Bloating    Blurred vision    Cancer (HCC)    Congestive heart disease (HCC)    Constipation    Diabetes (HCC)    IDDM     Diabetes mellitus (HCC)    Diarrhea, unspecified    CLARKE (dyspnea on exertion)    Easy bruising    Elevated cholesterol    Essential hypertension    Exposure to medical diagnostic radiation    Fatigue    Flatulence/gas pain/belching    Frequent urination    taking furosemide daily    Headache disorder    Hearing loss    Heart palpitations    Heartburn    occasionally    High blood pressure    High cholesterol    History of falling    Hyperlipidemia    Irregular bowel habits    Itch of skin    right hand only    Leaking of urine    sometimes    Leg swelling    Osteoporosis    Sleep disturbance    Stool incontinence    when I have diarrhea    Type 2 diabetes mellitus with hyperglycemia (HCC)    Visual impairment    glasses    Wears  glasses     Surgical History:  Past Surgical History:   Procedure Laterality Date    Cataract      Cervical spine surgery  10/06/2023    Colonoscopy  2019    Colonoscopy      Cystoscopy,insert ureteral stent      Cystourethroscopy  11/25/2020    Cystoscopy Procedure Dr Josh Hernandez  - recurrent tumor on BTS    Cystourethroscopy,biopsy  12/16/2019    BBx of scar sites, NSC    Cystourethroscopy,fulgur .5-2cm lesn  04/16/2020    TURBT, NSC    Cystourethroscopy,fulgur .5-2cm lesn  07/30/2020    TURBT with Gemcitabine    Cystourethroscopy,fulgur >5cm lesn  10/03/2019    TURBT with bilateral RPGs (NSC)    Fracture surgery      left wrist    Hysterectomy      age 31    Ndsc ablation & rcnstj [facility] w/o bypass      Other surgical history  04/20/2021    BTS CystoDr Hernandez    Other surgical history  08/17/2021    BTS Cysto Caterize Dr. Hernandez    Other surgical history  11/16/2021    BTS Cysto W/ caterize Dr. Hernandez     Other surgical history  03/08/2022    BTS Cysto- Dr. Hernandez    Spine surgery procedure unlisted  10/06/2003    CERVICAL    Tonsillectomy  1946    Total abdom hysterectomy  1971       Allergies:  Allergies   Allergen Reactions    Morphine CONFUSION       Palliative Care Social History:    Marital Status:   Children:  son and daughter  Living Situation: she lives with her daughter, Petty.    She uses a walker around home and wheelchair outside due to endurance issues and balance      Medications:      Review of Systems:  General:  Fatigue.  pain  Respiratory:  Denies SOB, denies cough  Cardiac:  Denies chest pain, heart palpitations  Abdomen:  Denies constipation, diarrhea.  Denies pain.    Psych:  No complaints.  Not Sleeping well    Palliative Performance Scale:   60%    Physical Examination:  General: Patient is alert and oriented, not in acute distress.  Respiratory: Normal excursions and effort  Abdomen: Soft, non tender   Musculoskeletal:  sitting in wheelchair  Psych:  Mood/Affect appropriate    Advanced  Directives Discussed and Completed:     HCPOA/Health Surrogate:    There is a completed HCPOA documentation on file in Epic.    I confirmed that patient's HCPOA is: Petty, her daughter    DINAH Discussed and Completed:  There is a Scanned POLST in EMR indicating DNAR/Selective    Palliative Care:  Pain has improved and is now manageable so will continue current plan.  She is still using ATC dilaudid dosing but will fentanyl at current dosing since she is having fatigue and memory SE.  She begins treatment next week, so will attempt opioid weaning in a month or so to see if pain remains controlled.  She is happy with this plan.  .  Will have her stop acetaminophen for now to minimize some of the scheduling and pill burden for her daughter and patient.    Will continue current gabapentin at night.  This will minimize SE and should help with sleep  Discussed bowel prophylaxis with miralax and senna.  She will begin BID miralax dosing and add senna prn.  She can titrate to optimize bowel pattern.       Impression/Plan:   1. Neoplasm related pain  Fentanyl 37.5mcg Q 72   Dilaudid 4mg Q 4 prn  Gabapentin 200mg Q HS  Acetaminophen 1G TID prn  RT    2. Drug-induced constipation  Miralax BID  Senna  BID prn  fluids    3. Palliative care by specialist  Ongoing support    4. Head and neck cancer (HCC)      Planned Follow up: Return in about 3 weeks (around 10/10/2024).      I spent a total of 35 minutes with the patient today, which included all of the following:direct face to face contact, history taking, physical examination, and >50% was spent counseling and coordinating care      The 21st Century Cures Act makes medical notes like these available to patients in the interest of transparency. Please be advised this is a medical document. Medical documents are intended to carry relevant information, facts as evident, and the clinical opinion of the practitioner. The medical note is intended as peer to peer communication and  may appear blunt or direct. It is written in medical language and may contain abbreviations or verbiage that are unfamiliar.        Electronically Signed by:  RUIZ CHINCHILLA Outpatient Palliative Nurse Practitioner

## 2024-09-20 ENCOUNTER — TELEPHONE (OUTPATIENT)
Dept: HEMATOLOGY/ONCOLOGY | Facility: HOSPITAL | Age: 84
End: 2024-09-20

## 2024-09-20 NOTE — TELEPHONE ENCOUNTER
Oncology Nutrition PHONE Consultation    Patient Name: Anastasia Hobson  YOB: 1940  Medical Record Number: [id number]   Account Number: [id number]  Dietitian: Jie Juarez RD, LDN    Date of visit: 9/20/2024    Diet Rx: high protein/calorie, soft as tolerated    Pertinent Dx/PMH: Left supraclavicular poorly differentiated carcinoma, p16 positive     Past Medical History:    Arrhythmia    Back pain    Bladder cancer (HCC)    high grade superficial TCC, recurrent July 2020 after BCG    Bloating    Blurred vision    Cancer (HCC)    Congestive heart disease (HCC)    Constipation    Diabetes (HCC)    IDDM     Diabetes mellitus (HCC)    Diarrhea, unspecified    CLARKE (dyspnea on exertion)    Easy bruising    Elevated cholesterol    Essential hypertension    Exposure to medical diagnostic radiation    Fatigue    Flatulence/gas pain/belching    Frequent urination    taking furosemide daily    Headache disorder    Hearing loss    Heart palpitations    Heartburn    occasionally    High blood pressure    High cholesterol    History of falling    Hyperlipidemia    Irregular bowel habits    Itch of skin    right hand only    Leaking of urine    sometimes    Leg swelling    Osteoporosis    Sleep disturbance    Stool incontinence    when I have diarrhea    Type 2 diabetes mellitus with hyperglycemia (HCC)    Visual impairment    glasses    Wears glasses       TX: concurrent weekly Carbo/taxol/RT (M-F thru 11/5/24) PENDING    Other pertinent subjective/objective information: diet/sx hx obtained    Pertinent Meds:    Current Outpatient Medications:     ondansetron 8 MG Oral Tablet Dispersible, Take 1 tablet (8 mg total) by mouth every 8 (eight) hours as needed for Nausea., Disp: 60 tablet, Rfl: 1    fentaNYL 12 MCG/HR Transdermal Patch 72 Hr, Place 1 patch onto the skin every third day. Take in addition to fentanyl 25mcg patch for a total dose of 37.5mcg, Disp: 5 patch, Rfl: 0    HYDROmorphone 4 MG Oral Tab, Take  1 tablet (4 mg total) by mouth every 4 (four) hours as needed for Pain., Disp: 90 tablet, Rfl: 0    fentaNYL 25 MCG/HR Transdermal Patch 72 Hr, Place 1 patch onto the skin every third day., Disp: 10 patch, Rfl: 0    amiodarone 200 MG Oral Tab, TAKE ONE TABLET BY MOUTH ONCE DAILY. Needs appointment with cardiologist and EKG done for future refills., Disp: , Rfl:     prochlorperazine (COMPAZINE) 10 mg tablet, Take 1 tablet (10 mg total) by mouth every 6 (six) hours as needed for Nausea., Disp: 30 tablet, Rfl: 1    gabapentin 100 MG Oral Cap, Take 2 capsules (200 mg total) by mouth nightly. (Patient taking differently: Take 2 capsules (200 mg total) by mouth 2 (two) times daily.), Disp: 60 capsule, Rfl: 1    Naloxone HCl 4 MG/0.1ML Nasal Liquid, 4 mg by Nasal route as needed. If patient remains unresponsive, repeat dose in other nostril 2-5 minutes after first dose., Disp: 1 kit, Rfl: 0    Acetaminophen 500 MG Oral Cap, Take 2 capsules (1,000 mg total) by mouth 3 (three) times daily as needed for Pain., Disp: , Rfl:     Insulin Lispro, 1 Unit Dial, (HUMALOG KWIKPEN) 100 UNIT/ML Subcutaneous Solution Pen-injector, INJECT 10-12 UNITS INTO THE SKIN THREE TIMES DAILY WITH MEALS AS DIRECTED, Disp: 15 mL, Rfl: 0    hydrALAZINE 50 MG Oral Tab, 1 tablet (50 mg total) 3 (three) times daily., Disp: , Rfl:     dilTIAZem HCl ER Beads 120 MG Oral Capsule SR 24 Hr, Take 1 capsule (120 mg total) by mouth daily., Disp: 30 capsule, Rfl: 11    LANTUS SOLOSTAR 100 UNIT/ML Subcutaneous Solution Pen-injector, INJECT 8 UNITS INTO THE SKIN NIGHTLY. *PEN EXPIRES 28 DAYS AFTER FIRST USE*, Disp: 15 mL, Rfl: 0    LISINOPRIL 20 MG Oral Tab, TAKE ONE TABLET BY MOUTH EVERY EVENING, Disp: 90 tablet, Rfl: 0    Insulin Pen Needle (BD PEN NEEDLE PRISCILLA U/F) 32G X 4 MM Does not apply Misc, Inject 1 Pen into the skin daily., Disp: 90 each, Rfl: 0    ATORVASTATIN 80 MG Oral Tab, Take 1 tablet (80 mg total) by mouth nightly., Disp: 90 tablet, Rfl: 0     Continuous Blood Gluc Sensor (FREESTYLE NATALIE 2 SENSOR) Does not apply Misc, 1 Device by Subdermal route every 14 (fourteen) days., Disp: 6 each, Rfl: 3    fenofibrate 145 MG Oral Tab, Take 1 tablet (145 mg total) by mouth daily., Disp: 90 tablet, Rfl: 0    Continuous Blood Gluc  (FREESTYLE NATALIE 2 READER) Does not apply Device, 1 each daily., Disp: 1 each, Rfl: 1    Continuous Blood Gluc  (FREESTYLE NATALIE 2 READER) Does not apply Device, 1 Device daily as needed., Disp: 1 each, Rfl: 0    PROBIOTIC PRODUCT OR, Take 1 capsule by mouth daily. 100 billion, Disp: , Rfl:     furosemide 40 MG Oral Tab, Take 0.5 tablets (20 mg total) by mouth daily., Disp: , Rfl:     Potassium Chloride ER 20 MEQ Oral Tab CR, Take 1 tablet by mouth daily., Disp: , Rfl:     apixaban 5 MG Oral Tab, Take 1 tablet (5 mg total) by mouth 2 (two) times daily., Disp: 60 tablet, Rfl: 2    Cholecalciferol (VITAMIN D3) 250 MCG (77897 UT) Oral Tab, Take 1,000 Units by mouth daily., Disp: , Rfl:     Calcium Citrate-Vitamin D 315-250 MG-UNIT Oral Tab, Take 1 tablet by mouth daily., Disp: , Rfl:     Pertinent Labs: noted    Height: 5'5\"            IBW: 125 +/- 10%    WT HX:   Wt Readings from Last 9 Encounters:   09/11/24 64.9 kg (143 lb)   09/06/24 69.4 kg (153 lb)   09/03/24 70.4 kg (155 lb 4.8 oz)   08/23/24 69.4 kg (153 lb)   08/19/24 52.8 kg (116 lb 6.4 oz)   07/31/24 72.3 kg (159 lb 6.4 oz)   07/31/24 68 kg (150 lb)   07/23/24 68.4 kg (150 lb 12.8 oz)   01/17/24 67 kg (147 lb 9.6 oz)       Estimated Nutrition Needs: 22-27 kcals/kg = 4753-9105 KCALS/d; 1.3 gms protein/kg = 85  gms/d    Services Provided: Verbal ix provided addressing -  importance of nutrition during tx; potential nutrition related side effects of tx and ways to address    Assessment/Plan: RD contacted pt and daughter via phone as per NP referral for assessment, recommendations as per upcoming tx.     Pt w/ stable wt and denying dysphagia/odynophagia at this time.      Diet hx revealed packaged breakfast sausage sandwich, tomato, fruit and Boost Glucose Control ONS (190 kcals, 16 gm protein) for breakfast; \"not too hungry at lunch\" having 1/2 sandwich, +/- salad and H2O; +/- fruit for snack; balanced dinner. Pt noted drinking H2O w/ meals and throughout the day.     Pt noted she is on ss insulin and is comfortable w/ dosing pending glucoscans.     RD reviewed recommendations as noted suggesting pt drink only 1/2 ONS at breakfast and 1/2 at lunch. Pending po intake as tx goes, she may benefit from adding a 2nd bottle of ONS.     RD addressed all questions/concerns posed offering support. RD will continue to monitor throughout tx.     Thank you for allowing me to participate in the care of Anastasia.     The 21st Century Cures Act makes medical notes like these available to patients in the interest of transparency. Please be advised this is a medical document. Medical documents are intended to carry relevant information, facts as evident, and the clinical opinion of the practitioner. The medical note is intended as peer to peer communication and may appear blunt or direct. It is written in medical language and may contain abbreviations or verbiage that are unfamiliar.

## 2024-09-23 ENCOUNTER — TELEPHONE (OUTPATIENT)
Dept: HEMATOLOGY/ONCOLOGY | Facility: HOSPITAL | Age: 84
End: 2024-09-23

## 2024-09-23 NOTE — TELEPHONE ENCOUNTER
Spoke with Petty regarding patients anxiety with wearing the mask for RT.  The pain level has increased with stopping acetaminophen.  She continues to use dilaudid Q 4 ATC.    Pain has improved and is more tolerable with adding acetaminophen back in to regimen.    She can continue this.    Patient asking for xanax to help get through RT.  Will have her take 0.125mg 45 minutes prior to RT.  She will space 2 hrs from dilaudid dosing.    Will follow up in 1 week

## 2024-09-24 ENCOUNTER — HOSPITAL ENCOUNTER (OUTPATIENT)
Dept: RADIATION ONCOLOGY | Facility: HOSPITAL | Age: 84
Discharge: HOME | End: 2024-09-24
Attending: RADIOLOGY
Payer: MEDICARE

## 2024-09-24 ENCOUNTER — OFFICE VISIT (OUTPATIENT)
Dept: HEMATOLOGY/ONCOLOGY | Facility: HOSPITAL | Age: 84
End: 2024-09-24
Attending: NURSE PRACTITIONER
Payer: MEDICARE

## 2024-09-24 VITALS
WEIGHT: 145.81 LBS | SYSTOLIC BLOOD PRESSURE: 135 MMHG | DIASTOLIC BLOOD PRESSURE: 72 MMHG | RESPIRATION RATE: 16 BRPM | BODY MASS INDEX: 24.59 KG/M2 | OXYGEN SATURATION: 98 % | HEART RATE: 79 BPM | HEIGHT: 64.49 IN | TEMPERATURE: 98 F

## 2024-09-24 DIAGNOSIS — Z79.4 TYPE 2 DIABETES MELLITUS WITH STAGE 3B CHRONIC KIDNEY DISEASE, WITH LONG-TERM CURRENT USE OF INSULIN (HCC): ICD-10-CM

## 2024-09-24 DIAGNOSIS — C76.0 HEAD AND NECK CANCER (HCC): Primary | ICD-10-CM

## 2024-09-24 DIAGNOSIS — R73.9 HYPERGLYCEMIA: ICD-10-CM

## 2024-09-24 DIAGNOSIS — C80.1 CARCINOMA OF UNKNOWN ORIGIN (HCC): Primary | ICD-10-CM

## 2024-09-24 DIAGNOSIS — N18.32 TYPE 2 DIABETES MELLITUS WITH STAGE 3B CHRONIC KIDNEY DISEASE, WITH LONG-TERM CURRENT USE OF INSULIN (HCC): ICD-10-CM

## 2024-09-24 DIAGNOSIS — E11.22 TYPE 2 DIABETES MELLITUS WITH STAGE 3B CHRONIC KIDNEY DISEASE, WITH LONG-TERM CURRENT USE OF INSULIN (HCC): ICD-10-CM

## 2024-09-24 LAB
ALBUMIN SERPL-MCNC: 4.4 G/DL (ref 3.2–4.8)
ALBUMIN/GLOB SERPL: 2 {RATIO} (ref 1–2)
ALP LIVER SERPL-CCNC: 62 U/L
ALT SERPL-CCNC: 37 U/L
ANION GAP SERPL CALC-SCNC: 7 MMOL/L (ref 0–18)
AST SERPL-CCNC: 45 U/L (ref ?–34)
BASOPHILS # BLD AUTO: 0.04 X10(3) UL (ref 0–0.2)
BASOPHILS NFR BLD AUTO: 0.4 %
BILIRUB SERPL-MCNC: 0.6 MG/DL (ref 0.2–1.1)
BUN BLD-MCNC: 31 MG/DL (ref 9–23)
CALCIUM BLD-MCNC: 9.6 MG/DL (ref 8.7–10.4)
CHLORIDE SERPL-SCNC: 104 MMOL/L (ref 98–112)
CO2 SERPL-SCNC: 28 MMOL/L (ref 21–32)
CREAT BLD-MCNC: 1.58 MG/DL
EGFRCR SERPLBLD CKD-EPI 2021: 32 ML/MIN/1.73M2 (ref 60–?)
EOSINOPHIL # BLD AUTO: 0.17 X10(3) UL (ref 0–0.7)
EOSINOPHIL NFR BLD AUTO: 1.9 %
ERYTHROCYTE [DISTWIDTH] IN BLOOD BY AUTOMATED COUNT: 14.8 %
GLOBULIN PLAS-MCNC: 2.2 G/DL (ref 2–3.5)
GLUCOSE BLD-MCNC: 333 MG/DL (ref 70–99)
HCT VFR BLD AUTO: 32.9 %
HGB BLD-MCNC: 10.3 G/DL
IMM GRANULOCYTES # BLD AUTO: 0.04 X10(3) UL (ref 0–1)
IMM GRANULOCYTES NFR BLD: 0.4 %
LYMPHOCYTES # BLD AUTO: 1.27 X10(3) UL (ref 1–4)
LYMPHOCYTES NFR BLD AUTO: 13.8 %
MCH RBC QN AUTO: 25.6 PG (ref 26–34)
MCHC RBC AUTO-ENTMCNC: 31.3 G/DL (ref 31–37)
MCV RBC AUTO: 81.6 FL
MONOCYTES # BLD AUTO: 1.2 X10(3) UL (ref 0.1–1)
MONOCYTES NFR BLD AUTO: 13.1 %
NEUTROPHILS # BLD AUTO: 6.46 X10 (3) UL (ref 1.5–7.7)
NEUTROPHILS # BLD AUTO: 6.46 X10(3) UL (ref 1.5–7.7)
NEUTROPHILS NFR BLD AUTO: 70.4 %
OSMOLALITY SERPL CALC.SUM OF ELEC: 308 MOSM/KG (ref 275–295)
PLATELET # BLD AUTO: 354 10(3)UL (ref 150–450)
POTASSIUM SERPL-SCNC: 4 MMOL/L (ref 3.5–5.1)
PROT SERPL-MCNC: 6.6 G/DL (ref 5.7–8.2)
RBC # BLD AUTO: 4.03 X10(6)UL
SODIUM SERPL-SCNC: 139 MMOL/L (ref 136–145)
WBC # BLD AUTO: 9.2 X10(3) UL (ref 4–11)

## 2024-09-24 PROCEDURE — 85025 COMPLETE CBC W/AUTO DIFF WBC: CPT

## 2024-09-24 PROCEDURE — S0028 INJECTION, FAMOTIDINE, 20 MG: HCPCS | Performed by: NURSE PRACTITIONER

## 2024-09-24 PROCEDURE — 96417 CHEMO IV INFUS EACH ADDL SEQ: CPT

## 2024-09-24 PROCEDURE — 80053 COMPREHEN METABOLIC PANEL: CPT

## 2024-09-24 PROCEDURE — 96413 CHEMO IV INFUSION 1 HR: CPT

## 2024-09-24 PROCEDURE — 96375 TX/PRO/DX INJ NEW DRUG ADDON: CPT

## 2024-09-24 PROCEDURE — 99215 OFFICE O/P EST HI 40 MIN: CPT | Performed by: NURSE PRACTITIONER

## 2024-09-24 RX ORDER — DIPHENHYDRAMINE HYDROCHLORIDE 50 MG/ML
50 INJECTION INTRAMUSCULAR; INTRAVENOUS ONCE
Status: COMPLETED | OUTPATIENT
Start: 2024-09-24 | End: 2024-09-24

## 2024-09-24 RX ORDER — PALONOSETRON 0.05 MG/ML
0.25 INJECTION, SOLUTION INTRAVENOUS ONCE
Status: CANCELLED
Start: 2024-09-24 | End: 2024-09-24

## 2024-09-24 RX ORDER — DIPHENHYDRAMINE HYDROCHLORIDE 50 MG/ML
50 INJECTION INTRAMUSCULAR; INTRAVENOUS ONCE
Status: CANCELLED | OUTPATIENT
Start: 2024-09-24

## 2024-09-24 RX ORDER — FAMOTIDINE 10 MG/ML
20 INJECTION, SOLUTION INTRAVENOUS ONCE
Status: COMPLETED | OUTPATIENT
Start: 2024-09-24 | End: 2024-09-24

## 2024-09-24 RX ORDER — PALONOSETRON 0.05 MG/ML
0.25 INJECTION, SOLUTION INTRAVENOUS ONCE
Status: COMPLETED | OUTPATIENT
Start: 2024-09-24 | End: 2024-09-24

## 2024-09-24 RX ORDER — FAMOTIDINE 10 MG/ML
20 INJECTION, SOLUTION INTRAVENOUS ONCE
Status: CANCELLED | OUTPATIENT
Start: 2024-09-24

## 2024-09-24 RX ADMIN — DIPHENHYDRAMINE HYDROCHLORIDE 50 MG: 50 INJECTION INTRAMUSCULAR; INTRAVENOUS at 09:24:00

## 2024-09-24 RX ADMIN — PALONOSETRON 0.25 MG: 0.05 INJECTION, SOLUTION INTRAVENOUS at 09:20:00

## 2024-09-24 RX ADMIN — FAMOTIDINE 20 MG: 10 INJECTION, SOLUTION INTRAVENOUS at 09:22:00

## 2024-09-24 NOTE — PROGRESS NOTES
Cancer Center Progress Note    Patient Name: Anastasia Hobson   YOB: 1940   Medical Record Number: [id number]   CSN: [id number]   Date of visit: 9/24/2024     Chief Complaint/Reason for Visit:  Chief Complaint   Patient presents with    Follow - Up    Chemotherapy      History of Present Illness: Anastasia presents today for follow up of left supraclavicular poorly differentiated carcinoma and to initiate chemoradiation with weekly carboplatin and paclitaxel. Her medical oncologist is Dr. Jordon Reid, additional oncology history below.    Today, patient reports pain in left side of neck and shoulder is 6 out of 10. She has a fentanyl patch and uses hydromorphone PRN. She has poor appetite. She is accompanied by her daughter.     Oncology History:    -Cystoscopy June 2024 with NAD.  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     -MRI cervical spine on 7/31/2024 incidentally noted a left lower neck mass measuring 4.5 x 3.5 cm.     -CT neck on 8/1/2024 4.2 x 2.9 x 4.2 cm left supraclavicular mass.     -Met with ENT,  and patient-tonsils were surgically absent on exam.     -Left neck mass biopsy on 8/5/2024 showed a poorly differentiated carcinoma with focal neuroendocrine differentiation.  IHC was positive for cytokeratin AE 1/3, p16, p63 and weakly positive for synaptophysin  .  -PET/CT on 8/21/24: focal marked metabolic activity in the left supraclavicular mass.  This mass is 4.9 x 4.2 cm with an SUV of 8.8.  No other lesions. LLL atelectasis or PNA     -She has a poor appetite but her daughter gets her to eat.  She uses her walker and here for most of her ADLs.  She has a history of smoking 1 pack/day for 40 years but quit about 25 years ago.  She was with her daughter Petty who is involved with her care. She is following with palliative care due to significant pain.      -Tumor board 8/28/24: Case reviewed in tumor board. PET  does not show evidence of distant disease. Concern for p16+ head and neck cancer vs. Cervical esophageal cancer. EGD recommended. ENT eval recommended. We will plan on definitive treatment with chemoradiation with weekly Carbo AUC 2 + Taxol 50 mg/m2.      -ENT Eval with Dr. Villalta on 8/28/244: Flexible laryngoscopy was unremarkable     Problem List:  Patient Active Problem List   Diagnosis    Osteoarthritis of spine with radiculopathy, cervical region    Primary hypertension    Type 2 diabetes mellitus without complication, with long-term current use of insulin (HCC)    Mixed hyperlipidemia    Arthrodesis status    Tremor of unknown origin    Personal history of colonic polyps    Malignant neoplasm of lateral wall of urinary bladder (HCC)    Stage 3b chronic kidney disease (HCC)    Atrial fibrillation with RVR (HCC)    Advance care planning    Acute pulmonary edema (HCC)    Other constipation    Functional diarrhea    Type 2 diabetes mellitus with diabetic chronic kidney disease (HCC)    At high risk for falls    Hypoglycemia    Chronic bronchitis, unspecified chronic bronchitis type (HCC)    Persistent atrial fibrillation (HCC)    Hyperlipidemia    Unspecified atrial fibrillation (HCC)    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)    Urge incontinence    Heart failure, unspecified (HCC)    Chronic kidney disease, stage 3b (HCC)    Type 2 diabetes mellitus with stage 2 chronic kidney disease, without long-term current use of insulin (HCC)    Long term (current) use of oral hypoglycemic drugs    Personal history of malignant neoplasm of bladder    Pure hypercholesterolemia, unspecified    Mass of left side of neck    Azotemia    Type 2 diabetes mellitus with hyperglycemia, unspecified whether long term insulin use (HCC)    Anticoagulated    Atrial fibrillation, chronic (HCC)    Palliative care by specialist    Head and neck cancer ([facility])        Medical History:  Past Medical History:    Arrhythmia     Back pain    Bladder cancer (HCC)    high grade superficial TCC, recurrent July 2020 after BCG    Bloating    Blurred vision    Cancer (HCC)    Congestive heart disease (HCC)    Constipation    Diabetes (HCC)    IDDM     Diabetes mellitus (HCC)    Diarrhea, unspecified    CLARKE (dyspnea on exertion)    Easy bruising    Elevated cholesterol    Essential hypertension    Exposure to medical diagnostic radiation    Fatigue    Flatulence/gas pain/belching    Frequent urination    taking furosemide daily    Headache disorder    Hearing loss    Heart palpitations    Heartburn    occasionally    High blood pressure    High cholesterol    History of falling    Hyperlipidemia    Irregular bowel habits    Itch of skin    right hand only    Leaking of urine    sometimes    Leg swelling    Osteoporosis    Sleep disturbance    Stool incontinence    when I have diarrhea    Type 2 diabetes mellitus with hyperglycemia (HCC)    Visual impairment    glasses    Wears glasses       Surgical History:  Past Surgical History:   Procedure Laterality Date    Cataract      Cervical spine surgery  10/06/2023    Colonoscopy  2019    Colonoscopy      Cystoscopy,insert ureteral stent      Cystourethroscopy  11/25/2020    Cystoscopy Procedure Dr Josh Hernandez  - recurrent tumor on BTS    Cystourethroscopy,biopsy  12/16/2019    BBx of scar sites, NSC    Cystourethroscopy,fulgur .5-2cm lesn  04/16/2020    TURBT, NSC    Cystourethroscopy,fulgur .5-2cm lesn  07/30/2020    TURBT with Gemcitabine    Cystourethroscopy,fulgur >5cm lesn  10/03/2019    TURBT with bilateral RPGs (NSC)    Fracture surgery      left wrist    Hysterectomy      age 31    Ndsc ablation & rcnstj [facility] w/o bypass      Other surgical history  04/20/2021    BTS Dr David Loaiza    Other surgical history  08/17/2021    BTS Cysto Catmarquis Hernandez    Other surgical history  11/16/2021    BTS Cysto W/ catmarquis Hernandez     Other surgical history  03/08/2022    BTS Cysto- Dr. Hernandez    Spine  surgery procedure unlisted  10/06/2003    CERVICAL    Tonsillectomy  89    Total abdom hysterectomy  1971       Allergies:  Allergies   Allergen Reactions    Morphine CONFUSION       Family History:  Family History   Problem Relation Age of Onset    Diabetes Father     Stroke Father     Colon Polyps Mother     Diabetes Mother     Heart Attack Mother     Other (Other) Son         Afib       Social History:  Social History     Socioeconomic History    Marital status:      Spouse name: Not on file    Number of children: 3    Years of education: Not on file    Highest education level: Not on file   Occupational History    Occupation: RETIRED   Tobacco Use    Smoking status: Former     Current packs/day: 0.00     Average packs/day: 1.1 packs/day for 65.3 years (73.5 ttl pk-yrs)     Types: Cigarettes     Quit date: 2007     Years since quittin.2    Smokeless tobacco: Never    Tobacco comments:     quit 15 years    Vaping Use    Vaping status: Never Used   Substance and Sexual Activity    Alcohol use: No    Drug use: No    Sexual activity: Not Currently     Partners: Male   Other Topics Concern    Caffeine Concern Not Asked    Exercise Not Asked    Seat Belt Not Asked    Special Diet Not Asked    Stress Concern Not Asked    Weight Concern Not Asked   Social History Narrative    , lives with daughter    Has 3 children: 2 sons, 1 daughter     Social Determinants of Health     Financial Resource Strain: Low Risk  (2023)    Financial Resource Strain     Difficulty of Paying Living Expenses: Not very hard     Med Affordability: No   Food Insecurity: No Food Insecurity (2024)    Food Insecurity     Food Insecurity: Never true   Transportation Needs: No Transportation Needs (2024)    Transportation Needs     Lack of Transportation: No     Car Seat: Not on file   Physical Activity: Not on file   Stress: Not on file   Social Connections: Not on file   Housing Stability: Low Risk  (2024)     Housing Stability     Housing Instability: No     Housing Instability Emergency: Not on file     Crib or Bassinette: Not on file       Medications:    Current Outpatient Medications:     ALPRAZolam 0.25 MG Oral Tab, Take 0.5 tablets (0.125 mg total) by mouth daily as needed for Anxiety. Take prior to radiation treatment, Disp: 15 tablet, Rfl: 1    ondansetron 8 MG Oral Tablet Dispersible, Take 1 tablet (8 mg total) by mouth every 8 (eight) hours as needed for Nausea., Disp: 60 tablet, Rfl: 1    fentaNYL 12 MCG/HR Transdermal Patch 72 Hr, Place 1 patch onto the skin every third day. Take in addition to fentanyl 25mcg patch for a total dose of 37.5mcg, Disp: 5 patch, Rfl: 0    HYDROmorphone 4 MG Oral Tab, Take 1 tablet (4 mg total) by mouth every 4 (four) hours as needed for Pain., Disp: 90 tablet, Rfl: 0    fentaNYL 25 MCG/HR Transdermal Patch 72 Hr, Place 1 patch onto the skin every third day., Disp: 10 patch, Rfl: 0    amiodarone 200 MG Oral Tab, TAKE ONE TABLET BY MOUTH ONCE DAILY. Needs appointment with cardiologist and EKG done for future refills., Disp: , Rfl:     prochlorperazine (COMPAZINE) 10 mg tablet, Take 1 tablet (10 mg total) by mouth every 6 (six) hours as needed for Nausea., Disp: 30 tablet, Rfl: 1    gabapentin 100 MG Oral Cap, Take 2 capsules (200 mg total) by mouth nightly. (Patient taking differently: Take 2 capsules (200 mg total) by mouth 2 (two) times daily.), Disp: 60 capsule, Rfl: 1    Naloxone HCl 4 MG/0.1ML Nasal Liquid, 4 mg by Nasal route as needed. If patient remains unresponsive, repeat dose in other nostril 2-5 minutes after first dose., Disp: 1 kit, Rfl: 0    Acetaminophen 500 MG Oral Cap, Take 2 capsules (1,000 mg total) by mouth 3 (three) times daily as needed for Pain., Disp: , Rfl:     Insulin Lispro, 1 Unit Dial, (HUMALOG KWIKPEN) 100 UNIT/ML Subcutaneous Solution Pen-injector, INJECT 10-12 UNITS INTO THE SKIN THREE TIMES DAILY WITH MEALS AS DIRECTED, Disp: 15 mL, Rfl: 0     hydrALAZINE 50 MG Oral Tab, 1 tablet (50 mg total) 3 (three) times daily., Disp: , Rfl:     dilTIAZem HCl ER Beads 120 MG Oral Capsule SR 24 Hr, Take 1 capsule (120 mg total) by mouth daily., Disp: 30 capsule, Rfl: 11    LANTUS SOLOSTAR 100 UNIT/ML Subcutaneous Solution Pen-injector, INJECT 8 UNITS INTO THE SKIN NIGHTLY. PEN EXPIRES 28 DAYS AFTER FIRST USE, Disp: 15 mL, Rfl: 0    LISINOPRIL 20 MG Oral Tab, TAKE ONE TABLET BY MOUTH EVERY EVENING, Disp: 90 tablet, Rfl: 0    Insulin Pen Needle (BD PEN NEEDLE PRISCILLA U/F) 32G X 4 MM Does not apply Misc, Inject 1 Pen into the skin daily., Disp: 90 each, Rfl: 0    ATORVASTATIN 80 MG Oral Tab, Take 1 tablet (80 mg total) by mouth nightly., Disp: 90 tablet, Rfl: 0    Continuous Blood Gluc Sensor (FREESTYLE NATALIE 2 SENSOR) Does not apply Misc, 1 Device by Subdermal route every 14 (fourteen) days., Disp: 6 each, Rfl: 3    fenofibrate 145 MG Oral Tab, Take 1 tablet (145 mg total) by mouth daily., Disp: 90 tablet, Rfl: 0    Continuous Blood Gluc  (FREESTYLE NATALIE 2 READER) Does not apply Device, 1 each daily., Disp: 1 each, Rfl: 1    Continuous Blood Gluc  (FREESTYLE NATALIE 2 READER) Does not apply Device, 1 Device daily as needed., Disp: 1 each, Rfl: 0    PROBIOTIC PRODUCT OR, Take 1 capsule by mouth daily. 100 billion, Disp: , Rfl:     furosemide 40 MG Oral Tab, Take 0.5 tablets (20 mg total) by mouth daily., Disp: , Rfl:     Potassium Chloride ER 20 MEQ Oral Tab CR, Take 1 tablet by mouth daily., Disp: , Rfl:     apixaban 5 MG Oral Tab, Take 1 tablet (5 mg total) by mouth 2 (two) times daily., Disp: 60 tablet, Rfl: 2    Cholecalciferol (VITAMIN D3) 250 MCG (27222 UT) Oral Tab, Take 1,000 Units by mouth daily., Disp: , Rfl:     Calcium Citrate-Vitamin D 315-250 MG-UNIT Oral Tab, Take 1 tablet by mouth daily., Disp: , Rfl:     Review of Systems:  A comprehensive 14 point review of systems was completed.  Pertinent positives and negatives noted in the  HPI.    Performance Status: ECOG 2 - Ambulatory/capable of all self-care, unable to perform any work activities. Up and about more than 50% of waking hours.    Physical Examination:  General: Patient is alert and oriented x 3, not in acute distress.  Vital Signs: Height: 163.8 cm (5' 4.49\") (09/24 0800)  Weight: 66.1 kg (145 lb 12.8 oz) (09/24 0800)  BSA (Calculated - sq m): 1.72 sq meters (09/24 0800)  Pulse: 79 (09/24 0800)  BP: 135/72 (09/24 0800)  Temp: 97.9 °F (36.6 °C) (09/24 0800)  Do Not Use - Resp Rate: --  SpO2: 98 % (09/24 0800)  HEENT: Anicteric, conjunctivae and sclerae clear, no oropharyngeal lesion/thrush, mucous membranes are moist   Chest: Clear to auscultation. Respirations unlabored.   Heart: Regular rate and rhythm.   Abdomen: Soft, non-distended, non-tender with present bowel sounds.  Extremities: No edema.  Neurological: Grossly intact.   Lymphatics: There is no palpable lymphadenopathy throughout in the cervical or supraclavicular regions.   Skin: warm, dry, no erythema or rash   Psych/Depression: mood and affect are appropriate.     Labs:     Recent Results (from the past 72 hour(s))   CBC W Differential W Platelet    Collection Time: 09/24/24  7:56 AM   Result Value Ref Range    WBC 9.2 4.0 - 11.0 x10(3) uL    RBC 4.03 3.80 - 5.30 x10(6)uL    HGB 10.3 (L) 12.0 - 16.0 g/dL    HCT 32.9 (L) 35.0 - 48.0 %    .0 150.0 - 450.0 10(3)uL    MCV 81.6 80.0 - 100.0 fL    MCH 25.6 (L) 26.0 - 34.0 pg    MCHC 31.3 31.0 - 37.0 g/dL    RDW 14.8 %    Neutrophil Absolute Prelim 6.46 1.50 - 7.70 x10 (3) uL    Neutrophil Absolute 6.46 1.50 - 7.70 x10(3) uL    Lymphocyte Absolute 1.27 1.00 - 4.00 x10(3) uL    Monocyte Absolute 1.20 (H) 0.10 - 1.00 x10(3) uL    Eosinophil Absolute 0.17 0.00 - 0.70 x10(3) uL    Basophil Absolute 0.04 0.00 - 0.20 x10(3) uL    Immature Granulocyte Absolute 0.04 0.00 - 1.00 x10(3) uL    Neutrophil % 70.4 %    Lymphocyte % 13.8 %    Monocyte % 13.1 %    Eosinophil % 1.9 %     Basophil % 0.4 %    Immature Granulocyte % 0.4 %   Comp Metabolic Panel (14)    Collection Time: 09/24/24  7:56 AM   Result Value Ref Range    Glucose 333 (H) 70 - 99 mg/dL    Sodium 139 136 - 145 mmol/L    Potassium 4.0 3.5 - 5.1 mmol/L    Chloride 104 98 - 112 mmol/L    CO2 28.0 21.0 - 32.0 mmol/L    Anion Gap 7 0 - 18 mmol/L    BUN 31 (H) 9 - 23 mg/dL    Creatinine 1.58 (H) 0.55 - 1.02 mg/dL    Calcium, Total 9.6 8.7 - 10.4 mg/dL    Calculated Osmolality 308 (H) 275 - 295 mOsm/kg    eGFR-Cr 32 (L) >=60 mL/min/1.73m2    AST 45 (H) <34 U/L    ALT 37 10 - 49 U/L    Alkaline Phosphatase 62 55 - 142 U/L    Bilirubin, Total 0.6 0.2 - 1.1 mg/dL    Total Protein 6.6 5.7 - 8.2 g/dL    Albumin 4.4 3.2 - 4.8 g/dL    Globulin  2.2 2.0 - 3.5 g/dL    A/G Ratio 2.0 1.0 - 2.0    Patient Fasting for CMP? Patient not present        Impression/Plan    Left supraclavicular poorly differentiated carcinoma: diagnosed in 8/2024 with no tumor origin. Starting definitive treatment with chemoradiation with weekly Carbo AUC 2 and paclitaxel 50 mg/m2.     Hyperglycemia: she wears continuous glucose monitor and uses insulin. She reports periods of recent low blood sugars. She will follow up with PCP if glucose readings remain elevated. Will lower pre-medication dexamethasone to 10mg.     Planned Follow Up: 9/30/2024 cll (pre-treatment labs); 10/1/2024 follow up with [facility] chemo; 10/7/2024 cll (pre-treatment labs); 10/8/2024 follow up with Dr. Reid, labs and chemo    Risk Level: HIGH carcinoma  receiving chemotherapy requiring close monitoring     The 21st Century Cures Act makes medical notes like these available to patients in the interest of transparency. Please be advised this is a medical document. Medical documents are intended to carry relevant information, facts as evident, and the clinical opinion of the practitioner. The medical note is intended as peer to peer communication and may appear blunt or direct. It is written in medical  language and may contain abbreviations or verbiage that are unfamiliar.     Electronically Signed by:    Mamie Woodward DNP, APRN, NP-C, AOCNP  Nurse Practitioner  [facility]

## 2024-09-24 NOTE — PROGRESS NOTES
Pt here for C1D1 Drug(s)taxol/carboplatin.  Arrives Via wheelchair, accompanied by Family member     Patient was evaluated today by CALEB.    Oral medications included in this regimen:  no    Patient confirms comprehension of cancer treatment schedule:  yes    Pregnancy screening:  Denies possibility of pregnancy    Modifications in dose or schedule:  No    Medications appearance and physical integrity checked by RN: yes.    Chemotherapy IV pump settings verified by 2 RNs:  Yes.  Frequency of blood return and site check throughout administration: Prior to administration and At completion of therapy     Infusion/treatment outcome:  patient tolerated treatment without incident    Education Record    Learner:  Patient  Barriers / Limitations:  None  Method:  Discussion  Education / instructions given:  plan of care  Outcome:  Shows understanding    Discharged Home, Via wheelchair, accompanied by:Family member    Patient/family verbalized understanding of future appointments: by printed AVS    Patient in treatment center for labs, APN,and first chemotherapy treatment.   Patient tolerated treatment without issue - plan to decrease benadryl to 25mg on treatment days d/t drowsiness.   Future appointments printed for patient and patient daughter - left treatment center in stable condition via wheelchair.

## 2024-09-24 NOTE — PROGRESS NOTES
Pt here for C1D1 Carbo/Taxol. Pt had first radiation treatment todat. Current pain level 6/10, taking dilaudid OTC for pain management. Intermittent nausea, takes antinausea medications once a day. Took 1/2 Xanax before coming in today.         Education Record    Learner:  Patient and Family Member    Disease / Diagnosis: head/neck cancer    Barriers / Limitations:  None   Comments:    Method:  Discussion   Comments:    General Topics:  Medication, Pain, Side effects and symptom management, and Plan of care reviewed   Comments:    Outcome:  Observed demonstration and Shows understanding   Comments:

## 2024-09-25 ENCOUNTER — TELEPHONE (OUTPATIENT)
Dept: HEMATOLOGY/ONCOLOGY | Facility: HOSPITAL | Age: 84
End: 2024-09-25

## 2024-09-25 RX ORDER — INSULIN LISPRO 100 [IU]/ML
INJECTION, SOLUTION INTRAVENOUS; SUBCUTANEOUS
Qty: 15 ML | Refills: 0 | Status: SHIPPED | OUTPATIENT
Start: 2024-09-25

## 2024-09-25 NOTE — TELEPHONE ENCOUNTER
Called patient post C1D1 of new tx.  Doing well other then confusion caused by benadryl.  She had confusion all night as a result.  Better now.    Reinforced plan and to call for any issues.  She verbalizes understanding.

## 2024-09-25 NOTE — PROGRESS NOTES
[facility] Radiation Treatment Management Note 1-5    Patient:  Anastasia Hobson  Age:  84 year old  Visit Diagnosis:    1. 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 Bella Cat    Site Delivered Dose (cGy) Prescribed Dose (cGy) Fraction #   LEFT NECK  600 4800 3/24   L NECK BOOST 0 1800 0/9     First treatment date:   9/24/24  Concurrent chemotherapy:  CARBO/TAXOL        9/19/2024     1:45 PM 9/24/2024     8:00 AM 9/26/2024     7:16 AM   Oncology Vitals   Height  5' 4.488\"    Height  164 cm    Weight  145 lb 12.8 oz 149 lb 14.4 oz   Weight  66.134 kg 67.994 kg   BSA (m2)  1.72 m2 1.74 m2   BMI  24.65 kg/m2 25.34 kg/m2   /53 135/72 122/63   Pulse 67 79 70   Resp 13 16 20   Temp  97.9 °F (36.6 °C) 97.8 °F (36.6 °C)   SpO2  98 % 97 %   Pain Score  5 3        Toxicities:  Fatigue Grade 1= Fatigue relieved by rest  Dermatitis associated with radiation Grade 0= None  Moisturizer used Vanicream Number of times: 2 times daily.  Dysphagia Grade 0= None  Dyspepsia Grade 0= None  Mucositis Grade 0= None  Constipation Grade 0= None  Diarrhea  Grade 0= None  Nausea Grade 0= None  Vomiting Grade 0= None  Xerostomia Not Present=No     Nursing Note:  Recent Labs   Lab 09/24/24  0756   RBC 4.03   HGB 10.3*   HCT 32.9*   MCV 81.6   MCH 25.6*   MCHC 31.3   RDW 14.8   NEPRELIM 6.46   WBC 9.2   .0     RN ED done with pt and son:  Reviewed potential side effects of RT and management.   Tolerating chemo, no c/o nausea.  Cont to have neck pain, minimal sore throat.  Tolerating PO intake, no dysphagia.  On Fentanyl 37mcg patch with Dilaudid 1 tab q4 hours for breakthrough.  Minimal constipation felt, taking Senna and Miralax daily.    Rosa MARCANO RN MD NOTE   Reviewed and agree with RN note above.  Setup imaging reviewed in [facility] and approved.    S:  -pain controlled on pain meds; no swallowing difficulty; no lt hand weakness or paresthesias; pain stops  at shoulder    O:  -congestion edema in left neck with mass    A/P:  -cont RT with chemo  OTV in 1 week    Oh-Hong Cat MD  Radiation Oncology

## 2024-09-25 NOTE — TELEPHONE ENCOUNTER
Humalog Kwikpen 100 Unit/Ml Inj Lill     Pt failed refill protocol for the following reasons:    Diabetes Medication Protocol [id number]/24/2024 06:09 PM   Protocol Details In person appointment or virtual visit in the past 6 mos or appointment in next 3 mos    EGFRCR or GFRNAA > 50    Last A1C < 7.5 and within past 6 months    Microalbumin procedure in past 12 months or taking ACE/ARB    GFR in the past 12 month        Last refill: 8/19/2024, for #15 mL, 0 refills  Last appt: 1/17/2024  Last Px: 1/17/2024  Next appt: N/A    Forward to Dr. Denae Prado, please advise on refills. Thank you.

## 2024-09-26 ENCOUNTER — HOSPITAL ENCOUNTER (OUTPATIENT)
Dept: RADIATION ONCOLOGY | Facility: HOSPITAL | Age: 84
Discharge: HOME | End: 2024-09-26
Attending: RADIOLOGY
Payer: MEDICARE

## 2024-09-26 VITALS
TEMPERATURE: 98 F | SYSTOLIC BLOOD PRESSURE: 122 MMHG | OXYGEN SATURATION: 97 % | WEIGHT: 149.88 LBS | BODY MASS INDEX: 25 KG/M2 | RESPIRATION RATE: 20 BRPM | HEART RATE: 70 BPM | DIASTOLIC BLOOD PRESSURE: 63 MMHG

## 2024-09-26 DIAGNOSIS — G89.3 NEOPLASM RELATED PAIN: ICD-10-CM

## 2024-09-26 DIAGNOSIS — C77.0 SECONDARY MALIGNANT NEOPLASM OF LYMPH NODES OF NECK (HCC): Primary | ICD-10-CM

## 2024-09-26 RX ORDER — HYDROMORPHONE HYDROCHLORIDE 4 MG/1
4 TABLET ORAL EVERY 4 HOURS PRN
Qty: 90 TABLET | Refills: 0 | Status: CANCELLED | OUTPATIENT
Start: 2024-09-26

## 2024-09-26 RX ORDER — HYDROMORPHONE HYDROCHLORIDE 4 MG/1
4 TABLET ORAL EVERY 4 HOURS PRN
Qty: 90 TABLET | Refills: 0 | Status: SHIPPED | OUTPATIENT
Start: 2024-09-26

## 2024-09-26 RX ORDER — FENOFIBRATE 145 MG/1
145 TABLET, COATED ORAL DAILY
Qty: 90 TABLET | Refills: 0 | Status: SHIPPED | OUTPATIENT
Start: 2024-09-26

## 2024-09-26 NOTE — TELEPHONE ENCOUNTER
Cholesterol Medication Protocol [id number]/25/2024 07:31 PM   Protocol Details ALT < 80    ALT resulted within past year    Lipid panel within past 12 months    In person appointment or virtual visit in the past 12 mos or appointment in next 3 mos      Refilled per protocol  fenofibrate 145 MG Oral Tab   Last refilled on 1/17/24 #90 with 0 rf.  LOV- 1/17/24  Last labs- 9/24/24

## 2024-09-27 DIAGNOSIS — G89.3 NEOPLASM RELATED PAIN: ICD-10-CM

## 2024-09-27 RX ORDER — HYDROMORPHONE HYDROCHLORIDE 4 MG/1
4 TABLET ORAL EVERY 4 HOURS PRN
Qty: 90 TABLET | Refills: 0 | OUTPATIENT
Start: 2024-09-27

## 2024-09-29 DIAGNOSIS — G89.3 NEOPLASM RELATED PAIN: ICD-10-CM

## 2024-09-29 RX ORDER — FENTANYL 12.5 UG/1
1 PATCH TRANSDERMAL
Qty: 5 PATCH | Refills: 0 | Status: CANCELLED | OUTPATIENT
Start: 2024-09-29

## 2024-09-30 ENCOUNTER — NURSE ONLY (OUTPATIENT)
Dept: HEMATOLOGY/ONCOLOGY | Facility: HOSPITAL | Age: 84
End: 2024-09-30
Attending: NURSE PRACTITIONER
Payer: MEDICARE

## 2024-09-30 DIAGNOSIS — G89.3 NEOPLASM RELATED PAIN: ICD-10-CM

## 2024-09-30 DIAGNOSIS — C76.0 HEAD AND NECK CANCER (HCC): Primary | ICD-10-CM

## 2024-09-30 LAB
ANION GAP SERPL CALC-SCNC: 5 MMOL/L (ref 0–18)
BASOPHILS # BLD AUTO: 0.03 X10(3) UL (ref 0–0.2)
BASOPHILS NFR BLD AUTO: 0.5 %
BUN BLD-MCNC: 28 MG/DL (ref 9–23)
CALCIUM BLD-MCNC: 9.3 MG/DL (ref 8.7–10.4)
CHLORIDE SERPL-SCNC: 104 MMOL/L (ref 98–112)
CO2 SERPL-SCNC: 29 MMOL/L (ref 21–32)
CREAT BLD-MCNC: 1.54 MG/DL
EGFRCR SERPLBLD CKD-EPI 2021: 33 ML/MIN/1.73M2 (ref 60–?)
EOSINOPHIL # BLD AUTO: 0.16 X10(3) UL (ref 0–0.7)
EOSINOPHIL NFR BLD AUTO: 2.7 %
ERYTHROCYTE [DISTWIDTH] IN BLOOD BY AUTOMATED COUNT: 14.9 %
FASTING STATUS PATIENT QL REPORTED: NO
GLUCOSE BLD-MCNC: 249 MG/DL (ref 70–99)
HCT VFR BLD AUTO: 29.7 %
HGB BLD-MCNC: 9.7 G/DL
IMM GRANULOCYTES # BLD AUTO: 0.07 X10(3) UL (ref 0–1)
IMM GRANULOCYTES NFR BLD: 1.2 %
LYMPHOCYTES # BLD AUTO: 0.83 X10(3) UL (ref 1–4)
LYMPHOCYTES NFR BLD AUTO: 14 %
MCH RBC QN AUTO: 25.7 PG (ref 26–34)
MCHC RBC AUTO-ENTMCNC: 32.7 G/DL (ref 31–37)
MCV RBC AUTO: 78.6 FL
MONOCYTES # BLD AUTO: 0.32 X10(3) UL (ref 0.1–1)
MONOCYTES NFR BLD AUTO: 5.4 %
NEUTROPHILS # BLD AUTO: 4.52 X10 (3) UL (ref 1.5–7.7)
NEUTROPHILS # BLD AUTO: 4.52 X10(3) UL (ref 1.5–7.7)
NEUTROPHILS NFR BLD AUTO: 76.2 %
OSMOLALITY SERPL CALC.SUM OF ELEC: 300 MOSM/KG (ref 275–295)
PLATELET # BLD AUTO: 326 10(3)UL (ref 150–450)
POTASSIUM SERPL-SCNC: 4.1 MMOL/L (ref 3.5–5.1)
RBC # BLD AUTO: 3.78 X10(6)UL
SODIUM SERPL-SCNC: 138 MMOL/L (ref 136–145)
WBC # BLD AUTO: 5.9 X10(3) UL (ref 4–11)

## 2024-09-30 PROCEDURE — 36591 DRAW BLOOD OFF VENOUS DEVICE: CPT

## 2024-09-30 PROCEDURE — 85025 COMPLETE CBC W/AUTO DIFF WBC: CPT

## 2024-09-30 PROCEDURE — 80048 BASIC METABOLIC PNL TOTAL CA: CPT

## 2024-09-30 RX ORDER — FENTANYL 12.5 UG/1
1 PATCH TRANSDERMAL
Qty: 10 PATCH | Refills: 0 | Status: SHIPPED | OUTPATIENT
Start: 2024-09-30

## 2024-09-30 NOTE — PROGRESS NOTES
Education Record    Learner:  Patient and Family Member    Disease / Diagnosis: pt here for CLL prior to treatment 10/1    Barriers / Limitations:  None   Comments:    Method:  Brief focused   Comments:    General Topics:  Side effects and symptom management   Comments:    Outcome:  Shows understanding   Comments:

## 2024-10-01 ENCOUNTER — OFFICE VISIT (OUTPATIENT)
Dept: HEMATOLOGY/ONCOLOGY | Facility: HOSPITAL | Age: 84
End: 2024-10-01
Attending: NURSE PRACTITIONER

## 2024-10-01 ENCOUNTER — HOSPITAL ENCOUNTER (OUTPATIENT)
Dept: RADIATION ONCOLOGY | Facility: HOSPITAL | Age: 84
Discharge: HOME | End: 2024-10-01
Attending: RADIOLOGY
Payer: MEDICARE

## 2024-10-01 VITALS
OXYGEN SATURATION: 96 % | HEIGHT: 64.49 IN | HEART RATE: 69 BPM | TEMPERATURE: 97 F | WEIGHT: 146.81 LBS | RESPIRATION RATE: 18 BRPM | SYSTOLIC BLOOD PRESSURE: 96 MMHG | DIASTOLIC BLOOD PRESSURE: 59 MMHG | BODY MASS INDEX: 24.76 KG/M2

## 2024-10-01 DIAGNOSIS — C76.0 HEAD AND NECK CANCER (HCC): Primary | ICD-10-CM

## 2024-10-01 DIAGNOSIS — R73.9 HYPERGLYCEMIA: ICD-10-CM

## 2024-10-01 PROCEDURE — S0028 INJECTION, FAMOTIDINE, 20 MG: HCPCS | Performed by: NURSE PRACTITIONER

## 2024-10-01 PROCEDURE — 77386 HC IMRT COMPLEX: CPT | Performed by: RADIOLOGY

## 2024-10-01 PROCEDURE — 99215 OFFICE O/P EST HI 40 MIN: CPT | Performed by: NURSE PRACTITIONER

## 2024-10-01 PROCEDURE — 96417 CHEMO IV INFUS EACH ADDL SEQ: CPT

## 2024-10-01 PROCEDURE — 96413 CHEMO IV INFUSION 1 HR: CPT

## 2024-10-01 PROCEDURE — G2211 COMPLEX E/M VISIT ADD ON: HCPCS | Performed by: NURSE PRACTITIONER

## 2024-10-01 PROCEDURE — 96375 TX/PRO/DX INJ NEW DRUG ADDON: CPT

## 2024-10-01 RX ORDER — PALONOSETRON 0.05 MG/ML
0.25 INJECTION, SOLUTION INTRAVENOUS ONCE
Status: COMPLETED | OUTPATIENT
Start: 2024-10-01 | End: 2024-10-01

## 2024-10-01 RX ORDER — FAMOTIDINE 10 MG/ML
20 INJECTION, SOLUTION INTRAVENOUS ONCE
Status: COMPLETED | OUTPATIENT
Start: 2024-10-01 | End: 2024-10-01

## 2024-10-01 RX ORDER — DIPHENHYDRAMINE HYDROCHLORIDE 50 MG/ML
25 INJECTION INTRAMUSCULAR; INTRAVENOUS ONCE
Status: COMPLETED | OUTPATIENT
Start: 2024-10-01 | End: 2024-10-01

## 2024-10-01 RX ORDER — DIPHENHYDRAMINE HYDROCHLORIDE 50 MG/ML
25 INJECTION INTRAMUSCULAR; INTRAVENOUS ONCE
Status: CANCELLED | OUTPATIENT
Start: 2024-10-01

## 2024-10-01 RX ORDER — FAMOTIDINE 10 MG/ML
20 INJECTION, SOLUTION INTRAVENOUS ONCE
Status: CANCELLED | OUTPATIENT
Start: 2024-10-01

## 2024-10-01 RX ORDER — PALONOSETRON 0.05 MG/ML
0.25 INJECTION, SOLUTION INTRAVENOUS ONCE
Status: CANCELLED
Start: 2024-10-01 | End: 2024-10-01

## 2024-10-01 RX ADMIN — PALONOSETRON 0.25 MG: 0.05 INJECTION, SOLUTION INTRAVENOUS at 09:46:00

## 2024-10-01 RX ADMIN — DIPHENHYDRAMINE HYDROCHLORIDE 25 MG: 50 INJECTION INTRAMUSCULAR; INTRAVENOUS at 09:46:00

## 2024-10-01 RX ADMIN — FAMOTIDINE 20 MG: 10 INJECTION, SOLUTION INTRAVENOUS at 09:46:00

## 2024-10-01 NOTE — PROGRESS NOTES
Cancer Center Progress Note    Patient Name: Anastasia Hobson   YOB: 1940   Medical Record Number: [id number]   Date of visit: 10/1/2024    Chief Complaint/Reason for Visit:  Chief Complaint   Patient presents with    Follow - Up    Chemotherapy      History of Present Illness: Anastasia presents today for follow up of left supraclavicular poorly differentiated carcinoma and chemoradiation with weekly carboplatin and paclitaxel, today is week 2 Her medical oncologist is Dr. Jordon Reid, additional oncology history below.    Today, patient reports continued pain in left side of neck and shoulder. She rates pain higher than usual at 8 out of 10. She has a fentanyl patch and uses hydromorphone PRN. She denies peripheral neuropathy. She did not experience nausea or vomiting in past week. She has poor appetite. She did not experience significant bowel changes. She is accompanied by her son today.     Oncology History:    -Cystoscopy June 2024 with NAD.  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     -MRI cervical spine on 7/31/2024 incidentally noted a left lower neck mass measuring 4.5 x 3.5 cm.     -CT neck on 8/1/2024 4.2 x 2.9 x 4.2 cm left supraclavicular mass.     -Met with ENT,  and patient-tonsils were surgically absent on exam.     -Left neck mass biopsy on 8/5/2024 showed a poorly differentiated carcinoma with focal neuroendocrine differentiation.  IHC was positive for cytokeratin AE 1/3, p16, p63 and weakly positive for synaptophysin  .  -PET/CT on 8/21/24: focal marked metabolic activity in the left supraclavicular mass.  This mass is 4.9 x 4.2 cm with an SUV of 8.8.  No other lesions. LLL atelectasis or PNA     -She has a poor appetite but her daughter gets her to eat.  She uses her walker and here for most of her ADLs.  She has a history of smoking 1 pack/day for 40 years but quit about 25 years ago.  She was with  her daughter Petty who is involved with her care. She is following with palliative care due to significant pain.      -Tumor board 8/28/24: Case reviewed in tumor board. PET does not show evidence of distant disease. Concern for p16+ head and neck cancer vs. Cervical esophageal cancer. EGD recommended. ENT eval recommended. We will plan on definitive treatment with chemoradiation with weekly Carbo AUC 2 + Taxol 50 mg/m2.      -ENT Eval with Dr. Villalta on 8/28/244: Flexible laryngoscopy was unremarkable     -9/24/2024 started concurrent radiation with weekly carboplatin and paclitaxel    Problem List:  Patient Active Problem List   Diagnosis    Osteoarthritis of spine with radiculopathy, cervical region    Primary hypertension    Type 2 diabetes mellitus without complication, with long-term current use of insulin (HCC)    Mixed hyperlipidemia    Arthrodesis status    Tremor of unknown origin    Personal history of colonic polyps    Malignant neoplasm of lateral wall of urinary bladder (HCC)    Stage 3b chronic kidney disease (HCC)    Atrial fibrillation with RVR (HCC)    Advance care planning    Acute pulmonary edema (HCC)    Other constipation    Functional diarrhea    Type 2 diabetes mellitus with diabetic chronic kidney disease (HCC)    At high risk for falls    Hypoglycemia    Chronic bronchitis, unspecified chronic bronchitis type (HCC)    Persistent atrial fibrillation (HCC)    Hyperlipidemia    Unspecified atrial fibrillation (HCC)    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)    Urge incontinence    Heart failure, unspecified (HCC)    Chronic kidney disease, stage 3b (HCC)    Type 2 diabetes mellitus with stage 2 chronic kidney disease, without long-term current use of insulin (HCC)    Long term (current) use of oral hypoglycemic drugs    Personal history of malignant neoplasm of bladder    Pure hypercholesterolemia, unspecified    Mass of left side of neck    Azotemia    Type 2 diabetes  mellitus with hyperglycemia, unspecified whether long term insulin use (HCC)    Anticoagulated    Atrial fibrillation, chronic (HCC)    Palliative care by specialist    Head and neck cancer (HCC)        Medical History:  Past Medical History:    Arrhythmia    Back pain    Bladder cancer (HCC)    high grade superficial TCC, recurrent July 2020 after BCG    Bloating    Blurred vision    Cancer (HCC)    Congestive heart disease (HCC)    Constipation    Diabetes (HCC)    IDDM     Diabetes mellitus (HCC)    Diarrhea, unspecified    CLARKE (dyspnea on exertion)    Easy bruising    Elevated cholesterol    Essential hypertension    Exposure to medical diagnostic radiation    Fatigue    Flatulence/gas pain/belching    Frequent urination    taking furosemide daily    Headache disorder    Hearing loss    Heart palpitations    Heartburn    occasionally    High blood pressure    High cholesterol    History of falling    Hyperlipidemia    Irregular bowel habits    Itch of skin    right hand only    Leaking of urine    sometimes    Leg swelling    Osteoporosis    Sleep disturbance    Stool incontinence    when I have diarrhea    Type 2 diabetes mellitus with hyperglycemia (HCC)    Visual impairment    glasses    Wears glasses       Surgical History:  Past Surgical History:   Procedure Laterality Date    Cataract      Cervical spine surgery  10/06/2023    Colonoscopy  2019    Colonoscopy      Cystoscopy,insert ureteral stent      Cystourethroscopy  11/25/2020    Cystoscopy Procedure Dr Josh Hernandez  - recurrent tumor on BTS    Cystourethroscopy,biopsy  12/16/2019    BBx of scar sites, NSC    Cystourethroscopy,fulgur .5-2cm lesn  04/16/2020    TURBT, NSC    Cystourethroscopy,fulgur .5-2cm lesn  07/30/2020    TURBT with Gemcitabine    Cystourethroscopy,fulgur >5cm lesn  10/03/2019    TURBT with bilateral RPGs ([facility] [place])    Fracture surgery      left wrist    Hysterectomy      age 31    Ndsc ablation & rcnstj [facility] w/o bypass      Other  surgical history  2021    BTS Dr David Loaiza    Other surgical history  2021    BTS [facility] Hernandez    Other surgical history  2021    BTS Josse W/ carley Hernandez     Other surgical history  2022    BTS Josse- Dr. Hernandez    Spine surgery procedure unlisted  10/06/2003    CERVICAL    Tonsillectomy  1946    Total abdom hysterectomy  1971       Allergies:  Allergies   Allergen Reactions    Morphine CONFUSION       Family History:  Family History   Problem Relation Age of Onset    Diabetes Father     Stroke Father     Colon Polyps Mother     Diabetes Mother     Heart Attack Mother     Other (Other) Son         Afib       Social History:  Social History     Socioeconomic History    Marital status:      Spouse name: Not on file    Number of children: 3    Years of education: Not on file    Highest education level: Not on file   Occupational History    Occupation: RETIRED   Tobacco Use    Smoking status: Former     Current packs/day: 0.00     Average packs/day: 1.1 packs/day for 65.3 years (73.5 ttl pk-yrs)     Types: Cigarettes     Quit date: 2007     Years since quittin.2    Smokeless tobacco: Never    Tobacco comments:     quit 15 years    Vaping Use    Vaping status: Never Used   Substance and Sexual Activity    Alcohol use: No    Drug use: No    Sexual activity: Not Currently     Partners: Male   Other Topics Concern    Caffeine Concern Not Asked    Exercise Not Asked    Seat Belt Not Asked    Special Diet Not Asked    Stress Concern Not Asked    Weight Concern Not Asked   Social History Narrative    , lives with daughter    Has 3 children: 2 sons, 1 daughter     Social Determinants of Health     Financial Resource Strain: Low Risk  (2023)    Financial Resource Strain     Difficulty of Paying Living Expenses: Not very hard     Med Affordability: No   Food Insecurity: No Food Insecurity (2024)    Food Insecurity     Food Insecurity: Never true    Transportation Needs: No Transportation Needs (7/31/2024)    Transportation Needs     Lack of Transportation: No     Car Seat: Not on file   Physical Activity: Not on file   Stress: Not on file   Social Connections: Not on file   Housing Stability: Low Risk  (7/31/2024)    Housing Stability     Housing Instability: No     Housing Instability Emergency: Not on file     Crib or Bassinette: Not on file       Medications:    Current Outpatient Medications:     ALPRAZolam 0.25 MG Oral Tab, Take 0.5 tablets (0.125 mg total) by mouth daily as needed for Anxiety. Take prior to radiation treatment, Disp: 15 tablet, Rfl: 1    ondansetron 8 MG Oral Tablet Dispersible, Take 1 tablet (8 mg total) by mouth every 8 (eight) hours as needed for Nausea., Disp: 60 tablet, Rfl: 1    fentaNYL 12 MCG/HR Transdermal Patch 72 Hr, Place 1 patch onto the skin every third day. Take in addition to fentanyl 25mcg patch for a total dose of 37.5mcg, Disp: 5 patch, Rfl: 0    HYDROmorphone 4 MG Oral Tab, Take 1 tablet (4 mg total) by mouth every 4 (four) hours as needed for Pain., Disp: 90 tablet, Rfl: 0    fentaNYL 25 MCG/HR Transdermal Patch 72 Hr, Place 1 patch onto the skin every third day., Disp: 10 patch, Rfl: 0    amiodarone 200 MG Oral Tab, TAKE ONE TABLET BY MOUTH ONCE DAILY. Needs appointment with cardiologist and EKG done for future refills., Disp: , Rfl:     prochlorperazine (COMPAZINE) 10 mg tablet, Take 1 tablet (10 mg total) by mouth every 6 (six) hours as needed for Nausea., Disp: 30 tablet, Rfl: 1    gabapentin 100 MG Oral Cap, Take 2 capsules (200 mg total) by mouth nightly. (Patient taking differently: Take 2 capsules (200 mg total) by mouth 2 (two) times daily.), Disp: 60 capsule, Rfl: 1    Naloxone HCl 4 MG/0.1ML Nasal Liquid, 4 mg by Nasal route as needed. If patient remains unresponsive, repeat dose in other nostril 2-5 minutes after first dose., Disp: 1 kit, Rfl: 0    Acetaminophen 500 MG Oral Cap, Take 2 capsules (1,000  mg total) by mouth 3 (three) times daily as needed for Pain., Disp: , Rfl:     Insulin Lispro, 1 Unit Dial, (HUMALOG KWIKPEN) 100 UNIT/ML Subcutaneous Solution Pen-injector, INJECT 10-12 UNITS INTO THE SKIN THREE TIMES DAILY WITH MEALS AS DIRECTED, Disp: 15 mL, Rfl: 0    hydrALAZINE 50 MG Oral Tab, 1 tablet (50 mg total) 3 (three) times daily., Disp: , Rfl:     dilTIAZem HCl ER Beads 120 MG Oral Capsule SR 24 Hr, Take 1 capsule (120 mg total) by mouth daily., Disp: 30 capsule, Rfl: 11    LANTUS SOLOSTAR 100 UNIT/ML Subcutaneous Solution Pen-injector, INJECT 8 UNITS INTO THE SKIN NIGHTLY. PEN EXPIRES 28 DAYS AFTER FIRST USE, Disp: 15 mL, Rfl: 0    LISINOPRIL 20 MG Oral Tab, TAKE ONE TABLET BY MOUTH EVERY EVENING, Disp: 90 tablet, Rfl: 0    Insulin Pen Needle (BD PEN NEEDLE PRISCILLA U/F) 32G X 4 MM Does not apply Misc, Inject 1 Pen into the skin daily., Disp: 90 each, Rfl: 0    ATORVASTATIN 80 MG Oral Tab, Take 1 tablet (80 mg total) by mouth nightly., Disp: 90 tablet, Rfl: 0    Continuous Blood Gluc Sensor (FREESTYLE NATALIE 2 SENSOR) Does not apply Misc, 1 Device by Subdermal route every 14 (fourteen) days., Disp: 6 each, Rfl: 3    fenofibrate 145 MG Oral Tab, Take 1 tablet (145 mg total) by mouth daily., Disp: 90 tablet, Rfl: 0    Continuous Blood Gluc  (FREESTYLE NATALIE 2 READER) Does not apply Device, 1 each daily., Disp: 1 each, Rfl: 1    Continuous Blood Gluc  (FREESTYLE NATALIE 2 READER) Does not apply Device, 1 Device daily as needed., Disp: 1 each, Rfl: 0    PROBIOTIC PRODUCT OR, Take 1 capsule by mouth daily. 100 billion, Disp: , Rfl:     furosemide 40 MG Oral Tab, Take 0.5 tablets (20 mg total) by mouth daily., Disp: , Rfl:     Potassium Chloride ER 20 MEQ Oral Tab CR, Take 1 tablet by mouth daily., Disp: , Rfl:     apixaban 5 MG Oral Tab, Take 1 tablet (5 mg total) by mouth 2 (two) times daily., Disp: 60 tablet, Rfl: 2    Cholecalciferol (VITAMIN D3) 250 MCG (67810 UT) Oral Tab, Take 1,000 Units by  mouth daily., Disp: , Rfl:     Calcium Citrate-Vitamin D 315-250 MG-UNIT Oral Tab, Take 1 tablet by mouth daily., Disp: , Rfl:     Review of Systems:  A comprehensive 14 point review of systems was completed.  Pertinent positives and negatives noted in the HPI.    Performance Status: ECOG 2 - Ambulatory/capable of all self-care, unable to perform any work activities. Up and about more than 50% of waking hours.    Physical Examination:  General: Patient is alert and oriented x 3, not in acute distress.  Vital Signs: Height: 163.8 cm (5' 4.49\") (10/01 0808)  Weight: 66.6 kg (146 lb 12.8 oz) (10/01 0808)  BSA (Calculated - sq m): 1.72 sq meters (10/01 0808)  Pulse: 69 (10/01 0808)  BP: 96/59 (10/01 0808)  Temp: 97.4 °F (36.3 °C) (10/01 0808)  Do Not Use - Resp Rate: --  SpO2: 96 % (10/01 0808)  HEENT: Anicteric, conjunctivae and sclerae clear, no oropharyngeal lesion/thrush, mucous membranes are moist   Chest: Clear to auscultation. Respirations unlabored.   Heart: Regular rate and rhythm.   Abdomen: Soft, non-distended, non-tender with present bowel sounds.  Extremities: No edema.  Neurological: Grossly intact.   Lymphatics: There is no palpable lymphadenopathy throughout in the cervical or supraclavicular regions.   Skin: warm, dry, no erythema or rash   Psych/Depression: mood and affect are appropriate.     Labs:     Recent Results (from the past 72 hour(s))   BASIC METABOLIC PANEL [E]    Collection Time: 09/30/24  7:55 AM   Result Value Ref Range    Glucose 249 (H) 70 - 99 mg/dL    Sodium 138 136 - 145 mmol/L    Potassium 4.1 3.5 - 5.1 mmol/L    Chloride 104 98 - 112 mmol/L    CO2 29.0 21.0 - 32.0 mmol/L    Anion Gap 5 0 - 18 mmol/L    BUN 28 (H) 9 - 23 mg/dL    Creatinine 1.54 (H) 0.55 - 1.02 mg/dL    Calcium, Total 9.3 8.7 - 10.4 mg/dL    Calculated Osmolality 300 (H) 275 - 295 mOsm/kg    eGFR-Cr 33 (L) >=60 mL/min/1.73m2    Patient Fasting for BMP? No    CBC W Differential W Platelet    Collection Time: 09/30/24   7:55 AM   Result Value Ref Range    WBC 5.9 4.0 - 11.0 x10(3) uL    RBC 3.78 (L) 3.80 - 5.30 x10(6)uL    HGB 9.7 (L) 12.0 - 16.0 g/dL    HCT 29.7 (L) 35.0 - 48.0 %    .0 150.0 - 450.0 10(3)uL    MCV 78.6 (L) 80.0 - 100.0 fL    MCH 25.7 (L) 26.0 - 34.0 pg    MCHC 32.7 31.0 - 37.0 g/dL    RDW 14.9 %    Neutrophil Absolute Prelim 4.52 1.50 - 7.70 x10 (3) uL    Neutrophil Absolute 4.52 1.50 - 7.70 x10(3) uL    Lymphocyte Absolute 0.83 (L) 1.00 - 4.00 x10(3) uL    Monocyte Absolute 0.32 0.10 - 1.00 x10(3) uL    Eosinophil Absolute 0.16 0.00 - 0.70 x10(3) uL    Basophil Absolute 0.03 0.00 - 0.20 x10(3) uL    Immature Granulocyte Absolute 0.07 0.00 - 1.00 x10(3) uL    Neutrophil % 76.2 %    Lymphocyte % 14.0 %    Monocyte % 5.4 %    Eosinophil % 2.7 %    Basophil % 0.5 %    Immature Granulocyte % 1.2 %       Impression/Plan    Left supraclavicular poorly differentiated carcinoma: diagnosed in 8/2024 with no tumor origin. Started definitive treatment with chemoradiation with weekly Carbo AUC 2 and paclitaxel 50 mg/m2 last week and tolerated without significant side effects. Labs reviewed, proceed with week 2 today.     Hyperglycemia: she wears continuous glucose monitor and uses insulin. She reports periods of recent low blood sugars. She will follow up with PCP if glucose readings remain elevated. Pre-medication dexamethasone lowered to 10mg. Blood glucose in CMP improved to 249    Planned Follow Up: 10/7/2024 cll (pre-treatment labs); 10/8/2024 follow up with Dr. Reid, labs and chemo    Risk Level: HIGH carcinoma receiving chemotherapy requiring close monitoring     The 21st Century Cures Act makes medical notes like these available to patients in the interest of transparency. Please be advised this is a medical document. Medical documents are intended to carry relevant information, facts as evident, and the clinical opinion of the practitioner. The medical note is intended as peer to peer communication and may  appear blunt or direct. It is written in medical language and may contain abbreviations or verbiage that are unfamiliar.     Electronically Signed by:    Mamie Woodward DNP, APRN, NP-C, AOCNP  Nurse Practitioner  [facility]

## 2024-10-01 NOTE — PROGRESS NOTES
Chief Complaint   Patient presents with    Follow - Up    Chemotherapy     Pt is here for treatment - C1 D8 carbo/taxol are expected. She reports continued pain to left shoulder that is receiving radiation; not worse but stable pain. Taking pain medication as directed. Eating and drinking, reports low appetite. Occasional nausea despite taking medication to control; denies N,V,D,C; some loose stools. No energy but does get self washed and dressed.     Education Record    Learner:  Patient and Family Member    Disease / Diagnosis: urinary bladder cancer    Barriers / Limitations:  None   Comments:    Method:  Brief focused   Comments:    General Topics:  Diet, Medication, Pain, Side effects and symptom management, and Plan of care reviewed   Comments:    Outcome:  Shows understanding   Comments:

## 2024-10-01 NOTE — PROGRESS NOTES
Pt here for C 1 D 8  Drug(s)Carboplatin/taxol.  Arrives Via wheelchair, accompanied by Self and Family member     Patient was evaluated today by CALEB.    Oral medications included in this regimen:  no    Patient confirms comprehension of cancer treatment schedule:  yes    Pregnancy screening:  Not applicable    Modifications in dose or schedule:  No    Medications appearance and physical integrity checked by RN: yes.    Chemotherapy IV pump settings verified by 2 RNs:  Yes.  Frequency of blood return and site check throughout administration: Prior to administration, Prior to each drug, and At completion of therapy     Infusion/treatment outcome:  patient tolerated treatment without incident    Education Record    Learner:  Patient and Family Member  Barriers / Limitations:  None  Method:  Discussion  Education / instructions given:  schedule of nausea meds and miralax.  Outcome:  Shows understanding    Discharged Home, Via wheelchair, accompanied by:Self and Family member    Patient/family verbalized understanding of future appointments: by printed AVS

## 2024-10-02 PROCEDURE — 77386 HC IMRT COMPLEX: CPT | Performed by: RADIOLOGY

## 2024-10-02 NOTE — PROGRESS NOTES
[facility] Radiation Treatment Management Note 6-10    Patient:  Anastasia Hobson  Age:  84 year old  Visit Diagnosis:    1. 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 Bella Cat    Site Delivered Dose (cGy) Prescribed Dose (cGy) Fraction #   LEFT NECK 1600 4800 8/24   L NECK BOOST 0 1800 0/9     First treatment date:  9/24/24   Concurrent chemotherapy: CARBO/TAXOL        10/1/2024     8:08 AM 10/3/2024     7:37 AM 10/3/2024     7:41 AM   Oncology Vitals   Height 5' 4.488\"     Height 164 cm     Weight 146 lb 12.8 oz  147 lb 3.2 oz   Weight 66.588 kg  66.769 kg   BSA (m2) 1.72 m2  1.73 m2   BMI 24.82 kg/m2  24.89 kg/m2   BP 96/59 118/69    Pulse 69 82    Resp 18 18    Temp 97.4 °F (36.3 °C) 98.6 °F (37 °C)    SpO2 96 % 98 %    Pain Score 5 4         Toxicities:  Fatigue Grade 2= Fatigue not relieved by rest limiting instrumental ADL  Dermatitis associated with radiation Grade 0= None  Moisturizer used  Cerave  Number of times: 2 times daily.  Dysphagia Grade 0= None  Dyspepsia Grade 0= None  Mucositis Grade 0= None  Constipation Grade 0= None  Diarrhea  Grade 0= None  Nausea Grade 0= None  Vomiting Grade 0= None      Nursing Note:  Pt tolerating RT well  Pain controlled with fentanyl patch, tylenol, & dilaudid  Neck skin intact without redness  No difficulty swallowing    Recent Labs   Lab 09/30/24  0755   RBC 3.78*   HGB 9.7*   HCT 29.7*   MCV 78.6*   MCH 25.7*   MCHC 32.7   RDW 14.9   NEPRELIM 4.52   WBC 5.9   .0          Monet MARCANO RN MD NOTE   Reviewed and agree with RN note above.  Setup imaging reviewed in [facility] and approved.    S:  -feeling mass is softer; no swallowing issues    O:  -left neck softer, less tense, less congestive edema  Skin minimally red if at all    A/P:  -cont CRT  Cont moisturizer    OTV in 1 week    Bella Cat MD  Radiation Oncology

## 2024-10-03 ENCOUNTER — HOSPITAL ENCOUNTER (OUTPATIENT)
Dept: RADIATION ONCOLOGY | Facility: HOSPITAL | Age: 84
Discharge: HOME | End: 2024-10-03
Attending: RADIOLOGY
Payer: MEDICARE

## 2024-10-03 VITALS
HEART RATE: 82 BPM | RESPIRATION RATE: 18 BRPM | TEMPERATURE: 99 F | OXYGEN SATURATION: 98 % | BODY MASS INDEX: 25 KG/M2 | SYSTOLIC BLOOD PRESSURE: 118 MMHG | DIASTOLIC BLOOD PRESSURE: 69 MMHG | WEIGHT: 147.19 LBS

## 2024-10-03 DIAGNOSIS — C77.0 SECONDARY MALIGNANT NEOPLASM OF LYMPH NODES OF NECK (HCC): Primary | ICD-10-CM

## 2024-10-03 PROCEDURE — 77386 HC IMRT COMPLEX: CPT | Performed by: RADIOLOGY

## 2024-10-04 PROCEDURE — 77386 HC IMRT COMPLEX: CPT | Performed by: RADIOLOGY

## 2024-10-04 PROCEDURE — 77336 RADIATION PHYSICS CONSULT: CPT | Performed by: RADIOLOGY

## 2024-10-07 ENCOUNTER — NURSE ONLY (OUTPATIENT)
Dept: HEMATOLOGY/ONCOLOGY | Facility: HOSPITAL | Age: 84
End: 2024-10-07
Attending: NURSE PRACTITIONER

## 2024-10-07 DIAGNOSIS — G89.3 NEOPLASM RELATED PAIN: ICD-10-CM

## 2024-10-07 DIAGNOSIS — C76.0 HEAD AND NECK CANCER (HCC): Primary | ICD-10-CM

## 2024-10-07 DIAGNOSIS — C76.0 HEAD AND NECK CANCER (HCC): ICD-10-CM

## 2024-10-07 LAB
ANION GAP SERPL CALC-SCNC: 6 MMOL/L (ref 0–18)
BASOPHILS # BLD AUTO: 0.02 X10(3) UL (ref 0–0.2)
BASOPHILS NFR BLD AUTO: 1 %
BUN BLD-MCNC: 38 MG/DL (ref 9–23)
CALCIUM BLD-MCNC: 9 MG/DL (ref 8.7–10.4)
CHLORIDE SERPL-SCNC: 103 MMOL/L (ref 98–112)
CO2 SERPL-SCNC: 26 MMOL/L (ref 21–32)
CREAT BLD-MCNC: 1.82 MG/DL
EGFRCR SERPLBLD CKD-EPI 2021: 27 ML/MIN/1.73M2 (ref 60–?)
EOSINOPHIL # BLD AUTO: 0.02 X10(3) UL (ref 0–0.7)
EOSINOPHIL NFR BLD AUTO: 1 %
ERYTHROCYTE [DISTWIDTH] IN BLOOD BY AUTOMATED COUNT: 15.6 %
GLUCOSE BLD-MCNC: 314 MG/DL (ref 70–99)
HCT VFR BLD AUTO: 29.2 %
HGB BLD-MCNC: 9.4 G/DL
IMM GRANULOCYTES # BLD AUTO: 0.01 X10(3) UL (ref 0–1)
IMM GRANULOCYTES NFR BLD: 0.5 %
LYMPHOCYTES # BLD AUTO: 0.37 X10(3) UL (ref 1–4)
LYMPHOCYTES NFR BLD AUTO: 18.7 %
MCH RBC QN AUTO: 25.1 PG (ref 26–34)
MCHC RBC AUTO-ENTMCNC: 32.2 G/DL (ref 31–37)
MCV RBC AUTO: 78.1 FL
MONOCYTES # BLD AUTO: 0.42 X10(3) UL (ref 0.1–1)
MONOCYTES NFR BLD AUTO: 21.2 %
NEUTROPHILS # BLD AUTO: 1.14 X10 (3) UL (ref 1.5–7.7)
NEUTROPHILS # BLD AUTO: 1.14 X10(3) UL (ref 1.5–7.7)
NEUTROPHILS NFR BLD AUTO: 57.6 %
OSMOLALITY SERPL CALC.SUM OF ELEC: 301 MOSM/KG (ref 275–295)
PLATELET # BLD AUTO: 337 10(3)UL (ref 150–450)
POTASSIUM SERPL-SCNC: 4.5 MMOL/L (ref 3.5–5.1)
RBC # BLD AUTO: 3.74 X10(6)UL
SODIUM SERPL-SCNC: 135 MMOL/L (ref 136–145)
WBC # BLD AUTO: 2 X10(3) UL (ref 4–11)

## 2024-10-07 PROCEDURE — 85025 COMPLETE CBC W/AUTO DIFF WBC: CPT

## 2024-10-07 PROCEDURE — 77386 HC IMRT COMPLEX: CPT | Performed by: RADIOLOGY

## 2024-10-07 PROCEDURE — 36591 DRAW BLOOD OFF VENOUS DEVICE: CPT

## 2024-10-07 PROCEDURE — 80048 BASIC METABOLIC PNL TOTAL CA: CPT

## 2024-10-07 RX ORDER — FENTANYL 25 UG/1
1 PATCH TRANSDERMAL
Qty: 10 PATCH | Refills: 0 | Status: SHIPPED | OUTPATIENT
Start: 2024-10-07

## 2024-10-07 NOTE — PROGRESS NOTES
[facility] Note    Visit Diagnosis:  1. Head and neck cancer (HCC)          History of Present Illness: 83-year-old female with a past medical history of: A-fib on Eliquis, urothelial carcinoma, CKD 3, GERD and diabetes was referred by Dr. Fall to discuss a poorly differentiated carcinoma.     -Cystoscopy June 2024 with NAD.  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    -MRI cervical spine on 7/31/2024 incidentally noted a left lower neck mass measuring 4.5 x 3.5 cm.    -CT neck on 8/1/2024 4.2 x 2.9 x 4.2 cm left supraclavicular mass.    -Met with ENT,  and patient-tonsils were surgically absent on exam.    -Left neck mass biopsy on 8/5/2024 showed a poorly differentiated carcinoma with focal neuroendocrine differentiation.  IHC was positive for cytokeratin AE 1/3, p16, p63 and weakly positive for synaptophysin  .  -PET/CT on 8/21/24: focal marked metabolic activity in the left supraclavicular mass.  This mass is 4.9 x 4.2 cm with an SUV of 8.8.  No other lesions. LLL atelectasis or PNA    -She has a poor appetite but her daughter gets her to eat.  She uses her walker and here for most of her ADLs.  She has a history of smoking 1 pack/day for 40 years but quit about 25 years ago.  She was with her daughter Petty who is involved with her care. She is following with palliative care due to significant pain.     -Tumor board 8/28/24: Case reviewed in tumor board. PET does not show evidence of distant disease. Concern for p16+ head and neck cancer vs. Cervical esophageal cancer. EGD recommended. ENT eval recommended. We will plan on definitive treatment with chemoradiation with weekly Carbo AUC 2 + Taxol 50 mg/m2.     -ENT Eval with Dr. Villalta on 8/28/244: Flexible laryngoscopy was unremarkable     -Chemoradiation with weekly carbo AUC 2 + Taxol 50 mg/m2: 9/24/24-Current   -Week 1: 9/24/24-Carbo/Taxol   -Week 2:  10/1/24-Carbo/taxol   -Week 3: 10/8/24: NO CHEMOTHERAPY-stopped for side effects    Interval History  -week 3 of treatment planned. She has been experiencing more nausea and taking compazine and zofran. She has been having more diarrhea but not taking imodium.   -WBC 2.0, Hb 9.4, Plt 334, Cr 1.82, blood sugar 314  -BP slightly lower  -More fatigued   -Still with significant pain    Review of Systems: 12 Point ROS was completed and pertinent positives are in the HPI        Past Medical History:    Arrhythmia    Back pain    Bladder cancer (HCC)    high grade superficial TCC, recurrent July 2020 after BCG    Bloating    Blurred vision    Cancer (HCC)    Congestive heart disease (HCC)    Constipation    Diabetes (HCC)    IDDM     Diabetes mellitus (HCC)    Diarrhea, unspecified    CLARKE (dyspnea on exertion)    Easy bruising    Elevated cholesterol    Essential hypertension    Exposure to medical diagnostic radiation    Fatigue    Flatulence/gas pain/belching    Frequent urination    taking furosemide daily    Headache disorder    Hearing loss    Heart palpitations    Heartburn    occasionally    High blood pressure    High cholesterol    History of falling    Hyperlipidemia    Irregular bowel habits    Itch of skin    right hand only    Leaking of urine    sometimes    Leg swelling    Osteoporosis    Sleep disturbance    Stool incontinence    when I have diarrhea    Type 2 diabetes mellitus with hyperglycemia (HCC)    Visual impairment    glasses    Wears glasses     Past Surgical History:   Procedure Laterality Date    Cataract      Cervical spine surgery  10/06/2023    Colonoscopy  2019    Colonoscopy      Cystoscopy,insert ureteral stent      Cystourethroscopy  11/25/2020    Cystoscopy Procedure Dr Josh Hernandez  - recurrent tumor on BTS    Cystourethroscopy,biopsy  12/16/2019    BBx of scar sites, [facility] [place]    Cystourethroscopy,fulgur .5-2cm lesn  04/16/2020    TURBT, [facility] [place]    Cystourethroscopy,fulgur .5-2cm lesn  07/30/2020     TURBT with Gemcitabine    Cystourethroscopy,fulgur >5cm lesn  10/03/2019    TURBT with bilateral RPGs ([facility])    Fracture surgery      left wrist    Hysterectomy      age 31    Ndsc ablation & rcnstj [facility] w/o bypass      Other surgical history  2021    BTDr David Cervantes    Other surgical history  2021    BTS Cysto Caterize Dr. Hernandez    Other surgical history  2021    BTS Cysto W/ caterigayathri Hernandez     Other surgical history  2022    BTS Cysto- Dr. Hernandez    Spine surgery procedure unlisted  10/06/2003    CERVICAL    Tonsillectomy  1946    Total abdom hysterectomy  1971     Social History     Socioeconomic History    Marital status:     Number of children: 3   Occupational History    Occupation: RETIRED   Tobacco Use    Smoking status: Former     Current packs/day: 0.00     Average packs/day: 1.1 packs/day for 65.3 years (73.5 ttl pk-yrs)     Types: Cigarettes     Quit date: 2007     Years since quittin.2    Smokeless tobacco: Never    Tobacco comments:     quit 15 years    Vaping Use    Vaping status: Never Used   Substance and Sexual Activity    Alcohol use: No    Drug use: No    Sexual activity: Not Currently     Partners: Male      Family History   Problem Relation Age of Onset    Diabetes Father     Stroke Father     Colon Polyps Mother     Diabetes Mother     Heart Attack Mother     Other (Other) Son         Afib       Physical Exam  Height: 163.8 cm (5' 4.49\") (10/08 0757)  Weight: 68 kg (150 lb) (10/08 0757)  BSA (Calculated - sq m): 1.74 sq meters (10/08 0757)  Pulse: 86 (10/08 0757)  BP: 89/53 (10/08 0757)  Temp: 98 °F (36.7 °C) (10/08 0757)  Do Not Use - Resp Rate: --  SpO2: 95 % (10/08 0757)    General: NAD, AOX3  HEENT: clear op, mmm, no jvd, no scleral icterus  Small L supraclavicular fullness   CV: RRR S1S2 no murmurs  Extremities: No edema   Lungs: CTAB, no increased work of breathing  Abd: soft nt nd +BS no hepatosplenomegaly  Neuro: CN: II-XII grossly  intact    Results:  Lab Results   Component Value Date    WBC 2.0 (L) 10/07/2024    HGB 9.4 (L) 10/07/2024    HCT 29.2 (L) 10/07/2024    MCV 78.1 (L) 10/07/2024    .0 10/07/2024     Lab Results   Component Value Date     (L) 10/07/2024    K 4.5 10/07/2024    CO2 26.0 10/07/2024     10/07/2024    BUN 38 (H) 10/07/2024    PHOS 4.4 12/27/2023    ALB 4.4 09/24/2024       No results found for: \"LDH\"    Radiology:   PET/CT  CONCLUSION:    1. Focal marked increased metabolic activity is associated with the left supraclavicular mass which has undergone previous biopsy.   2. There are no other metabolically active lesions detected.   3. Dependent consolidation left lower lobe and left effusion are noted.  There is only low-level metabolic activity associated with this finding which is most likely related to the atelectasis or pneumonia.   4. Incidental CT findings are described above.       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     Electronically signed by Goldberg, Cathryn A, MD on 8/12/2024 at 1040        Final Diagnosis Comment      The tumor is composed of sheets of malignant cells with pleomorphic nuclei and variable amounts of eosinophilic cytoplasm.  There are areas of necrosis.     Immunohistochemistry is performed to evaluate the tumor phenotype.  The malignant cells are positive for cytokeratin AE 1/3, p16, p63 and weakly positive for synaptophysin.  A rare malignant cell stains with p40.  They are negative for all other markers tested.     The morphologic and immunohistochemical findings are consistent with a poorly differentiated carcinoma with focal neuroendocrine differentiation.  Dr. Eden has reviewed the case and concurs with the above diagnosis.         Assessment and Plan:  Left supraclavicular poorly differentiated carcinoma, p16 positive  -Her PET/CT does not show evidence of distant disease or  tumor origin.  It is relatively rare for head and neck cancer to metastasize without any cervical lymphadenopathy or primary lesion (her tonsils are out). ENT evaluation was unremarkable.   -Tempus: TMB 6.3, BINU, PDL1 CPS 3, Somatic YOLANDA mutation  -She is not tolerating treatment very well with nausea, vomiting, diarrhea, fatigue and pain.     Plan  -We will hold chemotherapy this week  -Given her cytopenias and lack of tolerance, we will proceed with Carbo AUC 2 alone next week and discontinue taxol   -PET/CT 12 weeks post treatment    Hyperglycemia: following with PCP. On insulin. Dex premedication was lowered to 10 mg     HTN: Hold hydralazine due to hypotension     Cancer related Pain: following with palliative. Now on fentanyl patch    Nausea/vomiting: continue PRN antiemetics    Diarrhea: start PRN imodium    Bladder cancer: high-grade papillary urothelial carcinoma status post BCG x 6 with FELY.    [facility]

## 2024-10-07 NOTE — PROGRESS NOTES
Education Record    Learner:  Patient    Pt here for: Central line labs    Barriers / Limitations:  None    Method:  Brief focused, printed material and  reinforcement    General Topics:  Plan of care reviewed    Outcome: Pt labs drawn from port for chemo tomorrow. Port needle remains in for treatment. Left in stable condition. Appts in place.

## 2024-10-08 ENCOUNTER — APPOINTMENT (OUTPATIENT)
Dept: HEMATOLOGY/ONCOLOGY | Facility: HOSPITAL | Age: 84
End: 2024-10-08
Attending: NURSE PRACTITIONER
Payer: MEDICARE

## 2024-10-08 ENCOUNTER — OFFICE VISIT (OUTPATIENT)
Dept: HEMATOLOGY/ONCOLOGY | Facility: HOSPITAL | Age: 84
End: 2024-10-08
Attending: NURSE PRACTITIONER

## 2024-10-08 ENCOUNTER — TELEPHONE (OUTPATIENT)
Dept: HEMATOLOGY/ONCOLOGY | Facility: HOSPITAL | Age: 84
End: 2024-10-08

## 2024-10-08 VITALS
SYSTOLIC BLOOD PRESSURE: 89 MMHG | WEIGHT: 150 LBS | OXYGEN SATURATION: 95 % | RESPIRATION RATE: 16 BRPM | DIASTOLIC BLOOD PRESSURE: 53 MMHG | BODY MASS INDEX: 25.3 KG/M2 | TEMPERATURE: 98 F | HEART RATE: 86 BPM | HEIGHT: 64.49 IN

## 2024-10-08 DIAGNOSIS — C76.0 HEAD AND NECK CANCER (HCC): Primary | ICD-10-CM

## 2024-10-08 DIAGNOSIS — C77.0 SECONDARY MALIGNANT NEOPLASM OF LYMPH NODES OF NECK (HCC): Primary | ICD-10-CM

## 2024-10-08 PROCEDURE — 96361 HYDRATE IV INFUSION ADD-ON: CPT

## 2024-10-08 PROCEDURE — 96374 THER/PROPH/DIAG INJ IV PUSH: CPT

## 2024-10-08 PROCEDURE — 99215 OFFICE O/P EST HI 40 MIN: CPT | Performed by: INTERNAL MEDICINE

## 2024-10-08 PROCEDURE — G2211 COMPLEX E/M VISIT ADD ON: HCPCS | Performed by: INTERNAL MEDICINE

## 2024-10-08 PROCEDURE — 77386 HC IMRT COMPLEX: CPT | Performed by: RADIOLOGY

## 2024-10-08 RX ORDER — ONDANSETRON 2 MG/ML
8 INJECTION INTRAMUSCULAR; INTRAVENOUS AS NEEDED
Status: DISCONTINUED | OUTPATIENT
Start: 2024-10-08 | End: 2024-10-08

## 2024-10-08 RX ORDER — LIDOCAINE HYDROCHLORIDE 20 MG/ML
SOLUTION OROPHARYNGEAL
Qty: 200 ML | Refills: 3 | Status: SHIPPED | OUTPATIENT
Start: 2024-10-08

## 2024-10-08 RX ORDER — ONDANSETRON 2 MG/ML
8 INJECTION INTRAMUSCULAR; INTRAVENOUS AS NEEDED
Status: CANCELLED
Start: 2024-10-08

## 2024-10-08 RX ADMIN — ONDANSETRON 8 MG: 2 INJECTION INTRAMUSCULAR; INTRAVENOUS at 09:31:00

## 2024-10-08 NOTE — TELEPHONE ENCOUNTER
Petty [phone] Would like to speak with someone about her mom's infusion on 10/15/2024 thinks mom was double booked. Thanks Sia

## 2024-10-08 NOTE — PROGRESS NOTES
Oncology Nutrition PHONE Consultation     Patient Name: Anastasia Hobson  YOB: 1940  Medical Record Number: [id number]            Account Number: [id number]  Dietitian: Jie Juarez RD, LDN     Date of visit: 9/20/2024     Diet Rx: high protein/calorie, soft as tolerated     Pertinent Dx/PMH: Left supraclavicular poorly differentiated carcinoma, p16 positive      Past Medical History       Past Medical History:    Arrhythmia    Back pain    Bladder cancer (HCC)     high grade superficial TCC, recurrent July 2020 after BCG    Bloating    Blurred vision    Cancer (HCC)    Congestive heart disease (HCC)    Constipation    Diabetes (HCC)     IDDM     Diabetes mellitus (HCC)    Diarrhea, unspecified    CLARKE (dyspnea on exertion)    Easy bruising    Elevated cholesterol    Essential hypertension    Exposure to medical diagnostic radiation    Fatigue    Flatulence/gas pain/belching    Frequent urination     taking furosemide daily    Headache disorder    Hearing loss    Heart palpitations    Heartburn     occasionally    High blood pressure    High cholesterol    History of falling    Hyperlipidemia    Irregular bowel habits    Itch of skin     right hand only    Leaking of urine     sometimes    Leg swelling    Osteoporosis    Sleep disturbance    Stool incontinence     when I have diarrhea    Type 2 diabetes mellitus with hyperglycemia (HCC)    Visual impairment     glasses    Wears glasses            TX: concurrent weekly Carbo/taxol/RT (M-F thru 11/5/24)      Other pertinent subjective/objective information: diet/sx hx obtained     Pertinent Meds:     Current Outpatient Medications:     ondansetron 8 MG Oral Tablet Dispersible, Take 1 tablet (8 mg total) by mouth every 8 (eight) hours as needed for Nausea., Disp: 60 tablet, Rfl: 1    fentaNYL 12 MCG/HR Transdermal Patch 72 Hr, Place 1 patch onto the skin every third day. Take in addition to fentanyl 25mcg patch for a total dose of 37.5mcg, Disp: 5  patch, Rfl: 0    HYDROmorphone 4 MG Oral Tab, Take 1 tablet (4 mg total) by mouth every 4 (four) hours as needed for Pain., Disp: 90 tablet, Rfl: 0    fentaNYL 25 MCG/HR Transdermal Patch 72 Hr, Place 1 patch onto the skin every third day., Disp: 10 patch, Rfl: 0    amiodarone 200 MG Oral Tab, TAKE ONE TABLET BY MOUTH ONCE DAILY. Needs appointment with cardiologist and EKG done for future refills., Disp: , Rfl:     prochlorperazine (COMPAZINE) 10 mg tablet, Take 1 tablet (10 mg total) by mouth every 6 (six) hours as needed for Nausea., Disp: 30 tablet, Rfl: 1    gabapentin 100 MG Oral Cap, Take 2 capsules (200 mg total) by mouth nightly. (Patient taking differently: Take 2 capsules (200 mg total) by mouth 2 (two) times daily.), Disp: 60 capsule, Rfl: 1    Naloxone HCl 4 MG/0.1ML Nasal Liquid, 4 mg by Nasal route as needed. If patient remains unresponsive, repeat dose in other nostril 2-5 minutes after first dose., Disp: 1 kit, Rfl: 0    Acetaminophen 500 MG Oral Cap, Take 2 capsules (1,000 mg total) by mouth 3 (three) times daily as needed for Pain., Disp: , Rfl:     Insulin Lispro, 1 Unit Dial, (HUMALOG KWIKPEN) 100 UNIT/ML Subcutaneous Solution Pen-injector, INJECT 10-12 UNITS INTO THE SKIN THREE TIMES DAILY WITH MEALS AS DIRECTED, Disp: 15 mL, Rfl: 0    hydrALAZINE 50 MG Oral Tab, 1 tablet (50 mg total) 3 (three) times daily., Disp: , Rfl:     dilTIAZem HCl ER Beads 120 MG Oral Capsule SR 24 Hr, Take 1 capsule (120 mg total) by mouth daily., Disp: 30 capsule, Rfl: 11    LANTUS SOLOSTAR 100 UNIT/ML Subcutaneous Solution Pen-injector, INJECT 8 UNITS INTO THE SKIN NIGHTLY. *PEN EXPIRES 28 DAYS AFTER FIRST USE*, Disp: 15 mL, Rfl: 0    LISINOPRIL 20 MG Oral Tab, TAKE ONE TABLET BY MOUTH EVERY EVENING, Disp: 90 tablet, Rfl: 0    Insulin Pen Needle (BD PEN NEEDLE PRISCILLA U/F) 32G X 4 MM Does not apply Misc, Inject 1 Pen into the skin daily., Disp: 90 each, Rfl: 0    ATORVASTATIN 80 MG Oral Tab, Take 1 tablet (80 mg total)  by mouth nightly., Disp: 90 tablet, Rfl: 0    Continuous Blood Gluc Sensor (FREESTYLE NATALIE 2 SENSOR) Does not apply Misc, 1 Device by Subdermal route every 14 (fourteen) days., Disp: 6 each, Rfl: 3    fenofibrate 145 MG Oral Tab, Take 1 tablet (145 mg total) by mouth daily., Disp: 90 tablet, Rfl: 0    Continuous Blood Gluc  (FREESTYLE NATALIE 2 READER) Does not apply Device, 1 each daily., Disp: 1 each, Rfl: 1    Continuous Blood Gluc  (FREESTYLE NATALIE 2 READER) Does not apply Device, 1 Device daily as needed., Disp: 1 each, Rfl: 0    PROBIOTIC PRODUCT OR, Take 1 capsule by mouth daily. 100 billion, Disp: , Rfl:     furosemide 40 MG Oral Tab, Take 0.5 tablets (20 mg total) by mouth daily., Disp: , Rfl:     Potassium Chloride ER 20 MEQ Oral Tab CR, Take 1 tablet by mouth daily., Disp: , Rfl:     apixaban 5 MG Oral Tab, Take 1 tablet (5 mg total) by mouth 2 (two) times daily., Disp: 60 tablet, Rfl: 2    Cholecalciferol (VITAMIN D3) 250 MCG (81752 UT) Oral Tab, Take 1,000 Units by mouth daily., Disp: , Rfl:     Calcium Citrate-Vitamin D 315-250 MG-UNIT Oral Tab, Take 1 tablet by mouth daily., Disp: , Rfl:      Pertinent Labs: noted     Height: 5'5\"              IBW: 125 +/- 10%     WT HX:   10/08/24 68 kg (150 lb)   09/11/24 64.9 kg (143 lb)   09/03/24 70.4 kg (155 lb 4.8 oz)   08/23/24 69.4 kg (153 lb)   07/23/24 68.4 kg (150 lb 12.8 oz)         Estimated Nutrition Needs: 22-27 kcals/kg = 1389-6643 KCALS/d; 1.3 gms protein/kg = 85  gms/d     Assessment/Plan: RD f/u w/ pt and daughter in tx room today. Pt to only receive IV hydration today as per SE of chemo. Pt noted more pain w/ swallow and hoping to address w/ RT oncologist at [facility].       Pt w/ stable wt. Daughter noted they will be more diligent w/ antiemetics and will begin meds as per diarrhea.     Pt noted eating packaged breakfast sausage sandwich and Boost Glucose Control ONS (190 kcals, 16 gm protein) for breakfast. Daughter noted she's trying  have pt eat vegetables and lean proteins.      RD suggested pt consume more ONS if difficult eating solid foods. RD also noted it's OK if pt enjoys and is tolerating breakfast sandwich and can have additionally throughout the day until sx/side effects of tx are managed.     Pt/daughter verbalized understanding. RD offered support and will continue to monitor throughout tx.        The 21st Century Cures Act makes medical notes like these available to patients in the interest of transparency. Please be advised this is a medical document. Medical documents are intended to carry relevant information, facts as evident, and the clinical opinion of the practitioner. The medical note is intended as peer to peer communication and may appear blunt or direct. It is written in medical language and may contain abbreviations or verbiage that are unfamiliar.

## 2024-10-08 NOTE — PROGRESS NOTES
Patient here for follow-up and planned C1D15 treatment. Energy levels are poor. States she has nausea and vomiting yesterday, accompanied by non-stop diarrhea. Taking zofran and compazine for nausea. Not taking anything for diarrhea. Appetite levels are poor. States she is in 10/10 pain with movement. Using dilaudid with fentanyl patches.

## 2024-10-08 NOTE — PROGRESS NOTES
Education Record    Learner:  Patient and Family Member    Disease / Diagnosis: head and neck CA     Barriers / Limitations:  None   Comments:    Method:  Brief focused   Comments:    General Topics:  Plan of care reviewed   Comments:    Outcome:  Shows understanding   Comments:    Treatment held for today due to weakness/dehydration/low BP. IVF over 1hr given with anti-nausea medication. Patient to return in 1 week for cll, md, chemo. AVS printed and provided.

## 2024-10-09 PROCEDURE — 77386 HC IMRT COMPLEX: CPT | Performed by: RADIOLOGY

## 2024-10-09 NOTE — PROGRESS NOTES
Merged with [facility] Radiation Treatment Management Note 11-15    Patient:  Anastasia Hobson  Age:  84 year old  Visit Diagnosis:  No diagnosis found.  Primary Rad/Onc:  Dr. Bella Cat    Site Delivered Dose (cGy) Prescribed Dose (cGy) Fraction #   LEFT NECK 2600 4800 13/24   L NECK BOOST 0 1800 0/9     First treatment date:  9/24/24  Concurrent chemotherapy: CARBO/TAXOL WEEKLY        10/10/2024     7:41 AM 10/10/2024     7:47 AM 10/10/2024     7:51 AM   Oncology Vitals   Weight   158 lb 6.4 oz   Weight   71.85 kg   BSA (m2)   1.78 m2   BMI   26.78 kg/m2   /70     Pulse  86    Resp 18     Temp 98.3 °F (36.8 °C)     SpO2  95 %         Toxicities:  Fatigue Grade 2= Fatigue not relieved by rest limiting instrumental ADL  Dermatitis associated with radiation Grade 0= None  Moisturizer used  cetaphil  Number of times: 2 times daily.  Dysphagia Grade 2= Symptomatic and altered eating/swallowing  Dyspepsia Grade 0= None  Mucositis Grade 0= None  Constipation Grade 0= None  Diarrhea  Grade 0= None  Nausea Grade 1= Loss of appetite without alteration in eating habits   Vomiting Grade 0= None  Xerostomia Present=Yes     Nursing Note:  Recent Labs   Lab 10/07/24  0810   RBC 3.74*   HGB 9.4*   HCT 29.2*   MCV 78.1*   MCH 25.1*   MCHC 32.2   RDW 15.6   NEPRELIM 1.14*   WBC 2.0*   .0     Skipped chemo this week (Tuesday) due to N/V and labs  Pt c/o significant burning in throat  Pt has significant pain with just swallowing saliva  Pt ate a sausage sandwich, fruit and boost this morning  Taking Dilaudid every 4 hoiurs  Has fentanyl patches on       Monet MARCANO RN    Reviewed and agree with RN note above.  Setup imaging reviewed in [facility] and approved.    S:  Significant odynophagia, dysphagia, pain control, esophagitis symptoms wallowing issues    O:  -left neck softer,   Mild distress  G1 erythema     A/P:  TO ER for pain control, significant dysphagia,  IV hydration  Chemo has been on hold  Cont  moisturizer    OTV in 1 week    Alex Mckeon MD for   Bella Cat MD  Radiation Oncology    Oncology Chemo Meds          9/11/2024    09:39 9/19/2024    11:43 Cycle 1; Day 1    9/24/2024 Cycle 1; Day 8    10/1/2024 Cycle 1; Day 15 (Canceled)    10/8/2024 Cycle 1; [ Day 22 ]    10/15/2024   Oncology Flowsheet   Day, Cycle   Day 1, Cycle 1 Day 8, Cycle 1  [ Day 22, Cycle 1 ]   CARBOplatin 450 mg/45mL (Paraplatin) IV   110 mg 110 mg  Planned   dexAMETHasone 100 MG/10ML (Decadron) IV   New Bag (unknown dose, 10 mg ordered) New Bag (unknown dose, 10 mg ordered)  [ 10 mg ]   diphenhydrAMINE 50 mg/mL (Benadryl) IV   50 mg 25 mg  [ 25 mg ]   HYDROmorphone 1 MG/ML (Dilaudid) IV 0.4 mg       patients dose at home is 0.4mg        ondansetron 4 MG/2ML (Zofran) IV     8 mg    PACLitaxel 300 mg/50mL (Taxol) IV   50 mg/m2 = 84 mg       1/2 rate or 15 min 50 mg/m2 = 84 mg     palonosetron 0.25 MG/5ML (Aloxi) IV   0.25 mg 0.25 mg  [ 0.25 mg ]   sodium chloride IV  New Bag (unknown dose) 100 mL    250 mL       1/2 rate or 15 min    New Bag (unknown dose, 100 mL ordered) 100 mL    250 mL    New Bag (unknown dose, 100 mL ordered) 1,000 mL [ 100 mL ]    [ 100 mL ]      Details          [ Planned dose ]    Administered dose cannot be determined

## 2024-10-10 ENCOUNTER — HOSPITAL ENCOUNTER (OUTPATIENT)
Dept: RADIATION ONCOLOGY | Facility: HOSPITAL | Age: 84
Discharge: HOME | End: 2024-10-10
Attending: RADIOLOGY
Payer: MEDICARE

## 2024-10-10 ENCOUNTER — HOSPITAL ENCOUNTER (EMERGENCY)
Facility: HOSPITAL | Age: 84
Discharge: HOME | End: 2024-10-10
Attending: EMERGENCY MEDICINE
Payer: MEDICARE

## 2024-10-10 ENCOUNTER — APPOINTMENT (OUTPATIENT)
Dept: GENERAL RADIOLOGY | Facility: HOSPITAL | Age: 84
End: 2024-10-10
Attending: EMERGENCY MEDICINE
Payer: MEDICARE

## 2024-10-10 VITALS
TEMPERATURE: 98 F | SYSTOLIC BLOOD PRESSURE: 117 MMHG | OXYGEN SATURATION: 95 % | WEIGHT: 158.38 LBS | BODY MASS INDEX: 27 KG/M2 | HEART RATE: 86 BPM | RESPIRATION RATE: 18 BRPM | DIASTOLIC BLOOD PRESSURE: 70 MMHG

## 2024-10-10 VITALS
DIASTOLIC BLOOD PRESSURE: 60 MMHG | HEART RATE: 84 BPM | TEMPERATURE: 98 F | OXYGEN SATURATION: 100 % | RESPIRATION RATE: 18 BRPM | SYSTOLIC BLOOD PRESSURE: 148 MMHG

## 2024-10-10 DIAGNOSIS — R13.10 DYSPHAGIA, UNSPECIFIED TYPE: ICD-10-CM

## 2024-10-10 DIAGNOSIS — R07.0 THROAT PAIN: Primary | ICD-10-CM

## 2024-10-10 DIAGNOSIS — E86.0 DEHYDRATION: ICD-10-CM

## 2024-10-10 LAB
ALBUMIN SERPL-MCNC: 3.4 G/DL (ref 3.2–4.8)
ALBUMIN/GLOB SERPL: 1.8 {RATIO} (ref 1–2)
ALP LIVER SERPL-CCNC: 43 U/L
ALT SERPL-CCNC: 26 U/L
ANION GAP SERPL CALC-SCNC: 9 MMOL/L (ref 0–18)
AST SERPL-CCNC: 31 U/L (ref ?–34)
BASOPHILS # BLD AUTO: 0.02 X10(3) UL (ref 0–0.2)
BASOPHILS NFR BLD AUTO: 0.5 %
BILIRUB SERPL-MCNC: 0.4 MG/DL (ref 0.2–1.1)
BUN BLD-MCNC: 45 MG/DL (ref 9–23)
CALCIUM BLD-MCNC: 8.5 MG/DL (ref 8.7–10.4)
CHLORIDE SERPL-SCNC: 104 MMOL/L (ref 98–112)
CO2 SERPL-SCNC: 25 MMOL/L (ref 21–32)
CREAT BLD-MCNC: 1.85 MG/DL
EGFRCR SERPLBLD CKD-EPI 2021: 27 ML/MIN/1.73M2 (ref 60–?)
EOSINOPHIL # BLD AUTO: 0.02 X10(3) UL (ref 0–0.7)
EOSINOPHIL NFR BLD AUTO: 0.5 %
ERYTHROCYTE [DISTWIDTH] IN BLOOD BY AUTOMATED COUNT: 16.1 %
GLOBULIN PLAS-MCNC: 1.9 G/DL (ref 2–3.5)
GLUCOSE BLD-MCNC: 301 MG/DL (ref 70–99)
HCT VFR BLD AUTO: 26.3 %
HGB BLD-MCNC: 8.4 G/DL
IMM GRANULOCYTES # BLD AUTO: 0.11 X10(3) UL (ref 0–1)
IMM GRANULOCYTES NFR BLD: 2.6 %
LYMPHOCYTES # BLD AUTO: 0.75 X10(3) UL (ref 1–4)
LYMPHOCYTES NFR BLD AUTO: 18 %
MCH RBC QN AUTO: 25.5 PG (ref 26–34)
MCHC RBC AUTO-ENTMCNC: 31.9 G/DL (ref 31–37)
MCV RBC AUTO: 79.9 FL
MONOCYTES # BLD AUTO: 1.01 X10(3) UL (ref 0.1–1)
MONOCYTES NFR BLD AUTO: 24.2 %
NEUTROPHILS # BLD AUTO: 2.26 X10 (3) UL (ref 1.5–7.7)
NEUTROPHILS # BLD AUTO: 2.26 X10(3) UL (ref 1.5–7.7)
NEUTROPHILS NFR BLD AUTO: 54.2 %
OSMOLALITY SERPL CALC.SUM OF ELEC: 309 MOSM/KG (ref 275–295)
PLATELET # BLD AUTO: 371 10(3)UL (ref 150–450)
POTASSIUM SERPL-SCNC: 4.3 MMOL/L (ref 3.5–5.1)
PROT SERPL-MCNC: 5.3 G/DL (ref 5.7–8.2)
RBC # BLD AUTO: 3.29 X10(6)UL
SODIUM SERPL-SCNC: 138 MMOL/L (ref 136–145)
WBC # BLD AUTO: 4.2 X10(3) UL (ref 4–11)

## 2024-10-10 PROCEDURE — 80053 COMPREHEN METABOLIC PANEL: CPT | Performed by: EMERGENCY MEDICINE

## 2024-10-10 PROCEDURE — 85025 COMPLETE CBC W/AUTO DIFF WBC: CPT | Performed by: EMERGENCY MEDICINE

## 2024-10-10 PROCEDURE — 99285 EMERGENCY DEPT VISIT HI MDM: CPT

## 2024-10-10 PROCEDURE — 99284 EMERGENCY DEPT VISIT MOD MDM: CPT

## 2024-10-10 PROCEDURE — 71045 X-RAY EXAM CHEST 1 VIEW: CPT | Performed by: EMERGENCY MEDICINE

## 2024-10-10 PROCEDURE — 77386 HC IMRT COMPLEX: CPT | Performed by: RADIOLOGY

## 2024-10-10 PROCEDURE — 96361 HYDRATE IV INFUSION ADD-ON: CPT

## 2024-10-10 PROCEDURE — 96375 TX/PRO/DX INJ NEW DRUG ADDON: CPT

## 2024-10-10 PROCEDURE — 96374 THER/PROPH/DIAG INJ IV PUSH: CPT

## 2024-10-10 RX ORDER — HYDROMORPHONE HYDROCHLORIDE 1 MG/ML
1 INJECTION, SOLUTION INTRAMUSCULAR; INTRAVENOUS; SUBCUTANEOUS EVERY 30 MIN PRN
Status: DISCONTINUED | OUTPATIENT
Start: 2024-10-10 | End: 2024-10-10

## 2024-10-10 RX ORDER — BENZONATATE 100 MG/1
100 CAPSULE ORAL 3 TIMES DAILY PRN
Qty: 30 CAPSULE | Refills: 0 | Status: SHIPPED | OUTPATIENT
Start: 2024-10-10 | End: 2024-10-24

## 2024-10-10 RX ORDER — ONDANSETRON 2 MG/ML
4 INJECTION INTRAMUSCULAR; INTRAVENOUS ONCE
Status: COMPLETED | OUTPATIENT
Start: 2024-10-10 | End: 2024-10-10

## 2024-10-10 NOTE — ED INITIAL ASSESSMENT (HPI)
escorted by radiation RN (finished today's treatment), pain control for head and neck CA. Throat pain and coughing worse sent for pain control

## 2024-10-10 NOTE — ED PROVIDER NOTES
Patient Seen in: 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  The patient currently is on 37-1/2 mcg of fentanyl by patch and 1 Dilaudid 4 mg tablet every 4 hours by mouth.  She states that she has minimal resting discomfort but significant pain with any swallowing.  She has been using viscous lidocaine prior to eating and drinking but there is concern that her oral intake has been decreased.  The patient has also had some chest congestion without fever and a cough thought to be due to irritation of the throat from the radiation therapy.  She is requesting something for the cough.  She is uncertain whether she requires hospitalization.  She was vomiting up until 3 days ago.  She received IV fluids but not her usual dose of chemotherapy 2 days ago in the cancer center.    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    Bloating    Blurred vision    Cancer (HCC)    Congestive heart disease (HCC)    Constipation    Diabetes (HCC)    IDDM     Diabetes mellitus (HCC)    Diarrhea, unspecified    CLARKE (dyspnea on exertion)    Easy bruising    Elevated cholesterol    Essential hypertension    Exposure to medical diagnostic radiation    Fatigue    Flatulence/gas pain/belching    Frequent urination    taking furosemide daily    Headache disorder    Hearing loss    Heart palpitations    Heartburn    occasionally    High blood pressure    High  cholesterol    History of falling    Hyperlipidemia    Irregular bowel habits    Itch of skin    right hand only    Leaking of urine    sometimes    Leg swelling    Osteoporosis    Sleep disturbance    Stool incontinence    when I have diarrhea    Type 2 diabetes mellitus with hyperglycemia (HCC)    Visual impairment    glasses    Wears glasses              Past Surgical History:   Procedure Laterality Date    Cataract      Cervical spine surgery  10/06/2023    Colonoscopy  2019    Colonoscopy      Cystoscopy,insert ureteral stent      Cystourethroscopy  2020    Cystoscopy Procedure Dr Josh Hernandez  - recurrent tumor on BTS    Cystourethroscopy,biopsy  2019    BBx of scar sites, NSC    Cystourethroscopy,fulgur .5-2cm lesn  2020    TURBT, NSC    Cystourethroscopy,fulgur .5-2cm lesn  2020    TURBT with Gemcitabine    Cystourethroscopy,fulgur >5cm lesn  10/03/2019    TURBT with bilateral RPGs (NSC)    Fracture surgery      left wrist    Hysterectomy      age 31    Ndsc ablation & rcnstj atria lmtd w/o bypass      Other surgical history  2021    BTS CystoDr Hernandez    Other surgical history  2021    BTS Cysto Caterize Dr. Hernandez    Other surgical history  2021    BTS Cysto W/ caterize Dr. Hernandez     Other surgical history  2022    BTS Cysto- Dr. Hernandez    Spine surgery procedure unlisted  10/06/2003    CERVICAL    Tonsillectomy  1946    Total abdom hysterectomy  1971                Social History     Socioeconomic History    Marital status:     Number of children: 3   Occupational History    Occupation: RETIRED   Tobacco Use    Smoking status: Former     Current packs/day: 0.00     Average packs/day: 1.1 packs/day for 65.3 years (73.5 ttl pk-yrs)     Types: Cigarettes     Quit date: 2007     Years since quittin.2    Smokeless tobacco: Never    Tobacco comments:     quit 15 years    Vaping Use    Vaping status: Never Used   Substance and Sexual Activity    Alcohol use: No     Drug use: No    Sexual activity: Not Currently     Partners: Male   Social History Narrative    , lives with daughter    Has 3 children: 2 sons, 1 daughter     Social Drivers of Health     Financial Resource Strain: Low Risk  (2/27/2023)    Financial Resource Strain     Difficulty of Paying Living Expenses: Not very hard     Med Affordability: No   Food Insecurity: No Food Insecurity (7/31/2024)    Food Insecurity     Food Insecurity: Never true   Transportation Needs: No Transportation Needs (7/31/2024)    Transportation Needs     Lack of Transportation: No   Housing Stability: Low Risk  (7/31/2024)    Housing Stability     Housing Instability: No                  Physical Exam     ED Triage Vitals [10/10/24 0820]   /53   Pulse 90   Resp 18   Temp 98 °F (36.7 °C)   Temp src    SpO2 92 %   O2 Device None (Room air)       Current Vitals:   Vital Signs  BP: 148/60  Pulse: 84  Resp: 18  Temp: 98 °F (36.7 °C)  MAP (mmHg): 84    Oxygen Therapy  SpO2: 100 %  O2 Device: None (Room air)        Physical Exam     General Appearance: This is an older female lying on a gurney.  Vital signs were reviewed per nurses notes.  Heart rate and blood pressure within normal range.  HEENT: Normocephalic atraumatic.  Anicteric sclera.  Oral mucosa is moist.  No plaques.  Neck: No adenopathy or thyromegaly.  Mild resting stridor.  No hoarseness.  Lungs are clear to auscultation.  Heart exam: Normal S1-S2 without extra sounds or murmurs.  Regular rate and rhythm.  Abdomen is nontender.  Extremities are atraumatic.  Skin is dry without rashes or lesions.  Neuroexam: Alert, conversive and moving all 4 extremities well.  ED Course     Labs Reviewed   COMP METABOLIC PANEL (14) - Abnormal; Notable for the following components:       Result Value    Glucose 301 (*)     BUN 45 (*)     Creatinine 1.85 (*)     Calcium, Total 8.5 (*)     Calculated Osmolality 309 (*)     eGFR-Cr 27 (*)     Alkaline Phosphatase 43 (*)     Total Protein  5.3 (*)     Globulin  1.9 (*)     All other components within normal limits   CBC WITH DIFFERENTIAL WITH PLATELET - Abnormal; Notable for the following components:    RBC 3.29 (*)     HGB 8.4 (*)     HCT 26.3 (*)     MCV 79.9 (*)     MCH 25.5 (*)     Lymphocyte Absolute 0.75 (*)     Monocyte Absolute 1.01 (*)     All other components within normal limits   RAINBOW DRAW LAVENDER   RAINBOW DRAW LIGHT GREEN   RAINBOW DRAW BLUE   RAINBOW DRAW GOLD            Intravenous access was obtained.  Laboratory studies were drawn.  IV fluids, analgesics and antiemetics were administered.    After liter of normal saline the patient was significantly symptomatically improved.    Case was discussed with the patient's oncologist Dr. Reid.  He was amenable to short-term follow-up with the patient in the cancer center tomorrow for IV analgesics and fluids.  Patient was agreeable to the treatment plan.  She will seek radiation treatment as previously arranged tomorrow.       MDM      #1.  Throat pain.  History of throat cancer currently undergoing radiation and chemotherapy with increased irritation and especially discomfort with swallowing which is making it difficult for the patient to stay hydrated.  Improved with IV fluids.  She is currently on fentanyl patch and oral Dilaudid and when she does not have to mechanically swallow she is not that uncomfortable.  Medications were not adjusted although she was given a dose of IV Dilaudid in the ED.  2.  Dysphagia secondary to #1.  3.  Dehydration.  Mild without significant laboratory changes.  IV fluids given and patient was discharged to home.        Medical Decision Making      Disposition and Plan     Clinical Impression:  1. Throat pain    2. Dysphagia, unspecified type    3. Dehydration         Disposition:  Discharge  10/10/2024 10:42 am    Follow-up:  Jordon Reid MD  [facility] LAYLA CULVER [address] [place] [id number]  [phone]    Go in 1 day(s)            Medications  Prescribed:  Discharge Medication List as of 10/10/2024 11:17 AM        START taking these medications    Details   benzonatate 100 MG Oral Cap Take 1 capsule (100 mg total) by mouth 3 (three) times daily as needed for cough., Normal, Disp-30 capsule, R-0                 Supplementary Documentation:

## 2024-10-10 NOTE — ED QUICK NOTES
Rounding Completed    Plan of Care reviewed. Waiting for 0.9NS to infuse prior to discharge.    Bed is locked and in lowest position. Call light within reach.

## 2024-10-11 ENCOUNTER — TELEPHONE (OUTPATIENT)
Dept: HEMATOLOGY/ONCOLOGY | Facility: HOSPITAL | Age: 84
End: 2024-10-11

## 2024-10-11 ENCOUNTER — OFFICE VISIT (OUTPATIENT)
Dept: HEMATOLOGY/ONCOLOGY | Facility: HOSPITAL | Age: 84
End: 2024-10-11
Attending: NURSE PRACTITIONER

## 2024-10-11 VITALS
SYSTOLIC BLOOD PRESSURE: 162 MMHG | DIASTOLIC BLOOD PRESSURE: 75 MMHG | TEMPERATURE: 98 F | HEIGHT: 64.49 IN | OXYGEN SATURATION: 95 % | BODY MASS INDEX: 25.8 KG/M2 | RESPIRATION RATE: 16 BRPM | HEART RATE: 98 BPM | WEIGHT: 153 LBS

## 2024-10-11 DIAGNOSIS — Z51.5 PALLIATIVE CARE BY SPECIALIST: ICD-10-CM

## 2024-10-11 DIAGNOSIS — R63.0 LACK OF APPETITE: ICD-10-CM

## 2024-10-11 DIAGNOSIS — C76.0 HEAD AND NECK CANCER (HCC): Primary | ICD-10-CM

## 2024-10-11 DIAGNOSIS — C76.0 HEAD AND NECK CANCER (HCC): ICD-10-CM

## 2024-10-11 DIAGNOSIS — G89.3 NEOPLASM RELATED PAIN: Primary | ICD-10-CM

## 2024-10-11 DIAGNOSIS — R53.83 FATIGUE DUE TO TREATMENT: ICD-10-CM

## 2024-10-11 PROCEDURE — 99211 OFF/OP EST MAY X REQ PHY/QHP: CPT

## 2024-10-11 PROCEDURE — 99214 OFFICE O/P EST MOD 30 MIN: CPT | Performed by: NURSE PRACTITIONER

## 2024-10-11 PROCEDURE — 77336 RADIATION PHYSICS CONSULT: CPT | Performed by: RADIOLOGY

## 2024-10-11 RX ORDER — ONDANSETRON 2 MG/ML
8 INJECTION INTRAMUSCULAR; INTRAVENOUS AS NEEDED
Start: 2024-10-11

## 2024-10-11 RX ORDER — FENTANYL 50 UG/1
1 PATCH TRANSDERMAL
Qty: 5 PATCH | Refills: 0 | Status: SHIPPED | OUTPATIENT
Start: 2024-10-11

## 2024-10-11 RX ORDER — HYDROMORPHONE HYDROCHLORIDE 2 MG/1
2 TABLET ORAL ONCE
Status: CANCELLED
Start: 2024-10-11 | End: 2024-10-11

## 2024-10-11 RX ORDER — HYDROMORPHONE HYDROCHLORIDE 2 MG/1
2 TABLET ORAL ONCE
Status: COMPLETED | OUTPATIENT
Start: 2024-10-11 | End: 2024-10-11

## 2024-10-11 RX ORDER — GABAPENTIN 300 MG/1
300 CAPSULE ORAL 2 TIMES DAILY
Qty: 60 CAPSULE | Refills: 1 | Status: SHIPPED | OUTPATIENT
Start: 2024-10-11

## 2024-10-11 RX ADMIN — HYDROMORPHONE HYDROCHLORIDE 2 MG: 2 TABLET ORAL at 10:30:00

## 2024-10-11 NOTE — PROGRESS NOTES
Pt arrived via w/c with [facility].  Pt had been taken to ED ([facility]) this am via 911 transport for extreme SOB.  Pt denies nausea.  2+ BLE edema noted & BP elevated, per Monet Lenzt, will not give pt IVF today.  Francine WEBBER came and assessed pt.  It was determined to give pt oral Dilaudid, given per order.  Appt scheduled to see Dr. Reid next week per dghtr's request to discuss ending all tx.  Pt left via w/c with htr.

## 2024-10-11 NOTE — PROGRESS NOTES
Palliative Care Follow Up Note     Patient Name: Anastasia Hobson   YOB: 1940   Medical Record Number: [id number]   CSN: [id number]   Date of visit: 10/11/2024     Chief Complaint/Reason for Visit:  Follow up visit for symptoms     History of Present Illness:         Anastasia Hobson is a 84 year old female with head and neck cancer receiving chemo/RT.  She is half through RT.  She complains of L shoulder pain that is deep and achy.  She also complains of sharp stabbing pain with swallowing.  It feels like \"glass\".  The viscous lidocaine helps slightly.  She is eating and drinking some - but painful.  The pain is constant and varies in intensity.    She is using 6 doses of dilaudid 4mg daily.  When she skips a dose, the pain intensifies.   She is sleeping most of the day and feels fatigued and run down.  The patient verbalizes she is tired of feeling this ways and is considering stopping treatment.   Her daughter, Petty is with her today and is stressed with working and care burden since she is caregiver for patient.    She denies constipation or other symptoms.          Problem List:  Patient Active Problem List   Diagnosis    Osteoarthritis of spine with radiculopathy, cervical region    Primary hypertension    Type 2 diabetes mellitus without complication, with long-term current use of insulin (HCC)    Mixed hyperlipidemia    Arthrodesis status    Tremor of unknown origin    Personal history of colonic polyps    Malignant neoplasm of lateral wall of urinary bladder (HCC)    Stage 3b chronic kidney disease (HCC)    Atrial fibrillation with RVR (HCC)    Advance care planning    Acute pulmonary edema (HCC)    Other constipation    Functional diarrhea    Type 2 diabetes mellitus with diabetic chronic kidney disease (HCC)    At high risk for falls    Hypoglycemia    Chronic bronchitis, unspecified chronic bronchitis type (HCC)    Persistent atrial fibrillation (HCC)    Hyperlipidemia     Unspecified atrial fibrillation (HCC)    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)    Urge incontinence    Heart failure, unspecified (HCC)    Chronic kidney disease, stage 3b (HCC)    Type 2 diabetes mellitus with stage 2 chronic kidney disease, without long-term current use of insulin (HCC)    Long term (current) use of oral hypoglycemic drugs    Personal history of malignant neoplasm of bladder    Pure hypercholesterolemia, unspecified    Mass of left side of neck    Azotemia    Type 2 diabetes mellitus with hyperglycemia, unspecified whether long term insulin use (HCC)    Anticoagulated    Atrial fibrillation, chronic (HCC)    Palliative care by specialist    Head and neck cancer (HCC)      Medical History:  Past Medical History:    Arrhythmia    Back pain    Bladder cancer (HCC)    high grade superficial TCC, recurrent July 2020 after BCG    Bloating    Blurred vision    Cancer (HCC)    Congestive heart disease (HCC)    Constipation    Diabetes (HCC)    IDDM     Diabetes mellitus (HCC)    Diarrhea, unspecified    CLARKE (dyspnea on exertion)    Easy bruising    Elevated cholesterol    Essential hypertension    Exposure to medical diagnostic radiation    Fatigue    Flatulence/gas pain/belching    Frequent urination    taking furosemide daily    Headache disorder    Hearing loss    Heart palpitations    Heartburn    occasionally    High blood pressure    High cholesterol    History of falling    Hyperlipidemia    Irregular bowel habits    Itch of skin    right hand only    Leaking of urine    sometimes    Leg swelling    Osteoporosis    Sleep disturbance    Stool incontinence    when I have diarrhea    Type 2 diabetes mellitus with hyperglycemia (HCC)    Visual impairment    glasses    Wears glasses     Surgical History:  Past Surgical History:   Procedure Laterality Date    Cataract      Cervical spine surgery  10/06/2023    Colonoscopy  2019    Colonoscopy      Cystoscopy,insert ureteral stent       Cystourethroscopy  11/25/2020    Cystoscopy Procedure Dr Josh Hernandez  - recurrent tumor on BTS    Cystourethroscopy,biopsy  12/16/2019    BBx of scar sites, NSC    Cystourethroscopy,fulgur .5-2cm lesn  04/16/2020    TURBT, NSC    Cystourethroscopy,fulgur .5-2cm lesn  07/30/2020    TURBT with Gemcitabine    Cystourethroscopy,fulgur >5cm lesn  10/03/2019    TURBT with bilateral RPGs (NSC)    Fracture surgery      left wrist    Hysterectomy      age 31    Ndsc ablation & rcnstj atria lmtd w/o bypass      Other surgical history  04/20/2021    BTS Cysto, Dr Hernandez    Other surgical history  08/17/2021    BTS Cysto Caterize Dr. Hernandez    Other surgical history  11/16/2021    BTS Cysto W/ caterigayathri Hernandez     Other surgical history  03/08/2022    BTS Cysto- Dr. Hernandez    Spine surgery procedure unlisted  10/06/2003    CERVICAL    Tonsillectomy  1946    Total abdom hysterectomy  1971       Allergies:  Allergies   Allergen Reactions    Morphine CONFUSION       Palliative Care Social History:    Marital Status:   Children:  son and daughter  Living Situation: she lives with her daughter, Petty.    She uses a walker around home and wheelchair outside due to endurance issues and balance      Medications:      Review of Systems:  General:  Fatigue.  pain  Respiratory:  Denies SOB, denies cough  Cardiac:  Denies chest pain, heart palpitations  Abdomen:  Denies constipation, diarrhea.  Denies pain.    Psych:  No complaints.  Not Sleeping well    Palliative Performance Scale:   50%    Physical Examination:  General: Patient is alert and oriented, not in acute distress.  Respiratory: Normal excursions and effort.  Cough  Abdomen: Soft, non tender   Musculoskeletal:  sitting in wheelchair  Psych:  Mood/Affect appropriate    Advanced Directives Discussed and Completed:     HCPOA/Health Surrogate:    There is a completed HCPOA documentation on file in Epic.    I confirmed that patient's HCPOA is: Petty, her daughter    DINAH Discussed and  Completed:  There is a Scanned POLST in EMR indicating DNAR/Selective  Patient was given break from Rt today.  Encouraged her to talk with daughter about how she is feeling and current QOL which has declined with treatment.  She wants to talk with oncologist next week about her treatment options and then decide what best aligns with her goals    Palliative Care:  Regular use of dilaudid is still be used for pain control so will increase fentanyl to better optimize pain and reduce need for frequent dilaudid use.    Will increase gabapentin to better help with neuropathic component of throat pain.  She will continue dilaudid for BTP.  Encouraged spending more time out of bed to help with fatigue      Impression/Plan:   1. Neoplasm related pain  Fentanyl 50mcg Q 72   Dilaudid 4mg Q 4 prn  Gabapentin 300mg BID  Acetaminophen 1G TID prn  Viscous lidocaine  RT    2. Fatigue  Increase activity  Sunlight    3. Appetite  Soft foods  Small frequent meals  Protein supplements  Pain control    4. Palliative care by specialist  Ongoing support    5. Head and neck cancer (HCC)      Planned Follow up: Return in about 2 weeks (around 10/25/2024).      I spent a total of 35 minutes with the patient today, which included all of the following:direct face to face contact, history taking, physical examination, and >50% was spent counseling and coordinating care      The 21st Century Cures Act makes medical notes like these available to patients in the interest of transparency. Please be advised this is a medical document. Medical documents are intended to carry relevant information, facts as evident, and the clinical opinion of the practitioner. The medical note is intended as peer to peer communication and may appear blunt or direct. It is written in medical language and may contain abbreviations or verbiage that are unfamiliar.        Electronically Signed by:  RUIZ CHINCHILLA Outpatient Palliative Nurse Practitioner

## 2024-10-11 NOTE — TELEPHONE ENCOUNTER
Petty [phone]  My mom Anastasia is in the emergency room at [facility].  They would like to know after they access her port. Do they need to do a Heparin flush?  When they get the answer they will discharge my mom so we can make her appointment  at 9:30 this am.  Thanks Sia

## 2024-10-12 DIAGNOSIS — G89.3 NEOPLASM RELATED PAIN: ICD-10-CM

## 2024-10-14 ENCOUNTER — APPOINTMENT (OUTPATIENT)
Dept: HEMATOLOGY/ONCOLOGY | Facility: HOSPITAL | Age: 84
End: 2024-10-14
Attending: NURSE PRACTITIONER
Payer: MEDICARE

## 2024-10-14 ENCOUNTER — HOSPITAL ENCOUNTER (OUTPATIENT)
Dept: RADIATION ONCOLOGY | Facility: HOSPITAL | Age: 84
End: 2024-10-14
Attending: RADIOLOGY
Payer: MEDICARE

## 2024-10-14 VITALS
DIASTOLIC BLOOD PRESSURE: 72 MMHG | RESPIRATION RATE: 20 BRPM | HEART RATE: 86 BPM | WEIGHT: 159.19 LBS | OXYGEN SATURATION: 96 % | BODY MASS INDEX: 27 KG/M2 | TEMPERATURE: 99 F | SYSTOLIC BLOOD PRESSURE: 173 MMHG

## 2024-10-14 DIAGNOSIS — C77.0 SECONDARY MALIGNANT NEOPLASM OF LYMPH NODES OF NECK (HCC): Primary | ICD-10-CM

## 2024-10-14 PROCEDURE — 77386 HC IMRT COMPLEX: CPT | Performed by: RADIOLOGY

## 2024-10-14 RX ORDER — HYDROMORPHONE HYDROCHLORIDE 4 MG/1
4 TABLET ORAL EVERY 4 HOURS PRN
Qty: 90 TABLET | Refills: 0 | Status: SHIPPED | OUTPATIENT
Start: 2024-10-14

## 2024-10-14 NOTE — PROGRESS NOTES
Cancer Center Progress Note    Patient Name: Anastasia Hobson   YOB: 1940   Medical Record Number: [id number]   CSN: [id number]   Date of visit: 10/15/2024  Attending Oncology Physician:  Jordon Reid    NOTE TO PATIENT:  The 21st Century Cures Act makes medical notes like these available to patients in the interest of transparency. Please be advised this is a medical document. Medical documents are intended to carry relevant information, facts as evident, and the clinical opinion of the practitioner. The medical note is intended as peer to peer communication and may appear blunt or direct. It is written in medical language and may contain abbreviations or verbiage that are unfamiliar.      Chief Complaint:  Follow up       Oncologic History:  -Cystoscopy June 2024 with NAD.  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     -MRI cervical spine on 7/31/2024 incidentally noted a left lower neck mass measuring 4.5 x 3.5 cm.     -CT neck on 8/1/2024 4.2 x 2.9 x 4.2 cm left supraclavicular mass.     -Met with ENT,  and patient-tonsils were surgically absent on exam.     -Left neck mass biopsy on 8/5/2024 showed a poorly differentiated carcinoma with focal neuroendocrine differentiation.  IHC was positive for cytokeratin AE 1/3, p16, p63 and weakly positive for synaptophysin  .  -PET/CT on 8/21/24: focal marked metabolic activity in the left supraclavicular mass.  This mass is 4.9 x 4.2 cm with an SUV of 8.8.  No other lesions. LLL atelectasis or PNA     -She has a poor appetite but her daughter gets her to eat.  She uses her walker and here for most of her ADLs.  She has a history of smoking 1 pack/day for 40 years but quit about 25 years ago.  She was with her daughter Petty who is involved with her care. She is following with palliative care due to significant pain.      -Tumor board 8/28/24: Case reviewed in tumor board. PET does  not show evidence of distant disease. Concern for p16+ head and neck cancer vs. Cervical esophageal cancer. EGD recommended. ENT eval recommended. We will plan on definitive treatment with chemoradiation with weekly Carbo AUC 2 + Taxol 50 mg/m2.      -ENT Eval with Dr. Villalta on : Flexible laryngoscopy was unremarkable      -Chemoradiation with weekly carbo AUC 2 + Taxol 50 mg/m2: 24-Current               -Week 1: 24-Carbo/Taxol               -Week 2: 10/1/24-Carbo/taxol               -Week 3: 10/8/24: NO CHEMOTHERAPY-stopped for side effects   -Week 4: 10/15/24: Carbo AUC 2, discontinued Taxol     Interval Events:  84 year old  patient of Dr. Reid's  who presents for consideration of chemotherapy today.  She developed diarrhea and nausea as well as fatigue and pain after week 2 therefore her chemo was not given last week.  Today she is accompanied by her son.  She had a hypoglycemic episode (BG 53) upon awakening this morning and was given a Boost and a banana with improvement in her glucose level.  She feels better today.   She has no N/V.  She had transient diarrhea yesterday for which she took Imodium and has not had a bowel movement today yet.   She has been having poor fluid intake but feels as if she is retaining fluid.  She complains of weight gain.  She gained 4 lbs from last week.  She has BLE edema.  She has orthopnea and CLARKE.  She has been sleeping sitting up.  She states she has a h/o heart failure, atrial fibrillation for which she is followed by cardiology.  She has no chest pain or palpitations.    Allergies:  Allergies[1]    Social History     Socioeconomic History    Marital status:     Number of children: 3   Occupational History    Occupation: RETIRED   Tobacco Use    Smoking status: Former     Current packs/day: 0.00     Average packs/day: 1.1 packs/day for 65.3 years (73.5 ttl pk-yrs)     Types: Cigarettes     Quit date: 2007     Years since quittin.3     Smokeless tobacco: Never    Tobacco comments:     quit 15 years    Vaping Use    Vaping status: Never Used   Substance and Sexual Activity    Alcohol use: No    Drug use: No    Sexual activity: Not Currently     Partners: Male        Past Medical History:    Arrhythmia    Back pain    Bladder cancer (HCC)    high grade superficial TCC, recurrent July 2020 after BCG    Bloating    Blurred vision    Cancer (HCC)    Congestive heart disease (HCC)    Constipation    Diabetes (HCC)    IDDM     Diabetes mellitus (HCC)    Diarrhea, unspecified    CLARKE (dyspnea on exertion)    Easy bruising    Elevated cholesterol    Essential hypertension    Exposure to medical diagnostic radiation    Fatigue    Flatulence/gas pain/belching    Frequent urination    taking furosemide daily    Headache disorder    Hearing loss    Heart palpitations    Heartburn    occasionally    High blood pressure    High cholesterol    History of falling    Hyperlipidemia    Irregular bowel habits    Itch of skin    right hand only    Leaking of urine    sometimes    Leg swelling    Osteoporosis    Sleep disturbance    Stool incontinence    when I have diarrhea    Type 2 diabetes mellitus with hyperglycemia (HCC)    Visual impairment    glasses    Wears glasses        Past Surgical History:   Procedure Laterality Date    Cataract      Cervical spine surgery  10/06/2023    Colonoscopy  2019    Colonoscopy      Cystoscopy,insert ureteral stent      Cystourethroscopy  11/25/2020    Cystoscopy Procedure Dr Josh Hernandez  - recurrent tumor on BTS    Cystourethroscopy,biopsy  12/16/2019    BBx of scar sites, NSC    Cystourethroscopy,fulgur .5-2cm lesn  04/16/2020    TURBT, NSC    Cystourethroscopy,fulgur .5-2cm lesn  07/30/2020    TURBT with Gemcitabine    Cystourethroscopy,fulgur >5cm lesn  10/03/2019    TURBT with bilateral RPGs (NSC)    Fracture surgery      left wrist    Hysterectomy      age 31    Ndsc ablation & rcnstj atria lmtd w/o bypass      Other surgical  history  2021    Dr David Rivas    Other surgical history  2021    BTS Josse Hernandez    Other surgical history  2021    BTS Josse W/ carley Hernandez     Other surgical history  2022    B[facility] Josse- Dr. Hernandez    Spine surgery procedure unlisted  10/06/2003    CERVICAL    Tonsillectomy  1946    Total abdom hysterectomy  1971          Vital Signs:  Height: 163.8 cm (5' 4.49\") (10/15 0806)  Weight: 71.4 kg (157 lb 6.4 oz) (10/15 0806)  BSA (Calculated - sq m): 1.78 sq meters (10/15 0806)  Pulse: 72 (10/15 0806)  BP: 165/76 (10/15 0806)  Temp: 97.3 °F (36.3 °C) (10/15 0806)  Do Not Use - Resp Rate: --  SpO2: 99 % (10/15 0806)        Medications:      Review of Systems:   As in HPI    Physical Examination:  General: Awake, alert, oriented x3, no acute distress.    HEENT:  Anicteric, conjunctivae and sclerae clear, no sinus tenderness, no oropharyngeal lesion/thrush, mucous membranes are moist.  Neck:  Supple, no tenderness, no masses or adenopathy  Lungs:  Clear to auscultation bilaterally  CV:  Regular rate and rhythm  Abdomen:  Non-distended, normoactive bowel sounds, soft,nontender.  Extremities:  BLE edema L>R 1+, some tenderness in the lower left leg, no tenderness on the right  Neuro:  CN 2-12 intact    ECO    Labs:  Recent Results (from the past 24 hours)   CBC W Differential W Platelet    Collection Time: 10/15/24  8:07 AM   Result Value Ref Range    WBC 10.4 4.0 - 11.0 x10(3) uL    RBC 3.72 (L) 3.80 - 5.30 x10(6)uL    HGB 9.4 (L) 12.0 - 16.0 g/dL    HCT 29.6 (L) 35.0 - 48.0 %    .0 150.0 - 450.0 10(3)uL    MCV 79.6 (L) 80.0 - 100.0 fL    MCH 25.3 (L) 26.0 - 34.0 pg    MCHC 31.8 31.0 - 37.0 g/dL    RDW 16.6 %    Neutrophil Absolute Prelim 8.82 (H) 1.50 - 7.70 x10 (3) uL    Neutrophil Absolute 8.82 (H) 1.50 - 7.70 x10(3) uL    Lymphocyte Absolute 0.35 (L) 1.00 - 4.00 x10(3) uL    Monocyte Absolute 0.77 0.10 - 1.00 x10(3) uL    Eosinophil Absolute 0.00 0.00 - 0.70 x10(3)  uL    Basophil Absolute 0.03 0.00 - 0.20 x10(3) uL    Immature Granulocyte Absolute 0.45 0.00 - 1.00 x10(3) uL    Neutrophil % 84.6 %    Lymphocyte % 3.4 %    Monocyte % 7.4 %    Eosinophil % 0.0 %    Basophil % 0.3 %    Immature Granulocyte % 4.3 %   BASIC METABOLIC PANEL [E]    Collection Time: 10/15/24  8:07 AM   Result Value Ref Range    Glucose 121 (H) 70 - 99 mg/dL    Sodium 140 136 - 145 mmol/L    Potassium 4.2 3.5 - 5.1 mmol/L    Chloride 106 98 - 112 mmol/L    CO2 31.0 21.0 - 32.0 mmol/L    Anion Gap 3 0 - 18 mmol/L    BUN 33 (H) 9 - 23 mg/dL    Creatinine 1.26 (H) 0.55 - 1.02 mg/dL    Calcium, Total 9.2 8.7 - 10.4 mg/dL    Calculated Osmolality 299 (H) 275 - 295 mOsm/kg    eGFR-Cr 42 (L) >=60 mL/min/1.73m2    Patient Fasting for BMP? Patient not present          Impression/Plan:  Head and neck cancer:  Week 3 was held due to excessive side effects.  -Given her cytopenias and lack of tolerance, we will proceed with Carbo AUC 2 alone and discontinue taxol   -PET/CT 12 weeks post treatment    Fluid retention, orthopnea, reported h/o CHF:  Despite poor fluid intake and low nutrition she has noted weight gain.  She has symptoms of fluid retention.  Advised pt to follow up with her cardiologist but will proceed with an echocardiogram.    Anemia:  Due to chemo, CKD.  Overall stable.  Weekly CBC.    CKD:  Stable.  Pt advised to make the effort to stay hydrated but within the guidelines of her cardiology instructions.  Advised on preferred beverages that contain electroloytes.      Emotional Well Being:  I have assessed the patient's emotional well-being and any concerns about anxiety or depression.  No acute psychosocial intervention required at this time.    Patient will continue to receive longitudinal care at the [facility] for the complex care required for the cancer diagnosis including the expected complications related to anticancer therapy.    Risk level:  High-head and neck ca on chemotherapy,  requiring intervention and close monitoring.    RUIZ Stokes  [facility]         [1]   Allergies  Allergen Reactions    Morphine CONFUSION

## 2024-10-14 NOTE — PROGRESS NOTES
[facility] Radiation Treatment Management Note 11-15    Patient:  Anastasia Hobson  Age:  84 year old  Visit Diagnosis:  No diagnosis found.  Primary Rad/Onc:  Dr. Bella Cat    Site Delivered Dose (cGy) Prescribed Dose (cGy) Fraction #   L NECK 2600 4800 13/24   L NECK BOOST 0 1800 0/9     First treatment date:  9/24/24  Concurrent chemotherapy: CARBO/TAXOL WEEKLY        10/10/2024    11:00 AM 10/10/2024    11:15 AM 10/11/2024     9:43 AM   Oncology Vitals   Height   5' 4.488\"   Height   164 cm   Weight   153 lb   Weight   69.4 kg   BSA (m2)   1.76 m2   BMI   25.87 kg/m2   BP  148/60 162/75   Pulse 84 84 98   Resp 16 18 16   Temp   97.7 °F (36.5 °C)   SpO2 100 % 100 % 95 %        Toxicities:  Fatigue Grade 1= Fatigue relieved by rest  Dermatitis associated with radiation Grade 1= Faint erythema or dry desquamation  Moisturizer used  cetaphil  Number of times: 2 times daily.  Dysphagia Grade 2= Symptomatic and altered eating/swallowing  Dyspepsia Grade 0= None  Mucositis Grade 0= None  Constipation Grade 0= None  Diarrhea  Grade 0= None  Nausea Grade 0= None  Vomiting Grade 0= None  Xerostomia Present=Yes     Nursing Note:  Pt being seen pre-treatment  Last chemo Oct 1st.  Pt in ER x2 last week due to dysphagia and difficulty breathing  Feeling much better today  Fentanyl patch increased to 50 mcg  Continues on dilaudid and tylenol  Pt states she's eating and drinking  Pt having swelling in LE and L UE  Continues on lasix   Skin intact with some redness  Using moisturizer    Wt Readings from Last 6 Encounters:   10/14/24 72.2 kg (159 lb 3.2 oz)   10/11/24 69.4 kg (153 lb)   10/10/24 71.8 kg (158 lb 6.4 oz)   10/08/24 68 kg (150 lb)   10/03/24 66.8 kg (147 lb 3.2 oz)   10/01/24 66.6 kg (146 lb 12.8 oz)       Monet MARCANO RN MD NOTE   Reviewed and agree with RN note above.  Setup imaging reviewed in ARIA and approved.    S:  -was at [facility] on Fri am; woke up at 4:30AM \"couldn't  breathe\". CT shows 5 cm mass. I don't have the disc to review but no mention of tracheal invasion or narrowing on report. Sounds like was given steroids w/ improvement and went home. Slept ok sat and sun nights and no more breathing episodes. No on steroids right now. On pain meds.     O:  -left neck mass is softer, skin is red, looks comfortable, smiling    A/P:  -can resume RT today  -can consider also resuming concurrent chemo  -will re-CT sim her to make sure there has not been disease growth beyond the RT field edges  -see her on normal otv day on Thurs    Bella Cat MD  Radiation Oncology

## 2024-10-15 ENCOUNTER — OFFICE VISIT (OUTPATIENT)
Dept: HEMATOLOGY/ONCOLOGY | Facility: HOSPITAL | Age: 84
End: 2024-10-15
Attending: NURSE PRACTITIONER

## 2024-10-15 VITALS
HEART RATE: 72 BPM | RESPIRATION RATE: 16 BRPM | DIASTOLIC BLOOD PRESSURE: 76 MMHG | OXYGEN SATURATION: 99 % | TEMPERATURE: 97 F | HEIGHT: 64.49 IN | WEIGHT: 157.38 LBS | SYSTOLIC BLOOD PRESSURE: 165 MMHG | BODY MASS INDEX: 26.54 KG/M2

## 2024-10-15 DIAGNOSIS — C76.0 HEAD AND NECK CANCER (HCC): ICD-10-CM

## 2024-10-15 DIAGNOSIS — N18.32 STAGE 3B CHRONIC KIDNEY DISEASE (HCC): ICD-10-CM

## 2024-10-15 DIAGNOSIS — C76.0 HEAD AND NECK CANCER (HCC): Primary | ICD-10-CM

## 2024-10-15 DIAGNOSIS — R06.01 ORTHOPNEA: Primary | ICD-10-CM

## 2024-10-15 DIAGNOSIS — I48.91 ATRIAL FIBRILLATION, UNSPECIFIED TYPE (HCC): ICD-10-CM

## 2024-10-15 DIAGNOSIS — D64.9 ANEMIA, UNSPECIFIED TYPE: ICD-10-CM

## 2024-10-15 LAB
ANION GAP SERPL CALC-SCNC: 3 MMOL/L (ref 0–18)
BASOPHILS # BLD AUTO: 0.03 X10(3) UL (ref 0–0.2)
BASOPHILS NFR BLD AUTO: 0.3 %
BUN BLD-MCNC: 33 MG/DL (ref 9–23)
CALCIUM BLD-MCNC: 9.2 MG/DL (ref 8.7–10.4)
CHLORIDE SERPL-SCNC: 106 MMOL/L (ref 98–112)
CO2 SERPL-SCNC: 31 MMOL/L (ref 21–32)
CREAT BLD-MCNC: 1.26 MG/DL
EGFRCR SERPLBLD CKD-EPI 2021: 42 ML/MIN/1.73M2 (ref 60–?)
EOSINOPHIL # BLD AUTO: 0 X10(3) UL (ref 0–0.7)
EOSINOPHIL NFR BLD AUTO: 0 %
ERYTHROCYTE [DISTWIDTH] IN BLOOD BY AUTOMATED COUNT: 16.6 %
GLUCOSE BLD-MCNC: 121 MG/DL (ref 70–99)
HCT VFR BLD AUTO: 29.6 %
HGB BLD-MCNC: 9.4 G/DL
IMM GRANULOCYTES # BLD AUTO: 0.45 X10(3) UL (ref 0–1)
IMM GRANULOCYTES NFR BLD: 4.3 %
LYMPHOCYTES # BLD AUTO: 0.35 X10(3) UL (ref 1–4)
LYMPHOCYTES NFR BLD AUTO: 3.4 %
MCH RBC QN AUTO: 25.3 PG (ref 26–34)
MCHC RBC AUTO-ENTMCNC: 31.8 G/DL (ref 31–37)
MCV RBC AUTO: 79.6 FL
MONOCYTES # BLD AUTO: 0.77 X10(3) UL (ref 0.1–1)
MONOCYTES NFR BLD AUTO: 7.4 %
NEUTROPHILS # BLD AUTO: 8.82 X10 (3) UL (ref 1.5–7.7)
NEUTROPHILS # BLD AUTO: 8.82 X10(3) UL (ref 1.5–7.7)
NEUTROPHILS NFR BLD AUTO: 84.6 %
OSMOLALITY SERPL CALC.SUM OF ELEC: 299 MOSM/KG (ref 275–295)
PLATELET # BLD AUTO: 398 10(3)UL (ref 150–450)
POTASSIUM SERPL-SCNC: 4.2 MMOL/L (ref 3.5–5.1)
RBC # BLD AUTO: 3.72 X10(6)UL
SODIUM SERPL-SCNC: 140 MMOL/L (ref 136–145)
WBC # BLD AUTO: 10.4 X10(3) UL (ref 4–11)

## 2024-10-15 PROCEDURE — 80048 BASIC METABOLIC PNL TOTAL CA: CPT

## 2024-10-15 PROCEDURE — 99215 OFFICE O/P EST HI 40 MIN: CPT | Performed by: CLINICAL NURSE SPECIALIST

## 2024-10-15 PROCEDURE — 77386 HC IMRT COMPLEX: CPT | Performed by: RADIOLOGY

## 2024-10-15 PROCEDURE — 77014 HC CT GUIDANCE FOR PLACEMENT OF RADIATION THERAPY FIELDS: CPT | Performed by: RADIOLOGY

## 2024-10-15 PROCEDURE — G2211 COMPLEX E/M VISIT ADD ON: HCPCS | Performed by: CLINICAL NURSE SPECIALIST

## 2024-10-15 PROCEDURE — 85025 COMPLETE CBC W/AUTO DIFF WBC: CPT

## 2024-10-15 RX ORDER — METHYLPREDNISOLONE 4 MG/1
1 TABLET ORAL AS DIRECTED
COMMUNITY
Start: 2024-10-11

## 2024-10-15 RX ORDER — PALONOSETRON 0.05 MG/ML
0.25 INJECTION, SOLUTION INTRAVENOUS ONCE
Status: CANCELLED
Start: 2024-10-15 | End: 2024-10-15

## 2024-10-15 RX ORDER — PALONOSETRON 0.05 MG/ML
0.25 INJECTION, SOLUTION INTRAVENOUS ONCE
Status: COMPLETED | OUTPATIENT
Start: 2024-10-15 | End: 2024-10-15

## 2024-10-15 RX ADMIN — PALONOSETRON 0.25 MG: 0.05 INJECTION, SOLUTION INTRAVENOUS at 10:25:00

## 2024-10-15 NOTE — PROGRESS NOTES
Chief Complaint   Patient presents with    Follow - Up    Chemotherapy     Pt is here for treatment - C1 D22 carbo with concurrent RT is expected. She reports low glucose at home early this morning; continued low appetite and low fluid intake. Denies N,V,C. Diarrhea 2-4 stools per day; took 2 imodium today but had not previously. Pain has improved. Couldn't sleep last night due to restlessness and breathing issues.     Education Record    Learner:  Patient and Family Member    Disease / Diagnosis: head and neck cancer    Barriers / Limitations:  None   Comments:    Method:  Brief focused   Comments:    General Topics:  Diet, Medication, Pain, Side effects and symptom management, and Plan of care reviewed   Comments:    Outcome:  Needs reinforcement   Comments:

## 2024-10-15 NOTE — PROGRESS NOTES
Pt here for C28D1 Drug(s)carboplatin.  Arrives Via wheelchair, accompanied by Family member     Patient was evaluated today by CALEB.    Oral medications included in this regimen:  no    Patient confirms comprehension of cancer treatment schedule:  yes    Pregnancy screening:  Denies possibility of pregnancy    Modifications in dose or schedule:  yes- taxol removed from regimen per MD and APN .    Medications appearance and physical integrity checked by RN: yes.    Chemotherapy IV pump settings verified by 2 RNs:  Yes.  Frequency of blood return and site check throughout administration: Prior to administration and At completion of therapy     Infusion/treatment outcome:  patient tolerated treatment without incident    Education Record    Learner:  Patient  Barriers / Limitations:  None  Method:  Discussion  Education / instructions given:  plan of care  Outcome:  Shows understanding    Discharged Home, Via wheelchair, accompanied by:Family member    Patient/family verbalized understanding of future appointments: by printed AVS    Patient in treatment center for labs, APN, and treatment. Taxol removed per APN and MD due to cytopenia and lack of tolerance. Patient tolerated carboplatin without issue. Future appointments in place and patient left in stable condition.

## 2024-10-16 ENCOUNTER — HOSPITAL ENCOUNTER (OUTPATIENT)
Dept: CV DIAGNOSTICS | Facility: HOSPITAL | Age: 84
Discharge: HOME | End: 2024-10-16
Attending: CLINICAL NURSE SPECIALIST
Payer: MEDICARE

## 2024-10-16 DIAGNOSIS — I48.91 ATRIAL FIBRILLATION, UNSPECIFIED TYPE (HCC): ICD-10-CM

## 2024-10-16 DIAGNOSIS — R06.01 ORTHOPNEA: ICD-10-CM

## 2024-10-16 PROCEDURE — 93307 TTE W/O DOPPLER COMPLETE: CPT | Performed by: CLINICAL NURSE SPECIALIST

## 2024-10-16 PROCEDURE — 77386 HC IMRT COMPLEX: CPT | Performed by: RADIOLOGY

## 2024-10-16 NOTE — PROGRESS NOTES
[facility] Radiation Treatment Management Note 16-20    Patient:  Anastasia Hobson  Age:  84 year old  Visit Diagnosis:    1. 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 Bella Cat    Site Delivered Dose (cGy) Prescribed Dose (cGy) Fraction #   LEFT NECK 3400 4800 17/24   L NECK BOOST 0 1800 0/9     First treatment date:   9/24/24  Concurrent chemotherapy:  WEEKLY CHEMO        10/14/2024     7:36 AM 10/15/2024     8:06 AM 10/17/2024     7:13 AM   Oncology Vitals   Height  5' 4.488\"    Height  164 cm    Weight 159 lb 3.2 oz 157 lb 6.4 oz 155 lb 11.2 oz   Weight 72.213 kg 71.396 kg 70.625 kg   BSA (m2) 1.78 m2 1.78 m2 1.77 m2   BMI 26.91 kg/m2 26.61 kg/m2 26.32 kg/m2   /72 165/76 152/74   Pulse 86 72 68   Resp 20 16 16   Temp 98.9 °F (37.2 °C) 97.3 °F (36.3 °C) 98.3 °F (36.8 °C)   SpO2 96 % 99 % 95 %   Pain Score 1 0 0        Toxicities:  Fatigue Grade 1= Fatigue relieved by rest  Dermatitis associated with radiation Grade 2= Moderate to brisk erythema, patchy moist desquamation mostly confined to skin folds and creases; moderate edema  Moisturizer used Eucerin Number of times: 2 times daily.  Dysphagia Grade 1=  Symptomatic, able to eat regular diet  Dyspepsia Grade 0= None  Mucositis Grade 0= None  Constipation Grade 0= None  Diarrhea  Grade 0= None  Nausea Grade 0= None  Vomiting Grade 0= None  Xerostomia Present=Yes     Nursing Note:  Recent Labs   Lab 10/10/24  0904 10/15/24  0807   RBC 3.29* 3.72*   HGB 8.4* 9.4*   HCT 26.3* 29.6*   MCV 79.9* 79.6*   MCH 25.5* 25.3*   MCHC 31.9 31.8   RDW 16.1 16.6   NEPRELIM 2.26 8.82*   WBC 4.2 10.4   .0 398.0     Pt states feeling better.  Decreased neck/throat pain.  Has mild dysphagia, using Triple Mix.  Tolerates soft foods and liquids.  No c/o SOB, cough. Denies stridor.      Rosa MARCANO RN MD NOTE   Reviewed and agree with RN note above.  Setup imaging reviewed in ARIA and  approved.    S:  -feeling well on fentanyl 50. Throat pain is mild. No more episodes sob/stridor. Sleeping well. Not using her prn pain med.     O:  -155# holding steady  Neck skin a bit red  No edema  Mass is definitely softer    A/P:  -russel sharpe is managing her pain meds  Start elocon bid to reddened skin  Cont moisturizer    OTV in 1 week    Oh-Hong Cat MD  Radiation Oncology

## 2024-10-17 ENCOUNTER — APPOINTMENT (OUTPATIENT)
Dept: HEMATOLOGY/ONCOLOGY | Facility: HOSPITAL | Age: 84
End: 2024-10-17
Attending: NURSE PRACTITIONER
Payer: MEDICARE

## 2024-10-17 ENCOUNTER — HOSPITAL ENCOUNTER (OUTPATIENT)
Dept: RADIATION ONCOLOGY | Facility: HOSPITAL | Age: 84
Discharge: HOME | End: 2024-10-17
Attending: RADIOLOGY
Payer: MEDICARE

## 2024-10-17 VITALS
OXYGEN SATURATION: 95 % | RESPIRATION RATE: 16 BRPM | SYSTOLIC BLOOD PRESSURE: 152 MMHG | HEART RATE: 68 BPM | WEIGHT: 155.69 LBS | BODY MASS INDEX: 26 KG/M2 | TEMPERATURE: 98 F | DIASTOLIC BLOOD PRESSURE: 74 MMHG

## 2024-10-17 DIAGNOSIS — C77.0 SECONDARY MALIGNANT NEOPLASM OF LYMPH NODES OF NECK (HCC): Primary | ICD-10-CM

## 2024-10-17 PROCEDURE — 77386 HC IMRT COMPLEX: CPT | Performed by: RADIOLOGY

## 2024-10-17 RX ORDER — MOMETASONE FUROATE 1 MG/G
CREAM TOPICAL
Qty: 45 G | Refills: 2 | Status: SHIPPED | OUTPATIENT
Start: 2024-10-17

## 2024-10-18 PROCEDURE — 77336 RADIATION PHYSICS CONSULT: CPT | Performed by: RADIOLOGY

## 2024-10-18 PROCEDURE — 77386 HC IMRT COMPLEX: CPT | Performed by: RADIOLOGY

## 2024-10-21 PROCEDURE — 77386 HC IMRT COMPLEX: CPT | Performed by: RADIOLOGY

## 2024-10-22 ENCOUNTER — OFFICE VISIT (OUTPATIENT)
Dept: HEMATOLOGY/ONCOLOGY | Facility: HOSPITAL | Age: 84
End: 2024-10-22
Attending: NURSE PRACTITIONER

## 2024-10-22 ENCOUNTER — OFFICE VISIT (OUTPATIENT)
Dept: HEMATOLOGY/ONCOLOGY | Facility: HOSPITAL | Age: 84
End: 2024-10-22
Attending: NURSE PRACTITIONER
Payer: MEDICARE

## 2024-10-22 VITALS
HEIGHT: 64.49 IN | WEIGHT: 143.38 LBS | BODY MASS INDEX: 24.18 KG/M2 | HEART RATE: 64 BPM | SYSTOLIC BLOOD PRESSURE: 125 MMHG | TEMPERATURE: 98 F | RESPIRATION RATE: 18 BRPM | DIASTOLIC BLOOD PRESSURE: 52 MMHG | OXYGEN SATURATION: 98 %

## 2024-10-22 DIAGNOSIS — R73.9 HYPERGLYCEMIA: ICD-10-CM

## 2024-10-22 DIAGNOSIS — C76.0 HEAD AND NECK CANCER (HCC): Primary | ICD-10-CM

## 2024-10-22 LAB
ANION GAP SERPL CALC-SCNC: 4 MMOL/L (ref 0–18)
BASOPHILS # BLD AUTO: 0.02 X10(3) UL (ref 0–0.2)
BASOPHILS NFR BLD AUTO: 0.3 %
BUN BLD-MCNC: 25 MG/DL (ref 9–23)
CALCIUM BLD-MCNC: 8.9 MG/DL (ref 8.7–10.4)
CHLORIDE SERPL-SCNC: 105 MMOL/L (ref 98–112)
CO2 SERPL-SCNC: 28 MMOL/L (ref 21–32)
CREAT BLD-MCNC: 1.39 MG/DL
EGFRCR SERPLBLD CKD-EPI 2021: 37 ML/MIN/1.73M2 (ref 60–?)
EOSINOPHIL # BLD AUTO: 0.08 X10(3) UL (ref 0–0.7)
EOSINOPHIL NFR BLD AUTO: 1.1 %
ERYTHROCYTE [DISTWIDTH] IN BLOOD BY AUTOMATED COUNT: 18.5 %
GLUCOSE BLD-MCNC: 292 MG/DL (ref 70–99)
HCT VFR BLD AUTO: 29.9 %
HGB BLD-MCNC: 9.5 G/DL
IMM GRANULOCYTES # BLD AUTO: 0.05 X10(3) UL (ref 0–1)
IMM GRANULOCYTES NFR BLD: 0.7 %
LYMPHOCYTES # BLD AUTO: 0.4 X10(3) UL (ref 1–4)
LYMPHOCYTES NFR BLD AUTO: 5.5 %
MCH RBC QN AUTO: 25.6 PG (ref 26–34)
MCHC RBC AUTO-ENTMCNC: 31.8 G/DL (ref 31–37)
MCV RBC AUTO: 80.6 FL
MONOCYTES # BLD AUTO: 0.94 X10(3) UL (ref 0.1–1)
MONOCYTES NFR BLD AUTO: 12.9 %
NEUTROPHILS # BLD AUTO: 5.78 X10 (3) UL (ref 1.5–7.7)
NEUTROPHILS # BLD AUTO: 5.78 X10(3) UL (ref 1.5–7.7)
NEUTROPHILS NFR BLD AUTO: 79.5 %
OSMOLALITY SERPL CALC.SUM OF ELEC: 299 MOSM/KG (ref 275–295)
PLATELET # BLD AUTO: 233 10(3)UL (ref 150–450)
POTASSIUM SERPL-SCNC: 4 MMOL/L (ref 3.5–5.1)
RBC # BLD AUTO: 3.71 X10(6)UL
SODIUM SERPL-SCNC: 137 MMOL/L (ref 136–145)
WBC # BLD AUTO: 7.3 X10(3) UL (ref 4–11)

## 2024-10-22 PROCEDURE — 99215 OFFICE O/P EST HI 40 MIN: CPT | Performed by: NURSE PRACTITIONER

## 2024-10-22 PROCEDURE — 77386 HC IMRT COMPLEX: CPT | Performed by: RADIOLOGY

## 2024-10-22 PROCEDURE — 80048 BASIC METABOLIC PNL TOTAL CA: CPT

## 2024-10-22 PROCEDURE — G2211 COMPLEX E/M VISIT ADD ON: HCPCS | Performed by: NURSE PRACTITIONER

## 2024-10-22 PROCEDURE — 96375 TX/PRO/DX INJ NEW DRUG ADDON: CPT

## 2024-10-22 PROCEDURE — 85025 COMPLETE CBC W/AUTO DIFF WBC: CPT

## 2024-10-22 PROCEDURE — 96413 CHEMO IV INFUSION 1 HR: CPT

## 2024-10-22 RX ORDER — CIPROFLOXACIN 250 MG/1
250 TABLET, FILM COATED ORAL 2 TIMES DAILY
COMMUNITY
Start: 2024-10-18 | End: 2024-10-25

## 2024-10-22 RX ORDER — PALONOSETRON 0.05 MG/ML
0.25 INJECTION, SOLUTION INTRAVENOUS ONCE
Status: CANCELLED
Start: 2024-10-22 | End: 2024-10-22

## 2024-10-22 RX ORDER — PALONOSETRON 0.05 MG/ML
0.25 INJECTION, SOLUTION INTRAVENOUS ONCE
Status: COMPLETED | OUTPATIENT
Start: 2024-10-22 | End: 2024-10-22

## 2024-10-22 RX ADMIN — PALONOSETRON 0.25 MG: 0.05 INJECTION, SOLUTION INTRAVENOUS at 09:55:00

## 2024-10-22 NOTE — PROGRESS NOTES
Oncology Nutrition F/U Consultation     Patient Name: Anastasia Hobson  YOB: 1940  Medical Record Number: [id number]            Account Number: [id number]  Dietitian: Jie Juarez RD, LDN     Date of visit: 10/22/2024     Diet Rx: high protein/calorie, soft as tolerated     Pertinent Dx/PMH: Left supraclavicular poorly differentiated carcinoma, p16 positive      Past Medical History         Past Medical History:    Arrhythmia    Back pain    Bladder cancer (HCC)     high grade superficial TCC, recurrent July 2020 after BCG    Bloating    Blurred vision    Cancer (HCC)    Congestive heart disease (HCC)    Constipation    Diabetes (HCC)     IDDM     Diabetes mellitus (HCC)    Diarrhea, unspecified    CLARKE (dyspnea on exertion)    Easy bruising    Elevated cholesterol    Essential hypertension    Exposure to medical diagnostic radiation    Fatigue    Flatulence/gas pain/belching    Frequent urination     taking furosemide daily    Headache disorder    Hearing loss    Heart palpitations    Heartburn     occasionally    High blood pressure    High cholesterol    History of falling    Hyperlipidemia    Irregular bowel habits    Itch of skin     right hand only    Leaking of urine     sometimes    Leg swelling    Osteoporosis    Sleep disturbance    Stool incontinence     when I have diarrhea    Type 2 diabetes mellitus with hyperglycemia (HCC)    Visual impairment     glasses    Wears glasses            TX: Carbo w/o taxol/RT (10/15/24); concurrent weekly Carbo/taxol/RT (M-F thru 11/5/24)      Other pertinent subjective/objective information: diet/sx hx obtained     Pertinent Meds:     Current Outpatient Medications:     ondansetron 8 MG Oral Tablet Dispersible, Take 1 tablet (8 mg total) by mouth every 8 (eight) hours as needed for Nausea., Disp: 60 tablet, Rfl: 1    fentaNYL 12 MCG/HR Transdermal Patch 72 Hr, Place 1 patch onto the skin every third day. Take in addition to fentanyl 25mcg patch for a  total dose of 37.5mcg, Disp: 5 patch, Rfl: 0    HYDROmorphone 4 MG Oral Tab, Take 1 tablet (4 mg total) by mouth every 4 (four) hours as needed for Pain., Disp: 90 tablet, Rfl: 0    fentaNYL 25 MCG/HR Transdermal Patch 72 Hr, Place 1 patch onto the skin every third day., Disp: 10 patch, Rfl: 0    amiodarone 200 MG Oral Tab, TAKE ONE TABLET BY MOUTH ONCE DAILY. Needs appointment with cardiologist and EKG done for future refills., Disp: , Rfl:     prochlorperazine (COMPAZINE) 10 mg tablet, Take 1 tablet (10 mg total) by mouth every 6 (six) hours as needed for Nausea., Disp: 30 tablet, Rfl: 1    gabapentin 100 MG Oral Cap, Take 2 capsules (200 mg total) by mouth nightly. (Patient taking differently: Take 2 capsules (200 mg total) by mouth 2 (two) times daily.), Disp: 60 capsule, Rfl: 1    Naloxone HCl 4 MG/0.1ML Nasal Liquid, 4 mg by Nasal route as needed. If patient remains unresponsive, repeat dose in other nostril 2-5 minutes after first dose., Disp: 1 kit, Rfl: 0    Acetaminophen 500 MG Oral Cap, Take 2 capsules (1,000 mg total) by mouth 3 (three) times daily as needed for Pain., Disp: , Rfl:     Insulin Lispro, 1 Unit Dial, (HUMALOG KWIKPEN) 100 UNIT/ML Subcutaneous Solution Pen-injector, INJECT 10-12 UNITS INTO THE SKIN THREE TIMES DAILY WITH MEALS AS DIRECTED, Disp: 15 mL, Rfl: 0    hydrALAZINE 50 MG Oral Tab, 1 tablet (50 mg total) 3 (three) times daily., Disp: , Rfl:     dilTIAZem HCl ER Beads 120 MG Oral Capsule SR 24 Hr, Take 1 capsule (120 mg total) by mouth daily., Disp: 30 capsule, Rfl: 11    LANTUS SOLOSTAR 100 UNIT/ML Subcutaneous Solution Pen-injector, INJECT 8 UNITS INTO THE SKIN NIGHTLY. *PEN EXPIRES 28 DAYS AFTER FIRST USE*, Disp: 15 mL, Rfl: 0    LISINOPRIL 20 MG Oral Tab, TAKE ONE TABLET BY MOUTH EVERY EVENING, Disp: 90 tablet, Rfl: 0    Insulin Pen Needle (BD PEN NEEDLE PRISCILLA U/F) 32G X 4 MM Does not apply Misc, Inject 1 Pen into the skin daily., Disp: 90 each, Rfl: 0    ATORVASTATIN 80 MG Oral  Tab, Take 1 tablet (80 mg total) by mouth nightly., Disp: 90 tablet, Rfl: 0    Continuous Blood Gluc Sensor (FREESTYLE NATALIE 2 SENSOR) Does not apply Misc, 1 Device by Subdermal route every 14 (fourteen) days., Disp: 6 each, Rfl: 3    fenofibrate 145 MG Oral Tab, Take 1 tablet (145 mg total) by mouth daily., Disp: 90 tablet, Rfl: 0    Continuous Blood Gluc  (FREESTYLE NATALIE 2 READER) Does not apply Device, 1 each daily., Disp: 1 each, Rfl: 1    Continuous Blood Gluc  (FREESTYLE NATALIE 2 READER) Does not apply Device, 1 Device daily as needed., Disp: 1 each, Rfl: 0    PROBIOTIC PRODUCT OR, Take 1 capsule by mouth daily. 100 billion, Disp: , Rfl:     furosemide 40 MG Oral Tab, Take 0.5 tablets (20 mg total) by mouth daily., Disp: , Rfl:     Potassium Chloride ER 20 MEQ Oral Tab CR, Take 1 tablet by mouth daily., Disp: , Rfl:     apixaban 5 MG Oral Tab, Take 1 tablet (5 mg total) by mouth 2 (two) times daily., Disp: 60 tablet, Rfl: 2    Cholecalciferol (VITAMIN D3) 250 MCG (34755 UT) Oral Tab, Take 1,000 Units by mouth daily., Disp: , Rfl:     Calcium Citrate-Vitamin D 315-250 MG-UNIT Oral Tab, Take 1 tablet by mouth daily., Disp: , Rfl:      Pertinent Labs: noted     Height: 5'5\"              IBW: 125 +/- 10%     WT HX:   10/22/24 65 kg (143 lb 6.4 oz)   10/17/24 70.6 kg (155 lb 11.2 oz)   10/08/24 68 kg (150 lb)   09/11/24 64.9 kg (143 lb)   09/03/24 70.4 kg (155 lb 4.8 oz)   08/23/24 69.4 kg (153 lb)   07/23/24 68.4 kg (150 lb 12.8 oz)         Estimated Nutrition Needs: 22-27 kcals/kg = 8018-0347 KCALS/d; 1.3 gms protein/kg = 85  gms/d     Assessment/Plan: RD f/u w/ pt and son in tx room today. Pt notes continued swallowing pain but denies nausea or vomiting, no diarrhea or constipation.      RD suggested pt consume more ONS if difficult eating solid foods.      Pt verbalized understanding. RD offered support and will continue to monitor throughout tx.         The 21st Century Cures Act makes medical  notes like these available to patients in the interest of transparency. Please be advised this is a medical document. Medical documents are intended to carry relevant information, facts as evident, and the clinical opinion of the practitioner. The medical note is intended as peer to peer communication and may appear blunt or direct. It is written in medical language and may contain abbreviations or verbiage that are unfamiliar.

## 2024-10-22 NOTE — PROGRESS NOTES
Chief Complaint   Patient presents with    Follow - Up    Chemotherapy     Pt is here for treatment - C1 D29 carbo/taxol with concurrent RT is expected. Swallowing pain is affecting eating and drinking; denies N,V,D,C. Fatigued, sleeping more.    Education Record    Learner:  Patient and Family Member    Disease / Diagnosis: bladder cancer    Barriers / Limitations:  None   Comments:    Method:  Brief focused   Comments:    General Topics:  Diet, Medication, Pain, Side effects and symptom management, and Plan of care reviewed   Comments:    Outcome:  Shows understanding   Comments:

## 2024-10-22 NOTE — PROGRESS NOTES
Pt here for C1D29 Drug(s) Carboplatin.  Arrives Via wheelchair, accompanied by Family member     Patient was evaluated today by CALEB and Treatment Nurse.    Oral medications included in this regimen:  no    Patient confirms comprehension of cancer treatment schedule:  yes    Pregnancy screening:  Not applicable    Modifications in dose or schedule:  Yes - No Taxol    Medications appearance and physical integrity checked by RN: yes.    Chemotherapy IV pump settings verified by 2 RNs:  Yes.  Frequency of blood return and site check throughout administration: Prior to administration and At completion of therapy     Infusion/treatment outcome:  patient tolerated treatment without incident    Education Record    Learner:  Patient and Family Member  Barriers / Limitations:  None  Method:  Discussion  Education / instructions given:  plan of care, next appts  Outcome:  Shows understanding    Discharged Home, Via wheelchair, accompanied by:Family member    Patient/family verbalized understanding of future appointments: by MyChart messaging

## 2024-10-22 NOTE — PROGRESS NOTES
Cancer Center Progress Note    Patient Name: Anastasia Hobson   YOB: 1940   Medical Record Number: [id number]   Date of visit: 10/22/2024    Chief Complaint/Reason for Visit:  Chief Complaint   Patient presents with    Follow - Up    Chemotherapy      History of Present Illness: Anastasia presents today for follow up of left supraclavicular poorly differentiated carcinoma and chemoradiation, today is week 5. Last week, she received carboplatin only, paclitaxel was discontinued further due to diarrhea, nausea, and fatigue. Her medical oncologist is Dr. Jordon Reid, additional oncology history below.    Today, patient reports continued swallowing pain. Left sided neck pain is controlled with fentanyl patch. She denies nausea or vomiting, no diarrhea or constipation. She reports feeling fatigued. Last week, she had weight gain related to fluid retention. She reports swelling in legs is improved and weight is down. She is accompanied by her son today.     Oncology History:    -Cystoscopy June 2024 with NAD.  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     -MRI cervical spine on 7/31/2024 incidentally noted a left lower neck mass measuring 4.5 x 3.5 cm.     -CT neck on 8/1/2024 4.2 x 2.9 x 4.2 cm left supraclavicular mass.     -Met with ENT,  and patient-tonsils were surgically absent on exam.     -Left neck mass biopsy on 8/5/2024 showed a poorly differentiated carcinoma with focal neuroendocrine differentiation.  IHC was positive for cytokeratin AE 1/3, p16, p63 and weakly positive for synaptophysin  .  -PET/CT on 8/21/24: focal marked metabolic activity in the left supraclavicular mass.  This mass is 4.9 x 4.2 cm with an SUV of 8.8.  No other lesions. LLL atelectasis or PNA     -She has a poor appetite but her daughter gets her to eat.  She uses her walker and here for most of her ADLs.  She has a history of smoking 1 pack/day  for 40 years but quit about 25 years ago.  She was with her daughter Petty who is involved with her care. She is following with palliative care due to significant pain.      -Tumor board 8/28/24: Case reviewed in tumor board. PET does not show evidence of distant disease. Concern for p16+ head and neck cancer vs. Cervical esophageal cancer. EGD recommended. ENT eval recommended. We will plan on definitive treatment with chemoradiation with weekly Carbo AUC 2 + Taxol 50 mg/m2.      -ENT Eval with Dr. Villalta on 8/28/244: Flexible laryngoscopy was unremarkable     -9/24/2024 started concurrent radiation with weekly carboplatin and paclitaxel   -Week 1: 9/24/24-Carbo/Taxol               -Week 2: 10/1/24-Carbo/taxol               -Week 3: 10/8/24: Chemotherapy held for side effects (nausea, diarrhea, fatigue)               -Week 4: 10/15/24: Carbo AUC 2, discontinued Taxol    Problem List:  Patient Active Problem List   Diagnosis    Osteoarthritis of spine with radiculopathy, cervical region    Primary hypertension    Type 2 diabetes mellitus without complication, with long-term current use of insulin (HCC)    Mixed hyperlipidemia    Arthrodesis status    Tremor of unknown origin    Personal history of colonic polyps    Malignant neoplasm of lateral wall of urinary bladder (HCC)    Stage 3b chronic kidney disease (HCC)    Atrial fibrillation with RVR (HCC)    Advance care planning    Acute pulmonary edema (HCC)    Other constipation    Functional diarrhea    Type 2 diabetes mellitus with diabetic chronic kidney disease (HCC)    At high risk for falls    Hypoglycemia    Chronic bronchitis, unspecified chronic bronchitis type (HCC)    Persistent atrial fibrillation (HCC)    Hyperlipidemia    Unspecified atrial fibrillation (HCC)    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)    Urge incontinence    Heart failure, unspecified (HCC)    Chronic kidney disease, stage 3b (HCC)    Type 2 diabetes mellitus with  stage 2 chronic kidney disease, without long-term current use of insulin (HCC)    Long term (current) use of oral hypoglycemic drugs    Personal history of malignant neoplasm of bladder    Pure hypercholesterolemia, unspecified    Mass of left side of neck    Azotemia    Type 2 diabetes mellitus with hyperglycemia, unspecified whether long term insulin use (HCC)    Anticoagulated    Atrial fibrillation, chronic (HCC)    Palliative care by specialist    Head and neck cancer (HCC)        Medical History:  Past Medical History:    Arrhythmia    Back pain    Bladder cancer (HCC)    high grade superficial TCC, recurrent July 2020 after BCG    Bloating    Blurred vision    Cancer (HCC)    Congestive heart disease (HCC)    Constipation    Diabetes (HCC)    IDDM     Diabetes mellitus (HCC)    Diarrhea, unspecified    CLARKE (dyspnea on exertion)    Easy bruising    Elevated cholesterol    Essential hypertension    Exposure to medical diagnostic radiation    Fatigue    Flatulence/gas pain/belching    Frequent urination    taking furosemide daily    Headache disorder    Hearing loss    Heart palpitations    Heartburn    occasionally    High blood pressure    High cholesterol    History of falling    Hyperlipidemia    Irregular bowel habits    Itch of skin    right hand only    Leaking of urine    sometimes    Leg swelling    Osteoporosis    Sleep disturbance    Stool incontinence    when I have diarrhea    Type 2 diabetes mellitus with hyperglycemia (HCC)    Visual impairment    glasses    Wears glasses       Surgical History:  Past Surgical History:   Procedure Laterality Date    Cataract      Cervical spine surgery  10/06/2023    Colonoscopy  2019    Colonoscopy      Cystoscopy,insert ureteral stent      Cystourethroscopy  11/25/2020    Cystoscopy Procedure Dr Josh Hernandez  - recurrent tumor on BTS    Cystourethroscopy,[place]opsy  12/16/2019    [place] [facility] [place]ourethroscopy,fulgur .5-2cm lesn  04/16/2020    TURBT, [facility]SC     Cystourethroscopy,fulgur .5-2cm lesn  2020    TURBT with Gemcitabine    Cystourethroscopy,fulgur >5cm lesn  10/03/2019    TURBT with bilateral RPGs (NSC)    Fracture surgery      left wrist    Hysterectomy      age 31    Ndsc ablation & rcnstj [facility] w/o bypass      Other surgical history  2021    BTS Dr David Loaiza    Other surgical history  2021    BTS Cysto Caterigayathri Hernandez    Other surgical history  2021    BTS Cysto W/ caterigayathri Hernandez     Other surgical history  2022    BTS Cysto- Dr. Hernandez    Spine surgery procedure unlisted  10/06/2003    CERVICAL    Tonsillectomy  89    Total abdom hysterectomy  1971       Allergies:  Allergies   Allergen Reactions    Morphine CONFUSION       Family History:  Family History   Problem Relation Age of Onset    Diabetes Father     Stroke Father     Colon Polyps Mother     Diabetes Mother     Heart Attack Mother     Other (Other) Son         Afib       Social History:  Social History     Socioeconomic History    Marital status:      Spouse name: Not on file    Number of children: 3    Years of education: Not on file    Highest education level: Not on file   Occupational History    Occupation: RETIRED   Tobacco Use    Smoking status: Former     Current packs/day: 0.00     Average packs/day: 1.1 packs/day for 65.3 years (73.5 ttl pk-yrs)     Types: Cigarettes     Quit date: 2007     Years since quittin.3    Smokeless tobacco: Never    Tobacco comments:     quit 15 years    Vaping Use    Vaping status: Never Used   Substance and Sexual Activity    Alcohol use: No    Drug use: No    Sexual activity: Not Currently     Partners: Male   Other Topics Concern    Caffeine Concern Not Asked    Exercise Not Asked    Seat Belt Not Asked    Special Diet Not Asked    Stress Concern Not Asked    Weight Concern Not Asked   Social History Narrative    , lives with daughter    Has 3 children: 2 sons, 1 daughter     Social Drivers of Health      Financial Resource Strain: Low Risk  (2/27/2023)    Financial Resource Strain     Difficulty of Paying Living Expenses: Not very hard     Med Affordability: No   Food Insecurity: No Food Insecurity (7/31/2024)    Food Insecurity     Food Insecurity: Never true   Transportation Needs: No Transportation Needs (7/31/2024)    Transportation Needs     Lack of Transportation: No     Car Seat: Not on file   Physical Activity: Not on file   Stress: Not on file   Social Connections: Not on file   Housing Stability: Low Risk  (7/31/2024)    Housing Stability     Housing Instability: No     Housing Instability Emergency: Not on file     Crib or Bassinette: Not on file       Medications:    Current Outpatient Medications:     ALPRAZolam 0.25 MG Oral Tab, Take 0.5 tablets (0.125 mg total) by mouth daily as needed for Anxiety. Take prior to radiation treatment, Disp: 15 tablet, Rfl: 1    ondansetron 8 MG Oral Tablet Dispersible, Take 1 tablet (8 mg total) by mouth every 8 (eight) hours as needed for Nausea., Disp: 60 tablet, Rfl: 1    fentaNYL 12 MCG/HR Transdermal Patch 72 Hr, Place 1 patch onto the skin every third day. Take in addition to fentanyl 25mcg patch for a total dose of 37.5mcg, Disp: 5 patch, Rfl: 0    HYDROmorphone 4 MG Oral Tab, Take 1 tablet (4 mg total) by mouth every 4 (four) hours as needed for Pain., Disp: 90 tablet, Rfl: 0    fentaNYL 25 MCG/HR Transdermal Patch 72 Hr, Place 1 patch onto the skin every third day., Disp: 10 patch, Rfl: 0    amiodarone 200 MG Oral Tab, TAKE ONE TABLET BY MOUTH ONCE DAILY. Needs appointment with cardiologist and EKG done for future refills., Disp: , Rfl:     prochlorperazine (COMPAZINE) 10 mg tablet, Take 1 tablet (10 mg total) by mouth every 6 (six) hours as needed for Nausea., Disp: 30 tablet, Rfl: 1    gabapentin 100 MG Oral Cap, Take 2 capsules (200 mg total) by mouth nightly. (Patient taking differently: Take 2 capsules (200 mg total) by mouth 2 (two) times daily.),  Disp: 60 capsule, Rfl: 1    Naloxone HCl 4 MG/0.1ML Nasal Liquid, 4 mg by Nasal route as needed. If patient remains unresponsive, repeat dose in other nostril 2-5 minutes after first dose., Disp: 1 kit, Rfl: 0    Acetaminophen 500 MG Oral Cap, Take 2 capsules (1,000 mg total) by mouth 3 (three) times daily as needed for Pain., Disp: , Rfl:     Insulin Lispro, 1 Unit Dial, (HUMALOG KWIKPEN) 100 UNIT/ML Subcutaneous Solution Pen-injector, INJECT 10-12 UNITS INTO THE SKIN THREE TIMES DAILY WITH MEALS AS DIRECTED, Disp: 15 mL, Rfl: 0    hydrALAZINE 50 MG Oral Tab, 1 tablet (50 mg total) 3 (three) times daily., Disp: , Rfl:     dilTIAZem HCl ER Beads 120 MG Oral Capsule SR 24 Hr, Take 1 capsule (120 mg total) by mouth daily., Disp: 30 capsule, Rfl: 11    LANTUS SOLOSTAR 100 UNIT/ML Subcutaneous Solution Pen-injector, INJECT 8 UNITS INTO THE SKIN NIGHTLY. PEN EXPIRES 28 DAYS AFTER FIRST USE, Disp: 15 mL, Rfl: 0    LISINOPRIL 20 MG Oral Tab, TAKE ONE TABLET BY MOUTH EVERY EVENING, Disp: 90 tablet, Rfl: 0    Insulin Pen Needle (BD PEN NEEDLE PRISCILLA U/F) 32G X 4 MM Does not apply Misc, Inject 1 Pen into the skin daily., Disp: 90 each, Rfl: 0    ATORVASTATIN 80 MG Oral Tab, Take 1 tablet (80 mg total) by mouth nightly., Disp: 90 tablet, Rfl: 0    Continuous Blood Gluc Sensor (FREESTYLE NATALIE 2 SENSOR) Does not apply Misc, 1 Device by Subdermal route every 14 (fourteen) days., Disp: 6 each, Rfl: 3    fenofibrate 145 MG Oral Tab, Take 1 tablet (145 mg total) by mouth daily., Disp: 90 tablet, Rfl: 0    Continuous Blood Gluc  (FREESTYLE NATALIE 2 READER) Does not apply Device, 1 each daily., Disp: 1 each, Rfl: 1    Continuous Blood Gluc  (FREESTYLE NATALIE 2 READER) Does not apply Device, 1 Device daily as needed., Disp: 1 each, Rfl: 0    PROBIOTIC PRODUCT OR, Take 1 capsule by mouth daily. 100 billion, Disp: , Rfl:     furosemide 40 MG Oral Tab, Take 0.5 tablets (20 mg total) by mouth daily., Disp: , Rfl:     Potassium  Chloride ER 20 MEQ Oral Tab CR, Take 1 tablet by mouth daily., Disp: , Rfl:     apixaban 5 MG Oral Tab, Take 1 tablet (5 mg total) by mouth 2 (two) times daily., Disp: 60 tablet, Rfl: 2    Cholecalciferol (VITAMIN D3) 250 MCG (10723 UT) Oral Tab, Take 1,000 Units by mouth daily., Disp: , Rfl:     Calcium Citrate-Vitamin D 315-250 MG-UNIT Oral Tab, Take 1 tablet by mouth daily., Disp: , Rfl:     Review of Systems:  A comprehensive 14 point review of systems was completed.  Pertinent positives and negatives noted in the HPI.    Performance Status: ECOG 2 - Ambulatory/capable of all self-care, unable to perform any work activities. Up and about more than 50% of waking hours.    Physical Examination:  General: Patient is alert and oriented x 3, not in acute distress.  Vital Signs: Height: 163.8 cm (5' 4.49\") (10/22 0800)  Weight: 65 kg (143 lb 6.4 oz) (10/22 0800)  BSA (Calculated - sq m): 1.71 sq meters (10/22 0800)  Pulse: 64 (10/22 0800)  BP: 125/52 (10/22 0800)  Temp: 97.8 °F (36.6 °C) (10/22 0800)  Do Not Use - Resp Rate: --  SpO2: 98 % (10/22 0800)  HEENT: Anicteric, conjunctivae and sclerae clear, no oropharyngeal lesion/thrush, mucous membranes are moist   Chest: Clear to auscultation. Respirations unlabored.   Heart: Regular rate and rhythm.   Abdomen: Soft, non-distended, non-tender with present bowel sounds.  Extremities: mild bilateral lower extremity edema 1+  Neurological: Grossly intact.   Lymphatics: There is no palpable lymphadenopathy throughout in the cervical or supraclavicular regions.   Skin: warm, dry, no erythema or rash   Psych/Depression: mood and affect are appropriate.     Labs:     Recent Results (from the past 72 hours)   CBC W Differential W Platelet    Collection Time: 10/22/24  8:10 AM   Result Value Ref Range    WBC 7.3 4.0 - 11.0 x10(3) uL    RBC 3.71 (L) 3.80 - 5.30 x10(6)uL    HGB 9.5 (L) 12.0 - 16.0 g/dL    HCT 29.9 (L) 35.0 - 48.0 %    .0 150.0 - 450.0 10(3)uL    MCV 80.6  80.0 - 100.0 fL    MCH 25.6 (L) 26.0 - 34.0 pg    MCHC 31.8 31.0 - 37.0 g/dL    RDW 18.5 %    Neutrophil Absolute Prelim 5.78 1.50 - 7.70 x10 (3) uL    Neutrophil Absolute 5.78 1.50 - 7.70 x10(3) uL    Lymphocyte Absolute 0.40 (L) 1.00 - 4.00 x10(3) uL    Monocyte Absolute 0.94 0.10 - 1.00 x10(3) uL    Eosinophil Absolute 0.08 0.00 - 0.70 x10(3) uL    Basophil Absolute 0.02 0.00 - 0.20 x10(3) uL    Immature Granulocyte Absolute 0.05 0.00 - 1.00 x10(3) uL    Neutrophil % 79.5 %    Lymphocyte % 5.5 %    Monocyte % 12.9 %    Eosinophil % 1.1 %    Basophil % 0.3 %    Immature Granulocyte % 0.7 %   BASIC METABOLIC PANEL [E]    Collection Time: 10/22/24  8:10 AM   Result Value Ref Range    Glucose 292 (H) 70 - 99 mg/dL    Sodium 137 136 - 145 mmol/L    Potassium 4.0 3.5 - 5.1 mmol/L    Chloride 105 98 - 112 mmol/L    CO2 28.0 21.0 - 32.0 mmol/L    Anion Gap 4 0 - 18 mmol/L    BUN 25 (H) 9 - 23 mg/dL    Creatinine 1.39 (H) 0.55 - 1.02 mg/dL    Calcium, Total 8.9 8.7 - 10.4 mg/dL    Calculated Osmolality 299 (H) 275 - 295 mOsm/kg    eGFR-Cr 37 (L) >=60 mL/min/1.73m2    Patient Fasting for BMP? Patient not present        Impression/Plan    Left supraclavicular poorly differentiated carcinoma: diagnosed in 8/2024 with no tumor origin. Started definitive treatment with chemoradiation with weekly Carbo AUC 2 and paclitaxel 50 mg/m2 on 9/24/2024. Week 3 chemotherapy was held due to side effects of nausea, diarrhea and fatigue. She received week 4 last week with paclitaxel discontinued. Labs reviewed, proceed with week 5 carboplatin today. Plan for PET/CT 12 weeks post treatment.     Hyperglycemia: she wears continuous glucose monitor and uses insulin PRN. She reports periods of recent low blood sugars. She will follow up with PCP if glucose readings remain elevated. Pre-medication dexamethasone lowered to 10mg.     Planned Follow Up: 1 week follow up with Dr. Reid, labs and chemo    Risk Level: HIGH carcinoma receiving  chemotherapy requiring close monitoring     The 21st Century Cures Act makes medical notes like these available to patients in the interest of transparency. Please be advised this is a medical document. Medical documents are intended to carry relevant information, facts as evident, and the clinical opinion of the practitioner. The medical note is intended as peer to peer communication and may appear blunt or direct. It is written in medical language and may contain abbreviations or verbiage that are unfamiliar.     Electronically Signed by:    Mamie Woodward, IRENE, APRN, NP-C, AOCNP  Nurse Practitioner  [facility]

## 2024-10-23 DIAGNOSIS — Z79.4 TYPE 2 DIABETES MELLITUS WITHOUT COMPLICATION, WITH LONG-TERM CURRENT USE OF INSULIN (HCC): ICD-10-CM

## 2024-10-23 DIAGNOSIS — E11.9 TYPE 2 DIABETES MELLITUS WITHOUT COMPLICATION, WITH LONG-TERM CURRENT USE OF INSULIN (HCC): ICD-10-CM

## 2024-10-23 PROCEDURE — 77386 HC IMRT COMPLEX: CPT | Performed by: RADIOLOGY

## 2024-10-23 RX ORDER — INSULIN GLARGINE 100 [IU]/ML
INJECTION, SOLUTION SUBCUTANEOUS
Qty: 15 ML | Refills: 0 | Status: SHIPPED | OUTPATIENT
Start: 2024-10-23

## 2024-10-23 NOTE — PROGRESS NOTES
[facility] Radiation Treatment Management Note 21-25    Patient:  Anastasia Hobson  Age:  84 year old  Visit Diagnosis:  No diagnosis found.  Primary Rad/Onc:  Dr. Bella Cat    Site Delivered Dose (cGy) Prescribed Dose (cGy) Fraction #   LEFT NECK 4400 4800 22/24   L NECK BOOST 0 1800 0/9     First treatment date:   9/24/24  Concurrent chemotherapy:  WEEKLY CHEMO        10/17/2024     7:13 AM 10/22/2024     8:00 AM 10/24/2024     7:38 AM   Oncology Vitals   Height  5' 4.488\"    Height  164 cm    Weight 155 lb 11.2 oz 143 lb 6.4 oz 147 lb 11.2 oz   Weight 70.625 kg 65.046 kg 66.996 kg   BSA (m2) 1.77 m2 1.71 m2 1.73 m2   BMI 26.32 kg/m2 24.24 kg/m2 24.97 kg/m2   /74 125/52 149/69   Pulse 68 64 66   Resp 16 18 20   Temp 98.3 °F (36.8 °C) 97.8 °F (36.6 °C) 96.6 °F (35.9 °C)   SpO2 95 % 98 % 98 %   Pain Score 0 0 0        Toxicities:  Fatigue Grade 1= Fatigue relieved by rest  Dermatitis associated with radiation Grade 2= Moderate to brisk erythema, patchy moist desquamation mostly confined to skin folds and creases; moderate edema  Moisturizer used Vanicream and Eucerin Number of times: 2 times daily.  Dysphagia Grade 2= Symptomatic and altered eating/swallowing  Dyspepsia Grade 0= None  Mucositis Grade 0= None  Constipation Grade 0= None  Diarrhea  Grade 0= None  Nausea Grade 0= None  Vomiting Grade 0= None  Xerostomia Present=Yes     Nursing Note:  Recent Labs   Lab 10/22/24  0810   RBC 3.71*   HGB 9.5*   HCT 29.9*   MCV 80.6   MCH 25.6*   MCHC 31.8   RDW 18.5   NEPRELIM 5.78   WBC 7.3   .0     States less pain to throat.  Taking Triple Mix and using Fentanyl 50mcg/patch.  Tolerates soft foods and liquids.  Has intermittent non-productive cough.  Taking Tessalon Perles TID with relief.    Rosa MARCANO RN MD NOTE   Reviewed and agree with RN note above.  Setup imaging reviewed in [facility] and approved.    S:  -pain is controlled. Still eating/drinking, using prn lido. Feels  tessalon really helps w/ cough. Tumor feels better.    O:  -redness in field. Lt neck mass difficult to palpate. Less edema.    A/P:  -cont steroid cream to skin, lido prn, fentanyl for pain, and refill tessalon for cough    OTV in 1 week    Oh-Hong Cat MD  Radiation Oncology

## 2024-10-23 NOTE — TELEPHONE ENCOUNTER
Diabetes Medication Protocol Sqgtep28/23/2024 07:01 AM   Protocol Details In person appointment or virtual visit in the past 6 mos or appointment in next 3 mos    EGFRCR or GFRNAA > 50    Last A1C < 7.5 and within past 6 months    Microalbumin procedure in past 12 months or taking ACE/ARB    GFR in the past 12 months      Routing to provider per protocol.   LANTUS SOLOSTAR 100 UNIT/ML Subcutaneous Solution Pen-injector   Last refilled on 7/1/24 for #15mL  with 0 rf.   Last labs 10/22/24.   Unsure if labs were abnormal or not..   Last seen on for physical-1/17/24.       Future Appointments   Date Time Provider Department Center   10/24/2024  7:30 AM Bella Cat MD  RAD ONC [facility]   10/29/2024  7:15 AM Bella Cat MD  RAD ONC [facility]   10/29/2024  8:45 AM  [place] RN8  CHEMO [facility]   10/29/2024  9:15 AM Jordon Reid MD  HEM ONC [facility]   10/29/2024  9:30 AM Francine Dietz APRN  HEM ONC [facility]   10/31/2024  7:30 AM Bella Cat MD  RAD ONC [facility]   11/1/2024  7:00 AM MONTHLY BILLING RESOURCE  RAD ONC [facility]          Thank you.

## 2024-10-24 ENCOUNTER — HOSPITAL ENCOUNTER (OUTPATIENT)
Dept: RADIATION ONCOLOGY | Facility: HOSPITAL | Age: 84
Discharge: HOME | End: 2024-10-24
Attending: RADIOLOGY
Payer: MEDICARE

## 2024-10-24 VITALS
BODY MASS INDEX: 25 KG/M2 | WEIGHT: 147.69 LBS | HEART RATE: 66 BPM | TEMPERATURE: 97 F | SYSTOLIC BLOOD PRESSURE: 149 MMHG | DIASTOLIC BLOOD PRESSURE: 69 MMHG | RESPIRATION RATE: 20 BRPM | OXYGEN SATURATION: 98 %

## 2024-10-24 DIAGNOSIS — C77.0 SECONDARY MALIGNANT NEOPLASM OF LYMPH NODES OF NECK (HCC): Primary | ICD-10-CM

## 2024-10-24 PROCEDURE — 77386 HC IMRT COMPLEX: CPT | Performed by: RADIOLOGY

## 2024-10-24 RX ORDER — BENZONATATE 100 MG/1
100 CAPSULE ORAL 3 TIMES DAILY PRN
Qty: 60 CAPSULE | Refills: 0 | Status: SHIPPED | OUTPATIENT
Start: 2024-10-24 | End: 2024-11-23

## 2024-10-25 PROCEDURE — 77386 HC IMRT COMPLEX: CPT | Performed by: RADIOLOGY

## 2024-10-25 PROCEDURE — 77336 RADIATION PHYSICS CONSULT: CPT | Performed by: RADIOLOGY

## 2024-10-28 ENCOUNTER — APPOINTMENT (OUTPATIENT)
Dept: RADIATION ONCOLOGY | Facility: HOSPITAL | Age: 84
End: 2024-10-28
Attending: RADIOLOGY
Payer: MEDICARE

## 2024-10-28 PROCEDURE — 77386 HC IMRT COMPLEX: CPT | Performed by: RADIOLOGY

## 2024-10-28 RX ORDER — RISEDRONATE SODIUM 35 MG/1
35 TABLET, FILM COATED ORAL
Qty: 12 TABLET | Refills: 0 | Status: SHIPPED | OUTPATIENT
Start: 2024-10-28

## 2024-10-28 NOTE — TELEPHONE ENCOUNTER
Risedronate Sodium 35 mg tab      The original prescription was discontinued on 9/6/2024 by Jo Ann Verdin RN for the following reason: Therapy completed. Renewing this prescription may not be appropriate.     Pt failed refill protocol for the following reasons:    Osteoporosis Medication Protocol [id number]/27/2024 04:00 AM   Protocol Details In person appointment or virtual visit in the past 6 mos or appointment in next 3 mos    DEXA scan within past 2 years    CMP within the past 12 months    Calcium level between 8.3 and 10.3    GFR level greater than 35        Last refill: N/A  Last appt: 2/1/2024  Last Px: 1/17/2024  Next appt: N/A    Forward to Dr. Denae Prado, please advise on refills. Thank you.

## 2024-10-28 NOTE — TELEPHONE ENCOUNTER
Forwarding to provider as FYI   Virtual Brief Visit    Reason for visit is Cough, fatigue, abdominal pain        Encounter provider Tawanna Bryan PA-C    Provider located at [address] [place] [address]      Recent Visits  Date Type Provider Dept   03/19/20 Telephone [facility]   Showing recent visits within past 7 days and meeting all other requirements     Future Appointments  No visits were found meeting these conditions  Showing future appointments within next 150 days and meeting all other requirements        Patient agrees to participate in a virtual check in via telephone or video visit instead of presenting to the office to address urgent/immediate medical needs  Patient is aware this is a billable service  After connecting through telephone, the patient was identified by name and date of birth  William Schmitz was informed that this was a telemedicine visit and that the visit is being conducted through telephone which may not be secure and therefore might not be HIPAA-compliant  My office door was closed  No one else was in the room  He acknowledged consent and understanding of privacy and security of the virtual check-in visit  I informed the patient that I have reviewed his record in Epic and presented the opportunity for him to ask any questions regarding the visit today  The patient initiated communication and agreed to participate  Subjective  William Schmitz is a 23 y o  male in no acute distress        Past Medical History:   Diagnosis Date    Allergic rhinitis     Resolved 2/27/2017     Displaced fracture of proximal phalanx of unspecified thumb, initial encounter for closed fracture     Resolved 2/27/2017     Head injury     Resolved 2/27/2017     Migraines        Past Surgical History:   Procedure Laterality Date    NO PAST SURGERIES         Current Outpatient Medications   Medication Sig Dispense Refill    azithromycin (ZITHROMAX) 250 mg tablet Take 2 Tabs by mouth today, then Take 1 tablet daily for 4 more days  6 tablet 0    terbinafine (LamISIL) 250 mg tablet Take 1 tablet daily by mouth for 1 week at of each month for 4 cycles  28 tablet 0     No current facility-administered medications for this visit  Allergies   Allergen Reactions    Cefprozil Hives     Got rash after taking for several days      Penicillins Hives     Got bad hives right away         Assessment  Patient Instructions   1  Acute sinusitis-patient does have some slightly improving symptoms today  Advised him that this could still be viral in he could give it a day or 2 to see if symptoms improve  I will however send in a prescription antibiotic is Zithromax Z-Toño to be used if symptoms are worsening or persistent over the course of the weekend  If he is having more mucus production with his cough or thicker mucus from the nose this would be recommended  Patient verbalizes understanding and agreement with plan  In the meantime he will continue ibuprofen, fluids, and Delsym as needed for cough  A work note will be mailed to his home address for this past Wednesday and Thursday  [facility] telephone assessment is Stable, in no acute distress   Disposition:    Improving    I spent 10 minutes with the patient during this virtual check-in visit  HPI:  This is a 35-year-old gentleman that presents via of virtual telephone call for a visit for symptoms that started earlier in the week about 5 days ago  He started with a bit of a cough and some congestion  He has had some headache and sinus pressure with this  He was feeling very fatigued and run down on Wednesday and Thursday and was not able to go to work  He states today that he is feeling a little bit better however  He has been using some over-the-counter medications including ibuprofen which has been helpful    He is not having any fevers and he is not aware of any specific sick contacts  He did have some mild abdominal discomfort earlier in the week but that has resolved  Review of Systems   Constitutional: Negative for fever  HENT: Positive for congestion, rhinorrhea, sinus pressure and sinus pain  Respiratory: Positive for cough  Negative for chest tightness and shortness of breath  Cardiovascular: Negative for chest pain  Musculoskeletal: Negative for arthralgias              Problem List Items Addressed This Visit     None      Visit Diagnoses     Acute non-recurrent maxillary sinusitis    -  Primary    Relevant Medications    azithromycin (ZITHROMAX) 250 mg tablet            [id number]

## 2024-10-28 NOTE — TELEPHONE ENCOUNTER
Patient's daughter calling stating patient is going through chemo and radiation and will not be available until late December. States she will call back to schedule, declined to schedule anything at this time. FYI

## 2024-10-29 ENCOUNTER — HOSPITAL ENCOUNTER (OUTPATIENT)
Dept: RADIATION ONCOLOGY | Facility: HOSPITAL | Age: 84
Discharge: HOME | End: 2024-10-29
Attending: RADIOLOGY
Payer: MEDICARE

## 2024-10-29 ENCOUNTER — OFFICE VISIT (OUTPATIENT)
Dept: HEMATOLOGY/ONCOLOGY | Facility: HOSPITAL | Age: 84
End: 2024-10-29
Attending: NURSE PRACTITIONER
Payer: MEDICARE

## 2024-10-29 VITALS
TEMPERATURE: 98 F | HEART RATE: 107 BPM | BODY MASS INDEX: 24.06 KG/M2 | WEIGHT: 142.63 LBS | RESPIRATION RATE: 18 BRPM | DIASTOLIC BLOOD PRESSURE: 54 MMHG | HEIGHT: 64.49 IN | SYSTOLIC BLOOD PRESSURE: 107 MMHG | OXYGEN SATURATION: 96 %

## 2024-10-29 DIAGNOSIS — G89.3 NEOPLASM RELATED PAIN: ICD-10-CM

## 2024-10-29 DIAGNOSIS — R11.0 NAUSEA: ICD-10-CM

## 2024-10-29 DIAGNOSIS — C76.0 HEAD AND NECK CANCER (HCC): ICD-10-CM

## 2024-10-29 DIAGNOSIS — Z51.5 PALLIATIVE CARE BY SPECIALIST: Primary | ICD-10-CM

## 2024-10-29 DIAGNOSIS — R63.0 LACK OF APPETITE: ICD-10-CM

## 2024-10-29 DIAGNOSIS — C76.0 HEAD AND NECK CANCER (HCC): Primary | ICD-10-CM

## 2024-10-29 DIAGNOSIS — R53.83 FATIGUE DUE TO TREATMENT: ICD-10-CM

## 2024-10-29 LAB
ANION GAP SERPL CALC-SCNC: 3 MMOL/L (ref 0–18)
BASOPHILS # BLD AUTO: 0.01 X10(3) UL (ref 0–0.2)
BASOPHILS NFR BLD AUTO: 0.2 %
BILIRUB UR QL STRIP.AUTO: NEGATIVE
BUN BLD-MCNC: 29 MG/DL (ref 9–23)
CALCIUM BLD-MCNC: 9 MG/DL (ref 8.7–10.4)
CHLORIDE SERPL-SCNC: 105 MMOL/L (ref 98–112)
CLARITY UR REFRACT.AUTO: CLEAR
CO2 SERPL-SCNC: 30 MMOL/L (ref 21–32)
CREAT BLD-MCNC: 1.48 MG/DL
EGFRCR SERPLBLD CKD-EPI 2021: 35 ML/MIN/1.73M2 (ref 60–?)
EOSINOPHIL # BLD AUTO: 0.06 X10(3) UL (ref 0–0.7)
EOSINOPHIL NFR BLD AUTO: 1.1 %
ERYTHROCYTE [DISTWIDTH] IN BLOOD BY AUTOMATED COUNT: 19.9 %
GLUCOSE BLD-MCNC: 207 MG/DL (ref 70–99)
GLUCOSE UR STRIP.AUTO-MCNC: NORMAL MG/DL
HCT VFR BLD AUTO: 30.5 %
HGB BLD-MCNC: 9.6 G/DL
IMM GRANULOCYTES # BLD AUTO: 0.04 X10(3) UL (ref 0–1)
IMM GRANULOCYTES NFR BLD: 0.7 %
KETONES UR STRIP.AUTO-MCNC: NEGATIVE MG/DL
LEUKOCYTE ESTERASE UR QL STRIP.AUTO: NEGATIVE
LYMPHOCYTES # BLD AUTO: 0.54 X10(3) UL (ref 1–4)
LYMPHOCYTES NFR BLD AUTO: 9.6 %
MCH RBC QN AUTO: 25.8 PG (ref 26–34)
MCHC RBC AUTO-ENTMCNC: 31.5 G/DL (ref 31–37)
MCV RBC AUTO: 82 FL
MONOCYTES # BLD AUTO: 0.46 X10(3) UL (ref 0.1–1)
MONOCYTES NFR BLD AUTO: 8.1 %
NEUTROPHILS # BLD AUTO: 4.54 X10 (3) UL (ref 1.5–7.7)
NEUTROPHILS # BLD AUTO: 4.54 X10(3) UL (ref 1.5–7.7)
NEUTROPHILS NFR BLD AUTO: 80.3 %
NITRITE UR QL STRIP.AUTO: NEGATIVE
OSMOLALITY SERPL CALC.SUM OF ELEC: 298 MOSM/KG (ref 275–295)
PH UR STRIP.AUTO: 7 [PH] (ref 5–8)
PLATELET # BLD AUTO: 188 10(3)UL (ref 150–450)
POTASSIUM SERPL-SCNC: 3.7 MMOL/L (ref 3.5–5.1)
PROT UR STRIP.AUTO-MCNC: NEGATIVE MG/DL
RBC # BLD AUTO: 3.72 X10(6)UL
RBC UR QL AUTO: NEGATIVE
SODIUM SERPL-SCNC: 138 MMOL/L (ref 136–145)
SP GR UR STRIP.AUTO: 1.02 (ref 1–1.03)
UROBILINOGEN UR STRIP.AUTO-MCNC: NORMAL MG/DL
WBC # BLD AUTO: 5.7 X10(3) UL (ref 4–11)

## 2024-10-29 PROCEDURE — 85025 COMPLETE CBC W/AUTO DIFF WBC: CPT

## 2024-10-29 PROCEDURE — G2211 COMPLEX E/M VISIT ADD ON: HCPCS | Performed by: INTERNAL MEDICINE

## 2024-10-29 PROCEDURE — 99215 OFFICE O/P EST HI 40 MIN: CPT | Performed by: INTERNAL MEDICINE

## 2024-10-29 PROCEDURE — 96375 TX/PRO/DX INJ NEW DRUG ADDON: CPT

## 2024-10-29 PROCEDURE — 77386 HC IMRT COMPLEX: CPT | Performed by: RADIOLOGY

## 2024-10-29 PROCEDURE — 81003 URINALYSIS AUTO W/O SCOPE: CPT

## 2024-10-29 PROCEDURE — 96413 CHEMO IV INFUSION 1 HR: CPT

## 2024-10-29 PROCEDURE — 99214 OFFICE O/P EST MOD 30 MIN: CPT | Performed by: NURSE PRACTITIONER

## 2024-10-29 PROCEDURE — 80048 BASIC METABOLIC PNL TOTAL CA: CPT

## 2024-10-29 RX ORDER — PALONOSETRON 0.05 MG/ML
0.25 INJECTION, SOLUTION INTRAVENOUS ONCE
Status: COMPLETED | OUTPATIENT
Start: 2024-10-29 | End: 2024-10-29

## 2024-10-29 RX ORDER — PALONOSETRON 0.05 MG/ML
0.25 INJECTION, SOLUTION INTRAVENOUS ONCE
Status: CANCELLED
Start: 2024-10-29 | End: 2024-10-29

## 2024-10-29 RX ORDER — FENTANYL 50 UG/1
1 PATCH TRANSDERMAL
Qty: 10 PATCH | Refills: 0 | Status: SHIPPED | OUTPATIENT
Start: 2024-10-29

## 2024-10-29 RX ADMIN — PALONOSETRON 0.25 MG: 0.05 INJECTION, SOLUTION INTRAVENOUS at 09:47:00

## 2024-10-29 NOTE — PROGRESS NOTES
Patient here for follow-up and planned C1D36 treatment. States she was feeling well up until this morning. Started having nausea and vomiting this morning. Took zofran and compazine. Appetite levels are poor. Has pain to her neck, back, and her entire right side. Thinks she may have another UTI. Took dilaudid this morning. States she is feeling weaker. Still having a lot of pain with swallowing.

## 2024-10-29 NOTE — PROGRESS NOTES
[facility] Note    Visit Diagnosis:  1. Head and neck cancer (HCC)        History of Present Illness: 83-year-old female with a past medical history of: A-fib on Eliquis, urothelial carcinoma, CKD 3, GERD and diabetes was referred by Dr. Fall to discuss a poorly differentiated carcinoma.     -Cystoscopy June 2024 with NAD.  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    -MRI cervical spine on 7/31/2024 incidentally noted a left lower neck mass measuring 4.5 x 3.5 cm.    -CT neck on 8/1/2024 4.2 x 2.9 x 4.2 cm left supraclavicular mass.    -Met with ENT,  and patient-tonsils were surgically absent on exam.    -Left neck mass biopsy on 8/5/2024 showed a poorly differentiated carcinoma with focal neuroendocrine differentiation.  IHC was positive for cytokeratin AE 1/3, p16, p63 and weakly positive for synaptophysin  .  -PET/CT on 8/21/24: focal marked metabolic activity in the left supraclavicular mass.  This mass is 4.9 x 4.2 cm with an SUV of 8.8.  No other lesions. LLL atelectasis or PNA    -She has a poor appetite but her daughter gets her to eat.  She uses her walker and here for most of her ADLs.  She has a history of smoking 1 pack/day for 40 years but quit about 25 years ago.  She was with her daughter Petty who is involved with her care. She is following with palliative care due to significant pain.     -Tumor board 8/28/24: Case reviewed in tumor board. PET does not show evidence of distant disease. Concern for p16+ head and neck cancer vs. Cervical esophageal cancer. EGD recommended. ENT eval recommended. We will plan on definitive treatment with chemoradiation with weekly Carbo AUC 2 + Taxol 50 mg/m2.     -ENT Eval with Dr. Villalta on 8/28/244: Flexible laryngoscopy was unremarkable     -Chemoradiation with weekly carbo AUC 2 + Taxol 50 mg/m2: 9/24/24-Current   -Week 1: 9/24/24-Carbo/Taxol   -Week 2:  10/1/24-Carbo/taxol   -Week 3: 10/8/24: NO CHEMOTHERAPY-stopped for side effects   -Week 4: 10/15/24: Carbo only   -Week 5: 10/22/24: Carbo only   -Week 6: 10/29/24: Carbo only     Interval History  -Week 6 of treatment  -Labs from 10/22/24-Cr 1.39, HB 9.5, WBC 7.3, Plt 233  -recently finished her abx for her UTI  -She was doing well this week but felt more nauseated this AM. Had mild neck and abnormal pain. Feels strong enough for chemotherapy  -May have some more urinary frequency and flank pain also     Review of Systems: 12 Point ROS was completed and pertinent positives are in the HPI        Past Medical History:    Arrhythmia    Back pain    Bladder cancer (HCC)    high grade superficial TCC, recurrent July 2020 after BCG    Bloating    Blurred vision    Cancer (HCC)    Congestive heart disease (HCC)    Constipation    Diabetes (HCC)    IDDM     Diabetes mellitus (HCC)    Diarrhea, unspecified    CLARKE (dyspnea on exertion)    Easy bruising    Elevated cholesterol    Essential hypertension    Exposure to medical diagnostic radiation    Fatigue    Flatulence/gas pain/belching    Frequent urination    taking furosemide daily    Headache disorder    Hearing loss    Heart palpitations    Heartburn    occasionally    High blood pressure    High cholesterol    History of falling    Hyperlipidemia    Irregular bowel habits    Itch of skin    right hand only    Leaking of urine    sometimes    Leg swelling    Osteoporosis    Sleep disturbance    Stool incontinence    when I have diarrhea    Type 2 diabetes mellitus with hyperglycemia (HCC)    Visual impairment    glasses    Wears glasses     Past Surgical History:   Procedure Laterality Date    Cataract      Cervical spine surgery  10/06/2023    Colonoscopy  2019    Colonoscopy      Cystoscopy,insert ureteral stent      Cystourethroscopy  11/25/2020    Cystoscopy Procedure Dr Josh Hernandez  - recurrent tumor on BTS    Cystourethroscopy,biopsy  12/16/2019    BBx of scar  sites, NSC    Cystourethroscopy,fulgur .5-2cm lesn  2020    TURBT, NSC    Cystourethroscopy,fulgur .5-2cm lesn  2020    TURBT with Gemcitabine    Cystourethroscopy,fulgur >5cm lesn  10/03/2019    TURBT with bilateral RPGs ([facility])    Fracture surgery      left wrist    Hysterectomy      age 31    Ndsc ablation & rcnstj atria lmtd w/o bypass      Other surgical history  2021    BTS CystDr David carter    Other surgical history  2021    BTS Cysto Caterize Dr. Hernandez    Other surgical history  2021    BTS Cysto W/ caterigayathri Hernandez     Other surgical history  2022    BTS Cysto- Dr. Hernandez    Spine surgery procedure unlisted  10/06/2003    CERVICAL    Tonsillectomy  1946    Total abdom hysterectomy  1971     Social History     Socioeconomic History    Marital status:     Number of children: 3   Occupational History    Occupation: RETIRED   Tobacco Use    Smoking status: Former     Current packs/day: 0.00     Average packs/day: 1.1 packs/day for 65.3 years (73.5 ttl pk-yrs)     Types: Cigarettes     Quit date: 2007     Years since quittin.3    Smokeless tobacco: Never    Tobacco comments:     quit 15 years    Vaping Use    Vaping status: Never Used   Substance and Sexual Activity    Alcohol use: No    Drug use: No    Sexual activity: Not Currently     Partners: Male      Family History   Problem Relation Age of Onset    Diabetes Father     Stroke Father     Colon Polyps Mother     Diabetes Mother     Heart Attack Mother     Other (Other) Son         Afib       Physical Exam  Height: 163.8 cm (5' 4.49\") (10/29 0800)  Weight: 64.7 kg (142 lb 9.6 oz) (10/29 0800)  BSA (Calculated - sq m): 1.7 sq meters (10/29 0800)  Pulse: 107 (10/29 0800)  BP: 107/54 (10/29 0800)  Temp: 97.8 °F (36.6 °C) (10/29 0800)  Do Not Use - Resp Rate: --  SpO2: 96 % (10/29 0800)    General: NAD, AOX3  HEENT: clear op, mmm, no jvd, no scleral icterus  Small L supraclavicular fullness   CV: RRR S1S2 no  murmurs  Extremities: No edema   Lungs: CTAB, no increased work of breathing  Abd: soft nt nd +BS no hepatosplenomegaly  Neuro: CN: II-XII grossly intact    Results:  Lab Results   Component Value Date    WBC 5.7 10/29/2024    HGB 9.6 (L) 10/29/2024    HCT 30.5 (L) 10/29/2024    MCV 82.0 10/29/2024    .0 10/29/2024     Lab Results   Component Value Date     10/29/2024    K 3.7 10/29/2024    CO2 30.0 10/29/2024     10/29/2024    BUN 29 (H) 10/29/2024    PHOS 4.4 12/27/2023    ALB 3.4 10/10/2024       No results found for: \"LDH\"    Radiology:   PET/CT  CONCLUSION:    1. Focal marked increased metabolic activity is associated with the left supraclavicular mass which has undergone previous biopsy.   2. There are no other metabolically active lesions detected.   3. Dependent consolidation left lower lobe and left effusion are noted.  There is only low-level metabolic activity associated with this finding which is most likely related to the atelectasis or pneumonia.   4. Incidental CT findings are described above.       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     Electronically signed by Goldberg, Cathryn A, MD on 8/12/2024 at 1040        Final Diagnosis Comment      The tumor is composed of sheets of malignant cells with pleomorphic nuclei and variable amounts of eosinophilic cytoplasm.  There are areas of necrosis.     Immunohistochemistry is performed to evaluate the tumor phenotype.  The malignant cells are positive for cytokeratin AE 1/3, p16, p63 and weakly positive for synaptophysin.  A rare malignant cell stains with p40.  They are negative for all other markers tested.     The morphologic and immunohistochemical findings are consistent with a poorly differentiated carcinoma with focal neuroendocrine differentiation.  Dr. Eden has reviewed the case and concurs with the above diagnosis.         Assessment  and Plan:  Left supraclavicular poorly differentiated carcinoma, p16 positive  -Her PET/CT does not show evidence of distant disease or tumor origin.  It is relatively rare for head and neck cancer to metastasize without any cervical lymphadenopathy or primary lesion (her tonsils are out). ENT evaluation was unremarkable.   -Tempus: TMB 6.3, BINU, PDL1 CPS 3, Somatic YOLANDA mutation  -She did not tolerated combined carbo/taxol so taxol was dropped   -Feeling a bit more nauseated today but strong enough for treatment     Plan  -continue with chemoRT-will DR Small to AUC 1.5  -PET/CT 12 weeks post treatment    Hyperglycemia: following with PCP. On insulin. Dex premedication was lowered to 10 mg     HTN: Better today     Cancer related Pain: following with palliative. 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    Urinary frequency: check UA. If persistent bacteriuria, check US Kidney     [facility]

## 2024-10-29 NOTE — PROGRESS NOTES
Pt here for C36D1 Drug(s)carboplatin.  Arrives Via wheelchair, accompanied by Family member     Patient was evaluated today by MD.    Oral medications included in this regimen:  no    Patient confirms comprehension of cancer treatment schedule:  yes    Pregnancy screening:  Denies possibility of pregnancy    Modifications in dose or schedule:  No    Medications appearance and physical integrity checked by RN: yes.    Chemotherapy IV pump settings verified by 2 RNs:  Yes.  Frequency of blood return and site check throughout administration: Prior to administration and At completion of therapy     Infusion/treatment outcome:  patient tolerated treatment without incident    Education Record    Learner:  Patient  Barriers / Limitations:  None  Method:  Discussion  Education / instructions given:  plan of care  Outcome:  Shows understanding    Discharged Home, Via wheelchair, accompanied by:Family member    Patient/family verbalized understanding of future appointments: by [employer] messaging    Patient in treatment center for labs, MD, and treatment. Patient tolerated well and left in stable condition. Future appointment in place.

## 2024-10-29 NOTE — PROGRESS NOTES
Palliative Care Follow Up Note     Patient Name: Anastasia Hobson   YOB: 1940   Medical Record Number: [id number]   [facility]: [id number]   Date of visit: 10/29/2024     Chief Complaint/Reason for Visit:  Follow up visit for symptoms     History of Present Illness:         Anastasia Hobson is a 84 year old female with head and neck cancer receiving chemo/RT.  She will complete RT next week.   Her pain is well controlled since increasing fentanyl patch.  She is no longer using dilaudid or acetaminophen and is sleeping at night.   She is so tolerating this well and she and her daughter are grateful for the pain control and ability to sleep at night.   She continues to have some pain with swallowing which she feels is tolerable.  She is eating softer foods and supplementing with 2 boosts daily.    She doesn't feel the gabapentin is offering much benefit.   The viscous lidocaine helps slightly with swallowing.   She still complains of fatigue and is napping during the day but easily arouses and can tolerate activity when needed.    She denies constipation or other symptoms.   She is very happy with current pain control.       Problem List:  Patient Active Problem List   Diagnosis    Osteoarthritis of spine with radiculopathy, cervical region    Primary hypertension    Type 2 diabetes mellitus without complication, with long-term current use of insulin (HCC)    Mixed hyperlipidemia    Arthrodesis status    Tremor of unknown origin    Personal history of colonic polyps    Malignant neoplasm of lateral wall of urinary bladder (HCC)    Stage 3b chronic kidney disease (HCC)    Atrial fibrillation with RVR (HCC)    Advance care planning    Acute pulmonary edema (HCC)    Other constipation    Functional diarrhea    Type 2 diabetes mellitus with diabetic chronic kidney disease (HCC)    At high risk for falls    Hypoglycemia    Chronic bronchitis, unspecified chronic bronchitis type (HCC)    Persistent atrial  fibrillation (HCC)    Hyperlipidemia    Unspecified atrial fibrillation (HCC)    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)    Urge incontinence    Heart failure, unspecified (HCC)    Chronic kidney disease, stage 3b (HCC)    Type 2 diabetes mellitus with stage 2 chronic kidney disease, without long-term current use of insulin (HCC)    Long term (current) use of oral hypoglycemic drugs    Personal history of malignant neoplasm of bladder    Pure hypercholesterolemia, unspecified    Mass of left side of neck    Azotemia    Type 2 diabetes mellitus with hyperglycemia, unspecified whether long term insulin use (HCC)    Anticoagulated    Atrial fibrillation, chronic (HCC)    Palliative care by specialist    Head and neck cancer (HCC)      Medical History:  Past Medical History:    Arrhythmia    Back pain    Bladder cancer (HCC)    high grade superficial TCC, recurrent July 2020 after BCG    Bloating    Blurred vision    Cancer (HCC)    Congestive heart disease (HCC)    Constipation    Diabetes (HCC)    IDDM     Diabetes mellitus (HCC)    Diarrhea, unspecified    CLARKE (dyspnea on exertion)    Easy bruising    Elevated cholesterol    Essential hypertension    Exposure to medical diagnostic radiation    Fatigue    Flatulence/gas pain/belching    Frequent urination    taking furosemide daily    Headache disorder    Hearing loss    Heart palpitations    Heartburn    occasionally    High blood pressure    High cholesterol    History of falling    Hyperlipidemia    Irregular bowel habits    Itch of skin    right hand only    Leaking of urine    sometimes    Leg swelling    Osteoporosis    Sleep disturbance    Stool incontinence    when I have diarrhea    Type 2 diabetes mellitus with hyperglycemia (HCC)    Visual impairment    glasses    Wears glasses     Surgical History:  Past Surgical History:   Procedure Laterality Date    Cataract      Cervical spine surgery  10/06/2023    Colonoscopy  2019    Colonoscopy       Cystoscopy,insert ureteral stent      Cystourethroscopy  11/25/2020    Cystoscopy Procedure Dr Josh Hernandez  - recurrent tumor on BTS    Cystourethroscopy,biopsy  12/16/2019    BBx of scar sites, NSC    Cystourethroscopy,fulgur .5-2cm lesn  04/16/2020    TURBT, NSC    Cystourethroscopy,fulgur .5-2cm lesn  07/30/2020    TURBT with Gemcitabine    Cystourethroscopy,fulgur >5cm lesn  10/03/2019    TURBT with bilateral RPGs ([facility])    Fracture surgery      left wrist    Hysterectomy      age 31    Ndsc ablation & rcnstj [facility] w/o bypass      Other surgical history  04/20/2021    BTS Cysto, Dr Hernandez    Other surgical history  08/17/2021    BTS Cysto Caterize Dr. Hernandez    Other surgical history  11/16/2021    BTS Cysto W/ caterize Dr. Hernandez     Other surgical history  03/08/2022    BTS Cysto- Dr. Hernandez    Spine surgery procedure unlisted  10/06/2003    CERVICAL    Tonsillectomy  1946    Total abdom hysterectomy  1971       Allergies:  Allergies   Allergen Reactions    Morphine CONFUSION       Palliative Care Social History:    Marital Status:   Children:  son and daughter  Living Situation: she lives with her daughter, Petty.    She uses a walker around home and wheelchair outside due to endurance issues and balance      Medications:      Review of Systems:  General:  Fatigue.  pain  Respiratory:  Denies SOB, denies cough  Cardiac:  Denies chest pain, heart palpitations  Abdomen:  Denies constipation, diarrhea.  Denies pain.    Psych:  No complaints.  Not Sleeping well    Palliative Performance Scale:   70%    Physical Examination:  General: Patient is alert and oriented, not in acute distress.  Respiratory: Normal excursions and effort.  Cough  Abdomen: Soft, non tender   Musculoskeletal:  sitting in wheelchair  Psych:  Mood/Affect appropriate    Advanced Directives Discussed and Completed:     HCPOA/Health Surrogate:    There is a completed HCPOA documentation on file in Epic.    I confirmed that patient's HCPOA is:  Petty, her daughter    DINAH Discussed and Completed:  There is a Scanned POLST in EMR indicating DNAR/Selective  She is willing to continue treatment as long as QOL is maintained.  She is feeling better now and is happy treatment is almost completed    Palliative Care:  Current plan is effective so will continue for the next month and evaluate healing and pain level post treatment completion.  The goal is wean off gabapentin and opioids as healing progresses.  She wants to reduce gabapentin so will eliminate morning dose and see how she does.      Impression/Plan:   1. Neoplasm related pain  Fentanyl 50mcg Q 72   Dilaudid 4mg Q 4 prn  Gabapentin 300mg daily  Acetaminophen 1G TID prn  Viscous lidocaine    2. Fatigue  Increase activity  Sunlight  Minimize daytime naps    3. Appetite  Soft foods  Small frequent meals  Protein supplements  Pain control    4. Nausea  Zofran 8mg TID prn    5. Palliative care by specialist  Ongoing support    6. Head and neck cancer (HCC)      Planned Follow up: Return in about 1 month (around 11/29/2024).      I spent a total of 30 minutes with the patient today, which included all of the following:direct face to face contact, history taking, physical examination, and >50% was spent counseling and coordinating care      The 21st Century Cures Act makes medical notes like these available to patients in the interest of transparency. Please be advised this is a medical document. Medical documents are intended to carry relevant information, facts as evident, and the clinical opinion of the practitioner. The medical note is intended as peer to peer communication and may appear blunt or direct. It is written in medical language and may contain abbreviations or verbiage that are unfamiliar.        Electronically Signed by:  RUIZ CHINCHILLA Outpatient Palliative Nurse Practitioner

## 2024-10-30 PROCEDURE — 77386 HC IMRT COMPLEX: CPT | Performed by: RADIOLOGY

## 2024-10-30 NOTE — PROGRESS NOTES
[facility] Radiation Treatment Management Note 26-30    Patient:  Anastasia Hobson  Age:  84 year old  Visit Diagnosis:    1. 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 Bella Cat    Site Delivered Dose (cGy) Prescribed Dose (cGy) Fraction #   LEFT NECK 4800 4800 24/24   L NECK BOOST 600 1800 3/7     First treatment date:  9/24/24  Concurrent chemotherapy: WEEKLY CHEMO (Tuesdays)        10/24/2024     7:38 AM 10/29/2024     8:00 AM 10/31/2024     7:59 AM   Oncology Vitals   Height  5' 4.488\"    Height  164 cm    Weight 147 lb 11.2 oz 142 lb 9.6 oz 146 lb 8 oz   Weight 66.996 kg 64.683 kg 66.452 kg   BSA (m2) 1.73 m2 1.7 m2 1.72 m2   BMI 24.97 kg/m2 24.11 kg/m2 24.77 kg/m2   /69 107/54 121/72   Pulse 66 107 64   Resp 20 18 18   Temp 96.6 °F (35.9 °C) 97.8 °F (36.6 °C) 98.9 °F (37.2 °C)   SpO2 98 % 96 % 98 %   Pain Score 0 3 0      Toxicities:  Fatigue Grade 1= Fatigue relieved by rest  Dermatitis associated with radiation Grade 2= Moderate to brisk erythema, patchy moist desquamation mostly confined to skin folds and creases; moderate edema  Moisturizer used Aquaphor Number of times: 2 times daily.  Dysphagia Grade 1=  Symptomatic, able to eat regular diet  Dyspepsia Grade 0= None  Mucositis Grade 0= None  Constipation Grade 0= None  Diarrhea  Grade 0= None  Nausea Grade 1= Loss of appetite without alteration in eating habits   Vomiting Grade 1= Intervention not indicated  Xerostomia Present=Yes     Nursing Note:  Recent Labs   Lab 10/29/24  0803   RBC 3.72*   HGB 9.6*   HCT 30.5*   MCV 82.0   MCH 25.8*   MCHC 31.5   RDW 19.9   NEPRELIM 4.54   WBC 5.7   .0     Pt feeling well today  Denies pain right now  Has fentanyl patch  Rarely using dilaudid  Skin red with small dry spot  Using mometasone and aquaphor  Doing ok with swallowing  Uses lidocaine solution before eating    Wt Readings from Last 6 Encounters:   10/31/24 66.5 kg (146 lb  8 oz)   10/29/24 64.7 kg (142 lb 9.6 oz)   10/24/24 67 kg (147 lb 11.2 oz)   10/22/24 65 kg (143 lb 6.4 oz)   10/17/24 70.6 kg (155 lb 11.2 oz)   10/15/24 71.4 kg (157 lb 6.4 oz)      Monet MARCANO RN MD NOTE   Reviewed and agree with RN note above.  Setup imaging reviewed in [facility] and approved.    S:  -pain controlled; neck pain a lot better    O:  -L neck mass responding  -redness, not bad  No edema    A/P:  -finish out RT  -f/u in Jan  -PET in Feb  -wean pain meds per PCS      Bella Cat MD  Radiation Oncology

## 2024-10-31 ENCOUNTER — HOSPITAL ENCOUNTER (OUTPATIENT)
Dept: RADIATION ONCOLOGY | Facility: HOSPITAL | Age: 84
Discharge: HOME | End: 2024-10-31
Attending: RADIOLOGY
Payer: MEDICARE

## 2024-10-31 VITALS
RESPIRATION RATE: 18 BRPM | DIASTOLIC BLOOD PRESSURE: 72 MMHG | SYSTOLIC BLOOD PRESSURE: 121 MMHG | BODY MASS INDEX: 25 KG/M2 | OXYGEN SATURATION: 98 % | WEIGHT: 146.5 LBS | TEMPERATURE: 99 F | HEART RATE: 64 BPM

## 2024-10-31 DIAGNOSIS — C77.0 SECONDARY MALIGNANT NEOPLASM OF LYMPH NODES OF NECK (HCC): Primary | ICD-10-CM

## 2024-10-31 PROCEDURE — 77386 HC IMRT COMPLEX: CPT | Performed by: RADIOLOGY

## 2024-10-31 NOTE — PATIENT INSTRUCTIONS
- WE WILL CALL TO SCHEDULE YOU FOR A FOLLOW-UP WITH DR. RAMIREZ IN JANUARY 2025  - CALL CENTRAL SCHEDULING TO SCHEDULE PET IN 3 MONTHS (END OF JANUARY)- [phone]  - SIDE EFFECTS OF RADIATION WILL GRADUALLY SUBSIDE. IT MAY TAKE 1- 2 WEEKS POST-RADIATION FOR YOU TO NOTICE CHANGES SUCH AS A DECREASE IN YOUR FATIGUE LEVEL.   - CONTINUE WITH SKIN CARE: APPLY MILD CREAM TO TREATED AREA 2-3X/DAY, DO NOT USE HOT/COLD PACKS DIRECTLY ON SITE, USE MILD SOAP/DEODORANT TO AFFECTED AREA, KEEP OPEN TO AIR WHEN AT HOME.   - CALL THE NURSE LINE AT [phone] IF YOU HAVE ANY QUESTIONS/CONCERNS REGARDING RADIATION THERAPY.

## 2024-11-01 ENCOUNTER — HOSPITAL ENCOUNTER (OUTPATIENT)
Dept: RADIATION ONCOLOGY | Facility: HOSPITAL | Age: 84
Discharge: HOME | End: 2024-11-01
Attending: RADIOLOGY
Payer: MEDICARE

## 2024-11-01 PROCEDURE — 77336 RADIATION PHYSICS CONSULT: CPT | Performed by: RADIOLOGY

## 2024-11-01 PROCEDURE — 77386 HC IMRT COMPLEX: CPT | Performed by: RADIOLOGY

## 2024-11-04 PROCEDURE — 77386 HC IMRT COMPLEX: CPT | Performed by: RADIOLOGY

## 2024-11-05 ENCOUNTER — OFFICE VISIT (OUTPATIENT)
Dept: HEMATOLOGY/ONCOLOGY | Facility: HOSPITAL | Age: 84
End: 2024-11-05
Attending: NURSE PRACTITIONER

## 2024-11-05 VITALS
HEART RATE: 70 BPM | HEIGHT: 64.49 IN | OXYGEN SATURATION: 100 % | RESPIRATION RATE: 18 BRPM | WEIGHT: 143 LBS | TEMPERATURE: 97 F | SYSTOLIC BLOOD PRESSURE: 116 MMHG | DIASTOLIC BLOOD PRESSURE: 59 MMHG | BODY MASS INDEX: 24.12 KG/M2

## 2024-11-05 DIAGNOSIS — K12.33 MUCOSITIS DUE TO RADIATION THERAPY: ICD-10-CM

## 2024-11-05 DIAGNOSIS — C76.0 HEAD AND NECK CANCER (HCC): Primary | ICD-10-CM

## 2024-11-05 DIAGNOSIS — T45.1X5A ANEMIA ASSOCIATED WITH CHEMOTHERAPY: ICD-10-CM

## 2024-11-05 DIAGNOSIS — E86.0 DEHYDRATION: ICD-10-CM

## 2024-11-05 DIAGNOSIS — D64.81 ANEMIA ASSOCIATED WITH CHEMOTHERAPY: ICD-10-CM

## 2024-11-05 DIAGNOSIS — N18.32 CHRONIC KIDNEY DISEASE, STAGE 3B (HCC): ICD-10-CM

## 2024-11-05 LAB
ANION GAP SERPL CALC-SCNC: 3 MMOL/L (ref 0–18)
BASOPHILS # BLD AUTO: 0.01 X10(3) UL (ref 0–0.2)
BASOPHILS NFR BLD AUTO: 0.2 %
BUN BLD-MCNC: 28 MG/DL (ref 9–23)
CALCIUM BLD-MCNC: 8.9 MG/DL (ref 8.7–10.4)
CHLORIDE SERPL-SCNC: 104 MMOL/L (ref 98–112)
CO2 SERPL-SCNC: 30 MMOL/L (ref 21–32)
CREAT BLD-MCNC: 1.96 MG/DL
EGFRCR SERPLBLD CKD-EPI 2021: 25 ML/MIN/1.73M2 (ref 60–?)
EOSINOPHIL # BLD AUTO: 0.03 X10(3) UL (ref 0–0.7)
EOSINOPHIL NFR BLD AUTO: 0.7 %
ERYTHROCYTE [DISTWIDTH] IN BLOOD BY AUTOMATED COUNT: 21.6 %
GLUCOSE BLD-MCNC: 273 MG/DL (ref 70–99)
HCT VFR BLD AUTO: 30.3 %
HGB BLD-MCNC: 9.6 G/DL
IMM GRANULOCYTES # BLD AUTO: 0.01 X10(3) UL (ref 0–1)
IMM GRANULOCYTES NFR BLD: 0.2 %
LYMPHOCYTES # BLD AUTO: 0.55 X10(3) UL (ref 1–4)
LYMPHOCYTES NFR BLD AUTO: 13.4 %
MCH RBC QN AUTO: 26.3 PG (ref 26–34)
MCHC RBC AUTO-ENTMCNC: 31.7 G/DL (ref 31–37)
MCV RBC AUTO: 83 FL
MONOCYTES # BLD AUTO: 0.44 X10(3) UL (ref 0.1–1)
MONOCYTES NFR BLD AUTO: 10.7 %
NEUTROPHILS # BLD AUTO: 3.06 X10 (3) UL (ref 1.5–7.7)
NEUTROPHILS # BLD AUTO: 3.06 X10(3) UL (ref 1.5–7.7)
NEUTROPHILS NFR BLD AUTO: 74.8 %
OSMOLALITY SERPL CALC.SUM OF ELEC: 299 MOSM/KG (ref 275–295)
PLATELET # BLD AUTO: 191 10(3)UL (ref 150–450)
POTASSIUM SERPL-SCNC: 4.3 MMOL/L (ref 3.5–5.1)
RBC # BLD AUTO: 3.65 X10(6)UL
SODIUM SERPL-SCNC: 137 MMOL/L (ref 136–145)
WBC # BLD AUTO: 4.1 X10(3) UL (ref 4–11)

## 2024-11-05 PROCEDURE — 80048 BASIC METABOLIC PNL TOTAL CA: CPT

## 2024-11-05 PROCEDURE — 96360 HYDRATION IV INFUSION INIT: CPT

## 2024-11-05 PROCEDURE — 77386 HC IMRT COMPLEX: CPT | Performed by: RADIOLOGY

## 2024-11-05 PROCEDURE — G2211 COMPLEX E/M VISIT ADD ON: HCPCS | Performed by: CLINICAL NURSE SPECIALIST

## 2024-11-05 PROCEDURE — 85025 COMPLETE CBC W/AUTO DIFF WBC: CPT

## 2024-11-05 PROCEDURE — 99215 OFFICE O/P EST HI 40 MIN: CPT | Performed by: CLINICAL NURSE SPECIALIST

## 2024-11-05 RX ORDER — ONDANSETRON 2 MG/ML
8 INJECTION INTRAMUSCULAR; INTRAVENOUS AS NEEDED
Start: 2024-11-05

## 2024-11-05 RX ORDER — FUROSEMIDE 20 MG/1
TABLET ORAL
COMMUNITY
Start: 2024-11-03

## 2024-11-05 NOTE — PROGRESS NOTES
Cancer Center Progress Note    Patient Name: Anastasia Hobson   YOB: 1940   Medical Record Number: [id number]   CSN: [id number]   Date of visit: 11/5/2024  Attending Oncology Physician:  Jordon Reid    NOTE TO PATIENT:  The 21st Century Cures Act makes medical notes like these available to patients in the interest of transparency. Please be advised this is a medical document. Medical documents are intended to carry relevant information, facts as evident, and the clinical opinion of the practitioner. The medical note is intended as peer to peer communication and may appear blunt or direct. It is written in medical language and may contain abbreviations or verbiage that are unfamiliar.      Chief Complaint:  Follow up       Oncologic History:  -Cystoscopy June 2024 with NAD.  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     -MRI cervical spine on 7/31/2024 incidentally noted a left lower neck mass measuring 4.5 x 3.5 cm.     -CT neck on 8/1/2024 4.2 x 2.9 x 4.2 cm left supraclavicular mass.     -Met with ENT,  and patient-tonsils were surgically absent on exam.     -Left neck mass biopsy on 8/5/2024 showed a poorly differentiated carcinoma with focal neuroendocrine differentiation.  IHC was positive for cytokeratin AE 1/3, p16, p63 and weakly positive for synaptophysin  .  -PET/CT on 8/21/24: focal marked metabolic activity in the left supraclavicular mass.  This mass is 4.9 x 4.2 cm with an SUV of 8.8.  No other lesions. LLL atelectasis or PNA     -She has a poor appetite but her daughter gets her to eat.  She uses her walker and here for most of her ADLs.  She has a history of smoking 1 pack/day for 40 years but quit about 25 years ago.  She was with her daughter Petty who is involved with her care. She is following with palliative care due to significant pain.      -Tumor board 8/28/24: Case reviewed in tumor board. PET does  not show evidence of distant disease. Concern for p16+ head and neck cancer vs. Cervical esophageal cancer. EGD recommended. ENT eval recommended. We will plan on definitive treatment with chemoradiation with weekly Carbo AUC 2 + Taxol 50 mg/m2.      -ENT Eval with Dr. Villalta on : Flexible laryngoscopy was unremarkable      -Chemoradiation with weekly carbo AUC 2 + Taxol 50 mg/m2: 24-Current               -Week 1: 24-Carbo/Taxol               -Week 2: 10/1/24-Carbo/taxol               -Week 3: 10/8/24: NO CHEMOTHERAPY-stopped for side effects   -Week 4: 10/15/24: Carbo AUC 2, discontinued Taxol     -Week 5: 10/22/24: Carbo only               -Week 6: 10/29/24: Carbo AUC 1.5 due to nausea    Interval Events:  84 year old  patient of Dr. Reid's  who presents for consideration of chemotherapy today.  She is having significant dysphagia and odynophagia.  She tries to drink but it is difficulty.  Finishes RT tomorrow.  Complains of severe skin pain within the RT field.  She complains of fatigue and general weakness.  Afebrile.  No N/V/D/C.  No urinary complaints.    Allergies:  Allergies[1]    Social History     Socioeconomic History    Marital status:     Number of children: 3   Occupational History    Occupation: RETIRED   Tobacco Use    Smoking status: Former     Current packs/day: 0.00     Average packs/day: 1.1 packs/day for 65.3 years (73.5 ttl pk-yrs)     Types: Cigarettes     Quit date: 2007     Years since quittin.3    Smokeless tobacco: Never    Tobacco comments:     quit 15 years    Vaping Use    Vaping status: Never Used   Substance and Sexual Activity    Alcohol use: No    Drug use: No    Sexual activity: Not Currently     Partners: Male        Past Medical History:    Arrhythmia    Back pain    Bladder cancer (HCC)    high grade superficial TCC, recurrent 2020 after BCG    Bloating    Blurred vision    Cancer (HCC)    Congestive heart disease (HCC)    Constipation     Diabetes (HCC)    IDDM     Diabetes mellitus (HCC)    Diarrhea, unspecified    CLARKE (dyspnea on exertion)    Easy bruising    Elevated cholesterol    Essential hypertension    Exposure to medical diagnostic radiation    Fatigue    Flatulence/gas pain/belching    Frequent urination    taking furosemide daily    Headache disorder    Hearing loss    Heart palpitations    Heartburn    occasionally    High blood pressure    High cholesterol    History of falling    Hyperlipidemia    Irregular bowel habits    Itch of skin    right hand only    Leaking of urine    sometimes    Leg swelling    Osteoporosis    Sleep disturbance    Stool incontinence    when I have diarrhea    Type 2 diabetes mellitus with hyperglycemia (HCC)    Visual impairment    glasses    Wears glasses        Past Surgical History:   Procedure Laterality Date    Cataract      Cervical spine surgery  10/06/2023    Colonoscopy  2019    Colonoscopy      Cystoscopy,insert ureteral stent      Cystourethroscopy  11/25/2020    Cystoscopy Procedure Dr Josh Hernandez  - recurrent tumor on BTS    Cystourethroscopy,biopsy  12/16/2019    BBx of scar sites, NSC    Cystourethroscopy,fulgur .5-2cm lesn  04/16/2020    TURBT, NSC    Cystourethroscopy,fulgur .5-2cm lesn  07/30/2020    TURBT with Gemcitabine    Cystourethroscopy,fulgur >5cm lesn  10/03/2019    TURBT with bilateral RPGs (NSC)    Fracture surgery      left wrist    Hysterectomy      age 31    Ndsc ablation & rcnstj atria lmtd w/o bypass      Other surgical history  04/20/2021    BTS Dr David Loaiza    Other surgical history  08/17/2021    BTS Cysto Catmarquis Hernandez    Other surgical history  11/16/2021    BTS Cysto W/ caterigayathri Hernandez     Other surgical history  03/08/2022    BTS Cysto- Dr. Hernandez    Spine surgery procedure unlisted  10/06/2003    CERVICAL    Tonsillectomy  1946    Total abdom hysterectomy  1971          Vital Signs:   [ Day 43 ],  Cycle 1  11/05/24   Height 1.638 m (5' 4.49\")   Weight 64.9 kg (143  lb)   BSA (Calculated - sq m) 1.71 sq meters   BMI (Calculated) 24.18 kg/m2   /59   Pulse 70   BP Location Right arm   Patient Position Sitting   Temp 97.3 °F (36.3 °C)   Pain Score 0         Medications:      Generic Name Brand Name Strength Route/ Site Freq.   Acetaminophen (Cap) Acetaminophen 500 MG Oral Take 2 capsules (1,000 mg total) by mouth 3 (three) times daily as needed for Pain.      ALPRAZolam (Tab) Xanax 0.25 MG Oral Take 0.5 tablets (0.125 mg total) by mouth daily as needed for Anxiety. Take prior to radiation treatment      Amiodarone HCl (Tab) Pacerone 200 MG  TAKE ONE TABLET BY MOUTH ONCE DAILY. Needs appointment with cardiologist and EKG done for future refills.      Apixaban (Tab) Eliquis 5 MG Oral Take 1 tablet (5 mg total) by mouth 2 (two) times daily.      Atorvastatin Calcium (Tab) Lipitor 80 MG Oral Take 1 tablet (80 mg total) by mouth nightly.      Benzonatate (Cap) Tessalon 100 MG Oral Take 1 capsule (100 mg total) by mouth 3 (three) times daily as needed for cough.      Calcium Citrate-Vitamin D (Tab) CITRACAL-D 315-250 MG-UNIT Oral Take 1 tablet by mouth daily.      Cholecalciferol (Tab) Vitamin D3 250 MCG (68030 UT) Oral Take 1,000 Units by mouth daily.      Continuous Glucose  (Device) FreeStyle Shilo 2 Divide  Does not apply 1 Device daily as needed.      Continuous Glucose  (Device) FreeStyle Shilo 2 Divide  Does not apply 1 each daily.      Continuous Glucose Sensor (Misc) FreeStyle Shilo 2 Sensor  Subdermal 1 Device by Subdermal route every 14 (fourteen) days.      dilTIAZem HCl ER Beads (Capsule SR 24 Hr) TIAZAC 120 MG Oral Take 1 capsule (120 mg total) by mouth daily.      Fenofibrate (Tab) Tricor 145 MG Oral Take 1 tablet (145 mg total) by mouth daily.      fentaNYL (Patch 72 Hr) Duragesic 50 MCG/HR Transdermal Place 1 patch onto the skin every third day.      Furosemide (Tab) Lasix 40 MG Oral Take 0.5 tablets (20 mg total) by mouth daily.      Gabapentin  (Cap) Neurontin 300 MG Oral Take 1 capsule (300 mg total) by mouth in the morning and 1 capsule (300 mg total) before bedtime.      hydrALAZINE HCl (Tab) Apresoline 50 MG  1 tablet (50 mg total) 3 (three) times daily.      HYDROmorphone HCl (Tab) Dilaudid 4 MG Oral Take 1 tablet (4 mg total) by mouth every 4 (four) hours as needed for Pain.      Insulin Glargine (Solution Pen-injector) Lantus SoloStar 100 UNIT/ML  INJECT 8 UNITS INTO THE SKIN NIGHTLY. *PEN EXPIRES 28 DAYS AFTER FIRST USE*      Insulin Lispro (Solution Pen-injector) Insulin Lispro (1 Unit Dial) 100 UNIT/ML  INJECT 10-12 UNITS INTO THE SKIN THREE TIMES DAILY WITH MEALS AS DIRECTED      Insulin Pen Needle (Misc) BD Pen Needle Sierra U/F 32G X 4 MM Subcutaneous Inject 1 Pen into the skin daily.      Lidocaine HCl (Solution) XYLOCAINE 2 %  Mix equal parts benadryl and maalox liquid and take 10 mL po qAC and PRN      Lisinopril (Tab) Prinivil; Zestril 20 MG Oral TAKE ONE TABLET BY MOUTH EVERY EVENING      methylPREDNISolone (Tablet Therapy Pack) Medrol Dosepak 4 MG Oral Take 1 tablet (4 mg total) by mouth As Directed.      Mometasone Furoate (Cream) ELOCON 0.1 %  Apply twice daily to affected area      Naloxone HCl (Liquid) Naloxone HCl 4 MG/0.1ML Nasal 4 mg by Nasal route as needed. If patient remains unresponsive, repeat dose in other nostril 2-5 minutes after first dose.      Ondansetron (Tablet Dispersible) Zofran-ODT 8 MG Oral Take 1 tablet (8 mg total) by mouth every 8 (eight) hours as needed for Nausea.      Potassium Chloride (Tab CR) Potassium Chloride ER 20 MEQ Oral Take 1 tablet by mouth daily.      Probiotic Product   Oral Take 1 capsule by mouth daily. 100 billion      Prochlorperazine Maleate (Tab) Compazine 10 mg Oral Take 1 tablet (10 mg total) by mouth every 6 (six) hours as needed for Nausea.      Risedronate Sodium (Tab) Actonel 35 MG Oral Take 1 tablet (35 mg total) by mouth every 7 days            Review of Systems:   As in  HPI    Physical Examination:  General: Awake, alert, oriented x3, no acute distress.    HEENT:  Anicteric, conjunctivae and sclerae clear, no sinus tenderness, no visible oropharyngeal lesion/thrush but oropharynx is erythematous, mucous membranes are moist.  Neck:  Skin change with tenderness to left neck from RT.  Supple, no tenderness, no masses or adenopathy  Lungs:  Clear to auscultation bilaterally  CV:  Regular rate and rhythm  Abdomen:  Non-distended, normoactive bowel sounds, soft,nontender.  Extremities:  No edema, no tenderness   Neuro:  CN 2-12 intact    ECO    Labs:   Latest Reference Range & Units 24 08:08   Glucose 70 - 99 mg/dL 273 (H)   Sodium 136 - 145 mmol/L 137   Potassium 3.5 - 5.1 mmol/L 4.3   Chloride 98 - 112 mmol/L 104   Carbon Dioxide, Total 21.0 - 32.0 mmol/L 30.0   BUN 9 - 23 mg/dL 28 (H)   CREATININE 0.55 - 1.02 mg/dL 1.96 (H)   CALCIUM 8.7 - 10.4 mg/dL 8.9   EGFR >=60 mL/min/1.73m2 25 (L)   ANION GAP 0 - 18 mmol/L 3   CALCULATED OSMOLALITY 275 - 295 mOsm/kg 299 (H)   Patient Fasting for BMP?  Patient not present   WBC 4.0 - 11.0 x10(3) uL 4.1   Hemoglobin 12.0 - 16.0 g/dL 9.6 (L)   Hematocrit 35.0 - 48.0 % 30.3 (L)   Platelet Count 150.0 - 450.0 10(3)uL 191.0   RBC 3.80 - 5.30 x10(6)uL 3.65 (L)   MCH 26.0 - 34.0 pg 26.3   MCHC 31.0 - 37.0 g/dL 31.7   MCV 80.0 - 100.0 fL 83.0   RDW % 21.6   Prelim Neutrophil Abs 1.50 - 7.70 x10 (3) uL 3.06   Neutrophils Absolute 1.50 - 7.70 x10(3) uL 3.06   Lymphocytes Absolute 1.00 - 4.00 x10(3) uL 0.55 (L)   Monocytes Absolute 0.10 - 1.00 x10(3) uL 0.44   Eosinophils Absolute 0.00 - 0.70 x10(3) uL 0.03   Basophils Absolute 0.00 - 0.20 x10(3) uL 0.01   Immature Granulocyte Absolute 0.00 - 1.00 x10(3) uL 0.01   Neutrophils % % 74.8   Lymphocytes % % 13.4   Monocytes % % 10.7   Eosinophils % % 0.7   Basophils % % 0.2   Immature Granulocyte % % 0.2   (H): Data is abnormally high  (L): Data is abnormally low    Impression/Plan:  Head and neck  cancer:  Finishes RT tomorrow  Discussed with Dr. Reid.  Given her dysphagia, will stop chemotherapy.  -PET/CT 12 weeks post treatment    Dehydartion:  Due to dysphagia.  Will hydrate with 1 liter 0.9NS      Anemia:  Due to chemo, CKD.  Overall stable.  .    CKD:  Creatinine is elevated due to lack of oral intake.  Fluids today, PRN.    Pain from RT induced pharyngitis:  Has Fentanyl patch on, PRN Dilaudid (not using therefore encouraged to take PRN for optimal relief).    Emotional Well Being:  I have assessed the patient's emotional well-being and any concerns about anxiety or depression.  No acute psychosocial intervention required at this time.    Patient will continue to receive [facility] at the [facility] for the complex care required for the cancer diagnosis including the expected complications related to anticancer therapy.    Risk level:  High-head and neck ca on chemotherapy, requiring intervention and close monitoring.    RUIZ Stokes  [facility]         [1]   Allergies  Allergen Reactions    Morphine CONFUSION

## 2024-11-05 NOTE — PROGRESS NOTES
Education Record    Learner:  Patient    Disease / Diagnosis:    Barriers / Limitations:  None   Comments:    Method:  Discussion   Comments:    General Topics:  Plan of care reviewed   Comments:    Outcome:  Shows understanding   Comments:    Patient in treatment center for labs, APN, and treatment. Per MD, no treatment today as tomorrow is last day of radiation. Patient scheduled to return for MD and labs in 5 weeks. Patient received 1L IVF. Pt aware of labs and left in stable condition.

## 2024-11-05 NOTE — PROGRESS NOTES
Pt here for C1D43 Carbo/Taxol. Pt states her current overall pain is 2/10. She states the pain while swallowing and worsened since last visit. Due to this pain, pt states she is not eating/drinking as much. Continues to have constant fatigue. Currently completing antibiotics for UTI. Pt states she is not having to use her PRN pain medications OTC.       Education Record    Learner:  Patient and Family Member    Disease / Diagnosis: head/neck cancer    Barriers / Limitations:  None   Comments:    Method:  Discussion   Comments:    General Topics:  Infection, Medication, Pain, Side effects and symptom management, and Plan of care reviewed   Comments:    Outcome:  Observed demonstration and Shows understanding   Comments:

## 2024-11-06 ENCOUNTER — DOCUMENTATION ONLY (OUTPATIENT)
Dept: RADIATION ONCOLOGY | Facility: HOSPITAL | Age: 84
End: 2024-11-06

## 2024-11-06 PROCEDURE — 77386 HC IMRT COMPLEX: CPT | Performed by: RADIOLOGY

## 2024-11-10 DIAGNOSIS — E11.22 TYPE 2 DIABETES MELLITUS WITH STAGE 3B CHRONIC KIDNEY DISEASE, WITH LONG-TERM CURRENT USE OF INSULIN (HCC): ICD-10-CM

## 2024-11-10 DIAGNOSIS — Z79.4 TYPE 2 DIABETES MELLITUS WITH STAGE 3B CHRONIC KIDNEY DISEASE, WITH LONG-TERM CURRENT USE OF INSULIN (HCC): ICD-10-CM

## 2024-11-10 DIAGNOSIS — N18.32 TYPE 2 DIABETES MELLITUS WITH STAGE 3B CHRONIC KIDNEY DISEASE, WITH LONG-TERM CURRENT USE OF INSULIN (HCC): ICD-10-CM

## 2024-11-12 DIAGNOSIS — C77.0 SECONDARY MALIGNANT NEOPLASM OF LYMPH NODES OF NECK (HCC): ICD-10-CM

## 2024-11-12 RX ORDER — BENZONATATE 100 MG/1
100 CAPSULE ORAL 3 TIMES DAILY PRN
Qty: 60 CAPSULE | Refills: 0 | Status: SHIPPED | OUTPATIENT
Start: 2024-11-12 | End: 2024-12-12

## 2024-11-12 RX ORDER — LIDOCAINE HYDROCHLORIDE 20 MG/ML
SOLUTION OROPHARYNGEAL
Qty: 200 ML | Refills: 3 | Status: SHIPPED | OUTPATIENT
Start: 2024-11-12

## 2024-11-12 NOTE — TELEPHONE ENCOUNTER
LIDOCAINE VISCOUS HCL 2 % Mouth/Throat Solution     Pt failed refill protocol for the following reasons:    Non-Narcotic Pain Medication Protocol [id number]/12/2024 07:13 AM   Protocol Details In person appointment or virtual visit in the past 6 mos or appointment in next 3 mos      Last refill: 10/8/2024, for #200 mL, 3 refill  Last appt: 2/1/2024  Last Px:1/17/2024  Next appt: N/A    Forward to Dr. Denae Prado, please advise on refills. Thank you.

## 2024-11-12 NOTE — TELEPHONE ENCOUNTER
Diabetes Medication Protocol Eiukfd98/10/2024 08:33 AM   Protocol Details In person appointment or virtual visit in the past 6 mos or appointment in next 3 mos    EGFRCR or GFRNAA > 50    Last A1C < 7.5 and within past 6 months    Microalbumin procedure in past 12 months or taking ACE/ARB    GFR in the past 12 months       Last OV 2/1/24 phone visit, 1/17/24 Emmanuel  Last lab:  11/5/24 BMP/GFR 25  7/31/24  A1c 6.9  11/15/23 microalbumin  Last refilled 9/25/24  15 ml  0 refill

## 2024-11-13 RX ORDER — INSULIN LISPRO 100 [IU]/ML
INJECTION, SOLUTION INTRAVENOUS; SUBCUTANEOUS
Qty: 15 ML | Refills: 0 | Status: SHIPPED | OUTPATIENT
Start: 2024-11-13

## 2024-11-13 NOTE — TELEPHONE ENCOUNTER
Your Appointments      Tuesday November 19, 2024 1:40 PM  Follow Up Visit with Houston Prado MD  [facility] ([facility]) [address] [address]  [phone]

## 2024-11-19 ENCOUNTER — OFFICE VISIT (OUTPATIENT)
Dept: FAMILY MEDICINE CLINIC | Facility: CLINIC | Age: 84
End: 2024-11-19
Payer: MEDICARE

## 2024-11-19 VITALS
BODY MASS INDEX: 23.04 KG/M2 | RESPIRATION RATE: 16 BRPM | DIASTOLIC BLOOD PRESSURE: 68 MMHG | WEIGHT: 143.38 LBS | HEIGHT: 66 IN | SYSTOLIC BLOOD PRESSURE: 118 MMHG | TEMPERATURE: 97 F

## 2024-11-19 DIAGNOSIS — I50.82 BIVENTRICULAR HEART FAILURE (HCC): ICD-10-CM

## 2024-11-19 DIAGNOSIS — N18.32 TYPE 2 DIABETES MELLITUS WITH STAGE 3B CHRONIC KIDNEY DISEASE, WITH LONG-TERM CURRENT USE OF INSULIN (HCC): Primary | ICD-10-CM

## 2024-11-19 DIAGNOSIS — E11.22 TYPE 2 DIABETES MELLITUS WITH STAGE 3B CHRONIC KIDNEY DISEASE, WITH LONG-TERM CURRENT USE OF INSULIN (HCC): Primary | ICD-10-CM

## 2024-11-19 DIAGNOSIS — Z79.4 TYPE 2 DIABETES MELLITUS WITH STAGE 3B CHRONIC KIDNEY DISEASE, WITH LONG-TERM CURRENT USE OF INSULIN (HCC): Primary | ICD-10-CM

## 2024-11-19 DIAGNOSIS — N18.4 STAGE 4 CHRONIC KIDNEY DISEASE (HCC): ICD-10-CM

## 2024-11-19 DIAGNOSIS — L89.322 PRESSURE ULCER OF LEFT BUTTOCK, STAGE 2 (HCC): ICD-10-CM

## 2024-11-19 DIAGNOSIS — F33.9 DEPRESSION, RECURRENT (HCC): ICD-10-CM

## 2024-11-19 PROBLEM — J42 CHRONIC BRONCHITIS, UNSPECIFIED CHRONIC BRONCHITIS TYPE (HCC): Status: RESOLVED | Noted: 2023-03-28 | Resolved: 2024-11-19

## 2024-11-19 PROBLEM — J81.0 ACUTE PULMONARY EDEMA (HCC): Status: RESOLVED | Noted: 2022-09-27 | Resolved: 2024-11-19

## 2024-11-19 LAB
CARTRIDGE EXPIRATION DATE: ABNORMAL DATE
CREAT UR-SCNC: 58.1 MG/DL
HEMOGLOBIN A1C: 7.1 % (ref 4.3–5.6)
MICROALBUMIN UR-MCNC: 0.3 MG/DL
MICROALBUMIN/CREAT 24H UR-RTO: 5.2 UG/MG (ref ?–30)

## 2024-11-19 PROCEDURE — 99214 OFFICE O/P EST MOD 30 MIN: CPT | Performed by: FAMILY MEDICINE

## 2024-11-19 PROCEDURE — 82570 ASSAY OF URINE CREATININE: CPT | Performed by: FAMILY MEDICINE

## 2024-11-19 PROCEDURE — 82043 UR ALBUMIN QUANTITATIVE: CPT | Performed by: FAMILY MEDICINE

## 2024-11-19 PROCEDURE — 83036 HEMOGLOBIN GLYCOSYLATED A1C: CPT | Performed by: FAMILY MEDICINE

## 2024-11-19 RX ORDER — INSULIN LISPRO 100 [IU]/ML
INJECTION, SOLUTION INTRAVENOUS; SUBCUTANEOUS
Qty: 15 ML | Refills: 0 | Status: SHIPPED | OUTPATIENT
Start: 2024-11-19

## 2024-11-19 NOTE — PROGRESS NOTES
[facility] RADIATION ONCOLOGY  TREATMENT SUMMARY    PATIENT:  Anastasia Hobson    REFERRING MD: GEOVANY Reid MD  DIAGNOSIS:  SCCA L low neck node, unknown primary site    HISTORY:  84-year-old non-smoker presents with a 4-5 cm fixed left supraclavicular mass and pain.  Biopsy reveals poorly differentiated carcinoma with focal neuroendocrine differentiation.  Positive for p16.  Weak positive for synaptophysin.  PET scan shows SUV 9 uptake limited to just the left low neck mass.  ENT evaluation did not reveal a primary site.  EGD also did not reveal a primary.  She was referred for definitive chemoradiation therapy to the neck mass.    DOSE DELIVERED:    Left neck   4800 cGy in 24 fractions   6 MV photons   VMAT with IGRT   9/24/2024 to 10/28/2024     Left neck boost   1400 cGy in 7 fractions   6 MV photons   VMAT with IGRT   10/29/2024 to 11/6/2024     Concurrent chemotherapy: Carboplatin/Taxol    CLINICAL COURSE:  She had a short hospital admission for shortness of breath and stridor type symptoms that improved with a short course of steroids.  The symptoms did not return.  With treatment, her left neck pain did improve.  Her throat pain was well-controlled.  She did not need a feeding tube and did not lose a whole lot of weight.  She had mild skin reaction with use of mometasone.  No wet desquamation occurred.    FOLLOW UP:  PET scan in 2/2025  Wean pain medication per palliative care service  Follow-up here in January  Follow-up medical oncology  Follow-up ENT    Bella Cat M.D.  Radiation Oncology    CC: GEOVANY Reid MD

## 2024-11-19 NOTE — PATIENT INSTRUCTIONS
150-200 take 2 units humalog  201-250 take 3 units  251-300 take 4 units  301-350 take 5 units  351-400 take 6 units  >401 take 7 units and if not lower after one hour call MD    Take latus 8 units daily in am      Make sure your blood sugar is around 250 before bed

## 2024-11-19 NOTE — PROGRESS NOTES
Chief Complaint   Patient presents with    Follow - Up    Diabetes     HPI  Patient is here for follow-up on diabetes.  She is finding that her blood sugars are dropping at night and she has been taking extra insulin just before she goes to bed if she notices her blood sugars are above 250.  I explained to her that she should not do this as she needs a higher p.m. blood sugars to get her through the night and she voices understanding and agreement.  Patient is also adjusting her Humalog to what ever she feels like and is not following a sliding scale.  A new sliding scale was provided for her and she was advised to only use this based on her blood sugar readings before food.  She was advised to check that she had a blood sugar reading of 250 before she goes to bed.  Patient advised to continue Lantus 850 daily which she agrees to do.    ROS  As per HPI and all other systems reviewed and are negative      Past Medical History:    Arrhythmia    Back pain    Bladder cancer (HCC)    high grade superficial TCC, recurrent July 2020 after BCG    Bloating    Blurred vision    Cancer (HCC)    Congestive heart disease (HCC)    Constipation    Diabetes (HCC)    IDDM     Diabetes mellitus (HCC)    Diarrhea, unspecified    CLARKE (dyspnea on exertion)    Easy bruising    Elevated cholesterol    Essential hypertension    Exposure to medical diagnostic radiation    Fatigue    Flatulence/gas pain/belching    Frequent urination    taking furosemide daily    Headache disorder    Hearing loss    Heart palpitations    Heartburn    occasionally    High blood pressure    High cholesterol    History of falling    Hyperlipidemia    Irregular bowel habits    Itch of skin    right hand only    Leaking of urine    sometimes    Leg swelling    Osteoporosis    Sleep disturbance    Stool incontinence    when I have diarrhea    Type 2 diabetes mellitus with hyperglycemia (HCC)    Visual impairment    glasses    Wears glasses       Past Surgical  History:   Procedure Laterality Date    Cataract      Cervical spine surgery  10/06/2023    Colonoscopy      Colonoscopy      Cystoscopy,insert ureteral stent      Cystourethroscopy  2020    Cystoscopy Procedure Dr Josh Hernandez  - recurrent tumor on BTS    Cystourethroscopy,biopsy  2019    BBx of scar sites, [facility]    Cystourethroscopy,fulgur .5-2cm lesn  2020    TURBT, [facility]    Cystourethroscopy,fulgur .5-2cm lesn  2020    TURBT with Gemcitabine    Cystourethroscopy,fulgur >5cm lesn  10/03/2019    TURBT with bilateral RPGs ([facility])    Fracture surgery      left wrist    Hysterectomy      age 31    Ndsc ablation & rcnstj atria lmtd w/o bypass      Other surgical history  2021    BTS CystoDr Hernandez    Other surgical history  2021    BTS Cysto Caterize Dr. Hernandez    Other surgical history  2021    BTS Cysto W/ caterigayathri Hernandez     Other surgical history  2022    BTS Cysto- Dr. Hernandez    Spine surgery procedure unlisted  10/06/2003    CERVICAL    Tonsillectomy  1946    Total abdom hysterectomy  1971       Social History     Socioeconomic History    Marital status:     Number of children: 3   Occupational History    Occupation: RETIRED   Tobacco Use    Smoking status: Former     Current packs/day: 0.00     Average packs/day: 1.1 packs/day for 65.3 years (73.5 ttl pk-yrs)     Types: Cigarettes     Quit date: 2007     Years since quittin.4     Passive exposure: Past    Smokeless tobacco: Never    Tobacco comments:     quit 15 years    Vaping Use    Vaping status: Never Used   Substance and Sexual Activity    Alcohol use: No    Drug use: No    Sexual activity: Not Currently     Partners: Male       Family History   Problem Relation Age of Onset    Diabetes Father     Stroke Father     Colon Polyps Mother     Diabetes Mother     Heart Attack Mother     Other (Other) Son         Afib        Medications Ordered Prior to Encounter[1]      Objective  Vitals:    24 1351    BP: 118/68   Resp: 16   Temp: 97.1 °F (36.2 °C)   TempSrc: Temporal   Weight: 143 lb 6.4 oz (65 kg)   Height: 5' 6\" (1.676 m)     Physical Exam  Constitutional:       Appearance: Normal appearance.   HEENT:      Head: Normocephalic and atraumatic.      Eyes: PERRLA no notable nystagmus     Ears: normal on observation     Nose: Nose normal.      Mouth: Mucous membranes are moist.      Neck: no masses no bruit  Cardiovascular:      Rate and Rhythm: Normal rate and regular rhythm.   Pulmonary:      Effort: Pulmonary effort is normal.   Musculoskeletal:         General: Normal range of motion.      Cervical back: Normal range of motion.   Skin:     General: Skin is warm and dry.   Neurological:      General: No focal deficit present.      Mental Status: She is alert and oriented to person, place, and time.   Psychiatric:         Mood and Affect: Mood normal.         Thought Content: Thought content normal.       Assessment and Plan  Anastasia was seen today for follow - up and diabetes.    Diagnoses and all orders for this visit:    Type 2 diabetes mellitus with stage 3b chronic kidney disease, with long-term current use of insulin ([facility])  -     Insulin Lispro, 1 Unit Dial, (HUMALOG KWIKPEN) 100 UNIT/ML Subcutaneous Solution Pen-injector; INJECT 2-7 UNITS INTO THE SKIN THREE TIMES DAILY WITH MEALS AS DIRECTED  -     Microalb/Creat Ratio, Random Urine; Future  -     POC Hemoglobin A1C  -     Microalb/Creat Ratio, Random Urine    Stage 4 chronic kidney disease (HCC)  -     Microalb/Creat Ratio, Random Urine; Future  -     Microalb/Creat Ratio, Random Urine    Pressure ulcer of left buttock, stage 2 ([facility])    Depression, recurrent ([facility])    Biventricular heart failure ([facility])           Follow up  No follow-ups on file.      Patient Instructions  Patient Instructions   150-200 take 2 units humalog  201-250 take 3 units  251-300 take 4 units  301-350 take 5 units  351-400 take 6 units  >401 take 7 units and if not lower after one  hour call MD    Take latus 8 units daily in am      Make sure your blood sugar is around 250 before bed       Houston Prado MD       This note was created by [employer] voice recognition. Errors in content may be related to improper recognition by the system; efforts to review and correct have been done but errors may still exist. Please be advised the primary purpose of this note is for me to communicate medical care. Standard sentence structure is not always used. Medical terminology and medical abbreviations may be used. There may be grammatical, typographical, and automated fill ins that may have errors missed in proofreading.          [1]   Current Outpatient Medications on File Prior to Visit   Medication Sig Dispense Refill    benzonatate 100 MG Oral Cap Take 1 capsule (100 mg total) by mouth 3 (three) times daily as needed for cough. 60 capsule 0    furosemide 20 MG Oral Tab       fentaNYL 50 MCG/HR Transdermal Patch 72 Hr Place 1 patch onto the skin every third day. 10 patch 0    LANTUS SOLOSTAR 100 UNIT/ML Subcutaneous Solution Pen-injector INJECT 8 UNITS INTO THE SKIN NIGHTLY. ****PEN EXPIRES 28 DAYS AFTER FIRST USE**** 15 mL 0    gabapentin 300 MG Oral Cap Take 1 capsule (300 mg total) by mouth in the morning and 1 capsule (300 mg total) before bedtime. 60 capsule 1    FENOFIBRATE 145 MG Oral Tab Take 1 tablet (145 mg total) by mouth daily. 90 tablet 0    amiodarone 200 MG Oral Tab TAKE ONE TABLET BY MOUTH ONCE DAILY. Needs appointment with cardiologist and EKG done for future refills.      hydrALAZINE 50 MG Oral Tab 1 tablet (50 mg total) 3 (three) times daily.      dilTIAZem HCl ER Beads 120 MG Oral Capsule SR 24 Hr Take 1 capsule (120 mg total) by mouth daily. 30 capsule 11    LISINOPRIL 20 MG Oral Tab TAKE ONE TABLET BY MOUTH EVERY EVENING 90 tablet 0    ATORVASTATIN 80 MG Oral Tab Take 1 tablet (80 mg total) by mouth nightly. 90 tablet 0    PROBIOTIC PRODUCT OR Take 1 capsule by mouth daily. 100  billion      Potassium Chloride ER 20 MEQ Oral Tab CR Take 1 tablet by mouth daily.      apixaban 5 MG Oral Tab Take 1 tablet (5 mg total) by mouth 2 (two) times daily. 60 tablet 2    Cholecalciferol (VITAMIN D3) 250 MCG (67655 UT) Oral Tab Take 1,000 Units by mouth daily.      Calcium Citrate-Vitamin D 315-250 MG-UNIT Oral Tab Take 1 tablet by mouth daily.      LIDOCAINE VISCOUS HCL 2 % Mouth/Throat Solution Mix equal parts benadryl and maalox liquid and take 10 mL po qAC and  mL 3    RISEDRONATE 35 MG Oral Tab Take 1 tablet (35 mg total) by mouth every 7 days. (Patient not taking: Reported on 11/5/2024) 12 tablet 0    MOMETASONE FUROATE 0.1 % External Cream Apply twice daily to affected area 45 g 2    methylPREDNISolone 4 MG Oral Tablet Therapy Pack Take 1 tablet (4 mg total) by mouth As Directed.      HYDROmorphone 4 MG Oral Tab Take 1 tablet (4 mg total) by mouth every 4 (four) hours as needed for Pain. 90 tablet 0    ALPRAZolam 0.25 MG Oral Tab Take 0.5 tablets (0.125 mg total) by mouth daily as needed for Anxiety. Take prior to radiation treatment 15 tablet 1    ondansetron 8 MG Oral Tablet Dispersible Take 1 tablet (8 mg total) by mouth every 8 (eight) hours as needed for Nausea. 60 tablet 1    prochlorperazine (COMPAZINE) 10 mg tablet Take 1 tablet (10 mg total) by mouth every 6 (six) hours as needed for Nausea. 30 tablet 1    Naloxone HCl 4 MG/0.1ML Nasal Liquid 4 mg by Nasal route as needed. If patient remains unresponsive, repeat dose in other nostril 2-5 minutes after first dose. 1 kit 0    Acetaminophen 500 MG Oral Cap Take 2 capsules (1,000 mg total) by mouth 3 (three) times daily as needed for Pain. (Patient not taking: Reported on 11/5/2024)      Insulin Pen Needle (BD PEN NEEDLE PRISCILLA U/F) 32G X 4 MM Does not apply Misc Inject 1 Pen into the skin daily. 90 each 0    Continuous Blood Gluc Sensor (FREESTYLE NATALIE 2 SENSOR) Does not apply Misc 1 Device by Subdermal route every 14 (fourteen) days.  6 each 3    Continuous Blood Gluc  (FREESTYLE NATALIE 2 READER) Does not apply Device 1 each daily. 1 each 1    Continuous Blood Gluc  (FREESTYLE NATALIE 2 READER) Does not apply Device 1 Device daily as needed. 1 each 0     No current facility-administered medications on file prior to visit.

## 2024-12-03 DIAGNOSIS — G89.3 NEOPLASM RELATED PAIN: Primary | ICD-10-CM

## 2024-12-03 DIAGNOSIS — C76.0 HEAD AND NECK CANCER (HCC): ICD-10-CM

## 2024-12-03 DIAGNOSIS — G89.3 NEOPLASM RELATED PAIN: ICD-10-CM

## 2024-12-03 RX ORDER — FENTANYL 50 UG/1
1 PATCH TRANSDERMAL
Qty: 10 PATCH | Refills: 0 | OUTPATIENT
Start: 2024-12-03

## 2024-12-03 RX ORDER — FENTANYL 12.5 UG/1
PATCH TRANSDERMAL
Qty: 5 PATCH | Refills: 0 | Status: SHIPPED | OUTPATIENT
Start: 2024-12-03

## 2024-12-04 ENCOUNTER — TELEPHONE (OUTPATIENT)
Dept: HEMATOLOGY/ONCOLOGY | Facility: HOSPITAL | Age: 84
End: 2024-12-04

## 2024-12-04 NOTE — TELEPHONE ENCOUNTER
[phone] - [facility]  Needs clarification on this medication fentanyl patch. Thanks Sia

## 2024-12-09 NOTE — PROGRESS NOTES
[facility] Note    Visit Diagnosis:  1. Head and neck cancer (HCC)        History of Present Illness: 83-year-old female with a past medical history of: A-fib on Eliquis, urothelial carcinoma, CKD 3, GERD and diabetes was referred by Dr. Fall to discuss a poorly differentiated carcinoma.     -Cystoscopy June 2024 with NAD.  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    -MRI cervical spine on 7/31/2024 incidentally noted a left lower neck mass measuring 4.5 x 3.5 cm.    -CT neck on 8/1/2024 4.2 x 2.9 x 4.2 cm left supraclavicular mass.    -Met with ENT,  and patient-tonsils were surgically absent on exam.    -Left neck mass biopsy on 8/5/2024 showed a poorly differentiated carcinoma with focal neuroendocrine differentiation.  IHC was positive for cytokeratin AE 1/3, p16, p63 and weakly positive for synaptophysin  .  -PET/CT on 8/21/24: focal marked metabolic activity in the left supraclavicular mass.  This mass is 4.9 x 4.2 cm with an SUV of 8.8.  No other lesions. LLL atelectasis or PNA    -She has a poor appetite but her daughter gets her to eat.  She uses her walker and here for most of her ADLs.  She has a history of smoking 1 pack/day for 40 years but quit about 25 years ago.  She was with her daughter Petty who is involved with her care. She is following with palliative care due to significant pain.     -Tumor board 8/28/24: Case reviewed in tumor board. PET does not show evidence of distant disease. Concern for p16+ head and neck cancer vs. Cervical esophageal cancer. EGD recommended. ENT eval recommended. We will plan on definitive treatment with chemoradiation with weekly Carbo AUC 2 + Taxol 50 mg/m2.     -ENT Eval with Dr. Villalta on 8/28/244: Flexible laryngoscopy was unremarkable     -Chemoradiation with weekly carbo AUC 2 + Taxol 50 mg/m2: 9/24/24-Current   -Week 1: 9/24/24-Carbo/Taxol   -Week 2:  10/1/24-Carbo/taxol   -Week 3: 10/8/24: NO CHEMOTHERAPY-stopped for side effects   -Week 4: 10/15/24: Carbo only   -Week 5: 10/22/24: Carbo only   -Week 6: 10/29/24: Carbo only    -RT finished 11/6/24    Interval History  -PET/CT not scheduled   -She is doing better. Eating and drinking. Energy and appetite are up  -No palpable masses     Review of Systems: 12 Point ROS was completed and pertinent positives are in the HPI        Past Medical History:    Arrhythmia    Back pain    Bladder cancer (HCC)    high grade superficial TCC, recurrent July 2020 after BCG    Bloating    Blurred vision    Cancer (HCC)    Congestive heart disease (HCC)    Constipation    Diabetes (HCC)    IDDM     Diabetes mellitus (HCC)    Diarrhea, unspecified    CLARKE (dyspnea on exertion)    Easy bruising    Elevated cholesterol    Essential hypertension    Exposure to medical diagnostic radiation    Fatigue    Flatulence/gas pain/belching    Frequent urination    taking furosemide daily    Headache disorder    Hearing loss    Heart palpitations    Heartburn    occasionally    High blood pressure    High cholesterol    History of falling    Hyperlipidemia    Irregular bowel habits    Itch of skin    right hand only    Leaking of urine    sometimes    Leg swelling    Osteoporosis    Sleep disturbance    Stool incontinence    when I have diarrhea    Type 2 diabetes mellitus with hyperglycemia (HCC)    Visual impairment    glasses    Wears glasses     Past Surgical History:   Procedure Laterality Date    Cataract      Cervical spine surgery  10/06/2023    Colonoscopy  2019    Colonoscopy      Cystoscopy,insert ureteral stent      Cystourethroscopy  11/25/2020    Cystoscopy Procedure Dr Josh Hernandez  - recurrent tumor on BTS    Cystourethroscopy,biopsy  12/16/2019    [place] [facility] [place]    Cystourethroscopy,fulgur .5-2cm lesn  04/16/2020    TURBT, [facility] [place]    Cystourethroscopy,fulgur .5-2cm lesn  07/30/2020    TURBT with Gemcitabine     Cystourethroscopy,fulgur >5cm lesn  10/03/2019    TURBT with bilateral RPGs ([facility])    Fracture surgery      left wrist    Hysterectomy      age 31    Ndsc ablation & rcnstj [facility]ria [facility]td w/o bypass      Other surgical history  2021    BTS Dr David Loaiza    Other surgical history  2021    BTS Cysto Caterize Dr. Hernandez    Other surgical history  2021    BTS Cysto W/ caterigayathri Hernandez     Other surgical history  2022    BTS Cysto- Dr. Hernandez    Spine surgery procedure unlisted  10/06/2003    CERVICAL    Tonsillectomy  1946    Total abdom hysterectomy  1971     Social History     Socioeconomic History    Marital status:     Number of children: 3   Occupational History    Occupation: RETIRED   Tobacco Use    Smoking status: Former     Current packs/day: 0.00     Average packs/day: 1.1 packs/day for 65.3 years (73.5 ttl pk-yrs)     Types: Cigarettes     Quit date: 2007     Years since quittin.4     Passive exposure: Past    Smokeless tobacco: Never    Tobacco comments:     quit 15 years    Vaping Use    Vaping status: Never Used   Substance and Sexual Activity    Alcohol use: No    Drug use: No    Sexual activity: Not Currently     Partners: Male      Family History   Problem Relation Age of Onset    Diabetes Father     Stroke Father     Colon Polyps Mother     Diabetes Mother     Heart Attack Mother     Other (Other) Son         Afib       Physical Exam  Height: 163.8 cm (5' 4.49\") (12/10 0833)  Weight: 65.8 kg (145 lb) (12/10 0833)  BSA (Calculated - sq m): 1.72 sq meters (12/10 0833)  Pulse: 61 (12/10 0833)  BP: 146/55 (12/10 0833)  Temp: 97.3 °F (36.3 °C) (12/10 0833)  Do Not Use - Resp Rate: --  SpO2: 98 % (12/10 0833)    General: NAD, AOX3  HEENT: clear op, mmm, no jvd, no scleral icterus  NO palpable LAD  CV: RRR S1S2 no murmurs  Extremities: No edema   Lungs: CTAB, no increased work of breathing  Abd: soft nt nd +BS no hepatosplenomegaly  Neuro: CN: II-XII grossly  intact    Results:  Lab Results   Component Value Date    WBC 6.9 12/10/2024    HGB 10.4 (L) 12/10/2024    HCT 32.4 (L) 12/10/2024    MCV 81.2 12/10/2024    .0 12/10/2024     Lab Results   Component Value Date     11/05/2024    K 4.3 11/05/2024    CO2 30.0 11/05/2024     11/05/2024    BUN 28 (H) 11/05/2024    PHOS 4.4 12/27/2023    ALB 3.4 10/10/2024       No results found for: \"LDH\"    Radiology:   PET/CT  CONCLUSION:    1. Focal marked increased metabolic activity is associated with the left supraclavicular mass which has undergone previous biopsy.   2. There are no other metabolically active lesions detected.   3. Dependent consolidation left lower lobe and left effusion are noted.  There is only low-level metabolic activity associated with this finding which is most likely related to the atelectasis or pneumonia.   4. Incidental CT findings are described above.       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     Electronically signed by Goldberg, Cathryn A, MD on 8/12/2024 at 1040        Final Diagnosis Comment      The tumor is composed of sheets of malignant cells with pleomorphic nuclei and variable amounts of eosinophilic cytoplasm.  There are areas of necrosis.     Immunohistochemistry is performed to evaluate the tumor phenotype.  The malignant cells are positive for cytokeratin AE 1/3, p16, p63 and weakly positive for synaptophysin.  A rare malignant cell stains with p40.  They are negative for all other markers tested.     The morphologic and immunohistochemical findings are consistent with a poorly differentiated carcinoma with focal neuroendocrine differentiation.  Dr. Eden has reviewed the case and concurs with the above diagnosis.         Assessment and Plan:  Left supraclavicular poorly differentiated carcinoma, p16 positive  -Her PET/CT does not show evidence of distant disease or tumor  origin.  It is relatively rare for head and neck cancer to metastasize without any cervical lymphadenopathy or primary lesion (her tonsils are out). ENT evaluation was unremarkable.   -Tempus: TMB 6.3, BINU, PDL1 CPS 3, Somatic YOLANDA mutation  -She did not tolerated combined carbo/taxol so taxol was dropped   -She completed treatment 11/6/24 with a great clinical response     Plan  -PET/CT 12 weeks post treatment    HTN: Following with PCP    Cancer related Pain: following with palliative. 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    Follow up after PET    [facility]

## 2024-12-09 NOTE — TELEPHONE ENCOUNTER
Freestyle Shilo 2/Sensor/Flash Glucose Monitoring S Kit Abbo   Diabetic Supplies Protocol [id number]/08/2024 11:22 AM   Protocol Details In person appointment or virtual visit in the past 12 mos or appointment in next 3 mos   LOV 11/19/2024  Last Px:1/17/2024  Last refill on 1/26/2024, for #6, with 3 refills    Future Appointments   Date Time Provider Department Center   12/10/2024  8:30 AM NP [place] RN4  [facility]   12/10/2024  9:00 AM Jordon Reid MD  [facility]   12/10/2024  9:30 AM Francine Dietz APRN  [facility]   3/4/2025 10:30 AM Houston Prado MD [facility]

## 2024-12-10 ENCOUNTER — OFFICE VISIT (OUTPATIENT)
Dept: HEMATOLOGY/ONCOLOGY | Facility: HOSPITAL | Age: 84
End: 2024-12-10
Attending: NURSE PRACTITIONER
Payer: MEDICARE

## 2024-12-10 VITALS
TEMPERATURE: 97 F | BODY MASS INDEX: 24.45 KG/M2 | SYSTOLIC BLOOD PRESSURE: 146 MMHG | HEART RATE: 61 BPM | WEIGHT: 145 LBS | OXYGEN SATURATION: 98 % | HEIGHT: 64.49 IN | RESPIRATION RATE: 16 BRPM | DIASTOLIC BLOOD PRESSURE: 55 MMHG

## 2024-12-10 DIAGNOSIS — C76.0 HEAD AND NECK CANCER (HCC): Primary | ICD-10-CM

## 2024-12-10 DIAGNOSIS — C76.0 HEAD AND NECK CANCER (HCC): ICD-10-CM

## 2024-12-10 DIAGNOSIS — Z51.5 PALLIATIVE CARE BY SPECIALIST: ICD-10-CM

## 2024-12-10 DIAGNOSIS — G89.3 NEOPLASM RELATED PAIN: Primary | ICD-10-CM

## 2024-12-10 DIAGNOSIS — R53.81 PHYSICAL DECONDITIONING: ICD-10-CM

## 2024-12-10 LAB
ALBUMIN SERPL-MCNC: 3.7 G/DL (ref 3.2–4.8)
ALBUMIN/GLOB SERPL: 1.4 {RATIO} (ref 1–2)
ALP LIVER SERPL-CCNC: 51 U/L
ALT SERPL-CCNC: 44 U/L
ANION GAP SERPL CALC-SCNC: 8 MMOL/L (ref 0–18)
AST SERPL-CCNC: 48 U/L (ref ?–34)
BASOPHILS # BLD AUTO: 0.04 X10(3) UL (ref 0–0.2)
BASOPHILS NFR BLD AUTO: 0.6 %
BILIRUB SERPL-MCNC: 0.5 MG/DL (ref 0.2–1.1)
BUN BLD-MCNC: 31 MG/DL (ref 9–23)
CALCIUM BLD-MCNC: 9.4 MG/DL (ref 8.7–10.4)
CHLORIDE SERPL-SCNC: 106 MMOL/L (ref 98–112)
CO2 SERPL-SCNC: 26 MMOL/L (ref 21–32)
CREAT BLD-MCNC: 1.79 MG/DL
EGFRCR SERPLBLD CKD-EPI 2021: 28 ML/MIN/1.73M2 (ref 60–?)
EOSINOPHIL # BLD AUTO: 0.16 X10(3) UL (ref 0–0.7)
EOSINOPHIL NFR BLD AUTO: 2.3 %
ERYTHROCYTE [DISTWIDTH] IN BLOOD BY AUTOMATED COUNT: 18.2 %
FASTING STATUS PATIENT QL REPORTED: NO
GLOBULIN PLAS-MCNC: 2.7 G/DL (ref 2–3.5)
GLUCOSE BLD-MCNC: 237 MG/DL (ref 70–99)
HCT VFR BLD AUTO: 32.4 %
HGB BLD-MCNC: 10.4 G/DL
IMM GRANULOCYTES # BLD AUTO: 0.02 X10(3) UL (ref 0–1)
IMM GRANULOCYTES NFR BLD: 0.3 %
LYMPHOCYTES # BLD AUTO: 1.14 X10(3) UL (ref 1–4)
LYMPHOCYTES NFR BLD AUTO: 16.5 %
MCH RBC QN AUTO: 26.1 PG (ref 26–34)
MCHC RBC AUTO-ENTMCNC: 32.1 G/DL (ref 31–37)
MCV RBC AUTO: 81.2 FL
MONOCYTES # BLD AUTO: 0.73 X10(3) UL (ref 0.1–1)
MONOCYTES NFR BLD AUTO: 10.6 %
NEUTROPHILS # BLD AUTO: 4.81 X10 (3) UL (ref 1.5–7.7)
NEUTROPHILS # BLD AUTO: 4.81 X10(3) UL (ref 1.5–7.7)
NEUTROPHILS NFR BLD AUTO: 69.7 %
OSMOLALITY SERPL CALC.SUM OF ELEC: 304 MOSM/KG (ref 275–295)
PLATELET # BLD AUTO: 285 10(3)UL (ref 150–450)
POTASSIUM SERPL-SCNC: 4.2 MMOL/L (ref 3.5–5.1)
PROT SERPL-MCNC: 6.4 G/DL (ref 5.7–8.2)
RBC # BLD AUTO: 3.99 X10(6)UL
SODIUM SERPL-SCNC: 140 MMOL/L (ref 136–145)
WBC # BLD AUTO: 6.9 X10(3) UL (ref 4–11)

## 2024-12-10 PROCEDURE — 99215 OFFICE O/P EST HI 40 MIN: CPT | Performed by: INTERNAL MEDICINE

## 2024-12-10 PROCEDURE — 85025 COMPLETE CBC W/AUTO DIFF WBC: CPT

## 2024-12-10 PROCEDURE — 80053 COMPREHEN METABOLIC PANEL: CPT

## 2024-12-10 PROCEDURE — G2211 COMPLEX E/M VISIT ADD ON: HCPCS | Performed by: INTERNAL MEDICINE

## 2024-12-10 PROCEDURE — 99214 OFFICE O/P EST MOD 30 MIN: CPT | Performed by: NURSE PRACTITIONER

## 2024-12-10 PROCEDURE — 36591 DRAW BLOOD OFF VENOUS DEVICE: CPT

## 2024-12-10 RX ORDER — GABAPENTIN 100 MG/1
300 CAPSULE ORAL 2 TIMES DAILY
Qty: 50 CAPSULE | Refills: 0 | Status: SHIPPED | OUTPATIENT
Start: 2024-12-10

## 2024-12-10 RX ORDER — GABAPENTIN 300 MG/1
300 CAPSULE ORAL 2 TIMES DAILY
Qty: 60 CAPSULE | Refills: 1 | Status: SHIPPED | OUTPATIENT
Start: 2024-12-10 | End: 2024-12-10 | Stop reason: DRUGHIGH

## 2024-12-10 NOTE — PROGRESS NOTES
Palliative Care Follow Up Note     Patient Name: Anastasia Hobson   YOB: 1940   Medical Record Number: [id number]   [facility]: [id number]   Date of visit: 12/10/2024     Chief Complaint/Reason for Visit:  Follow up visit for symptoms     History of Present Illness:         Anastasia Hobson is a 84 year old female with head and neck cancer who has completed chemo/RT.  She has been weaning fentanyl without any difficulty or pain.  She is using no BT pain medication.  She is eating and drinking better now.  She is sleeping at night.  She feels she is still deconditioned from treatment and feels her balance and endurance is still affected.  She is relieved to have no pain and feeling better.  She is exited to be regaining some or her independence.   She feels her energy is slowly improving.  She denies constipation or other symptoms.   She is asking for PT to help speed recovery up.      Problem List:  Patient Active Problem List   Diagnosis    Osteoarthritis of spine with radiculopathy, cervical region    Primary hypertension    Type 2 diabetes mellitus without complication, with long-term current use of insulin (HCC)    Mixed hyperlipidemia    Arthrodesis status    Tremor of unknown origin    Personal history of colonic polyps    Malignant neoplasm of lateral wall of urinary bladder (HCC)    Stage 3b chronic kidney disease (HCC)    Atrial fibrillation with RVR (HCC)    Advance care planning    Other constipation    Functional diarrhea    Type 2 diabetes mellitus with diabetic chronic kidney disease (HCC)    At high risk for falls    Hypoglycemia    Persistent atrial fibrillation (HCC)    Hyperlipidemia    Unspecified atrial fibrillation (HCC)    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)    Urge incontinence    Heart failure, unspecified (HCC)    Chronic kidney disease, stage 3b (HCC)    Type 2 diabetes mellitus with stage 2 chronic kidney disease, without long-term current use of  insulin (HCC)    Long term (current) use of oral hypoglycemic drugs    Personal history of malignant neoplasm of bladder    Pure hypercholesterolemia, unspecified    Mass of left side of neck    Azotemia    Type 2 diabetes mellitus with hyperglycemia, unspecified whether long term insulin use (HCC)    Anticoagulated    Atrial fibrillation, chronic (HCC)    Palliative care by specialist    Head and neck cancer (HCC)    Pressure ulcer of left buttock, stage 2 (HCC)    Depression, recurrent (HCC)      Medical History:  Past Medical History:    Arrhythmia    Back pain    Bladder cancer (HCC)    high grade superficial TCC, recurrent July 2020 after BCG    Bloating    Blurred vision    Cancer (HCC)    Congestive heart disease (HCC)    Constipation    Diabetes (HCC)    IDDM     Diabetes mellitus (HCC)    Diarrhea, unspecified    CLARKE (dyspnea on exertion)    Easy bruising    Elevated cholesterol    Essential hypertension    Exposure to medical diagnostic radiation    Fatigue    Flatulence/gas pain/belching    Frequent urination    taking furosemide daily    Headache disorder    Hearing loss    Heart palpitations    Heartburn    occasionally    High blood pressure    High cholesterol    History of falling    Hyperlipidemia    Irregular bowel habits    Itch of skin    right hand only    Leaking of urine    sometimes    Leg swelling    Osteoporosis    Sleep disturbance    Stool incontinence    when I have diarrhea    Type 2 diabetes mellitus with hyperglycemia (HCC)    Visual impairment    glasses    Wears glasses     Surgical History:  Past Surgical History:   Procedure Laterality Date    Cataract      Cervical spine surgery  10/06/2023    Colonoscopy  2019    Colonoscopy      Cystoscopy,insert ureteral stent      Cystourethroscopy  11/25/2020    Cystoscopy Procedure Dr Josh Hernandez  - recurrent tumor on BTS    Cystourethroscopy,biopsy  12/16/2019    BBx of scar sites, [facility] [place]    Cystourethroscopy,fulgur .5-2cm lesn  04/16/2020     TURBT, [facility]    Cystourethroscopy,fulgur .5-2cm lesn  07/30/2020    TURBT with Gemcitabine    Cystourethroscopy,fulgur >5cm lesn  10/03/2019    TURBT with bilateral RPGs ([facility])    Fracture surgery      left wrist    Hysterectomy      age 31    Ndsc ablation & rcnstj atria lmtd w/o bypass      Other surgical history  04/20/2021    BTS Dr David Loaiza    Other surgical history  08/17/2021    BTS Cysto Caterize Dr. Hernandez    Other surgical history  11/16/2021    BTS Cysto W/ caterize Dr. Hernandez     Other surgical history  03/08/2022    BTS Cysto- Dr. Hernandez    Spine surgery procedure unlisted  10/06/2003    CERVICAL    Tonsillectomy  1946    Total abdom hysterectomy  1971       Allergies:  Allergies   Allergen Reactions    Morphine CONFUSION       Palliative Care Social History:    Marital Status:   Children:  son and daughter  Living Situation: she lives with her daughter, Petty.    She uses a walker around home and wheelchair outside due to endurance issues and balance      Medications:      Review of Systems:  General:  Fatigue.  pain  Respiratory:  Denies SOB, denies cough  Cardiac:  Denies chest pain, heart palpitations  Abdomen:  Denies constipation, diarrhea.  Denies pain.    Psych:  No complaints.  Not Sleeping well    Palliative Performance Scale:   70%    Physical Examination:  General: Patient is alert and oriented, not in acute distress.  Respiratory: Normal excursions and effort.  Cough  Abdomen: Soft, non tender   Musculoskeletal:  sitting in wheelchair  Psych:  Mood/Affect appropriate    Advanced Directives Discussed and Completed:     HCPOA/Health Surrogate:    There is a completed HCPOA documentation on file in Epic.    I confirmed that patient's HCPOA is: Petty, her daughter    POLST Discussed and Completed:  There is a Scanned POLST in EMR indicating DNAR/Selective  She is willing to continue treatment as long as QOL is maintained.  She is feeling better now and is happy treatment is almost  completed    Palliative Care:  she has tolerated fentanyl wean and and stop further use.  Will wean off gabapentin.    Will order PT to help with strengthening and recovery  Patient has much improved and no longer needs to follow up with palliative care unless new symptoms occur.      Impression/Plan:   Neoplasm related pain  Gabapentin 300mg BID - weaning  Acetaminophen 1G TID prn    Deconditioning  Increase activity  Use cane  PT external location close to home    Palliative care by specialist  Prn support     Head and neck cancer (HCC)  Completed treatment.       Planned Follow up: Return if symptoms worsen or fail to improve.      I spent a total of 30 minutes with the patient today, which included all of the following:direct face to face contact, history taking, physical examination, and >50% was spent counseling and coordinating care      The 21st Century Cures Act makes medical notes like these available to patients in the interest of transparency. Please be advised this is a medical document. Medical documents are intended to carry relevant information, facts as evident, and the clinical opinion of the practitioner. The medical note is intended as peer to peer communication and may appear blunt or direct. It is written in medical language and may contain abbreviations or verbiage that are unfamiliar.        Electronically Signed by:  RUIZ CHINCHILLA Outpatient Palliative Nurse Practitioner

## 2024-12-10 NOTE — PROGRESS NOTES
Patient here for follow-up. Energy levels are improving. Denies any pain at this time. Appetite and swallowing are getting better as well. Ambulating more. Overall feels like everything is improving.

## 2024-12-10 NOTE — PATIENT INSTRUCTIONS
Gabapentin weaning:    Today start weaning-  Use 100mg capsules  300mg capsule in morning AND 2 capsules (200mg dose) in evening for 4 days THEN   2 capsules ( 200mg  dose) twice daily for 4 days THEN  1 capsule (100mg dose) twice daily for 4 days THEN   Stop

## 2024-12-31 ENCOUNTER — PATIENT MESSAGE (OUTPATIENT)
Age: 84
End: 2024-12-31

## 2024-12-31 RX ORDER — GABAPENTIN 100 MG/1
CAPSULE ORAL
Qty: 90 CAPSULE | Refills: 1 | Status: SHIPPED | OUTPATIENT
Start: 2024-12-31

## 2024-12-31 NOTE — TELEPHONE ENCOUNTER
Sometimes the neuropathic pain can linger so its perfectly ok to continue to the gabapentin.  Lets see how you do with resuming gabapentin 100mg in the morning and 200mg at bedtime.  This may be enough to keep pain manageable and is still less than you were using.    You can continue using tylenol as needed.      I will send a refill for gabapentin 100mg capsules to your pharmacy.    I will see you in February when you are here seeing Dr. Reid.      Hope you had a great holiday!

## 2025-01-30 NOTE — PROGRESS NOTES
[facility] Note    Visit Diagnosis:  1. Head and neck cancer (HCC)        History of Present Illness: 83-year-old female with a past medical history of: A-fib on Eliquis, urothelial carcinoma, CKD 3, GERD and diabetes was referred by Dr. Fall to discuss a poorly differentiated carcinoma.     -Cystoscopy June 2024 with NAD.  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    -MRI cervical spine on 7/31/2024 incidentally noted a left lower neck mass measuring 4.5 x 3.5 cm.    -CT neck on 8/1/2024 4.2 x 2.9 x 4.2 cm left supraclavicular mass.    -Met with ENT,  and patient-tonsils were surgically absent on exam.    -Left neck mass biopsy on 8/5/2024 showed a poorly differentiated carcinoma with focal neuroendocrine differentiation.  IHC was positive for cytokeratin AE 1/3, p16, p63 and weakly positive for synaptophysin  .  -PET/CT on 8/21/24: focal marked metabolic activity in the left supraclavicular mass.  This mass is 4.9 x 4.2 cm with an SUV of 8.8.  No other lesions. LLL atelectasis or PNA    -She has a poor appetite but her daughter gets her to eat.  She uses her walker and here for most of her ADLs.  She has a history of smoking 1 pack/day for 40 years but quit about 25 years ago.  She was with her daughter Petty who is involved with her care. She is following with palliative care due to significant pain.     -Tumor board 8/28/24: Case reviewed in tumor board. PET does not show evidence of distant disease. Concern for p16+ head and neck cancer vs. Cervical esophageal cancer. EGD recommended. ENT eval recommended. We will plan on definitive treatment with chemoradiation with weekly Carbo AUC 2 + Taxol 50 mg/m2.     -ENT Eval with Dr. Villalta on 8/28/244: Flexible laryngoscopy was unremarkable     -Chemoradiation with weekly carbo AUC 2 + Taxol 50 mg/m2: 9/24/24-Current   -Week 1: 9/24/24-Carbo/Taxol   -Week 2:  10/1/24-Carbo/taxol   -Week 3: 10/8/24: NO CHEMOTHERAPY-stopped for side effects   -Week 4: 10/15/24: Carbo only   -Week 5: 10/22/24: Carbo only   -Week 6: 10/29/24: Carbo only    -RT finished 11/6/24    -PET/CT 1/31/25 with FELY    Interval History  -PET/CT reviewed  -She feels great. Only uses gabapentin  -No cough, dyspnea, dysphagia     Review of Systems: 12 Point ROS was completed and pertinent positives are in the HPI        Past Medical History:    Arrhythmia    Back pain    Bladder cancer (HCC)    high grade superficial TCC, recurrent July 2020 after BCG    Bloating    Blurred vision    Cancer (HCC)    Congestive heart disease (HCC)    Constipation    Diabetes (HCC)    IDDM     Diabetes mellitus (HCC)    Diarrhea, unspecified    CLARKE (dyspnea on exertion)    Easy bruising    Elevated cholesterol    Essential hypertension    Exposure to medical diagnostic radiation    Fatigue    Flatulence/gas pain/belching    Frequent urination    taking furosemide daily    Headache disorder    Hearing loss    Heart palpitations    Heartburn    occasionally    High blood pressure    High cholesterol    History of falling    Hyperlipidemia    Irregular bowel habits    Itch of skin    right hand only    Leaking of urine    sometimes    Leg swelling    Osteoporosis    Sleep disturbance    Stool incontinence    when I have diarrhea    Type 2 diabetes mellitus with hyperglycemia (HCC)    Visual impairment    glasses    Wears glasses     Past Surgical History:   Procedure Laterality Date    Cataract      Cervical spine surgery  10/06/2023    Colonoscopy  2019    Colonoscopy      Cystoscopy,insert ureteral stent      Cystourethroscopy  11/25/2020    Cystoscopy Procedure Dr Josh Hernandez  - recurrent tumor on BTS    Cystourethroscopy,biopsy  12/16/2019    [place] [facility] [place] [facility] [place]    [place]ystourethroscopy,fulgur .5-2cm lesn  04/16/2020    [place] [facility] [place] [facility] [place]    Cystourethroscopy,fulgur .5-2cm lesn  07/30/2020    TURBT with Gemcitabine     Cystourethroscopy,fulgur >5cm lesn  10/03/2019    TURBT with bilateral RPGs ([facility])    Fracture surgery      left wrist    Hysterectomy      age 31    Ndsc ablation & rcnstj atria lmtd w/o bypass      Other surgical history  2021    BTS Dr David Loaiza    Other surgical history  2021    BTS Cysto Caterize Dr. Hernandez    Other surgical history  2021    BTS Cysto W/ caterigayathri Hernandez     Other surgical history  2022    BTS Cysto- Dr. Hernandez    Spine surgery procedure unlisted  10/06/2003    CERVICAL    Tonsillectomy  1946    Total abdom hysterectomy  1971     Social History     Socioeconomic History    Marital status:     Number of children: 3   Occupational History    Occupation: RETIRED   Tobacco Use    Smoking status: Former     Current packs/day: 0.00     Average packs/day: 1.1 packs/day for 65.3 years (73.5 ttl pk-yrs)     Types: Cigarettes     Quit date: 2007     Years since quittin.6     Passive exposure: Past    Smokeless tobacco: Never    Tobacco comments:     quit 15 years    Vaping Use    Vaping status: Never Used   Substance and Sexual Activity    Alcohol use: No    Drug use: No    Sexual activity: Not Currently     Partners: Male      Family History   Problem Relation Age of Onset    Diabetes Father     Stroke Father     Colon Polyps Mother     Diabetes Mother     Heart Attack Mother     Other (Other) Son         Afib       Physical Exam  Height: 163.8 cm (5' 4.49\") (1029)  Weight: 64.9 kg (143 lb) (1029)  BSA (Calculated - sq m): 1.71 sq meters (1029)  Pulse: 61 (1029)  BP: 139/57 (1029)  Temp: 96.9 °F (36.1 °C) (1029)  Do Not Use - Resp Rate: --  SpO2: 99 % (1029)    General: NAD, AOX3  HEENT: clear op, mmm, no jvd, no scleral icterus  NO palpable LAD  CV: RRR S1S2 no murmurs  Extremities: No edema   Lungs: CTAB, no increased work of breathing  Abd: soft nt nd +BS no hepatosplenomegaly  Neuro: CN: II-XII grossly  intact    Results:  Lab Results   Component Value Date    WBC 6.9 12/10/2024    HGB 10.4 (L) 12/10/2024    HCT 32.4 (L) 12/10/2024    MCV 81.2 12/10/2024    .0 12/10/2024     Lab Results   Component Value Date     12/10/2024    K 4.2 12/10/2024    CO2 26.0 12/10/2024     12/10/2024    BUN 31 (H) 12/10/2024    PHOS 4.4 12/27/2023    ALB 3.7 12/10/2024       No results found for: \"LDH\"    Radiology:   PET/CT  CONCLUSION:    1. Focal marked increased metabolic activity is associated with the left supraclavicular mass which has undergone previous biopsy.   2. There are no other metabolically active lesions detected.   3. Dependent consolidation left lower lobe and left effusion are noted.  There is only low-level metabolic activity associated with this finding which is most likely related to the atelectasis or pneumonia.   4. Incidental CT findings are described above.       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     Electronically signed by Goldberg, Cathryn A, MD on 8/12/2024 at 1040        Final Diagnosis Comment      The tumor is composed of sheets of malignant cells with pleomorphic nuclei and variable amounts of eosinophilic cytoplasm.  There are areas of necrosis.     Immunohistochemistry is performed to evaluate the tumor phenotype.  The malignant cells are positive for cytokeratin AE 1/3, p16, p63 and weakly positive for synaptophysin.  A rare malignant cell stains with p40.  They are negative for all other markers tested.     The morphologic and immunohistochemical findings are consistent with a poorly differentiated carcinoma with focal neuroendocrine differentiation.  Dr. Eden has reviewed the case and concurs with the above diagnosis.         Assessment and Plan:  Left supraclavicular poorly differentiated carcinoma, p16 positive  -Her PET/CT does not show evidence of distant disease or tumor  origin.  It is relatively rare for head and neck cancer to metastasize without any cervical lymphadenopathy or primary lesion (her tonsils are out). ENT evaluation was unremarkable.   -Tempus: TMB 6.3, BINU, PDL1 CPS 3, Somatic YOLANDA mutation  -She did not tolerated combined carbo/taxol so taxol was dropped   -She completed treatment 11/6/24 with a great clinical response   -PET/CT 3 months later with FELY    Plan  -PET/CT in 3 months  -EGD if she has symptoms  -Follow up with ENT    HTN: Following with PCP    Cancer related Pain: following with palliative. 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    Follow up after PET in 3 months. Ok to remove port     [facility]

## 2025-01-31 ENCOUNTER — HOSPITAL ENCOUNTER (OUTPATIENT)
Dept: NUCLEAR MEDICINE | Facility: HOSPITAL | Age: 85
Discharge: HOME | End: 2025-01-31
Attending: RADIOLOGY
Payer: MEDICARE

## 2025-01-31 DIAGNOSIS — C77.0 SECONDARY MALIGNANT NEOPLASM OF LYMPH NODES OF NECK (HCC): ICD-10-CM

## 2025-01-31 LAB — GLUCOSE BLD-MCNC: 133 MG/DL (ref 70–99)

## 2025-01-31 PROCEDURE — 78815 PET IMAGE W/CT SKULL-THIGH: CPT | Performed by: RADIOLOGY

## 2025-01-31 PROCEDURE — 82962 GLUCOSE BLOOD TEST: CPT

## 2025-01-31 NOTE — IMAGING NOTE
This RN called to PET CT to access port. Port accessed without difficulty using sterile technique  Blood return noted. 20 ml saline injected after PET injection. Port access removed. Band Aid applied to site.

## 2025-02-04 ENCOUNTER — HOSPITAL ENCOUNTER (OUTPATIENT)
Dept: RADIATION ONCOLOGY | Facility: HOSPITAL | Age: 85
Discharge: HOME | End: 2025-02-04
Attending: NURSE PRACTITIONER
Payer: MEDICARE

## 2025-02-04 ENCOUNTER — OFFICE VISIT (OUTPATIENT)
Age: 85
End: 2025-02-04
Attending: NURSE PRACTITIONER
Payer: MEDICARE

## 2025-02-04 VITALS
DIASTOLIC BLOOD PRESSURE: 65 MMHG | SYSTOLIC BLOOD PRESSURE: 147 MMHG | HEART RATE: 69 BPM | OXYGEN SATURATION: 100 % | BODY MASS INDEX: 24 KG/M2 | RESPIRATION RATE: 20 BRPM | TEMPERATURE: 97 F | WEIGHT: 141.81 LBS

## 2025-02-04 VITALS
HEIGHT: 64.49 IN | SYSTOLIC BLOOD PRESSURE: 139 MMHG | WEIGHT: 143 LBS | RESPIRATION RATE: 18 BRPM | HEART RATE: 61 BPM | OXYGEN SATURATION: 99 % | TEMPERATURE: 97 F | DIASTOLIC BLOOD PRESSURE: 57 MMHG | BODY MASS INDEX: 24.12 KG/M2

## 2025-02-04 DIAGNOSIS — G62.0 CHEMOTHERAPY-INDUCED NEUROPATHY (HCC): Primary | ICD-10-CM

## 2025-02-04 DIAGNOSIS — C76.0 HEAD AND NECK CANCER (HCC): ICD-10-CM

## 2025-02-04 DIAGNOSIS — C77.0 SECONDARY MALIGNANT NEOPLASM OF LYMPH NODES OF NECK (HCC): Primary | ICD-10-CM

## 2025-02-04 DIAGNOSIS — T45.1X5A CHEMOTHERAPY-INDUCED NEUROPATHY (HCC): Primary | ICD-10-CM

## 2025-02-04 DIAGNOSIS — C76.0 HEAD AND NECK CANCER (HCC): Primary | ICD-10-CM

## 2025-02-04 DIAGNOSIS — Z51.5 PALLIATIVE CARE BY SPECIALIST: ICD-10-CM

## 2025-02-04 PROCEDURE — 99213 OFFICE O/P EST LOW 20 MIN: CPT

## 2025-02-04 RX ORDER — CARVEDILOL 3.12 MG/1
TABLET ORAL
COMMUNITY
Start: 2025-01-23

## 2025-02-04 RX ORDER — GABAPENTIN 100 MG/1
200 CAPSULE ORAL 2 TIMES DAILY
Qty: 360 CAPSULE | Refills: 0 | Status: SHIPPED | OUTPATIENT
Start: 2025-02-04

## 2025-02-04 NOTE — PROGRESS NOTES
Palliative Care Follow Up Note     Patient Name: Anastasia Hobson   YOB: 1940   Medical Record Number: [id number]   [facility]: [id number]   Date of visit: 2/4/2025     Chief Complaint/Reason for Visit:  Follow up visit for symptoms     History of Present Illness:         Anastasia Hobson is a 84 year old female with head and neck cancer who  completed chemo/RT 3 months ago.  She is eating and drinking well and continues to regain her strength and endurance.   She is sleeping at night.  She is relieved to have no pain and feeling better.   She denies constipation.  Her main complaint is JESSICA in her hands.  Attempted weaning of gabapentin but neuropathy intensified so it was restarted and being titrated.  The pain is constant and shooting stabbing pain, especially in her fingers.  This does affect seep at times, ability to use buttons, write and hold objects.   She hasn't noticed much improvement yet.    She is finding gabapentin somewhat helpful.    Problem List:  Patient Active Problem List   Diagnosis    Osteoarthritis of spine with radiculopathy, cervical region    Primary hypertension    Type 2 diabetes mellitus without complication, with long-term current use of insulin (HCC)    Mixed hyperlipidemia    Arthrodesis status    Tremor of unknown origin    Personal history of colonic polyps    Malignant neoplasm of lateral wall of urinary bladder (HCC)    Stage 3b chronic kidney disease (HCC)    Atrial fibrillation with RVR (HCC)    Advance care planning    Other constipation    Functional diarrhea    Type 2 diabetes mellitus with diabetic chronic kidney disease (HCC)    At high risk for falls    Hypoglycemia    Persistent atrial fibrillation (HCC)    Hyperlipidemia    Unspecified atrial fibrillation (HCC)    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)    Urge incontinence    Heart failure, unspecified (HCC)    Chronic kidney disease, stage 3b (HCC)    Type 2 diabetes mellitus with stage  2 chronic kidney disease, without long-term current use of insulin (HCC)    Long term (current) use of oral hypoglycemic drugs    Personal history of malignant neoplasm of bladder    Pure hypercholesterolemia, unspecified    Mass of left side of neck    Azotemia    Type 2 diabetes mellitus with hyperglycemia, unspecified whether long term insulin use (HCC)    Anticoagulated    Atrial fibrillation, chronic (HCC)    Palliative care by specialist    Head and neck cancer (HCC)    Pressure ulcer of left buttock, stage 2 (HCC)    Depression, recurrent      Medical History:  Past Medical History:    Arrhythmia    Back pain    Bladder cancer (HCC)    high grade superficial TCC, recurrent July 2020 after BCG    Bloating    Blurred vision    Cancer (HCC)    Congestive heart disease (HCC)    Constipation    Diabetes (HCC)    IDDM     Diabetes mellitus (HCC)    Diarrhea, unspecified    CLARKE (dyspnea on exertion)    Easy bruising    Elevated cholesterol    Essential hypertension    Exposure to medical diagnostic radiation    Fatigue    Flatulence/gas pain/belching    Frequent urination    taking furosemide daily    Headache disorder    Hearing loss    Heart palpitations    Heartburn    occasionally    High blood pressure    High cholesterol    History of falling    Hyperlipidemia    Irregular bowel habits    Itch of skin    right hand only    Leaking of urine    sometimes    Leg swelling    Osteoporosis    Sleep disturbance    Stool incontinence    when I have diarrhea    Type 2 diabetes mellitus with hyperglycemia (HCC)    Visual impairment    glasses    Wears glasses     Surgical History:  Past Surgical History:   Procedure Laterality Date    Cataract      Cervical spine surgery  10/06/2023    Colonoscopy  2019    Colonoscopy      Cystoscopy,insert ureteral stent      Cystourethroscopy  11/25/2020    Cystoscopy Procedure Dr Josh Hernandez  - recurrent tumor on BTS    Cystourethroscopy,biopsy  12/16/2019    BBx of scar sites, NSC     Cystourethroscopy,fulgur .5-2cm lesn  04/16/2020    TURBT, NSC    Cystourethroscopy,fulgur .5-2cm lesn  07/30/2020    TURBT with Gemcitabine    Cystourethroscopy,fulgur >5cm lesn  10/03/2019    TURBT with bilateral RPGs (NSC)    Fracture surgery      left wrist    Hysterectomy      age 31    Ndsc ablation & rcnstj [facility]ria [facility] w/o bypass      Other surgical history  04/20/2021    BTS CystDr David carter    Other surgical history  08/17/2021    BTS Cysto Caterize Dr. Hernandez    Other surgical history  11/16/2021    BTS Cysto W/ caterize Dr. Hernandez     Other surgical history  03/08/2022    BTS Cysto- Dr. Hernandez    Spine surgery procedure unlisted  10/06/2003    CERVICAL    Tonsillectomy  1946    Total abdom hysterectomy  1971       Allergies:  Allergies   Allergen Reactions    Morphine CONFUSION       Palliative Care Social History:    Marital Status:   Children:  son and daughter  Living Situation: she lives with her daughter, Petty.    She has regained her independence    Medications:    Review of Systems:  General:  Fatigue.  pain  Respiratory:  Denies SOB, denies cough  Cardiac:  Denies chest pain, heart palpitations  Abdomen:  Denies constipation, diarrhea.  Denies pain.    Psych:  No complaints.  Not Sleeping well    Palliative Performance Scale:   90%    Physical Examination:  General: Patient is alert and oriented, not in acute distress.  Respiratory: Normal excursions and effort.    Abdomen: Soft, non tender   Musculoskeletal:  normal gait  Psych:  Mood/Affect appropriate    Advanced Directives Discussed and Completed:     HCPOA/Health Surrogate:    There is a completed HCPOA documentation on file in Epic.    I confirmed that patient's HCPOA is: Petty, her daughter    POLST Discussed and Completed:  There is a Scanned POLST in EMR indicating DNAR/Selective    Palliative Care:   JESSICA is affecting ADLs and sleep.  She is tolerating gabapentin so will increase dose to optimize JESSICA control.  Hopefully, this will improve over  time.  If she retains some baseline neuropathy, she can remain on gabapentin long term with PCP management.     Impression/Plan:   JESSICA  Gabapentin 200mg BID   Acetaminophen 1G TID prn    Palliative care by specialist  ongoing    Head and neck cancer (HCC)  Completed treatment.       Planned Follow up: Return in about 3 months (around 5/4/2025).      I spent a total of 30 minutes with the patient today, which included all of the following:direct face to face contact, history taking, physical examination, and >50% was spent counseling and coordinating care      The 21st Century Cures Act makes medical notes like these available to patients in the interest of transparency. Please be advised this is a medical document. Medical documents are intended to carry relevant information, facts as evident, and the clinical opinion of the practitioner. The medical note is intended as peer to peer communication and may appear blunt or direct. It is written in medical language and may contain abbreviations or verbiage that are unfamiliar.        Electronically Signed by:  RUIZ CHINCHILLA Outpatient Palliative Nurse Practitioner

## 2025-02-04 NOTE — PROGRESS NOTES
Patient here for follow-up. Had a recent PET scan. Energy levels are still low. Denies any unmanaged pain. Appetite levels are improving as well as mobility.

## 2025-02-04 NOTE — PATIENT INSTRUCTIONS
- WE WILL CALL TO SCHEDULE YOUR FOLLOW-UP APPOINTMENT WITH DR. RAMIREZ IN NOVEMBER 2025     - CALL TO SCHEDULE A FOLLOW-UP WITH DR. LAI    - CALL [phone] IF YOU HAVE ANY QUESTIONS/CONCERNS REGARDING RADIATION THERAPY

## 2025-02-04 NOTE — PAT NURSING NOTE
PreOp Instructions     You are scheduled for: an Interventional Radiology Procedure     Date of Procedure: 02/06/25. CHECK IN AT 9:30 AM     Diet Instructions: Do not eat or drink anything after midnight including gum, mints, candy, etc.     Medications: Medications you are allowed to take can be taken with a sip of water the morning of your procedure     Do not take the following Blood Thinner(s): LAST DOSE OF ELIQUIS ON 3/4 IN THE MORNING     Medications to Stop: Hold herbal supplements and vitamins     Other Medications: HOLD FUROSEMIDE AND POTASSIUM MORNING OR PROCEDURE     Diabetic Instructions: Do not take morning dose of short-acting Insulin, If you wear a CGM (Continuous Glucose Monitor), it needs to be removed (sensor/transmitter) prior to your procedure. You may bring a new monitor to place after the procedure. Please plan accordingly.    Skin Prep : Shower with antibacterial soap using a clean washcloth, prior to procedure. Once dried off, no lotions/powders/creams/ointments, etc.     Driving After Procedure: Sedation will be given so you WILL NOT be able to drive home. You will need a responsible adult  to drive you home. You can NOT take uber or taxi unless approved by your physician in advance.     Discharge Teaching: Your nurse will give you specific instructions before discharge, Most people can resume normal activities in 2-3 days, Any questions, please call the physician's office

## 2025-02-04 NOTE — PROGRESS NOTES
Nursing Follow-Up Note    Patient: Anastasia Hobson  YOB: 1940  Age: 84 year old  Radiation Oncologist: Dr. Bella Cat  Referring Physician: Jordon Reid  Chief Complaint:   Chief Complaint   Patient presents with    Follow - Up     Date: 2/4/2025    Toxicities: N/A    Vital Signs: /65 (BP Location: Left arm, Patient Position: Sitting, Cuff Size: adult)   Pulse 69   Temp 96.9 °F (36.1 °C) (Tympanic)   Resp 20   Wt 64.3 kg (141 lb 12.8 oz)   SpO2 100%   BMI 23.97 kg/m² ,   Wt Readings from Last 6 Encounters:   02/04/25 64.3 kg (141 lb 12.8 oz)   12/10/24 65.8 kg (145 lb)   11/19/24 65 kg (143 lb 6.4 oz)   11/05/24 64.9 kg (143 lb)   10/31/24 66.5 kg (146 lb 8 oz)   10/29/24 64.7 kg (142 lb 9.6 oz)       Allergies:  Allergies[1]    Nursing Note: Pt completed RT to L neck on 11/6/24. Followed up with Dr. Reid in Dec, ordered PET/CT. Done on 1/31/25. Pt feels well, appetite good. Denies dysphagia, sore throat or xerostomia. Mild hyperpigmentation to neck, no desquamation. Full ROM to neck, no lymphedema noted.          [1]   Allergies  Allergen Reactions    Morphine CONFUSION

## 2025-02-04 NOTE — PROGRESS NOTES
[facility]  RADIATION ONCOLOGY   FOLLOW UP     Anastasia Hobson  9/13/1940    DIAGNOSIS: SCCA L neck node, unknown primary    CANCER HISTORY   4 to 5 cm L low neck mass with pain. Biopsy shows p16 positive SCCA. PET with SUV 9 in L neck, but no primary site. ENT (Dr Villalta) exam negative for primary site. EGD negative.     Hx of superficial bladder CA, DM, HL, HTN, arrhythmia, C4-6 fusion, hysterectomy    48 Gy to L neck with 14 Gy boost to L low neck mass with carboplatin/Taxol, completed 11/2024  Not clear where primary site is so no suspected primary site RT    PCS managing pain    INTERIM HISTORY   PET 1/31/2025:  SUV 3 with post RT changes in anterior left lung apex/low neck. No emergence of primary site.    Pain resolved. Mild occ dysphagia.    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+  Ex smoker    The low neck location is atypical, but recommended ENT follow up to detect emergence of primary site in the future    Not sure if EGD surveillance is indicated - will d/w Dr Reid  Hx of smoking so CT chest periodically would be reasonable, maybe a low dose non contrast CT    Dysphagia is mild so dilatation not indicated    Follow up:  9 month    Bella Cat MD  Radiation Oncology    CC: GEOVANY Reid MD     15 minutes were spent with the patient, more than 50 percent on counseling/coordination of care (discuss disease status, management of any side effects, future follow up plans)

## 2025-02-10 RX ORDER — GABAPENTIN 300 MG/1
300 CAPSULE ORAL 2 TIMES DAILY
Qty: 60 CAPSULE | Refills: 0 | OUTPATIENT
Start: 2025-02-10

## 2025-02-21 ENCOUNTER — HOSPITAL ENCOUNTER (OUTPATIENT)
Dept: LAB | Facility: HOSPITAL | Age: 85
Discharge: HOME | End: 2025-02-21
Attending: INTERNAL MEDICINE
Payer: MEDICARE

## 2025-02-21 DIAGNOSIS — Z01.818 PRE-OP TESTING: ICD-10-CM

## 2025-02-21 LAB
ANION GAP SERPL CALC-SCNC: 7 MMOL/L (ref 0–18)
BASOPHILS # BLD AUTO: 0.02 X10(3) UL (ref 0–0.2)
BASOPHILS NFR BLD AUTO: 0.4 %
BUN BLD-MCNC: 38 MG/DL (ref 9–23)
CALCIUM BLD-MCNC: 10 MG/DL (ref 8.7–10.6)
CHLORIDE SERPL-SCNC: 104 MMOL/L (ref 98–112)
CO2 SERPL-SCNC: 31 MMOL/L (ref 21–32)
CREAT BLD-MCNC: 1.94 MG/DL
EGFRCR SERPLBLD CKD-EPI 2021: 25 ML/MIN/1.73M2 (ref 60–?)
EOSINOPHIL # BLD AUTO: 0.06 X10(3) UL (ref 0–0.7)
EOSINOPHIL NFR BLD AUTO: 1.2 %
ERYTHROCYTE [DISTWIDTH] IN BLOOD BY AUTOMATED COUNT: 16.9 %
FASTING STATUS PATIENT QL REPORTED: NO
GLUCOSE BLD-MCNC: 176 MG/DL (ref 70–99)
HCT VFR BLD AUTO: 36.8 %
HGB BLD-MCNC: 11.6 G/DL
IMM GRANULOCYTES # BLD AUTO: 0.01 X10(3) UL (ref 0–1)
IMM GRANULOCYTES NFR BLD: 0.2 %
LYMPHOCYTES # BLD AUTO: 0.8 X10(3) UL (ref 1–4)
LYMPHOCYTES NFR BLD AUTO: 16.4 %
MCH RBC QN AUTO: 26.1 PG (ref 26–34)
MCHC RBC AUTO-ENTMCNC: 31.5 G/DL (ref 31–37)
MCV RBC AUTO: 82.7 FL
MONOCYTES # BLD AUTO: 0.58 X10(3) UL (ref 0.1–1)
MONOCYTES NFR BLD AUTO: 11.9 %
NEUTROPHILS # BLD AUTO: 3.41 X10 (3) UL (ref 1.5–7.7)
NEUTROPHILS # BLD AUTO: 3.41 X10(3) UL (ref 1.5–7.7)
NEUTROPHILS NFR BLD AUTO: 69.9 %
OSMOLALITY SERPL CALC.SUM OF ELEC: 307 MOSM/KG (ref 275–295)
PLATELET # BLD AUTO: 233 10(3)UL (ref 150–450)
POTASSIUM SERPL-SCNC: 4.3 MMOL/L (ref 3.5–5.1)
RBC # BLD AUTO: 4.45 X10(6)UL
SODIUM SERPL-SCNC: 142 MMOL/L (ref 136–145)
WBC # BLD AUTO: 4.9 X10(3) UL (ref 4–11)

## 2025-02-21 PROCEDURE — 36415 COLL VENOUS BLD VENIPUNCTURE: CPT | Performed by: INTERNAL MEDICINE

## 2025-02-21 PROCEDURE — 80048 BASIC METABOLIC PNL TOTAL CA: CPT | Performed by: NURSE PRACTITIONER

## 2025-02-21 PROCEDURE — 85025 COMPLETE CBC W/AUTO DIFF WBC: CPT | Performed by: INTERNAL MEDICINE

## 2025-02-27 DIAGNOSIS — G89.3 NEOPLASM RELATED PAIN: Primary | ICD-10-CM

## 2025-02-27 RX ORDER — GABAPENTIN 100 MG/1
CAPSULE ORAL
Qty: 90 CAPSULE | Refills: 1 | Status: SHIPPED | OUTPATIENT
Start: 2025-02-27

## 2025-03-06 ENCOUNTER — HOSPITAL ENCOUNTER (OUTPATIENT)
Dept: INTERVENTIONAL RADIOLOGY/VASCULAR | Facility: HOSPITAL | Age: 85
Discharge: HOME | End: 2025-03-06
Attending: INTERNAL MEDICINE | Admitting: INTERNAL MEDICINE
Payer: MEDICARE

## 2025-03-06 VITALS
TEMPERATURE: 99 F | DIASTOLIC BLOOD PRESSURE: 67 MMHG | RESPIRATION RATE: 12 BRPM | HEART RATE: 58 BPM | SYSTOLIC BLOOD PRESSURE: 185 MMHG | OXYGEN SATURATION: 99 %

## 2025-03-06 DIAGNOSIS — C76.0 HEAD AND NECK CANCER (HCC): ICD-10-CM

## 2025-03-06 DIAGNOSIS — Z01.818 PRE-OP TESTING: Primary | ICD-10-CM

## 2025-03-06 LAB
GLUCOSE BLD-MCNC: 187 MG/DL (ref 70–99)
INR: 1.1 (ref 0.8–1.3)

## 2025-03-06 PROCEDURE — 99152 MOD SED SAME PHYS/QHP 5/>YRS: CPT | Performed by: RADIOLOGY

## 2025-03-06 PROCEDURE — 82962 GLUCOSE BLOOD TEST: CPT

## 2025-03-06 PROCEDURE — 36590 REMOVAL TUNNELED CV CATH: CPT | Performed by: RADIOLOGY

## 2025-03-06 PROCEDURE — 0JPT3WZ REMOVAL OF TOTALLY IMPLANTABLE VASCULAR ACCESS DEVICE FROM TRUNK SUBCUTANEOUS TISSUE AND FASCIA, PERCUTANEOUS APPROACH: ICD-10-PCS | Performed by: RADIOLOGY

## 2025-03-06 PROCEDURE — 77001 FLUOROGUIDE FOR VEIN DEVICE: CPT | Performed by: RADIOLOGY

## 2025-03-06 PROCEDURE — 85610 PROTHROMBIN TIME: CPT | Performed by: RADIOLOGY

## 2025-03-06 RX ORDER — DIPHENHYDRAMINE HYDROCHLORIDE 50 MG/ML
50 INJECTION, SOLUTION INTRAMUSCULAR; INTRAVENOUS ONCE AS NEEDED
Status: DISCONTINUED | OUTPATIENT
Start: 2025-03-06 | End: 2025-03-06

## 2025-03-06 RX ORDER — MIDAZOLAM HYDROCHLORIDE 1 MG/ML
INJECTION INTRAMUSCULAR; INTRAVENOUS
Status: COMPLETED
Start: 2025-03-06 | End: 2025-03-06

## 2025-03-06 RX ORDER — ONDANSETRON 2 MG/ML
4 INJECTION INTRAMUSCULAR; INTRAVENOUS ONCE AS NEEDED
Status: DISCONTINUED | OUTPATIENT
Start: 2025-03-06 | End: 2025-03-06

## 2025-03-06 RX ORDER — NALOXONE HYDROCHLORIDE 0.4 MG/ML
0.08 INJECTION, SOLUTION INTRAMUSCULAR; INTRAVENOUS; SUBCUTANEOUS AS NEEDED
Status: DISCONTINUED | OUTPATIENT
Start: 2025-03-06 | End: 2025-03-06

## 2025-03-06 RX ORDER — LIDOCAINE HYDROCHLORIDE AND EPINEPHRINE BITARTRATE 20; .01 MG/ML; MG/ML
INJECTION, SOLUTION SUBCUTANEOUS
Status: COMPLETED
Start: 2025-03-06 | End: 2025-03-06

## 2025-03-06 RX ORDER — VANCOMYCIN HYDROCHLORIDE 1 G/20ML
INJECTION, POWDER, LYOPHILIZED, FOR SOLUTION INTRAVENOUS
Status: COMPLETED
Start: 2025-03-06 | End: 2025-03-06

## 2025-03-06 RX ORDER — LIDOCAINE HYDROCHLORIDE 10 MG/ML
INJECTION, SOLUTION INFILTRATION; PERINEURAL
Status: COMPLETED
Start: 2025-03-06 | End: 2025-03-06

## 2025-03-06 RX ORDER — SODIUM CHLORIDE 9 MG/ML
INJECTION, SOLUTION INTRAVENOUS CONTINUOUS
Status: DISCONTINUED | OUTPATIENT
Start: 2025-03-06 | End: 2025-03-06

## 2025-03-06 RX ORDER — CEFAZOLIN SODIUM 1 G/3ML
INJECTION, POWDER, FOR SOLUTION INTRAMUSCULAR; INTRAVENOUS
Status: COMPLETED
Start: 2025-03-06 | End: 2025-03-06

## 2025-03-06 NOTE — DISCHARGE INSTRUCTIONS
~ Follow up with MDs as scheduled.   ~ Rest today and resume regular activity in the am as tolerated.   ~ No driving or drinking alcohol for 24hrs.   ~ Resume diet and medications.   ~ No lifting more than 10 pounds for 48hrs. Avoid lifting heavy objects such as a purse or a backpack from the shoulder of which the port was placed for 48hrs.   ~ Avoid strenuous activity with the arm of which th port was placed for 48hrs.   ~ Keep bandage completely dry and intact. You may remove bandage on Sunday and shower. There will be paper tape or glue. DO NOT REMOVE. They will fall off in 7-10 days. No tub baths/pools until incision completely healed (about 2-3 weeks)    ~ Notify MD if any signs of infection... Fever, chills, redness, swelling or drainage.   ~ It is normal to have some discomfort at the incisions ite for the first 24-48hrs. You make take over the counter Tylenol if needed.   ~ Resume Eliquis tomorrow.

## 2025-03-10 ENCOUNTER — APPOINTMENT (OUTPATIENT)
Dept: GENERAL RADIOLOGY | Facility: HOSPITAL | Age: 85
End: 2025-03-10
Attending: EMERGENCY MEDICINE
Payer: MEDICARE

## 2025-03-10 ENCOUNTER — APPOINTMENT (OUTPATIENT)
Dept: MRI IMAGING | Facility: HOSPITAL | Age: 85
End: 2025-03-10
Attending: EMERGENCY MEDICINE
Payer: MEDICARE

## 2025-03-10 ENCOUNTER — HOSPITAL ENCOUNTER (OUTPATIENT)
Dept: GENERAL RADIOLOGY | Facility: HOSPITAL | Age: 85
Discharge: HOME | End: 2025-03-10
Attending: FAMILY MEDICINE
Payer: MEDICARE

## 2025-03-10 ENCOUNTER — HOSPITAL ENCOUNTER (INPATIENT)
Facility: HOSPITAL | Age: 85
LOS: 3 days | Discharge: NURSING, REHAB OR LONG-TERM CARE | End: 2025-03-14
Attending: EMERGENCY MEDICINE | Admitting: STUDENT IN AN ORGANIZED HEALTH CARE EDUCATION/TRAINING PROGRAM
Payer: MEDICARE

## 2025-03-10 ENCOUNTER — TELEPHONE (OUTPATIENT)
Dept: FAMILY MEDICINE CLINIC | Facility: CLINIC | Age: 85
End: 2025-03-10

## 2025-03-10 DIAGNOSIS — M54.50 CHRONIC MIDLINE LOW BACK PAIN, UNSPECIFIED WHETHER SCIATICA PRESENT: ICD-10-CM

## 2025-03-10 DIAGNOSIS — N18.9 CHRONIC KIDNEY DISEASE, UNSPECIFIED CKD STAGE: ICD-10-CM

## 2025-03-10 DIAGNOSIS — M54.9 MID BACK PAIN: ICD-10-CM

## 2025-03-10 DIAGNOSIS — R52 INTRACTABLE PAIN: ICD-10-CM

## 2025-03-10 DIAGNOSIS — M54.50 CHRONIC MIDLINE LOW BACK PAIN, UNSPECIFIED WHETHER SCIATICA PRESENT: Primary | ICD-10-CM

## 2025-03-10 DIAGNOSIS — G89.29 CHRONIC MIDLINE LOW BACK PAIN, UNSPECIFIED WHETHER SCIATICA PRESENT: ICD-10-CM

## 2025-03-10 DIAGNOSIS — G89.29 CHRONIC MIDLINE LOW BACK PAIN, UNSPECIFIED WHETHER SCIATICA PRESENT: Primary | ICD-10-CM

## 2025-03-10 DIAGNOSIS — R73.9 HYPERGLYCEMIA: ICD-10-CM

## 2025-03-10 DIAGNOSIS — S22.060D CLOSED WEDGE COMPRESSION FRACTURE OF T8 VERTEBRA WITH ROUTINE HEALING, SUBSEQUENT ENCOUNTER: Primary | ICD-10-CM

## 2025-03-10 LAB
ALBUMIN SERPL-MCNC: 4.6 G/DL (ref 3.2–4.8)
ALBUMIN/GLOB SERPL: 2 {RATIO} (ref 1–2)
ALP LIVER SERPL-CCNC: 63 U/L
ALT SERPL-CCNC: 61 U/L
AMB EXT CHOL/HDL RATIO: 2.3
AMB EXT CHOLESTEROL, TOTAL: 139 MG/DL
AMB EXT HDL CHOLESTEROL: 61 MG/DL
AMB EXT LDL CHOLESTEROL, DIRECT: 75 MG/DL
AMB EXT NON HDL CHOL: 78 MG/DL
AMB EXT TRIGLYCERIDES: 98 MG/DL
AMB EXT VLDL: 20 MG/DL
ANION GAP SERPL CALC-SCNC: 12 MMOL/L (ref 0–18)
AST SERPL-CCNC: 64 U/L (ref ?–34)
BASOPHILS # BLD AUTO: 0.02 X10(3) UL (ref 0–0.2)
BASOPHILS NFR BLD AUTO: 0.3 %
BILIRUB SERPL-MCNC: 0.6 MG/DL (ref 0.2–1.1)
BUN BLD-MCNC: 39 MG/DL (ref 9–23)
CALCIUM BLD-MCNC: 9.7 MG/DL (ref 8.7–10.6)
CHLORIDE SERPL-SCNC: 102 MMOL/L (ref 98–112)
CO2 SERPL-SCNC: 26 MMOL/L (ref 21–32)
CREAT BLD-MCNC: 1.91 MG/DL
EGFRCR SERPLBLD CKD-EPI 2021: 26 ML/MIN/1.73M2 (ref 60–?)
EOSINOPHIL # BLD AUTO: 0.07 X10(3) UL (ref 0–0.7)
EOSINOPHIL NFR BLD AUTO: 1.1 %
ERYTHROCYTE [DISTWIDTH] IN BLOOD BY AUTOMATED COUNT: 16.6 %
GLOBULIN PLAS-MCNC: 2.3 G/DL (ref 2–3.5)
GLUCOSE BLD-MCNC: 166 MG/DL (ref 70–99)
GLUCOSE BLD-MCNC: 330 MG/DL (ref 70–99)
HCT VFR BLD AUTO: 36 %
HGB BLD-MCNC: 11.7 G/DL
IMM GRANULOCYTES # BLD AUTO: 0.04 X10(3) UL (ref 0–1)
IMM GRANULOCYTES NFR BLD: 0.6 %
LYMPHOCYTES # BLD AUTO: 1.01 X10(3) UL (ref 1–4)
LYMPHOCYTES NFR BLD AUTO: 15.6 %
MCH RBC QN AUTO: 26.4 PG (ref 26–34)
MCHC RBC AUTO-ENTMCNC: 32.5 G/DL (ref 31–37)
MCV RBC AUTO: 81.3 FL
MONOCYTES # BLD AUTO: 0.77 X10(3) UL (ref 0.1–1)
MONOCYTES NFR BLD AUTO: 11.9 %
NEUTROPHILS # BLD AUTO: 4.55 X10 (3) UL (ref 1.5–7.7)
NEUTROPHILS # BLD AUTO: 4.55 X10(3) UL (ref 1.5–7.7)
NEUTROPHILS NFR BLD AUTO: 70.5 %
OSMOLALITY SERPL CALC.SUM OF ELEC: 312 MOSM/KG (ref 275–295)
PLATELET # BLD AUTO: 273 10(3)UL (ref 150–450)
POTASSIUM SERPL-SCNC: 4.2 MMOL/L (ref 3.5–5.1)
PROT SERPL-MCNC: 6.9 G/DL (ref 5.7–8.2)
RBC # BLD AUTO: 4.43 X10(6)UL
SODIUM SERPL-SCNC: 140 MMOL/L (ref 136–145)
WBC # BLD AUTO: 6.5 X10(3) UL (ref 4–11)

## 2025-03-10 PROCEDURE — 72072 X-RAY EXAM THORAC SPINE 3VWS: CPT | Performed by: FAMILY MEDICINE

## 2025-03-10 PROCEDURE — 73552 X-RAY EXAM OF FEMUR 2/>: CPT | Performed by: EMERGENCY MEDICINE

## 2025-03-10 PROCEDURE — 72158 MRI LUMBAR SPINE W/O & W/DYE: CPT | Performed by: EMERGENCY MEDICINE

## 2025-03-10 PROCEDURE — 73560 X-RAY EXAM OF KNEE 1 OR 2: CPT | Performed by: EMERGENCY MEDICINE

## 2025-03-10 PROCEDURE — 99222 1ST HOSP IP/OBS MODERATE 55: CPT | Performed by: STUDENT IN AN ORGANIZED HEALTH CARE EDUCATION/TRAINING PROGRAM

## 2025-03-10 PROCEDURE — 72110 X-RAY EXAM L-2 SPINE 4/>VWS: CPT | Performed by: FAMILY MEDICINE

## 2025-03-10 PROCEDURE — 72157 MRI CHEST SPINE W/O & W/DYE: CPT | Performed by: EMERGENCY MEDICINE

## 2025-03-10 RX ORDER — HYDROMORPHONE HYDROCHLORIDE 1 MG/ML
0.4 INJECTION, SOLUTION INTRAMUSCULAR; INTRAVENOUS; SUBCUTANEOUS EVERY 4 HOURS PRN
Status: CANCELLED | OUTPATIENT
Start: 2025-03-10

## 2025-03-10 RX ORDER — HYDROMORPHONE HYDROCHLORIDE 1 MG/ML
0.1 INJECTION, SOLUTION INTRAMUSCULAR; INTRAVENOUS; SUBCUTANEOUS EVERY 4 HOURS PRN
Status: CANCELLED | OUTPATIENT
Start: 2025-03-10

## 2025-03-10 RX ORDER — HYDROMORPHONE HYDROCHLORIDE 1 MG/ML
0.2 INJECTION, SOLUTION INTRAMUSCULAR; INTRAVENOUS; SUBCUTANEOUS EVERY 4 HOURS PRN
Status: CANCELLED | OUTPATIENT
Start: 2025-03-10

## 2025-03-10 RX ORDER — INSULIN ASPART 100 [IU]/ML
0.15 INJECTION, SOLUTION INTRAVENOUS; SUBCUTANEOUS ONCE
Status: DISCONTINUED | OUTPATIENT
Start: 2025-03-10 | End: 2025-03-12

## 2025-03-10 RX ORDER — HYDROMORPHONE HYDROCHLORIDE 1 MG/ML
0.5 INJECTION, SOLUTION INTRAMUSCULAR; INTRAVENOUS; SUBCUTANEOUS ONCE
Status: COMPLETED | OUTPATIENT
Start: 2025-03-10 | End: 2025-03-10

## 2025-03-10 RX ORDER — SODIUM CHLORIDE 9 MG/ML
INJECTION, SOLUTION INTRAVENOUS CONTINUOUS
Status: DISCONTINUED | OUTPATIENT
Start: 2025-03-10 | End: 2025-03-10

## 2025-03-10 RX ORDER — GADOTERATE MEGLUMINE 376.9 MG/ML
15 INJECTION INTRAVENOUS
Status: COMPLETED | OUTPATIENT
Start: 2025-03-10 | End: 2025-03-10

## 2025-03-10 RX ORDER — ENOXAPARIN SODIUM 100 MG/ML
40 INJECTION SUBCUTANEOUS DAILY
Status: CANCELLED | OUTPATIENT
Start: 2025-03-11

## 2025-03-10 RX ORDER — CYCLOBENZAPRINE HCL 10 MG
5 TABLET ORAL ONCE
Status: COMPLETED | OUTPATIENT
Start: 2025-03-10 | End: 2025-03-10

## 2025-03-10 NOTE — TELEPHONE ENCOUNTER
Advised patient's dtr of Dr. Awad's note below. Central Scheduling ph#[phone] provided to schedule Xrays - Patient's dtr verbalized understanding. No further questions at this time.

## 2025-03-10 NOTE — TELEPHONE ENCOUNTER
----- Message from Houston Prado sent at 3/10/2025  4:30 PM CDT -----  Please let patient know that she has some worsening degenerative disc disease and would benefit from seeing a orthopedic specialist.  Thank you

## 2025-03-10 NOTE — TELEPHONE ENCOUNTER
----- Message from Houston Prado sent at 3/10/2025  4:30 PM CDT -----  Patient has worsening compression deformity of her thoracic spine which could be causing her pain.  Would recommend orthopedic evaluation by Dr. Michel.  Please check if okay to place referral

## 2025-03-10 NOTE — ED INITIAL ASSESSMENT (HPI)
Pt arrives to ED for evaluation for mid-lower back pain, pt a syncopal fell last Tuesday and has been in pain since. Pt states the pain is getting worse. Pt states the tramadol she was prescribed is not working. Per pt family member, pt pain cannot be managed. Pt did not have back pain prior to falling.    Yes

## 2025-03-10 NOTE — TELEPHONE ENCOUNTER
Called and spoke with Dtr and patient   Patient went to ER on 03/04/25 for fall (see ED visit 03/04/25 at [facility])  Reports imaging done - CT head and XR hips - patient was Rx'd tramadol and extra strength tylenol    Patient reports back pain since fall  Patient reports as Sharp pain to mid and lower back, rates 10/10   If lying down flat and not moving - rates pain 6/10  As soon as start moving, any movement - causes 10/10 pain, can go for hours, meds don't help when patient moving    Dtr and patient looking for XRAY of back order     Ok to place? Does patient need to be seen?  Please advise, thank you

## 2025-03-10 NOTE — TELEPHONE ENCOUNTER
DAUGHTER CALLED AND ADV PT TOOK A FALL LAST WEEK 3/4/25 - HOSPITAL WAS MORE WORRIED ABOUT PT HITTING HEAD,    ADV PT IS HAVING A LOT OF BACK PAIN AND WOULD LIKE TO SEE IF  CAN PLACE X-RAY ORDER FOR BACK.    PLEASE CALL AND ADV    THANK YOU

## 2025-03-10 NOTE — TELEPHONE ENCOUNTER
Advised patient's dtr of Dr. Awad's notes below. Patient's dtr verbalized understanding and stated already at ER for evaluation for back pain. Advised patient's dtr that ER should be able to access notes from PCP regarding XR results - she verbalized understanding. No further questions at this time.    Routing as FYI only

## 2025-03-11 PROBLEM — R52 INTRACTABLE PAIN: Status: ACTIVE | Noted: 2025-03-11

## 2025-03-11 PROBLEM — R73.9 HYPERGLYCEMIA: Status: ACTIVE | Noted: 2025-03-11

## 2025-03-11 PROBLEM — M54.9 BACK PAIN: Status: ACTIVE | Noted: 2025-03-11

## 2025-03-11 PROBLEM — N18.9 CHRONIC KIDNEY DISEASE, UNSPECIFIED CKD STAGE: Status: ACTIVE | Noted: 2025-03-11

## 2025-03-11 LAB
ALBUMIN SERPL-MCNC: 4 G/DL (ref 3.2–4.8)
ALP LIVER SERPL-CCNC: 56 U/L
ALT SERPL-CCNC: 52 U/L
AST SERPL-CCNC: 54 U/L (ref ?–34)
BILIRUB DIRECT SERPL-MCNC: 0.3 MG/DL (ref ?–0.3)
BILIRUB SERPL-MCNC: 0.7 MG/DL (ref 0.2–1.1)
EST. AVERAGE GLUCOSE BLD GHB EST-MCNC: 157 MG/DL (ref 68–126)
GLUCOSE BLD-MCNC: 168 MG/DL (ref 70–99)
GLUCOSE BLD-MCNC: 172 MG/DL (ref 70–99)
GLUCOSE BLD-MCNC: 193 MG/DL (ref 70–99)
GLUCOSE BLD-MCNC: 81 MG/DL (ref 70–99)
HBA1C MFR BLD: 7.1 % (ref ?–5.7)
INR BLD: 1.32 (ref 0.8–1.2)
PROT SERPL-MCNC: 6.2 G/DL (ref 5.7–8.2)
PROTHROMBIN TIME: 16.5 SECONDS (ref 11.6–14.8)

## 2025-03-11 PROCEDURE — 99232 SBSQ HOSP IP/OBS MODERATE 35: CPT | Performed by: HOSPITALIST

## 2025-03-11 RX ORDER — HEPARIN SODIUM 5000 [USP'U]/ML
5000 INJECTION, SOLUTION INTRAVENOUS; SUBCUTANEOUS EVERY 8 HOURS SCHEDULED
Status: DISCONTINUED | OUTPATIENT
Start: 2025-03-11 | End: 2025-03-14

## 2025-03-11 RX ORDER — BENZONATATE 100 MG/1
200 CAPSULE ORAL 3 TIMES DAILY PRN
Status: DISCONTINUED | OUTPATIENT
Start: 2025-03-11 | End: 2025-03-14

## 2025-03-11 RX ORDER — HYDROMORPHONE HYDROCHLORIDE 1 MG/ML
0.8 INJECTION, SOLUTION INTRAMUSCULAR; INTRAVENOUS; SUBCUTANEOUS EVERY 4 HOURS PRN
Status: DISCONTINUED | OUTPATIENT
Start: 2025-03-11 | End: 2025-03-14

## 2025-03-11 RX ORDER — HYDRALAZINE HYDROCHLORIDE 50 MG/1
50 TABLET, FILM COATED ORAL 3 TIMES DAILY
Status: DISCONTINUED | OUTPATIENT
Start: 2025-03-11 | End: 2025-03-14

## 2025-03-11 RX ORDER — AMIODARONE HYDROCHLORIDE 200 MG/1
200 TABLET ORAL DAILY
Status: DISCONTINUED | OUTPATIENT
Start: 2025-03-11 | End: 2025-03-14

## 2025-03-11 RX ORDER — SODIUM CHLORIDE 9 MG/ML
INJECTION, SOLUTION INTRAVENOUS CONTINUOUS
Status: ACTIVE | OUTPATIENT
Start: 2025-03-11 | End: 2025-03-11

## 2025-03-11 RX ORDER — LISINOPRIL 20 MG/1
20 TABLET ORAL EVERY EVENING
Status: DISCONTINUED | OUTPATIENT
Start: 2025-03-11 | End: 2025-03-14

## 2025-03-11 RX ORDER — ECHINACEA PURPUREA EXTRACT 125 MG
1 TABLET ORAL
Status: DISCONTINUED | OUTPATIENT
Start: 2025-03-11 | End: 2025-03-14

## 2025-03-11 RX ORDER — HYDROMORPHONE HYDROCHLORIDE 1 MG/ML
0.4 INJECTION, SOLUTION INTRAMUSCULAR; INTRAVENOUS; SUBCUTANEOUS EVERY 4 HOURS PRN
Status: DISCONTINUED | OUTPATIENT
Start: 2025-03-11 | End: 2025-03-14

## 2025-03-11 RX ORDER — POLYETHYLENE GLYCOL 3350 17 G/17G
17 POWDER, FOR SOLUTION ORAL DAILY PRN
Status: DISCONTINUED | OUTPATIENT
Start: 2025-03-11 | End: 2025-03-12

## 2025-03-11 RX ORDER — ATORVASTATIN CALCIUM 80 MG/1
80 TABLET, FILM COATED ORAL NIGHTLY
Status: DISCONTINUED | OUTPATIENT
Start: 2025-03-11 | End: 2025-03-14

## 2025-03-11 RX ORDER — SENNOSIDES 8.6 MG
17.2 TABLET ORAL NIGHTLY PRN
Status: DISCONTINUED | OUTPATIENT
Start: 2025-03-11 | End: 2025-03-14

## 2025-03-11 RX ORDER — ACETAMINOPHEN 500 MG
1000 TABLET ORAL EVERY 4 HOURS PRN
Status: DISCONTINUED | OUTPATIENT
Start: 2025-03-11 | End: 2025-03-14

## 2025-03-11 RX ORDER — INSULIN DEGLUDEC 100 U/ML
5 INJECTION, SOLUTION SUBCUTANEOUS NIGHTLY
Status: DISCONTINUED | OUTPATIENT
Start: 2025-03-11 | End: 2025-03-12

## 2025-03-11 RX ORDER — DILTIAZEM HYDROCHLORIDE 120 MG/1
120 CAPSULE, EXTENDED RELEASE ORAL DAILY
Status: DISCONTINUED | OUTPATIENT
Start: 2025-03-11 | End: 2025-03-14

## 2025-03-11 RX ORDER — HYDROMORPHONE HYDROCHLORIDE 1 MG/ML
INJECTION, SOLUTION INTRAMUSCULAR; INTRAVENOUS; SUBCUTANEOUS
Status: COMPLETED
Start: 2025-03-11 | End: 2025-03-11

## 2025-03-11 RX ORDER — METOPROLOL SUCCINATE 25 MG/1
25 TABLET, EXTENDED RELEASE ORAL
Status: DISCONTINUED | OUTPATIENT
Start: 2025-03-11 | End: 2025-03-14

## 2025-03-11 RX ORDER — NICOTINE POLACRILEX 4 MG
15 LOZENGE BUCCAL
Status: DISCONTINUED | OUTPATIENT
Start: 2025-03-11 | End: 2025-03-14

## 2025-03-11 RX ORDER — METOPROLOL SUCCINATE 25 MG/1
25 TABLET, EXTENDED RELEASE ORAL DAILY
COMMUNITY

## 2025-03-11 RX ORDER — DEXTROSE MONOHYDRATE 25 G/50ML
50 INJECTION, SOLUTION INTRAVENOUS
Status: DISCONTINUED | OUTPATIENT
Start: 2025-03-11 | End: 2025-03-14

## 2025-03-11 RX ORDER — METOCLOPRAMIDE HYDROCHLORIDE 5 MG/ML
5 INJECTION INTRAMUSCULAR; INTRAVENOUS EVERY 8 HOURS PRN
Status: DISCONTINUED | OUTPATIENT
Start: 2025-03-11 | End: 2025-03-14

## 2025-03-11 RX ORDER — FUROSEMIDE 20 MG/1
20 TABLET ORAL DAILY
Status: DISCONTINUED | OUTPATIENT
Start: 2025-03-11 | End: 2025-03-14

## 2025-03-11 RX ORDER — GABAPENTIN 100 MG/1
100 CAPSULE ORAL 2 TIMES DAILY
Status: DISCONTINUED | OUTPATIENT
Start: 2025-03-11 | End: 2025-03-14

## 2025-03-11 RX ORDER — BISACODYL 10 MG
10 SUPPOSITORY, RECTAL RECTAL
Status: DISCONTINUED | OUTPATIENT
Start: 2025-03-11 | End: 2025-03-14

## 2025-03-11 RX ORDER — ONDANSETRON 2 MG/ML
4 INJECTION INTRAMUSCULAR; INTRAVENOUS EVERY 6 HOURS PRN
Status: DISCONTINUED | OUTPATIENT
Start: 2025-03-11 | End: 2025-03-14

## 2025-03-11 RX ORDER — FENOFIBRATE 134 MG/1
134 CAPSULE ORAL
Status: DISCONTINUED | OUTPATIENT
Start: 2025-03-11 | End: 2025-03-11 | Stop reason: SINTOL

## 2025-03-11 RX ORDER — NICOTINE POLACRILEX 4 MG
30 LOZENGE BUCCAL
Status: DISCONTINUED | OUTPATIENT
Start: 2025-03-11 | End: 2025-03-14

## 2025-03-11 NOTE — PHYSICAL THERAPY NOTE
Physical therapy consult requested. Pt is an 85 y/o F who presented to the ED on 3/10/25 with worsening low back pain and mobility.  Pt admitted with intractable back pain, closed wedge compression fracture of T8 vertebra, and CKD.  IR eval, kyphoplasty pending, with order for TLSO.  TLSO ordered, not yet delivered.  Will hold therapy evaluation until medical plan determined, TLSO at beside.

## 2025-03-11 NOTE — ED QUICK NOTES
Orders for admission, patient is aware of plan and ready to go upstairs. Any questions, please call ED RN Bisi at extension [phone].     Patient Covid vaccination status: Fully vaccinated     COVID Test Ordered in ED: None    COVID Suspicion at Admission: N/A    Running Infusions:    sodium chloride          Mental Status/LOC at time of transport: a/ox4    Other pertinent information:   CIWA score: N/A   NIH score:  N/A

## 2025-03-11 NOTE — PROGRESS NOTES
[facility]   part of [facility]     Hospitalist Progress Note     Anastasia Hobson Patient Status:  Observation    1940 MRN [id number]   Location [facility] 4NW-A Attending Nathan Contreras MD   Hosp Day # 0 PCP Houston Prado MD     Chief Complaint: back pain     Subjective:     Patient still w/ back pain     Objective:    Review of Systems:   A comprehensive review of systems was completed; pertinent positive and negatives stated in subjective.    Vital signs:  Temp:  [97.8 °F (36.6 °C)-98.8 °F (37.1 °C)] 98.3 °F (36.8 °C)  Pulse:  [47-55] 48  Resp:  [9-24] 16  BP: (155-198)/() 173/71  SpO2:  [97 %-100 %] 97 %    Physical Exam:    General: No acute distress  Respiratory: No wheezes, no rhonchi  Cardiovascular: S1, S2, regular rate and rhythm  Abdomen: Soft, Non-tender, non-distended, positive bowel sounds  Neuro: No new focal deficits.   Extremities: No edema      Diagnostic Data:    Labs:  Recent Labs   Lab 257 03/10/25  1743 25  0557   WBC  --  6.5  --    HGB  --  11.7*  --    MCV  --  81.3  --    PLT  --  273.0  --    INR 1.1  --  1.32*       Recent Labs   Lab 03/10/25  1743 03/11/25  0557   *  --    BUN 39*  --    CREATSERUM 1.91*  --    CA 9.7  --    ALB 4.6 4.0     --    K 4.2  --      --    CO2 26.0  --    ALKPHO 63 56   AST 64* 54*   ALT 61* 52*   BILT 0.6 0.7   TP 6.9 6.2       Estimated Glomerular Filtration Rate: 26 mL/min/1.73m2 (A) (result from lab).    No results for input(s): \"TROP\", \"TROPHS\", \"CK\" in the last 168 hours.    Recent Labs   Lab 257 25  0557   PTP  --  16.5*   INR 1.1 1.32*                  Microbiology    No results found for this visit on 03/10/25.      Imaging: Reviewed in Epic.    Medications:    lidocaine-menthol  1 patch Transdermal Nightly    insulin aspart  2-10 Units Subcutaneous TID AC and HS    insulin aspart  1-68 Units Subcutaneous TID CC    heparin  5,000 Units Subcutaneous Q8H NUNU     [Held by provider] apixaban  5 mg Oral BID    amiodarone  200 mg Oral Daily    atorvastatin  80 mg Oral Nightly    dilTIAZem HCl ER Beads  120 mg Oral Daily    furosemide  20 mg Oral Daily    gabapentin  100 mg Oral BID    hydrALAZINE  50 mg Oral TID    insulin degludec  5 Units Subcutaneous Nightly    lisinopril  20 mg Oral QPM    metoprolol succinate ER  25 mg Oral Daily Beta Blocker    insulin aspart  0.15 Units/kg Subcutaneous Once       Assessment & Plan:      #intractable Back pain 2/2 subacute T8 compression fx s/p fall  Xray with worsening T8 fracture  -MRI noted  Hold DOAC for now   PRN muscle relaxant   Continue PRN dilaudid   Failed conservative mgmt at home for the past week, now w/ worsening back pain  To try TLSO brace, PT, pain control today  If no improvement, kyphoplasty w/ IR tomorrow if pt still wants. Risks d/w pt and dtr      #Afib paroxysmal  Tele  Hold DOAC as above      #Bladder ca      #Hypertension     #HLD  statin     #DM type 2 with hyperglycemia   ISS, ICF 1 :10     #CKD III     #h/o cervical ca       Nathan Contreras MD    Supplementary Documentation:     Quality:  DVT Mechanical Prophylaxis:   SCDs,    DVT Pharmacologic Prophylaxis   Medication    heparin (Porcine) 5000 UNIT/ML injection 5,000 Units    [Held by provider] apixaban (Eliquis) tab 5 mg                Code Status: DNAR/Selective Treatment  Correia: No urinary catheter in place  Correia Duration (in days):   Central line:    DOMINIQUE:     Discharge is dependent on: course  At this point Ms. Hobson is expected to be discharge to: home    The 21st Century Cures Act makes medical notes like these available to patients in the interest of transparency. Please be advised this is a medical document. Medical documents are intended to carry relevant information, facts as evident, and the clinical opinion of the practitioner. The medical note is intended as peer to peer communication and may appear blunt or direct. It is written in medical  language and may contain abbreviations or verbiage that are unfamiliar.

## 2025-03-11 NOTE — H&P
[facility] HOSPITAL[facility]  History and Physical     Anastasia Hobson Patient Status:  Emergency    1940 MRN [id number]   Location [facility] EMERGENCY DEPARTMENT Attending Henrik Franco, DO   Hosp Day # 0 PCP Houston Prado MD     Chief Complaint: BAck pain     Subjective:    History of Present Illness:     Anastasia Hobson is a 84 year old female with  h/o Bladder CA, CHF, DM type 2, HLD, HTN, Afib paroxysmal. Pt is presenting from home with worsening back pain- mid back. She reports having a syncopal episode last week and falling- and she has had back pain since then. She was evaluated at OSH ER for her syncope and DC home. She has not had recurrent falls but has essentially been in bed most of the day since. She feels overall very weak- usually ambulates independently and as of late has needed a walker whenever she gets up.  She ha sPRN dilaudid at home and has had minimal relief. No urine/ stool incontinence, has been constipated.     History/Other:    Past Medical 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    Bloating    Blurred vision    Cancer (HCC)    Congestive heart disease (HCC)    Constipation    Diabetes (HCC)    IDDM     Diabetes mellitus (HCC)    Diarrhea, unspecified    CLARKE (dyspnea on exertion)    Easy bruising    Elevated cholesterol    Essential hypertension    Exposure to medical diagnostic radiation    Fatigue    Flatulence/gas pain/belching    Frequent urination    taking furosemide daily    Headache disorder    Hearing loss    Heart palpitations    Heartburn    occasionally    High blood pressure    High cholesterol    History of falling    Hyperlipidemia    Irregular bowel habits    Itch of skin    right hand only    Leaking of urine    sometimes    Leg swelling    Osteoporosis    Sleep disturbance    Stool incontinence    when I have diarrhea    Type 2 diabetes  mellitus with hyperglycemia (HCC)    Visual impairment    glasses    Wears glasses     Past Surgical History:   Past Surgical History:   Procedure Laterality Date    Cataract      Cervical spine surgery  10/06/2023    Colonoscopy  2019    Colonoscopy      Cystoscopy,insert ureteral stent      Cystourethroscopy  11/25/2020    Cystoscopy Procedure Dr Josh Hernandez  - recurrent tumor on BTS    Cystourethroscopy,biopsy  12/16/2019    BBx of scar sites, NSC    Cystourethroscopy,fulgur .5-2cm lesn  04/16/2020    TURBT, NSC    Cystourethroscopy,fulgur .5-2cm lesn  07/30/2020    TURBT with Gemcitabine    Cystourethroscopy,fulgur >5cm lesn  10/03/2019    TURBT with bilateral RPGs (NSC)    Fracture surgery      left wrist    Hysterectomy      age 31    Ndsc ablation & rcnstj [facility] w/o bypass      Other surgical history  04/20/2021    BTS CystoDr Hernandez    Other surgical history  08/17/2021    BTS Cysto Caterize Dr. Hernandez    Other surgical history  11/16/2021    BTS Cysto W/ caterigayathri Hernandez     Other surgical history  03/08/2022    BTS Cysto- Dr. Hernandez    Spine surgery procedure unlisted  10/06/2003    CERVICAL    Tonsillectomy  1946    Total abdom hysterectomy  1971      Family History:   Family History   Problem Relation Age of Onset    Diabetes Father     Stroke Father     Colon Polyps Mother     Diabetes Mother     Heart Attack Mother     Other (Other) Son         Afib     Social History:    reports that she quit smoking about 17 years ago. Her smoking use included cigarettes. She has a 73.5 pack-year smoking history. She has been exposed to tobacco smoke. She has never used smokeless tobacco. She reports that she does not drink alcohol and does not use drugs.     Allergies: Allergies[1]    Medications:  Medications Ordered Prior to Encounter[2]    Review of Systems:   A comprehensive review of systems was completed.    Pertinent positives and negatives noted in the HPI.    Objective:   Physical Exam:    BP (!) 185/66   Pulse  50   Temp 97.8 °F (36.6 °C) (Temporal)   Resp 11   Ht 5' 6\" (1.676 m)   Wt 143 lb (64.9 kg)   SpO2 97%   BMI 23.08 kg/m²   General: No acute distress, Alert  Respiratory: No rhonchi, no wheezes  Cardiovascular: S1, S2. Regular rate and rhythm  Abdomen: Soft, Non-tender, non-distended, positive bowel sounds  Neuro: No new focal deficits, mp 3/5 in bilateral LE  Extremities: No edema      Results:    Labs:      Labs Last 24 Hours:    Recent Labs   Lab 03/10/25  1743   RBC 4.43   HGB 11.7*   HCT 36.0   MCV 81.3   MCH 26.4   MCHC 32.5   RDW 16.6   NEPRELIM 4.55   WBC 6.5   .0       Recent Labs   Lab 03/10/25  1743   *   BUN 39*   CREATSERUM 1.91*   EGFRCR 26*   CA 9.7   ALB 4.6      K 4.2      CO2 26.0   ALKPHO 63   AST 64*   ALT 61*   BILT 0.6   TP 6.9       Estimated Glomerular Filtration Rate: 26 mL/min/1.73m2 (A) (result from lab).    Lab Results   Component Value Date    INR 1.1 03/06/2025    INR 1.1 09/19/2024       No results for input(s): \"TROP\", \"TROPHS\", \"CK\" in the last 168 hours.    No results for input(s): \"TROP\", \"PBNP\" in the last 168 hours.    No results for input(s): \"PCT\" in the last 168 hours.    Imaging: Imaging data reviewed in Epic.    Assessment & Plan:      #Back pain s/p fall  Xray with worsening T8 fracture  MRI pending, - will need spine vs IR eval pending results  Hold DOAC for now pending MRI results   PRN muscle relaxant   Continue PRN dilaudid     #Afib paroxysmal  Tele  Hold DOAC as above     #Bladder ca     #Hypertension    #HLD  statin    #DM type 2 with hyperglycemia   ISS, ICF 1 :10    #CKD III    #h/o cervical ca     MED REC IS PENDING    Plan of care discussed with pt    Tara Montalvo MD    Supplementary Documentation:     The 21st Century Cures Act makes medical notes like these available to patients in the interest of transparency. Please be advised this is a medical document. Medical documents are intended to carry relevant information, facts as  evident, and the clinical opinion of the practitioner. The medical note is intended as peer to peer communication and may appear blunt or direct. It is written in medical language and may contain abbreviations or verbiage that are unfamiliar.                                       [1]   Allergies  Allergen Reactions    Morphine CONFUSION   [2]   Current Facility-Administered Medications on File Prior to Encounter   Medication Dose Route Frequency Provider Last Rate Last Admin    [COMPLETED] lidocaine-EPINEPHrine (Xylocaine-Epinephrine) 2 %-1:322371 injection             [COMPLETED] lidocaine (Xylocaine) 1 % injection             [COMPLETED] fentaNYL (Sublimaze) 50 mcg/mL injection             [COMPLETED] vancomycin (Vancocin) 1 g injection             [COMPLETED] midazolam (Versed) 2 MG/2ML injection             [COMPLETED] ceFAZolin (Ancef) 1 g injection             [COMPLETED] fentaNYL (Sublimaze) 50 mcg/mL injection             [COMPLETED] midazolam (Versed) 2 MG/2ML injection              Current Outpatient Medications on File Prior to Encounter   Medication Sig Dispense Refill    gabapentin 100 MG Oral Cap Take 1 capsule (100 mg total) by mouth every morning AND 2 capsules (200 mg total) nightly. 90 capsule 1    carvedilol 3.125 MG Oral Tab carvediloL 3.125 mg tablet, [RxNorm: 470641]      FREESTYLE NATALIE 2 SENSOR Does not apply Misc USE TO MONITOR BLOOD SUGAR CONTINUOUSLY. CHANGE EVERY 14 DAYS. 12 each 0    Insulin Lispro, 1 Unit Dial, (HUMALOG KWIKPEN) 100 UNIT/ML Subcutaneous Solution Pen-injector INJECT 2-7 UNITS INTO THE SKIN THREE TIMES DAILY WITH MEALS AS DIRECTED 15 mL 0    furosemide 20 MG Oral Tab       LANTUS SOLOSTAR 100 UNIT/ML Subcutaneous Solution Pen-injector INJECT 8 UNITS INTO THE SKIN NIGHTLY. ****PEN EXPIRES 28 DAYS AFTER FIRST USE**** 15 mL 0    FENOFIBRATE 145 MG Oral Tab Take 1 tablet (145 mg total) by mouth daily. 90 tablet 0    amiodarone 200 MG Oral Tab TAKE ONE TABLET BY MOUTH ONCE  DAILY. Needs appointment with cardiologist and EKG done for future refills.      hydrALAZINE 50 MG Oral Tab 1 tablet (50 mg total) 3 (three) times daily.      dilTIAZem HCl ER Beads 120 MG Oral Capsule SR 24 Hr Take 1 capsule (120 mg total) by mouth daily. 30 capsule 11    LISINOPRIL 20 MG Oral Tab TAKE ONE TABLET BY MOUTH EVERY EVENING 90 tablet 0    Insulin Pen Needle (BD PEN NEEDLE PRISCILLA U/F) 32G X 4 MM Does not apply Misc Inject 1 Pen into the skin daily. 90 each 0    ATORVASTATIN 80 MG Oral Tab Take 1 tablet (80 mg total) by mouth nightly. 90 tablet 0    Continuous Blood Gluc  (FREESTYLE NATALIE 2 READER) Does not apply Device 1 each daily. 1 each 1    Continuous Blood Gluc  (FREESTYLE NATALIE 2 READER) Does not apply Device 1 Device daily as needed. 1 each 0    PROBIOTIC PRODUCT OR Take 1 capsule by mouth daily. 100 billion      Potassium Chloride ER 20 MEQ Oral Tab CR Take 1 tablet by mouth daily.      apixaban 5 MG Oral Tab Take 1 tablet (5 mg total) by mouth 2 (two) times daily. 60 tablet 2    Cholecalciferol (VITAMIN D3) 250 MCG (53030 UT) Oral Tab Take 1,000 Units by mouth daily.      Calcium Citrate-Vitamin D 315-250 MG-UNIT Oral Tab Take 1 tablet by mouth daily.

## 2025-03-11 NOTE — PLAN OF CARE
Pt is aaox4, pt stated back pain is very minimal, declined pain med, waiting for TLSO, eliquis still on hold.   Pt had bouts of NSB for almost 15 mins, asymptomatic, conversant, Dr. Contreras informed, EKG showed controlled afib, HR 70's, at the latest pt is afib controlled at 80's, monitored, TLSO is at the bedside.    Problem: CARDIOVASCULAR - ADULT  Goal: Maintains optimal cardiac output and hemodynamic stability  Description: INTERVENTIONS:  - Monitor vital signs, rhythm, and trends  - Monitor for bleeding, hypotension and signs of decreased cardiac output  - Evaluate effectiveness of vasoactive medications to optimize hemodynamic stability  - Monitor arterial and/or venous puncture sites for bleeding and/or hematoma  - Assess quality of pulses, skin color and temperature  - Assess for signs of decreased coronary artery perfusion - ex. Angina  - Evaluate fluid balance, assess for edema, trend weights  Outcome: Progressing  Goal: Absence of cardiac arrhythmias or at baseline  Description: INTERVENTIONS:  - Continuous cardiac monitoring, monitor vital signs, obtain 12 lead EKG if indicated  - Evaluate effectiveness of antiarrhythmic and heart rate control medications as ordered  - Initiate emergency measures for life threatening arrhythmias  - Monitor electrolytes and administer replacement therapy as ordered  Outcome: Progressing     Problem: METABOLIC/FLUID AND ELECTROLYTES - ADULT  Goal: Glucose maintained within prescribed range  Description: INTERVENTIONS:  - Monitor Blood Glucose as ordered  - Assess for signs and symptoms of hyperglycemia and hypoglycemia  - Administer ordered medications to maintain glucose within target range  - Assess barriers to adequate nutritional intake and initiate nutrition consult as needed  - Instruct patient on self management of diabetes  Outcome: Progressing  Goal: Electrolytes maintained within normal limits  Description: INTERVENTIONS:  - Monitor labs and rhythm and assess  patient for signs and symptoms of electrolyte imbalances  - Administer electrolyte replacement as ordered  - Monitor response to electrolyte replacements, including rhythm and repeat lab results as appropriate  - Fluid restriction as ordered  - Instruct patient on fluid and nutrition restrictions as appropriate  Outcome: Progressing  Goal: Hemodynamic stability and optimal renal function maintained  Description: INTERVENTIONS:  - Monitor labs and assess for signs and symptoms of volume excess or deficit  - Monitor intake, output and patient weight  - Monitor urine specific gravity, serum osmolarity and serum sodium as indicated or ordered  - Monitor response to interventions for patient's volume status, including labs, urine output, blood pressure (other measures as available)  - Encourage oral intake as appropriate  - Instruct patient on fluid and nutrition restrictions as appropriate  Outcome: Progressing     Problem: MUSCULOSKELETAL - ADULT  Goal: Return mobility to safest level of function  Description: INTERVENTIONS:  - Assess patient stability and activity tolerance for standing, transferring and ambulating w/ or w/o assistive devices  - Assist with transfers and ambulation using safe patient handling equipment as needed  - Ensure adequate protection for wounds/incisions during mobilization  - Obtain PT/OT consults as needed  - Advance activity as appropriate  - Communicate ordered activity level and limitations with patient/family  Outcome: Progressing  Goal: Maintain proper alignment of affected body part  Description: INTERVENTIONS:  - Support and protect limb and body alignment per provider's orders  - Instruct and reinforce with patient and family use of appropriate assistive device and precautions (e.g. spinal or hip dislocation precautions)  Outcome: Progressing     Problem: PAIN - ADULT  Goal: Verbalizes/displays adequate comfort level or patient's stated pain goal  Description: INTERVENTIONS:  -  Encourage pt to monitor pain and request assistance  - Assess pain using appropriate pain scale  - Administer analgesics based on type and severity of pain and evaluate response  - Implement non-pharmacological measures as appropriate and evaluate response  - Consider cultural and social influences on pain and pain management  - Manage/alleviate anxiety  - Utilize distraction and/or relaxation techniques  - Monitor for opioid side effects  - Notify MD/LIP if interventions unsuccessful or patient reports new pain  - Anticipate increased pain with activity and pre-medicate as appropriate  Outcome: Progressing

## 2025-03-11 NOTE — PLAN OF CARE
Problem: CARDIOVASCULAR - ADULT  Goal: Maintains optimal cardiac output and hemodynamic stability  Description: INTERVENTIONS:  - Monitor vital signs, rhythm, and trends  - Monitor for bleeding, hypotension and signs of decreased cardiac output  - Evaluate effectiveness of vasoactive medications to optimize hemodynamic stability  - Monitor arterial and/or venous puncture sites for bleeding and/or hematoma  - Assess quality of pulses, skin color and temperature  - Assess for signs of decreased coronary artery perfusion - ex. Angina  - Evaluate fluid balance, assess for edema, trend weights  Outcome: Progressing  Goal: Absence of cardiac arrhythmias or at baseline  Description: INTERVENTIONS:  - Continuous cardiac monitoring, monitor vital signs, obtain 12 lead EKG if indicated  - Evaluate effectiveness of antiarrhythmic and heart rate control medications as ordered  - Initiate emergency measures for life threatening arrhythmias  - Monitor electrolytes and administer replacement therapy as ordered  Outcome: Progressing     Problem: METABOLIC/FLUID AND ELECTROLYTES - ADULT  Goal: Glucose maintained within prescribed range  Description: INTERVENTIONS:  - Monitor Blood Glucose as ordered  - Assess for signs and symptoms of hyperglycemia and hypoglycemia  - Administer ordered medications to maintain glucose within target range  - Assess barriers to adequate nutritional intake and initiate nutrition consult as needed  - Instruct patient on self management of diabetes  Outcome: Progressing  Goal: Electrolytes maintained within normal limits  Description: INTERVENTIONS:  - Monitor labs and rhythm and assess patient for signs and symptoms of electrolyte imbalances  - Administer electrolyte replacement as ordered  - Monitor response to electrolyte replacements, including rhythm and repeat lab results as appropriate  - Fluid restriction as ordered  - Instruct patient on fluid and nutrition restrictions as  appropriate  Outcome: Progressing  Goal: Hemodynamic stability and optimal renal function maintained  Description: INTERVENTIONS:  - Monitor labs and assess for signs and symptoms of volume excess or deficit  - Monitor intake, output and patient weight  - Monitor urine specific gravity, serum osmolarity and serum sodium as indicated or ordered  - Monitor response to interventions for patient's volume status, including labs, urine output, blood pressure (other measures as available)  - Encourage oral intake as appropriate  - Instruct patient on fluid and nutrition restrictions as appropriate  Outcome: Progressing     Problem: MUSCULOSKELETAL - ADULT  Goal: Return mobility to safest level of function  Description: INTERVENTIONS:  - Assess patient stability and activity tolerance for standing, transferring and ambulating w/ or w/o assistive devices  - Assist with transfers and ambulation using safe patient handling equipment as needed  - Ensure adequate protection for wounds/incisions during mobilization  - Obtain PT/OT consults as needed  - Advance activity as appropriate  - Communicate ordered activity level and limitations with patient/family  Outcome: Progressing  Goal: Maintain proper alignment of affected body part  Description: INTERVENTIONS:  - Support and protect limb and body alignment per provider's orders  - Instruct and reinforce with patient and family use of appropriate assistive device and precautions (e.g. spinal or hip dislocation precautions)  Outcome: Progressing   Admitted from home with back pain, difficulty walking.  Had fall 3/8.  Seen in er and sent home.  Pain was uncontrolled.  MRI completed in ER.  Plan is for possible kyphoplasty.  Alert and orientated x4.  Was able to ambulate to bathroom. Voiding without difficulty.  NPO for possible procedure.

## 2025-03-11 NOTE — ED PROVIDER NOTES
Patient Seen in: [facility] Emergency Department      History     Chief Complaint   Patient presents with    Back Pain     Stated Complaint: came from outpt xray, fell last tuesday- went to er. back pain getting worse. p*    Subjective:   84-year-old female, multiple comorbidities, presents with back pain.  Last week she was seen at [facility] after syncope and fall, had CT of the brain which was negative.  Patient was complaining of back pain, never had any x-rays and was discharged home with tramadol for pain.  States her pains are getting worse, tramadol not helping.  Has some Dilaudid at home for her cancer related pain and has not helping either.  Have a hard time walking because of the pain and his pain was bedbound at this point.              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    Bloating    Blurred vision    Cancer (HCC)    Congestive heart disease (HCC)    Constipation    Diabetes (HCC)    IDDM     Diabetes mellitus (HCC)    Diarrhea, unspecified    CLARKE (dyspnea on exertion)    Easy bruising    Elevated cholesterol    Essential hypertension    Exposure to medical diagnostic radiation    Fatigue    Flatulence/gas pain/belching    Frequent urination    taking furosemide daily    Headache disorder    Hearing loss    Heart palpitations    Heartburn    occasionally    High blood pressure    High cholesterol    History of falling    Hyperlipidemia    Irregular bowel habits    Itch of skin    right hand only    Leaking of urine    sometimes    Leg swelling    Osteoporosis    Sleep disturbance    Stool incontinence    when I have diarrhea    Type 2 diabetes mellitus with hyperglycemia (HCC)    Visual impairment    glasses    Wears glasses              Past Surgical History:   Procedure Laterality Date    Cataract      Cervical spine surgery  10/06/2023    Colonoscopy  2019    Colonoscopy       Cystoscopy,insert ureteral stent      Cystourethroscopy  2020    Cystoscopy Procedure Dr Josh Hernandez  - recurrent tumor on BTS    Cystourethroscopy,biopsy  2019    BBx of scar sites, NSC    Cystourethroscopy,fulgur .5-2cm lesn  2020    TURBT, NSC    Cystourethroscopy,fulgur .5-2cm lesn  2020    TURBT with Gemcitabine    Cystourethroscopy,fulgur >5cm lesn  10/03/2019    TURBT with bilateral RPGs ([facility] [place] [facility])    Fracture surgery      left wrist    Hysterectomy      age 31    Ndsc ablation & rcnstj [facility] w/o bypass      Other surgical history  2021    BTS Cysto, Dr Hernandez    Other surgical history  2021    BTS Cysto Caterize Dr. Hernandez    Other surgical history  2021    BTS Cysto W/ caterigayathri Hernandez     Other surgical history  2022    BTS Cysto- Dr. Hernandez    Spine surgery procedure unlisted  10/06/2003    CERVICAL    Tonsillectomy  1946    Total abdom hysterectomy  1971                Social History     Socioeconomic History    Marital status:     Number of children: 3   Occupational History    Occupation: RETIRED   Tobacco Use    Smoking status: Former     Current packs/day: 0.00     Average packs/day: 1.1 packs/day for 65.3 years (73.5 ttl pk-yrs)     Types: Cigarettes     Quit date: 2007     Years since quittin.7     Passive exposure: Past    Smokeless tobacco: Never    Tobacco comments:     quit 15 years    Vaping Use    Vaping status: Never Used   Substance and Sexual Activity    Alcohol use: No    Drug use: No    Sexual activity: Not Currently     Partners: Male   Social History Narrative    , lives with daughter    Has 3 children: 2 sons, 1 daughter     Social Drivers of Health     Food Insecurity: No Food Insecurity (2024)    Food Insecurity     Food Insecurity: Never true   Transportation Needs: No Transportation Needs (2024)    Transportation Needs     Lack of Transportation: No   Housing Stability: Low Risk  (2024)    Housing Stability      Housing Instability: No                  Physical Exam     ED Triage Vitals [03/10/25 1725]   BP (!) 173/67   Pulse (!) 49   Resp 16   Temp 97.8 °F (36.6 °C)   Temp src Temporal   SpO2 97 %   O2 Device None (Room air)       Current Vitals:   Vital Signs  BP: (!) 185/66  Pulse: 50  Resp: 11  Temp: 97.8 °F (36.6 °C)  Temp src: Temporal  MAP (mmHg): 100    Oxygen Therapy  SpO2: 97 %  O2 Device: None (Room air)        Physical Exam  Vitals and nursing note reviewed.   Constitutional:       General: She is not in acute distress.     Appearance: She is not toxic-appearing.   HENT:      Head: Normocephalic.   Cardiovascular:      Rate and Rhythm: Normal rate.      Pulses: Normal pulses.   Pulmonary:      Effort: Pulmonary effort is normal. No respiratory distress.      Breath sounds: Normal breath sounds.   Abdominal:      General: There is no distension.      Palpations: Abdomen is soft.      Tenderness: There is no abdominal tenderness.   Musculoskeletal:      Cervical back: Neck supple.      Comments: Tenderness to palpation of the midshaft femur and right knee.  Tenderness along the mid to lower thoracic spine as well in the left paraspinal musculature.  No low back pain   Skin:     General: Skin is warm and dry.   Neurological:      General: No focal deficit present.      Mental Status: She is alert.      Cranial Nerves: No cranial nerve deficit.      Deep Tendon Reflexes: Reflexes normal.   Psychiatric:         Behavior: Behavior normal.             ED Course     Labs Reviewed   CBC WITH DIFFERENTIAL WITH PLATELET - Abnormal; Notable for the following components:       Result Value    HGB 11.7 (*)     All other components within normal limits   COMP METABOLIC PANEL (14) - Abnormal; Notable for the following components:    Glucose 330 (*)     BUN 39 (*)     Creatinine 1.91 (*)     Calculated Osmolality 312 (*)     eGFR-Cr 26 (*)     AST 64 (*)     ALT 61 (*)     All other components within normal limits   POCT  GLUCOSE - Abnormal; Notable for the following components:    POC Glucose 166 (*)     All other components within normal limits                   MDM      XR KNEE (1 OR 2 VIEWS), RIGHT (CPT=73560)    Result Date: 3/10/2025  CONCLUSION:  No acute fracture, subluxation or dislocation.   LOCATION:  [facility]   Dictated by (CST): Atif Zaragoza MD on 3/10/2025 at 8:36 PM     Finalized by (CST): Atif Zaragoza MD on 3/10/2025 at 8:36 PM       XR FEMUR MIN 2 VIEWS RIGHT (CPT=73552)    Result Date: 3/10/2025  CONCLUSION:  No acute femur fracture.   LOCATION:  [facility]   Dictated by (CST): Atif Zaragoza MD on 3/10/2025 at 8:24 PM     Finalized by (CST): Atif Zaragoza MD on 3/10/2025 at 8:36 PM       XR THORACIC SPINE (3 VIEWS) (CPT=72072)    Result Date: 3/10/2025  CONCLUSION:  1. Slight worsening compression deformity of T8 compared to a previous exam from 1/8/2024.  Follow-up MRI may aid in determining acuity of this finding. 2. Mild degenerative and arthritic changes are seen.   LOCATION:  [facility]    Dictated by (CST): Atif Zaragoza MD on 3/10/2025 at 4:21 PM     Finalized by (CST): Atif Zaragoza MD on 3/10/2025 at 4:25 PM       XR LUMBAR SPINE (MIN 4 VIEWS) (CPT=72110)    Result Date: 3/10/2025  CONCLUSION:  Degenerative and arthritic changes are noted.  There is mild worsening degenerative disc disease at L3-4 compared to 5/16/2016.   LOCATION:  [facility]   Dictated by (CST): Atif Zaragoza MD on 3/10/2025 at 4:17 PM     Finalized by (CST): Atif Zaragoza MD on 3/10/2025 at 4:21 PM        I independently interpreted the x-ray of thoracic spine and note that T8 compression    Admission disposition: 3/10/2025  8:55 PM       Differential diagnosis includes, but not limited to, compression fracture, pathologic fracture, muscle spasm    Daughter at bedside helpful to provide information on the history presenting illness    External chart review demonstrates her outpatient immediate care and recent  ER visits as well when hematology visits    84-year-old female with a fall last week.  Send back pain since.  Has a T8 compression fracture and previous imaging but looks more impressive on images obtained today as an outpatient.  Reflex intact.  Neuro intact.  When she is lying flat or sitting up she is comfortable with any movement she has pain in that region.  She is some scoliosis clinically and has a lot of tenderness in the left thoracic paraspinal musculature even slightly more in the T8 region where the fracture is.  Admitted to Dr. Montalvo, MRI thoracic and lumbar with and without pending.  Daughter updated, in agreement, neurovascular tact.  Pain is controlled.  Resting comfortably at this time but terrible pain when she is up and amatory and/or moving going from sitting to standing or vice versa.        Medical Decision Making      Disposition and Plan     Clinical Impression:  1. Closed wedge compression fracture of T8 vertebra with routine healing, subsequent encounter    2. Intractable pain    3. Hyperglycemia    4. Chronic kidney disease, unspecified CKD stage         Disposition:  Admit  3/10/2025  8:55 pm    Follow-up:  No follow-up provider specified.        Medications Prescribed:  Current Discharge Medication List              Supplementary Documentation:         Hospital Problems       Present on Admission  Date Reviewed: 2/4/2025            ICD-10-CM Noted POA    * (Principal) Closed wedge compression fracture of T8 vertebra with routine healing, subsequent encounter S22.060D 3/10/2025 Unknown

## 2025-03-11 NOTE — OCCUPATIONAL THERAPY NOTE
OT order received, chart reviewed, pt with Kypho pending for worsening T8 fx.  OT will follow up as approp.

## 2025-03-12 LAB
ANION GAP SERPL CALC-SCNC: 9 MMOL/L (ref 0–18)
ATRIAL RATE: 78 BPM
BASOPHILS # BLD AUTO: 0.04 X10(3) UL (ref 0–0.2)
BASOPHILS NFR BLD AUTO: 0.7 %
BUN BLD-MCNC: 26 MG/DL (ref 9–23)
CALCIUM BLD-MCNC: 9.8 MG/DL (ref 8.7–10.6)
CHLORIDE SERPL-SCNC: 108 MMOL/L (ref 98–112)
CO2 SERPL-SCNC: 26 MMOL/L (ref 21–32)
CREAT BLD-MCNC: 1.51 MG/DL
EGFRCR SERPLBLD CKD-EPI 2021: 34 ML/MIN/1.73M2 (ref 60–?)
EOSINOPHIL # BLD AUTO: 0.15 X10(3) UL (ref 0–0.7)
EOSINOPHIL NFR BLD AUTO: 2.5 %
ERYTHROCYTE [DISTWIDTH] IN BLOOD BY AUTOMATED COUNT: 16.6 %
GLUCOSE BLD-MCNC: 102 MG/DL (ref 70–99)
GLUCOSE BLD-MCNC: 112 MG/DL (ref 70–99)
GLUCOSE BLD-MCNC: 148 MG/DL (ref 70–99)
GLUCOSE BLD-MCNC: 163 MG/DL (ref 70–99)
HCT VFR BLD AUTO: 34.9 %
HGB BLD-MCNC: 11.1 G/DL
IMM GRANULOCYTES # BLD AUTO: 0.04 X10(3) UL (ref 0–1)
IMM GRANULOCYTES NFR BLD: 0.7 %
LYMPHOCYTES # BLD AUTO: 1.3 X10(3) UL (ref 1–4)
LYMPHOCYTES NFR BLD AUTO: 21.8 %
MAGNESIUM SERPL-MCNC: 1.8 MG/DL (ref 1.6–2.6)
MCH RBC QN AUTO: 26.3 PG (ref 26–34)
MCHC RBC AUTO-ENTMCNC: 31.8 G/DL (ref 31–37)
MCV RBC AUTO: 82.7 FL
MONOCYTES # BLD AUTO: 0.8 X10(3) UL (ref 0.1–1)
MONOCYTES NFR BLD AUTO: 13.4 %
NEUTROPHILS # BLD AUTO: 3.64 X10 (3) UL (ref 1.5–7.7)
NEUTROPHILS # BLD AUTO: 3.64 X10(3) UL (ref 1.5–7.7)
NEUTROPHILS NFR BLD AUTO: 60.9 %
OSMOLALITY SERPL CALC.SUM OF ELEC: 301 MOSM/KG (ref 275–295)
PLATELET # BLD AUTO: 253 10(3)UL (ref 150–450)
POTASSIUM SERPL-SCNC: 3.9 MMOL/L (ref 3.5–5.1)
Q-T INTERVAL: 436 MS
QRS DURATION: 92 MS
QTC CALCULATION (BEZET): 460 MS
R AXIS: -25 DEGREES
RBC # BLD AUTO: 4.22 X10(6)UL
SODIUM SERPL-SCNC: 143 MMOL/L (ref 136–145)
T AXIS: 50 DEGREES
VENTRICULAR RATE: 67 BPM
WBC # BLD AUTO: 6 X10(3) UL (ref 4–11)

## 2025-03-12 PROCEDURE — 99232 SBSQ HOSP IP/OBS MODERATE 35: CPT | Performed by: HOSPITALIST

## 2025-03-12 RX ORDER — MAGNESIUM OXIDE 400 MG/1
400 TABLET ORAL ONCE
Status: COMPLETED | OUTPATIENT
Start: 2025-03-12 | End: 2025-03-12

## 2025-03-12 RX ORDER — POLYETHYLENE GLYCOL 3350 17 G/17G
17 POWDER, FOR SOLUTION ORAL DAILY
Status: DISCONTINUED | OUTPATIENT
Start: 2025-03-12 | End: 2025-03-14

## 2025-03-12 NOTE — OCCUPATIONAL THERAPY NOTE
OCCUPATIONAL THERAPY EVALUATION - INPATIENT     Room Number: 430/430-A  Evaluation Date: 3/12/2025  Type of Evaluation: Initial  Presenting Problem: fall, back pain, T8 fx    Physician Order: IP Consult to Occupational Therapy  Reason for Therapy: ADL/IADL Dysfunction and Discharge Planning    OCCUPATIONAL THERAPY ASSESSMENT   Patient is currently functioning below baseline with toileting, upper body dressing, lower body dressing, grooming, bed mobility, transfers, static sitting balance, dynamic sitting balance, static standing balance, dynamic standing balance, maintaining seated position, and functional standing tolerance. Prior to admission, patient's baseline is Mod I.  Patient is requiring moderate assistance as a result of the following impairments: decreased functional strength, decreased functional reach, decreased endurance, pain, impaired coordination, impaired motor planning, and decreased muscular endurance. Occupational Therapy will continue to follow for duration of hospitalization.    Patient will benefit from continued skilled OT Services to promote return to prior level of function and safety with continuous assistance and gradual rehabilitative therapy    History Related to Current Admission: Patient is a 84 year old female admitted on 3/10/2025 with Presenting Problem: fall, back pain, T8 fx. Co-Morbidities : bladder cancer    WEIGHT BEARING RESTRICTION       Recommendations for nursing staff:   Transfers: Mod A  Toileting location: toilet    EVALUATION SESSION:  Patient Start of Session: supine in bed for session    FUNCTIONAL TRANSFER ASSESSMENT  Sit to Stand: Edge of Bed  Edge of Bed: Minimal Assist (with Mod A to correct initial LOB but min A to come to stand)    BED MOBILITY  Rolling: Minimal Assist  Supine to Sit : Minimal Assist (pt educated on log roll to get out of bed)  Scooting: Min A to EOB    BALANCE ASSESSMENT  Static Sitting: Contact Guard Assist  Static Standing: Moderate Assist  (to stnad and amb in room and hallway with mod A to correct LOB but CGA to min A otherwise)    FUNCTIONAL ADL ASSESSMENT  UB Dressing Seated: Moderate Assist (for donning of TLSO brace)  LB Dressing Seated: Moderate Assist (for socks with eductaion on spine precautions to complete)    ACTIVITY TOLERANCE: vitals stable                         O2 SATURATIONS       COGNITION  Overall Cognitive Status:  WFL - within functional limits    Upper Extremity   ROM: within functional limits   Strength: within functional limits   Coordination  Gross motor: WNl  Fine motor: WNl  Sensation: Light touch:  intact    EDUCATION PROVIDED  Patient Education : Role of Occupational Therapy; Plan of Care  Patient's Response to Education: Verbalized Understanding; Returned Demonstration    Equipment used: RW  Demonstrates functional use, Would benefit from additional trial      Therapist comments: Pt reported fatigue at home, pt educated on work simplification and energy conservation for at home to assist with independence with fatigue at home.     Patient End of Session: Up in chair, Needs met, Call light within reach, RN aware of session/findings, All patient questions and concerns addressed, Alarm set    OCCUPATIONAL PROFILE    HOME SITUATION  Type of Home: House  Home Layout: Two level  Lives With: Daughter    Toilet and Equipment: Standard height toilet  Shower/Tub and Equipment: Walk-in shower  Other Equipment: None    Occupation/Status: retired  Hand Dominance: Right          Prior Level of Function: Pt typically independent with ADLs and mobility. Pt does not use AD.    SUBJECTIVE   Pt stated, \"I am just weak and in pain.\"    PAIN ASSESSMENT  Rating: Unable to rate  Location: back pain  Management Techniques: Body mechanics, Activity promotion, Breathing techniques, Relaxation, Nurse notified, Repositioning    OBJECTIVE  Precautions: Bed/chair alarm  Fall Risk: High fall risk    ASSESSMENTS    AM-PAC ‘6-Clicks’ Inpatient Daily  Activity Short Form  -   Putting on and taking off regular lower body clothing?: A Lot  -   Bathing (including washing, rinsing, drying)?: A Lot  -   Toileting, which includes using toilet, bedpan or urinal? : A Lot  -   Putting on and taking off regular upper body clothing?: A Little  -   Taking care of personal grooming such as brushing teeth?: A Little  -   Eating meals?: A Little    AM-PAC Score:  Score: 15  Approx Degree of Impairment: 56.46%  Standardized Score (AM-PAC Scale): 34.69    ADDITIONAL TESTS     NEUROLOGICAL FINDINGS      COGNITION ASSESSMENTS     PLAN     OT Treatment Plan: Balance activities, Energy conservation/work simplification techniques, ADL training, IADL training, Continued evaluation, Compensatory technique education, Patient/Family training, Patient/Family education, Endurance training, UE strengthening/ROM, Functional transfer training  Rehab Potential : Good  Frequency: 3-5x/week  Number of Visits to Meet Established Goals: 5    ADL Goals   Patient will perform upper body dressing:  with supervision  Patient will perform lower body dressing:  with supervision  Patient will perform toileting: with supervision    Functional Transfer Goals  Patient will transfer from supine to sit:  with supervision  Patient will transfer from sit to stand:  with supervision  Patient will transfer to toilet:  with supervision    UE Exercise Program Goal  Patient will be supervision with bilateral AROM HEP (home exercise program).    Additional Goals:  Pt will verbalize at least 3 energy conservation techniques  Pt will stand at sink for 5 minutes to complete grooming routine    Patient Evaluation Complexity Level:   Occupational Profile/Medical History MODERATE - Expanded review of history including review of medical or therapy record   Specific performance deficits impacting engagement in ADL/IADL MODERATE  3 - 5 performance deficits   Client Assessment/Performance Deficits MODERATE - Comorbidities and  min to mod modifications of tasks    Clinical Decision Making MODERATE - Analysis of occupational profile, detailed assessments, several treatment options    Overall Complexity MODERATE     OT Session Time: 20 minutes  Self-Care Home Management: 15 minutes  Therapeutic Activity: 0 minutes  Neuromuscular Re-education: 0 minutes  Therapeutic Exercise: 0 minutes  Cognitive Skills: 0 minutes  Sensory Integrative: 0 minutes  Orthotic Management and Trainin minutes  Can add/delete any of these

## 2025-03-12 NOTE — PHYSICAL THERAPY NOTE
PHYSICAL THERAPY EVALUATION - INPATIENT     Room Number: 430/430-A  Evaluation Date: 3/12/2025  Type of Evaluation: Initial  Physician Order: PT Eval and Treat    Presenting Problem: compression fracture     Reason for Therapy: Mobility Dysfunction and Discharge Planning    PHYSICAL THERAPY ASSESSMENT   Patient is a 84 year old female admitted 3/10/2025 for compression fracture.  Prior to admission, patient's baseline is ambulatory ind.  Patient is currently functioning below baseline with bed mobility, transfers, and gait.  Patient is requiring minimal assist as a result of the following impairments: decreased functional strength and decreased endurance/aerobic capacity.  Physical Therapy will continue to follow for duration of hospitalization.    Patient will benefit from continued skilled PT Services to promote return to prior level of function and safety with continuous assistance and gradual rehabilitative therapy .    PLAN DURING HOSPITALIZATION  Nursing Mobility Recommendation : 1 Assist     PT Treatment Plan: Bed mobility, Body mechanics, Endurance, Energy conservation, Patient education, Family education, Gait training, Range of motion, Strengthening, Stair training, Balance training, Transfer training  Rehab Potential : Good  Frequency (Obs):  (2-3x/week)     CURRENT GOALS    Goal #1 Patient is able to demonstrate supine - sit EOB @ level: supervision     Goal #2 Patient is able to demonstrate transfers Sit to/from Stand at assistance level: supervision     Goal #3 Patient is able to ambulate 100 feet with assist device: walker - rolling at assistance level: supervision     Goal #4    Goal #5    Goal #6    Goal Comments: Goals established on 3/12/2025      PHYSICAL THERAPY MEDICAL/SOCIAL HISTORY  History related to current admission: Patient is a 84 year old female admitted on 3/10/2025 from home for pain, +compression fracture.  Pt set to undergo kyphoplasty on 3/15/25.    HOME SITUATION  Type of Home:  House  Home Layout: Two level                     Lives With: Daughter               Prior Level of [place]: Pt reports ind PTA.  Pt states dtr works from home and is able to assist.    SUBJECTIVE  \"I feel pretty good.\"     OBJECTIVE  Precautions: Bed/chair alarm  Fall Risk: High fall risk    WEIGHT BEARING RESTRICTION     PAIN ASSESSMENT  Ratin  Location: back  Management Techniques: Activity promotion, Body mechanics, Relaxation, Repositioning    COGNITION  Overall Cognitive Status:  WFL - within functional limits    RANGE OF MOTION AND STRENGTH ASSESSMENT  Upper extremity ROM and strength are within functional limits     Lower extremity ROM is within functional limits     Lower extremity strength is within functional limits     BALANCE  Static Sitting: Good  Dynamic Sitting: Good  Static Standing: Fair -  Dynamic Standing: Poor +    ADDITIONAL TESTS                                    ACTIVITY TOLERANCE                         O2 WALK       NEUROLOGICAL FINDINGS                        AM-PAC '6-Clicks' INPATIENT SHORT FORM - BASIC MOBILITY  How much difficulty does the patient currently have...  Patient Difficulty: Turning over in bed (including adjusting bedclothes, sheets and blankets)?: A Little   Patient Difficulty: Sitting down on and standing up from a chair with arms (e.g., wheelchair, bedside commode, etc.): A Little   Patient Difficulty: Moving from lying on back to sitting on the side of the bed?: A Little   How much help from another person does the patient currently need...   Help from Another: Moving to and from a bed to a chair (including a wheelchair)?: A Little   Help from Another: Need to walk in hospital room?: A Little   Help from Another: Climbing 3-5 steps with a railing?: A Little     AM-PAC Score:  Raw Score: 18   Approx Degree of Impairment: 46.58%   Standardized Score (AM-PAC Scale): 43.63   CMS Modifier (G-Code): CK    FUNCTIONAL ABILITY STATUS  Gait Assessment   Functional  Mobility/Gait Assessment  Gait Assistance: Minimum assistance  Distance (ft): 50  Assistive Device: Rolling walker  Pattern: Shuffle (ataxic at times)    Skilled Therapy Provided     Bed Mobility:  Rolling: min A   Supine to sit: min A via logroll   Sit to supine: NT     Transfer Mobility:  Sit to stand: CGA   Stand to sit: CGA  Gait = min A as above.    Therapist's Comments: Pt educated in donning TLSO brace- applied while seated at eob.  Pt educated in logroll, as attempted to sit via long sit.  Encouraged ongoing use of RW at this time.    Exercise/Education Provided:  Bed mobility  Body mechanics  Functional activity tolerated  Gait training  Transfer training    Patient End of Session: Up in chair, Needs met, Call light within reach, RN aware of session/findings, Hospital anti-slip socks, All patient questions and concerns addressed, Alarm set, Discussed recommendations with /      Patient Evaluation Complexity Level:  History Moderate - 1 or 2 personal factors and/or co-morbidities   Examination of body systems Moderate - addressing a total of 3 or more elements   Clinical Presentation  Moderate - Evolving   Clinical Decision Making Moderate Complexity       PT Session Time: 20 minutes    Therapeutic Activity: 12 minutes

## 2025-03-12 NOTE — PROGRESS NOTES
Pt is alert and oriented x4. Pt complained of pain and given dilaudid with good releief. Pt up to bathroom with walker. Call light in reach and safety measures in place. Pt npo for possible procedure in the am.

## 2025-03-12 NOTE — PROGRESS NOTES
[facility]   part of [facility]     Hospitalist Progress Note     Anastasia Hobson Patient Status:  Observation    1940 MRN [id number]   [facility] 4NW-A Attending Nathan Contreras MD   Hosp Day # 1 PCP Houston Prado MD     Chief Complaint: back pain     Subjective:     Patient feels no sig change    Objective:    Review of Systems:   A comprehensive review of systems was completed; pertinent positive and negatives stated in subjective.    Vital signs:  Temp:  [97.7 °F (36.5 °C)-98.5 °F (36.9 °C)] 97.7 °F (36.5 °C)  Pulse:  [49-71] 49  Resp:  [15-18] 18  BP: (135-144)/(56-69) 141/59  SpO2:  [96 %-98 %] 98 %    Physical Exam:    General: No acute distress  Respiratory: No wheezes, no rhonchi  Cardiovascular: S1, S2, IR IR  Abdomen: Soft, Non-tender, non-distended, positive bowel sounds  Neuro: No new focal deficits.   Extremities: No edema      Diagnostic Data:    Labs:  Recent Labs   Lab 25  1017 03/10/25  1743 25  0557 25  0558   WBC  --  6.5  --  6.0   HGB  --  11.7*  --  11.1*   MCV  --  81.3  --  82.7   PLT  --  273.0  --  253.0   INR 1.1  --  1.32*  --        Recent Labs   Lab 03/10/25  1743 25  0557 25  0558   *  --  102*   BUN 39*  --  26*   CREATSERUM 1.91*  --  1.51*   CA 9.7  --  9.8   ALB 4.6 4.0  --      --  143   K 4.2  --  3.9     --  108   CO2 26.0  --  26.0   ALKPHO 63 56  --    AST 64* 54*  --    ALT 61* 52*  --    BILT 0.6 0.7  --    TP 6.9 6.2  --        Estimated Glomerular Filtration Rate: 34 mL/min/1.73m2 (A) (result from lab).    No results for input(s): \"TROP\", \"TROPHS\", \"CK\" in the last 168 hours.    Recent Labs   Lab 25  1017 25  0557   PTP  --  16.5*   INR 1.1 1.32*                  Microbiology    No results found for this visit on 03/10/25.      Imaging: Reviewed in Epic.    Medications:    polyethylene glycol (PEG 3350)  17 g Oral Daily    lidocaine-menthol  1 patch Transdermal Nightly     insulin aspart  2-10 Units Subcutaneous TID AC and HS    insulin aspart  1-68 Units Subcutaneous TID CC    heparin  5,000 Units Subcutaneous Q8H NUNU    [Held by provider] apixaban  5 mg Oral BID    amiodarone  200 mg Oral Daily    atorvastatin  80 mg Oral Nightly    dilTIAZem HCl ER Beads  120 mg Oral Daily    furosemide  20 mg Oral Daily    gabapentin  100 mg Oral BID    hydrALAZINE  50 mg Oral TID    insulin degludec  5 Units Subcutaneous Nightly    lisinopril  20 mg Oral QPM    metoprolol succinate ER  25 mg Oral Daily Beta Blocker    insulin aspart  0.15 Units/kg Subcutaneous Once       Assessment & Plan:      #intractable Back pain 2/2 subacute T8 compression fx s/p fall  Xray with worsening T8 fracture  -MRI noted  Hold DOAC  PRN muscle relaxant   Continue PRN dilaudid   Failed conservative mgmt at home for the past week, now w/ worsening back pain  Minimal improvement with TLSO  Plan on kyphoplasty on friday     #Afib paroxysmal  Cont. Rate control, BB and cardizem  Hold OAC  Discussed risks with pt in detail, she understands     #Bladder ca      #Hypertension     #HLD  statin     #DM type 2  ICF 1:30  IC 1:15  Hold long acting     #CKD III     #h/o cervical ca       Javier Murray MD          Supplementary Documentation:     Quality:  DVT Mechanical Prophylaxis:   SCDs,    DVT Pharmacologic Prophylaxis   Medication    heparin (Porcine) 5000 UNIT/ML injection 5,000 Units    [Held by provider] apixaban (Eliquis) tab 5 mg                Code Status: DNAR/Selective Treatment  Correia: No urinary catheter in place  Correia Duration (in days):   Central line:    DOMINIQUE:     Discharge is dependent on: course  At this point Ms. Hobson is expected to be discharge to: home    The 21st Century Cures Act makes medical notes like these available to patients in the interest of transparency. Please be advised this is a medical document. Medical documents are intended to carry relevant information, facts as evident, and the  clinical opinion of the practitioner. The medical note is intended as peer to peer communication and may appear blunt or direct. It is written in medical language and may contain abbreviations or verbiage that are unfamiliar.

## 2025-03-13 LAB
GLUCOSE BLD-MCNC: 182 MG/DL (ref 70–99)
GLUCOSE BLD-MCNC: 192 MG/DL (ref 70–99)
GLUCOSE BLD-MCNC: 228 MG/DL (ref 70–99)
GLUCOSE BLD-MCNC: 278 MG/DL (ref 70–99)
MAGNESIUM SERPL-MCNC: 2 MG/DL (ref 1.6–2.6)

## 2025-03-13 PROCEDURE — 99232 SBSQ HOSP IP/OBS MODERATE 35: CPT | Performed by: HOSPITALIST

## 2025-03-13 NOTE — PROGRESS NOTES
[facility]   part of [facility]     Hospitalist Progress Note     Anastasia Hobson Patient Status:  Observation    1940 MRN [id number]   formerly [facility] 4NW-A Attending Nathan Contreras MD   Hosp Day # 2 PCP Houston Prado MD     Chief Complaint: back pain     Subjective:     Patient feels a little better.     Objective:    Review of Systems:   A comprehensive review of systems was completed; pertinent positive and negatives stated in subjective.    Vital signs:  Temp:  [97.8 °F (36.6 °C)-98.8 °F (37.1 °C)] 98.5 °F (36.9 °C)  Pulse:  [53-68] 53  Resp:  [16-20] 18  BP: (123-161)/(50-77) 125/77  SpO2:  [95 %-100 %] 95 %    Physical Exam:    General: No acute distress  Respiratory: No wheezes, no rhonchi  Cardiovascular: S1, S2, IR IR  Abdomen: Soft, Non-tender, non-distended, positive bowel sounds  Neuro: No new focal deficits.   Extremities: No edema      Diagnostic Data:    Labs:  Recent Labs   Lab 03/10/25  1743 03/11/25  0557 25  0558   WBC 6.5  --  6.0   HGB 11.7*  --  11.1*   MCV 81.3  --  82.7   .0  --  253.0   INR  --  1.32*  --        Recent Labs   Lab 03/10/25  1743 03/11/25  0557 25  0558   *  --  102*   BUN 39*  --  26*   CREATSERUM 1.91*  --  1.51*   CA 9.7  --  9.8   ALB 4.6 4.0  --      --  143   K 4.2  --  3.9     --  108   CO2 26.0  --  26.0   ALKPHO 63 56  --    AST 64* 54*  --    ALT 61* 52*  --    BILT 0.6 0.7  --    TP 6.9 6.2  --        Estimated Glomerular Filtration Rate: 34 mL/min/1.73m2 (A) (result from lab).    No results for input(s): \"TROP\", \"TROPHS\", \"CK\" in the last 168 hours.    Recent Labs   Lab 25  0557   PTP 16.5*   INR 1.32*                  Microbiology    No results found for this visit on 03/10/25.      Imaging: Reviewed in Epic.    Medications:    polyethylene glycol (PEG 3350)  17 g Oral Daily    insulin aspart  1-68 Units Subcutaneous TID AC and HS    lidocaine-menthol  1 patch Transdermal Nightly     insulin aspart  1-68 Units Subcutaneous TID CC    [Held by provider] heparin  5,000 Units Subcutaneous Q8H NUNU    [Held by provider] apixaban  5 mg Oral BID    amiodarone  200 mg Oral Daily    atorvastatin  80 mg Oral Nightly    dilTIAZem HCl ER Beads  120 mg Oral Daily    furosemide  20 mg Oral Daily    gabapentin  100 mg Oral BID    hydrALAZINE  50 mg Oral TID    lisinopril  20 mg Oral QPM    metoprolol succinate ER  25 mg Oral Daily Beta Blocker       Assessment & Plan:      #intractable Back pain 2/2 subacute T8 compression fx s/p fall  Xray with worsening T8 fracture  -MRI noted  Hold DOAC  PRN muscle relaxant   Continue PRN dilaudid   Failed conservative mgmt at home for the past week, now w/ worsening back pain  Minimal improvement with TLSO  Plan on kyphoplasty on friday     #Afib paroxysmal  Cont. Rate control, BB and cardizem  Hold OAC  Discussed risks with pt in detail, she understands     #Bladder ca      #Hypertension     #HLD  statin     #DM type 2  ICF 1:30  IC 1:15     #CKD III     #h/o cervical ca       Javier Murray MD          Supplementary Documentation:     Quality:  DVT Mechanical Prophylaxis:   SCDs,    DVT Pharmacologic Prophylaxis   Medication    [Held by provider] heparin (Porcine) 5000 UNIT/ML injection 5,000 Units    [Held by provider] apixaban (Eliquis) tab 5 mg                Code Status: DNAR/Selective Treatment  Correia: No urinary catheter in place  Correia Duration (in days):   Central line:    DOMINIQUE:     Discharge is dependent on: course  At this point Ms. Hobson is expected to be discharge to: home    The 21st Century Cures Act makes medical notes like these available to patients in the interest of transparency. Please be advised this is a medical document. Medical documents are intended to carry relevant information, facts as evident, and the clinical opinion of the practitioner. The medical note is intended as peer to peer communication and may appear blunt or direct. It is written  in medical language and may contain abbreviations or verbiage that are unfamiliar.

## 2025-03-13 NOTE — PLAN OF CARE
Patient alert and oriented x4. VSS. Afebrile. C/o back pain. PRN dilaudid administered. Scheduled lidocaine patch applied. No BG coverage provided per parameters. See MAR. Safety precautions continued. Call light within reach.   Problem: CARDIOVASCULAR - ADULT  Goal: Maintains optimal cardiac output and hemodynamic stability  Description: INTERVENTIONS:  - Monitor vital signs, rhythm, and trends  - Monitor for bleeding, hypotension and signs of decreased cardiac output  - Evaluate effectiveness of vasoactive medications to optimize hemodynamic stability  - Monitor arterial and/or venous puncture sites for bleeding and/or hematoma  - Assess quality of pulses, skin color and temperature  - Assess for signs of decreased coronary artery perfusion - ex. Angina  - Evaluate fluid balance, assess for edema, trend weights  Outcome: Progressing  Goal: Absence of cardiac arrhythmias or at baseline  Description: INTERVENTIONS:  - Continuous cardiac monitoring, monitor vital signs, obtain 12 lead EKG if indicated  - Evaluate effectiveness of antiarrhythmic and heart rate control medications as ordered  - Initiate emergency measures for life threatening arrhythmias  - Monitor electrolytes and administer replacement therapy as ordered  Outcome: Progressing     Problem: METABOLIC/FLUID AND ELECTROLYTES - ADULT  Goal: Glucose maintained within prescribed range  Description: INTERVENTIONS:  - Monitor Blood Glucose as ordered  - Assess for signs and symptoms of hyperglycemia and hypoglycemia  - Administer ordered medications to maintain glucose within target range  - Assess barriers to adequate nutritional intake and initiate nutrition consult as needed  - Instruct patient on self management of diabetes  Outcome: Progressing  Goal: Electrolytes maintained within normal limits  Description: INTERVENTIONS:  - Monitor labs and rhythm and assess patient for signs and symptoms of electrolyte imbalances  - Administer electrolyte replacement  as ordered  - Monitor response to electrolyte replacements, including rhythm and repeat lab results as appropriate  - Fluid restriction as ordered  - Instruct patient on fluid and nutrition restrictions as appropriate  Outcome: Progressing  Goal: Hemodynamic stability and optimal renal function maintained  Description: INTERVENTIONS:  - Monitor labs and assess for signs and symptoms of volume excess or deficit  - Monitor intake, output and patient weight  - Monitor urine specific gravity, serum osmolarity and serum sodium as indicated or ordered  - Monitor response to interventions for patient's volume status, including labs, urine output, blood pressure (other measures as available)  - Encourage oral intake as appropriate  - Instruct patient on fluid and nutrition restrictions as appropriate  Outcome: Progressing     Problem: PAIN - ADULT  Goal: Verbalizes/displays adequate comfort level or patient's stated pain goal  Description: INTERVENTIONS:  - Encourage pt to monitor pain and request assistance  - Assess pain using appropriate pain scale  - Administer analgesics based on type and severity of pain and evaluate response  - Implement non-pharmacological measures as appropriate and evaluate response  - Consider cultural and social influences on pain and pain management  - Manage/alleviate anxiety  - Utilize distraction and/or relaxation techniques  - Monitor for opioid side effects  - Notify MD/LIP if interventions unsuccessful or patient reports new pain  - Anticipate increased pain with activity and pre-medicate as appropriate  Outcome: Not Progressing

## 2025-03-13 NOTE — PLAN OF CARE
Pt received alert and oriented x4. VSS, afebrile. Up in chair for some of the shift. Ambulating to bathroom with walker. C/o back pain 6-7/10, prn dilaudid given with relief. Tolerating diet. NPO at midnight for kyphoplasty. Pt verbalized understanding. Fall precautions in place. Call light within reach.       Problem: CARDIOVASCULAR - ADULT  Goal: Maintains optimal cardiac output and hemodynamic stability  Description: INTERVENTIONS:  - Monitor vital signs, rhythm, and trends  - Monitor for bleeding, hypotension and signs of decreased cardiac output  - Evaluate effectiveness of vasoactive medications to optimize hemodynamic stability  - Monitor arterial and/or venous puncture sites for bleeding and/or hematoma  - Assess quality of pulses, skin color and temperature  - Assess for signs of decreased coronary artery perfusion - ex. Angina  - Evaluate fluid balance, assess for edema, trend weights  Outcome: Progressing     Problem: METABOLIC/FLUID AND ELECTROLYTES - ADULT  Goal: Glucose maintained within prescribed range  Description: INTERVENTIONS:  - Monitor Blood Glucose as ordered  - Assess for signs and symptoms of hyperglycemia and hypoglycemia  - Administer ordered medications to maintain glucose within target range  - Assess barriers to adequate nutritional intake and initiate nutrition consult as needed  - Instruct patient on self management of diabetes  Outcome: Progressing     Problem: MUSCULOSKELETAL - ADULT  Goal: Return mobility to safest level of function  Description: INTERVENTIONS:  - Assess patient stability and activity tolerance for standing, transferring and ambulating w/ or w/o assistive devices  - Assist with transfers and ambulation using safe patient handling equipment as needed  - Ensure adequate protection for wounds/incisions during mobilization  - Obtain PT/OT consults as needed  - Advance activity as appropriate  - Communicate ordered activity level and limitations with  patient/family  Outcome: Progressing     Problem: PAIN - ADULT  Goal: Verbalizes/displays adequate comfort level or patient's stated pain goal  Description: INTERVENTIONS:  - Encourage pt to monitor pain and request assistance  - Assess pain using appropriate pain scale  - Administer analgesics based on type and severity of pain and evaluate response  - Implement non-pharmacological measures as appropriate and evaluate response  - Consider cultural and social influences on pain and pain management  - Manage/alleviate anxiety  - Utilize distraction and/or relaxation techniques  - Monitor for opioid side effects  - Notify MD/LIP if interventions unsuccessful or patient reports new pain  - Anticipate increased pain with activity and pre-medicate as appropriate  Outcome: Progressing

## 2025-03-14 ENCOUNTER — APPOINTMENT (OUTPATIENT)
Dept: INTERVENTIONAL RADIOLOGY/VASCULAR | Facility: HOSPITAL | Age: 85
End: 2025-03-14
Attending: STUDENT IN AN ORGANIZED HEALTH CARE EDUCATION/TRAINING PROGRAM
Payer: MEDICARE

## 2025-03-14 VITALS
RESPIRATION RATE: 18 BRPM | HEART RATE: 97 BPM | TEMPERATURE: 98 F | HEIGHT: 66 IN | OXYGEN SATURATION: 98 % | DIASTOLIC BLOOD PRESSURE: 69 MMHG | WEIGHT: 143 LBS | BODY MASS INDEX: 22.98 KG/M2 | SYSTOLIC BLOOD PRESSURE: 123 MMHG

## 2025-03-14 LAB
GLUCOSE BLD-MCNC: 112 MG/DL (ref 70–99)
GLUCOSE BLD-MCNC: 143 MG/DL (ref 70–99)
GLUCOSE BLD-MCNC: 156 MG/DL (ref 70–99)

## 2025-03-14 PROCEDURE — 0PU43JZ SUPPLEMENT THORACIC VERTEBRA WITH SYNTHETIC SUBSTITUTE, PERCUTANEOUS APPROACH: ICD-10-PCS | Performed by: STUDENT IN AN ORGANIZED HEALTH CARE EDUCATION/TRAINING PROGRAM

## 2025-03-14 PROCEDURE — 99239 HOSP IP/OBS DSCHRG MGMT >30: CPT | Performed by: HOSPITALIST

## 2025-03-14 PROCEDURE — 0PS43ZZ REPOSITION THORACIC VERTEBRA, PERCUTANEOUS APPROACH: ICD-10-PCS | Performed by: STUDENT IN AN ORGANIZED HEALTH CARE EDUCATION/TRAINING PROGRAM

## 2025-03-14 RX ORDER — CEFAZOLIN SODIUM 1 G/3ML
INJECTION, POWDER, FOR SOLUTION INTRAMUSCULAR; INTRAVENOUS
Status: COMPLETED
Start: 2025-03-14 | End: 2025-03-14

## 2025-03-14 RX ORDER — HYDROCODONE BITARTRATE AND ACETAMINOPHEN 5; 325 MG/1; MG/1
1 TABLET ORAL EVERY 6 HOURS PRN
Qty: 20 TABLET | Refills: 0 | Status: SHIPPED | OUTPATIENT
Start: 2025-03-14

## 2025-03-14 RX ORDER — MIDAZOLAM HYDROCHLORIDE 1 MG/ML
INJECTION INTRAMUSCULAR; INTRAVENOUS
Status: COMPLETED
Start: 2025-03-14 | End: 2025-03-14

## 2025-03-14 RX ORDER — IOPAMIDOL 612 MG/ML
30 INJECTION, SOLUTION INTRAVASCULAR
Status: COMPLETED | OUTPATIENT
Start: 2025-03-14 | End: 2025-03-14

## 2025-03-14 RX ORDER — LIDOCAINE HYDROCHLORIDE 10 MG/ML
INJECTION, SOLUTION INFILTRATION; PERINEURAL
Status: COMPLETED
Start: 2025-03-14 | End: 2025-03-14

## 2025-03-14 NOTE — PROGRESS NOTES
[facility]   part of [facility]     Hospitalist Progress Note     Anastasia Hobson Patient Status:  Observation    1940 MRN [id number]   Location [facility] 4NW-A Attending Nathan Contreras MD   Hosp Day # 3 PCP Houston Prado MD     Chief Complaint: back pain     Subjective:     Patient feels ok.     Objective:    Review of Systems:   A comprehensive review of systems was completed; pertinent positive and negatives stated in subjective.    Vital signs:  Temp:  [97.6 °F (36.4 °C)-98.5 °F (36.9 °C)] 98 °F (36.7 °C)  Pulse:  [] 85  Resp:  [18] 18  BP: (125-153)/(58-77) 138/58  SpO2:  [74 %-100 %] 74 %    Physical Exam:    General: No acute distress  Respiratory: No wheezes, no rhonchi  Cardiovascular: S1, S2, IR IR  Abdomen: Soft, Non-tender, non-distended, positive bowel sounds  Neuro: No new focal deficits.   Extremities: No edema      Diagnostic Data:    Labs:  Recent Labs   Lab 03/10/25  1743 03/11/25  0557 25  0558   WBC 6.5  --  6.0   HGB 11.7*  --  11.1*   MCV 81.3  --  82.7   .0  --  253.0   INR  --  1.32*  --        Recent Labs   Lab 03/10/25  1743 03/11/25  0557 25  0558   *  --  102*   BUN 39*  --  26*   CREATSERUM 1.91*  --  1.51*   CA 9.7  --  9.8   ALB 4.6 4.0  --      --  143   K 4.2  --  3.9     --  108   CO2 26.0  --  26.0   ALKPHO 63 56  --    AST 64* 54*  --    ALT 61* 52*  --    BILT 0.6 0.7  --    TP 6.9 6.2  --        Estimated Glomerular Filtration Rate: 34 mL/min/1.73m2 (A) (result from lab).    No results for input(s): \"TROP\", \"TROPHS\", \"CK\" in the last 168 hours.    Recent Labs   Lab 25  0557   PTP 16.5*   INR 1.32*                  Microbiology    No results found for this visit on 03/10/25.      Imaging: Reviewed in Epic.    Medications:    polyethylene glycol (PEG 3350)  17 g Oral Daily    insulin aspart  1-68 Units Subcutaneous TID AC and HS    lidocaine-menthol  1 patch Transdermal Nightly    insulin aspart   1-68 Units Subcutaneous TID CC    [Held by provider] heparin  5,000 Units Subcutaneous Q8H NUNU    [Held by provider] apixaban  5 mg Oral BID    amiodarone  200 mg Oral Daily    atorvastatin  80 mg Oral Nightly    dilTIAZem HCl ER Beads  120 mg Oral Daily    furosemide  20 mg Oral Daily    gabapentin  100 mg Oral BID    hydrALAZINE  50 mg Oral TID    lisinopril  20 mg Oral QPM    metoprolol succinate ER  25 mg Oral Daily Beta Blocker       Assessment & Plan:      #intractable Back pain 2/2 subacute T8 compression fx s/p fall  Xray with worsening T8 fracture  -MRI noted  Hold DOAC, resume after kypho  PRN muscle relaxant   Continue PRN dilaudid   Failed conservative mgmt at home for the past week, now w/ worsening back pain  Minimal improvement with TLSO  Plan on kyphoplasty today     #Afib s/p ablation and flutter ablation  Cont. Rate control, BB and cardizem  Hold OAC  Discussed risks with pt in detail, she understands    #HTN  BP stable  Cont home med regimen  To f/u with her primary cardiologist     #Bladder ca      #Hypertension     #HLD  statin     #DM type 2  ICF 1:30  IC 1:15     #CKD III     #h/o cervical ca     Dispo: as above. Kypho today.  Possible DC later.      Javier Murray MD          Supplementary Documentation:     Quality:  DVT Mechanical Prophylaxis:   SCDs,    DVT Pharmacologic Prophylaxis   Medication    [Held by provider] heparin (Porcine) 5000 UNIT/ML injection 5,000 Units    [Held by provider] apixaban (Eliquis) tab 5 mg                Code Status: DNAR/Selective Treatment  Correia: No urinary catheter in place  Correia Duration (in days):   Central line:    DOMINIQUE:     Discharge is dependent on: course  At this point Ms. Hobson is expected to be discharge to: home    The 21st Century Cures Act makes medical notes like these available to patients in the interest of transparency. Please be advised this is a medical document. Medical documents are intended to carry relevant information, facts as  evident, and the clinical opinion of the practitioner. The medical note is intended as peer to peer communication and may appear blunt or direct. It is written in medical language and may contain abbreviations or verbiage that are unfamiliar.

## 2025-03-14 NOTE — CM/SW NOTE
03/14/25 1500   Discharge disposition   Expected discharge disposition subacute   Discharge transportation [facility]     DAYTON noted that patient is medically cleared for DC today. DAYTON discussed [facility] choice list with patient and her daughter. They are both agreeable to [facility]. DAYTON confirmed with the [facility] that they are able to accept. SW notified patient's daughter Petty who is aware and agreeable.     [facility] [phone] has been requested to arrange ambulance for  time of 6PM. PCS form completed.    [facility] [phone]     SW will continue to follow for plan of care changes and remain available for any additional DC needs or concerns.     Indira Tinajero MSW, LSW  Discharge Planner   [id number]

## 2025-03-14 NOTE — PROCEDURES
[facility]   part of [facility]  Procedure Note    Anastasia Hobson Patient Status:  Inpatient    1940 MRN [id number]   Location [facility] INTERVENTIONAL SUITES Attending Javier Murray MD   Hosp Day # 3 PCP [place] Prado MD     Procedure: T8 kyphoplasty    Pre-Procedure Diagnosis:  T8 compression fracture    Post-Procedure Diagnosis: Same    Anesthesia:  Local and Sedation    Findings:  Successful lyphoplasty of T8 compression fracture    Specimens: None    Blood Loss:  <5ml    Tourniquet Time: 0  Complications:  None  Drains:  None    Secondary Diagnosis:  None    Ade Solano MD  3/14/2025

## 2025-03-14 NOTE — INTERVAL H&P NOTE
The above referenced H&P was reviewed by Ade Solano MD on 3/14/2025, the patient was examined and no significant changes have occurred in the patient's condition since the H&P was performed.  Risks, benefits, alternative treatments and consequences of no treatment were discussed.  We will proceed with procedure as planned.  Mallampati: 2  Cardiac: Regular rate  Respiratory: Non-labored  ASA: 3      Ade Solano MD  3/14/2025  2:33 PM

## 2025-03-14 NOTE — PLAN OF CARE
Pt a/o x4. VSS on RA. C/o pain. Meds per MAR. Up to bathroom w/ walker & SBA. Plan for IR kyphoplasty.     Fall risk protocol followed, call light within reach.

## 2025-03-14 NOTE — PLAN OF CARE
NURSING DISCHARGE NOTE    Discharged Rehab facility via Ambulance.  Accompanied by Family member  Belongings Taken by patient/family.    Pt a/o x4. VSS, remained afebrile. Meds given per MAR. Dilaudid given as needed for back pain. Kyphoplasty performed today. PIV removed prior to discharge. Burns delivered to bedside. Stapled in [place] folder to give to the [facility]. Report given to RN at the [facility].     Problem: CARDIOVASCULAR - ADULT  Goal: Maintains optimal cardiac output and hemodynamic stability  Description: INTERVENTIONS:  - Monitor vital signs, rhythm, and trends  - Monitor for bleeding, hypotension and signs of decreased cardiac output  - Evaluate effectiveness of vasoactive medications to optimize hemodynamic stability  - Monitor arterial and/or venous puncture sites for bleeding and/or hematoma  - Assess quality of pulses, skin color and temperature  - Assess for signs of decreased coronary artery perfusion - ex. Angina  - Evaluate fluid balance, assess for edema, trend weights  Outcome: Adequate for Discharge  Goal: Absence of cardiac arrhythmias or at baseline  Description: INTERVENTIONS:  - Continuous cardiac monitoring, monitor vital signs, obtain 12 lead EKG if indicated  - Evaluate effectiveness of antiarrhythmic and heart rate control medications as ordered  - Initiate emergency measures for life threatening arrhythmias  - Monitor electrolytes and administer replacement therapy as ordered  Outcome: Adequate for Discharge     Problem: METABOLIC/FLUID AND ELECTROLYTES - ADULT  Goal: Glucose maintained within prescribed range  Description: INTERVENTIONS:  - Monitor Blood Glucose as ordered  - Assess for signs and symptoms of hyperglycemia and hypoglycemia  - Administer ordered medications to maintain glucose within target range  - Assess barriers to adequate nutritional intake and initiate nutrition consult as needed  - Instruct patient on self management of diabetes  Outcome: Adequate for  Discharge  Goal: Electrolytes maintained within normal limits  Description: INTERVENTIONS:  - Monitor labs and rhythm and assess patient for signs and symptoms of electrolyte imbalances  - Administer electrolyte replacement as ordered  - Monitor response to electrolyte replacements, including rhythm and repeat lab results as appropriate  - Fluid restriction as ordered  - Instruct patient on fluid and nutrition restrictions as appropriate  Outcome: Adequate for Discharge  Goal: Hemodynamic stability and optimal renal function maintained  Description: INTERVENTIONS:  - Monitor labs and assess for signs and symptoms of volume excess or deficit  - Monitor intake, output and patient weight  - Monitor urine specific gravity, serum osmolarity and serum sodium as indicated or ordered  - Monitor response to interventions for patient's volume status, including labs, urine output, blood pressure (other measures as available)  - Encourage oral intake as appropriate  - Instruct patient on fluid and nutrition restrictions as appropriate  Outcome: Adequate for Discharge     Problem: MUSCULOSKELETAL - ADULT  Goal: Return mobility to safest level of function  Description: INTERVENTIONS:  - Assess patient stability and activity tolerance for standing, transferring and ambulating w/ or w/o assistive devices  - Assist with transfers and ambulation using safe patient handling equipment as needed  - Ensure adequate protection for wounds/incisions during mobilization  - Obtain PT/OT consults as needed  - Advance activity as appropriate  - Communicate ordered activity level and limitations with patient/family  Outcome: Adequate for Discharge  Goal: Maintain proper alignment of affected body part  Description: INTERVENTIONS:  - Support and protect limb and body alignment per provider's orders  - Instruct and reinforce with patient and family use of appropriate assistive device and precautions (e.g. spinal or hip dislocation  precautions)  Outcome: Adequate for Discharge     Problem: PAIN - ADULT  Goal: Verbalizes/displays adequate comfort level or patient's stated pain goal  Description: INTERVENTIONS:  - Encourage pt to monitor pain and request assistance  - Assess pain using appropriate pain scale  - Administer analgesics based on type and severity of pain and evaluate response  - Implement non-pharmacological measures as appropriate and evaluate response  - Consider cultural and social influences on pain and pain management  - Manage/alleviate anxiety  - Utilize distraction and/or relaxation techniques  - Monitor for opioid side effects  - Notify MD/LIP if interventions unsuccessful or patient reports new pain  - Anticipate increased pain with activity and pre-medicate as appropriate  Outcome: Adequate for Discharge

## 2025-03-14 NOTE — HISTORICAL OFFICE NOTE
[facility]  Outside Information  Continuity of Care Document  2/28/2025  Anastasia Hobson - 84 y.o. Female; born [birth year]Summary of episode note, generated on Mar. 10, 2025March 10, 2025   Additional Documents     ClinicalDocument.xml - Referral Note (Last Received: 3/10/2025  2:13 AM)   Plain Text Document (Last Received: 3/10/2025  2:13 AM)   Continuity of Care Document (Last Received: 3/10/2025  3:46 PM) - Currently Viewing  CHIEF COMPLAINT    CHIEF COMPLAINT  Reason for Visit/Chief Complaint   overdue 6 month follow up  pt d/c carvedilol d/t ADR (lightheadedness)   persistent atrial fibrillation was admitted with heart failure with preserved ejection fraction. She had a DEBRA cardioversion and Flecainide was started for A-fib control. Patient had a cryo A-fib ablation and flutter ablation July 19, 2023.  Patient had a syncopal spell and ended up at [facility]. She had cervical spine fractures.Dr. Daniels stopped flecainide but she is still having dizzy spells. Dizziness has improved with cutting back on lasix dose in the past.RPM data reviewedPatient started carvedilol 3.125 mg BID on 01/24/2025 as instructed at last OV. Patient states 2 weeks ago she started developing lightheadedness, dizziness, and felt like she was gonna pass out. Patient stopped taking the carvedilol on 02/21/25 and states the symptoms have stopped.  Patient confirmed she is taking diltiazem 120 mg daily, furosemide 20 mg daily, hydralazine 100 mg TID, and lisinopril 20 mg BID.  She does complain of dyspnea on exertion going upstairsShe was taken off Toprol XL by Dr. Daniels in the past.     PROBLEMS  Reconcile with Patient's ChartPROBLEMS  Problem Effective Dates Date resolved Problem Status   Atrial fibrillation with rapid ventricular response (HCC), [SNOMED-CT: 600816697407983] 10/3/2022 - Active   Benign essential HTN, [SNOMED-CT: 1777826] 9/13/2022 - Active   CLARKE (dyspnea on exertion), [SNOMED-CT:  96033904] 9/27/2022 - Active   Mixed hyperlipidemia, [SNOMED-CT: 046045063] 4/25/2018 - Active   Type 2 diabetes mellitus with hyperglycemia, [SNOMED-CT: 444905984429493] 11/5/2021 - Active   Diabetes mellitus type 2 in nonobese, [SNOMED-CT: 375261424] 9/13/2022 - Active   Acute pulmonary edema, [SNOMED-CT: 36666320] 9/27/2022 - Active     ENCOUNTER    ENCOUNTER  Problem Effective Dates Date resolved Problem Status   Atrial fibrillation with rapid ventricular response (HCC), [SNOMED-CT: 327867762340694] 10/3/2022 - Active   Benign essential HTN, [SNOMED-CT: 9986383] 9/13/2022 - Active   CLARKE (dyspnea on exertion), [SNOMED-CT: 59681687] 9/27/2022 - Active   Mixed hyperlipidemia, [SNOMED-CT: 680111988] 4/25/2018 - Active   Type 2 diabetes mellitus with hyperglycemia, [SNOMED-CT: 875225769404788] 11/5/2021 - Active   Diabetes mellitus type 2 in nonobese, [SNOMED-CT: 163292336] 9/13/2022 - Active   Acute pulmonary edema, [SNOMED-CT: 06158213] 9/27/2022 - Active     VITAL SIGNS    VITAL SIGNS  Date / Time: 2/28/2025   BP Systolic 168 mmHg   BP Diastolic 76 mmHg   Height 66 inches   Weight 141 lbs   Pulse Rate 69 bpm   BSA (Body Surface Area) 1.7 cc/m2   BMI (Body Mass Index) 22.8 cc/m2   Blood Pressure 168 / 76 mmHg     PHYSICAL EXAMINATION    PHYSICAL EXAMINATION  Header Details   Vitals Right Arm Sitting  / 76 mmHg, Pulse rate 69 bpm, Regular, Height in 5' 6\", BMI: 22.8, Weight in 141.01 lbs (or) 63.96 kgs, BSA : 1.73 cc/m²   General Appearance No Acute Distress, Appropriate   Head/Eyes/Ears/Nose/Mouth/Throat Conjunctiva pink, Sclera Clear, Mucous membranes Moist   Neck Normal carotid pulsations, No carotid bruits, No JVD   Respiratory Unlabored, Lungs clear with normal breath sounds, Equal bilaterally   Cardiovascular Intact distal pulses, Regular rhythm. Normal rate present. Normal and normal S1 and S2   Gastrointestinal Abdomen soft, Non-tender   Musculoskeletal Normal spine   Gait Normal gait   Lower  Extremities Pulses 2+ and equal bilaterally, No edema   Skin Warm and dry, Intact   Neurologic / Psychiatric Alert and Oriented   Speech Normal speech     ALLERGIES, ADVERSE REACTIONS, ALERTS  Reconcile with Patient's ChartALLERGIES, ADVERSE REACTIONS, ALERTS  Type Substance Reaction Severity Status   Allergies Opioids - Morphine Analogues [Snomed:707671911], [SNOMED: 259484782] hallucinations Severe Active     MEDICATIONS ADMINISTERED DURING VISIT    No data available    MEDICATIONS  Reconcile with Patient's ChartMEDICATIONS  Medication Start Date Route/Frequency Status   Amiodarone Hydrochloride 200 Mg Tab Taro, [RxNorm: 364187] - TAKE ONE TABLET BY MOUTH DAILY Active   atorvastatin 80 mg tablet, [RxNorm: 993624] 6/10/2024 Take 1 tablet orally once a day. Active   Calcium Citrate-Vitamin D 315-250 MG-UNIT Oral Tab, [RxNorm: 461597] 10/3/2022 Take 1 tablet by mouth daily. Active   cholecalciferol 50 MCG (2000 UT) Oral Tab, [RxNorm: 507387] 10/3/2022 Take 2,000 Units by mouth daily. Active   dilTIAZem  mg tablet,extended release 24 hr, [RxNorm: 634840] 12/17/2024 Take 1 tablet orally once a day. Active   Eliquis 2.5 mg tablet, [RxNorm: 1310688] 1/23/2025 Take 1 tablet orally 2 times a day. Active   fenofibrate nanocrystallized 145 mg tablet, [RxNorm: 849093] 12/17/2024 Take 1 tablet orally once a day. Active   furosemide 20 mg tablet, [RxNorm: 401561] 1/23/2025 Take 1 tablet orally once a day. Active   gabapentin 100 mg capsule, [RxNorm: 277703] 12/17/2024 Take 2 capsules orally once a day. Active   HumaLOG Mix 75-25 KwikPen U-100 insulin 100 unit/mL subcutaneous pen, [RxNorm: 929044] 12/23/2022 Inject 10 units (subcutaneous) 3 times a day. Active   hydrALAZINE 50 mg tablet, [RxNorm: 042562] 2/28/2025 Take 1 tablet orally 3 times a day. Active   Levemir FlexPen 100 unit/mL (3 mL) solution subcutaneous insulin pen, [RxNorm: 486189] 12/21/2023 Inject 10 units (subcutaneous) once a day. Active   lisinopriL 20 mg  tablet, [RxNorm: 233172] 1/3/2025 Take 1 tablet orally 2 times a day. Active   Potassium Chloride Er 20 Meq Tab Adva, [RxNorm: 486677] - Take 1 tablet by mouth once a day. Active   Probiotic 10 billion cell capsule, [RxNorm: 0468477] 12/17/2024 Take 1 capsule orally once a day. Active   Tylenol Extra Strength 500 mg tablet, [RxNorm: 650400] 8/30/2024 Take 1 tablet orally once a day as needed. Active   metoprolol succinate ER 25 mg tablet,extended release 24 hr, [RxNorm: 459336] 2/28/2025 Take 1 tablet orally once a day. Active   atorvastatin 80 mg tablet, [RxNorm: 553064] 2/28/2025 Take 1 tablet orally once a day. Active     ASSESSMENT    83 yo elderly female  &gt;&gt; PAF:  -Eliquis 5 mg Bid, Toprol 50 mg daily; unable to tolerate more rate lowering medications  -followed by Dr. Daniels  &gt;&gt;Pulmonary edema: Tachycardia mediated.  -EF: 60-65 %. Trops negative.  &gt;&gt;Dyslipidemia: Atorvastatin 80 mg. HDL: 69 LDL: 48.  &gt;&gt;HTN  &gt;&gt;Type 2 DM  &gt;&gt;CKD stage 3Ischemic evaluation with nuclear cardiac PET reassuring Dec 2022. She does complain of dyspnea on exertion going upstairs. Will check echo and stress.  Plan:  -enroll in PCM for HTN  -continue remote patient monitoring for HTN and heart failure management  -continue lasix 20 mg daily  -continue atorvastatin 80 mg nightly  -continue diltiazem 120 mg daily, hydralazine 100 mg TID, and lisinopril 20 mg BID  -start Toprol XL 25 mg daily; may need to increase but follow with RPM and cardio@home team  -echocardiogram and cardiac nuclear PET and than follow-up with Dr. Ashton  -follow-up with Dr. Daniels as scheduled  -lipids with upcoming physical with PCPRecommend low sodium diet, daily exercise and maintain a normal BMI. Recommend monitor blood pressure at home.     FAMILY HISTORY    FAMILY HISTORY  Relationship Age Diagnosis   Father 86 Cerebrovascular accident (CVA) (Reason for death)   Mother 0 S/P CABG x 4; DM (diabetes mellitus) Type 2      GENERAL STATUS    No data available    PAST MEDICAL HISTORY    PAST MEDICAL HISTORY  Problem Date diagonsed Date resolved Status   Atrial fibrillation with rapid ventricular response (HCC), [SNOMED-CT: 958385054237966] 10/3/2022 - Active   Benign essential HTN, [SNOMED-CT: 5773569] 9/13/2022 - Active   CLARKE (dyspnea on exertion), [SNOMED-CT: 85269356] 9/27/2022 - Active   Mixed hyperlipidemia, [SNOMED-CT: 645086608] 4/25/2018 - Active   Type 2 diabetes mellitus with hyperglycemia, [SNOMED-CT: 004566751570476] 11/5/2021 - Active   Diabetes mellitus type 2 in nonobese, [SNOMED-CT: 353270366] 9/13/2022 - Active   Acute pulmonary edema, [SNOMED-CT: 91795596] 9/27/2022 - Active     HISTORY OF PRESENT ILLNESS    persistent atrial fibrillation was admitted with heart failure with preserved ejection fraction. She had a DEBRA cardioversion and Flecainide was started for A-fib control. Patient had a cryo A-fib ablation and flutter ablation July 19, 2023.  Patient had a syncopal spell and ended up at [facility]. She had cervical spine fractures.Dr. Daniels stopped flecainide but she is still having dizzy spells. Dizziness has improved with cutting back on lasix dose in the past.RPM data reviewedPatient started carvedilol 3.125 mg BID on 01/24/2025 as instructed at last OV. Patient states 2 weeks ago she started developing lightheadedness, dizziness, and felt like she was gonna pass out. Patient stopped taking the carvedilol on 02/21/25 and states the symptoms have stopped.  Patient confirmed she is taking diltiazem 120 mg daily, furosemide 20 mg daily, hydralazine 100 mg TID, and lisinopril 20 mg BID.  She does complain of dyspnea on exertion going upstairsShe was taken off Toprol XL by Dr. Daniels in the past.     IMMUNIZATIONS    No data available    PLAN OF CARE    PLAN OF CARE  Planned Care Date   Enrollment to [facility] 1/1/1900   Echocardiography - Complete ([facility]) 1/1/1900   Cardiac PET/CT Scan (40061) ([facility]) 1/1/1900   Lipid  panel - Fasting 1/1/1900   Take 1 tablet orally once a day.-metoprolol succinate ER 25 mg tablet,extended release 24 hr 2/28/2025   Take 1 tablet orally once a day.-atorvastatin 80 mg tablet 2/28/2025   Referral Visit - Houston Prado [email or web address] : 1/1/1900   Follow up visit - Carrillo Ashton MD 1/1/1900     PROCEDURES    No data available    RESULTS    RESULTS  Name Result Date Location - Ordered By   GLUCOSE [LOINC: 2339-0] 176 mg/dL [High] 02/21/2025 09:38:00 AM [facility] ([facility])  Address: [address] [place] [address]  tel:   SODIUM [LOINC: 2951-2] 142 mmol/L 02/21/2025 09:38:00 AM [facility] ([facility])  Address: [address] [place] [address]  [address]  tel:   POTASSIUM [LOINC: 2823-3] 4.3 mmol/L 02/21/2025 09:38:00 AM [facility] ([facility])  Address: [address] [place] [address]  [address]  tel:   CHLORIDE [LOINC: 2075-0] 104 mmol/L 02/21/2025 09:38:00 AM [facility] ([facility])  Address: [address] [place] [address]  [address]  tel:   CO2 [LOINC: 2028-9] 31.0 mmol/L 02/21/2025 09:38:00 AM [facility] ([facility])  Address: [address] [place] [address]  [address]  tel:   ANION GAP [LOINC: 33037-3] 7 mmol/L 02/21/2025 09:38:00 AM [facility] ([facility])  Address: [address] [place] [address]  [address]  tel:   BUN [LOINC: 6299-2] 38 mg/dL [High] 02/21/2025 09:38:00 AM [facility] ([facility])  Address: [address] [place] [address]  [address]  tel:   CREATININE [LOINC: 37004-9] 1.94 mg/dL [High] 02/21/2025 09:38:00 AM [facility] ([facility])  Address: [address] [place] [address]  [address]  tel:   CALCIUM [LOINC: 56949-7] 10.0 mg/dL 02/21/2025 09:38:00 AM [facility] ([facility])  Address: [address] [place], [place] [id number]  tel:    OSMOLALITY CALCULATED [LOINC: 49947-7] 307 mOsm/kg [High] 02/21/2025 09:38:00 AM [facility] ([facility])  Address: [address] [place] [address]  tel:   E GFR CR [LOINC: 45119-8] 25 mL/min/1.73m2 [Low] 02/21/2025 09:38:00 AM [facility] ([facility])  Address: [address] [place] [address]  tel:   FASTING PATIENT BMP ANSWER [LOINC: 76974-4] No 02/21/2025 09:38:00 AM [facility] ([facility])  Address: [address] [place] [address]  tel:   PRO-BETA NATRIURETIC PEPTIDE [LOINC: 86576-3] 725 pg/mL [High] 06/03/2024 11:26:00 AM [facility] ([facility])  Address: [address] [place] [address]  tel:   Lipid panel - Fasting [LOINC: 26860164] Pending Schedule next available     Nuclear PET 1.Stress EKG is normal. 1.This is a normal perfusion study, no perfusion defects noted. 2.The left ventricular cavity is noted to be normal on the stress studies. The stress left ventricular ejection fraction was calculated to be 81% and left ventricular global function is hyperkinetic. This finding may be secondary to small ventricular size. The rest left ventricular cavity is noted to be normal. The rest left ventricular ejection fraction was calculated to be 77% and rest left ventricular global function is normal. 3.This scan is suggestive of low risk for future cardiovascular events. 4.The study quality is good. 12/13/2022 1:30:00 PM Carrillo Ashton MD   Trans Thoracic Echocardiogram 1.The left ventricle is normal in size with mild left ventricular hypertrophy and normal global left ventricular systolic function. The left ventricle diastolic function is impaired (Grade I) with normal left atrial pressure. The left ventricular ejection fraction is 60-65%.2.The right ventricle is normal in size. Right ventricular systolic function is normal.3.Left atrium is moderately enlarged. 4.Mild (1+) mitral  regurgitation. Mild to moderate mitral annular calcification is noted.5.The estimated pulmonary artery systolic pressure is 27 mmHg assuming a right atrial pressure of 3 mmHg. 6.The study quality is good. 1/26/2024 1:30:00 PM Carrillo Ashton MD   Ambulatory Telemetry Monitor 1.This is a good quality study. 2.Predominant rhythm is normal sinus rhythm. 3.The minimum heart rate recorded was 52 beats / minute. The maximum heart rate is 108 beats / minute. The mean heart rate is 70 beats / minute. 4.No evidence of AV block is noted. 5.Rare premature atrial contractions noted. 6.No evidence of atrial fibrillation noted. 7.Afib [place] 0%8.Frequent premature ventricular contractions noted. 9.No evidence of ventricular tachycardia is noted.10.No pauses were noted. 11.Created by: Annette [email or web address] 3% 7/2/2024 11:00:00 AM Mindy Daniels MD     REVIEW OF SYSTEMS    REVIEW OF SYSTEMS  Header Details   Cardiovascular No history of Chest pain, CLARKE, Palpitations, Syncope, PND, Orthopnea, Edema, Claudication     SOCIAL HISTORY    SOCIAL HISTORY  Social History Element Description Effective Dates   Smoking status Former smoker -     FUNCTIONAL STATUS    No data available    MEDICAL EQUIPMENT    No data available    Goals Sections    No data available    REASON FOR REFERRAL                   Health Concerns Section    No data available    COGNITIVE/MENTAL STATUS    No data available    Patient Demographics    Patient Demographics  Patient Address Patient Name Communication   [address] [place] [address] Anastasia Hobson [phone] (Mobile)     Patient Demographics  Language Race / Ethnicity Marital Status   English - Spoken (Preferred) White / Not  or       Document Information    Primary Care Provider Other Service Providers Document Coverage Dates   Carrillo Ashton  NPI: [id number]  [phone] (Work)  [address] [place] [address], [address]  [place] [address]  [place] [address]  Interpreting  Physicians  [facility]  [phone] (Work)  [address] [place] [address] Emily Crawford  NPI: [id number]  [phone] (Work)  [address] [place] [address]  [place] [address]  Interpreting Physicians     Manjula Ballesteros  NPI: [id number]  [phone] (Work)  [address] [place] [address]  [place] [address]  Nurses     Jacki Blue  NPI: [id number]  [phone] (Work)  [address] [place] [address]  [place] [address]  Nurses Feb. 28, 2025February 28, 2025      Organization   [facility]  [phone] (Work)  [address] [place] [address]  [place] [address]     Encounter Providers Encounter Date    Feb. 28, 2025February 28, 2025     Legal Authenticator    Carrillo Ashton  NPI: [id number]  [phone] (Work)  [address] [place] [address]  [place] [address]    His

## 2025-03-15 NOTE — DISCHARGE SUMMARY
[facility]  DISCHARGE SUMMARY     Anastasia Hobson Patient Status:  Inpatient    1940 MRN [id number]   Location [facility] 4NW-A Attending Charlie Ocasio*   Hosp Day # 3 PCP Houston Prado MD     Date of Admission:  3/10/2025  Date of Discharge:   3/14/2025    Discharge Disposition: SNF Subacute Rehab    Discharge Diagnosis:    #intractable Back pain 2/2 subacute T8 compression fx s/p fall  Xray with worsening T8 fracture  -MRI noted  Hold DOAC, resume after kypho  PRN muscle relaxant   Continue PRN dilaudid   Failed conservative mgmt at home for the past week, now w/ worsening back pain  Minimal improvement with TLSO  Plan on kyphoplasty today     #Afib s/p ablation and flutter ablation  Cont. Rate control, BB and cardizem  Hold OAC  Discussed risks with pt in detail, she understands     #HTN  BP stable  Cont home med regimen  To f/u with her primary cardiologist     #Bladder ca      #Hypertension     #HLD  statin     #DM type 2  ICF 1:30  IC 1:15     #CKD III     #h/o cervical ca       History of Present Illness:    Anastasia Hobson is a 84 year old female with  h/o Bladder CA, CHF, DM type 2, HLD, HTN, Afib paroxysmal. Pt is presenting from home with worsening back pain- mid back. She reports having a syncopal episode last week and falling- and she has had back pain since then. She was evaluated at OSH ER for her syncope and DC home. She has not had recurrent falls but has essentially been in bed most of the day since. She feels overall very weak- usually ambulates independently and as of late has needed a walker whenever she gets up.  She ha sPRN dilaudid at home and has had minimal relief. No urine/ stool incontinence, has been constipated.           Brief Synopsis:    The patient was admitted due to intractable back pain secondary to subacute traumatic T8 compression fracture which she suffered after a fall.  She had persistent symptoms despite pain medications.  She  had kyphoplasty done on 3/14.  After which she had improvement in symptoms and was been stable for discharge to [facility].    All diagnosis' and recommendations discussed with patient and/or family in detail.      Lace+ Score: 78  59-90 High Risk  29-58 Medium Risk  0-28   Low Risk       TCM Follow-Up Recommendation:  LACE > 58: High Risk of readmission after discharge from the hospital.    Procedures during hospitalization:   kyphoplasty      Discharge Medication List:        [facility] reviewed: yes    Follow-up appointment:   Houston Prado MD  [address] [facility] [address]  [phone]    Follow up in 1 week(s)  Follow up      Vital signs:  Pulse:  [97] 97  BP: (123)/(69) 123/69  SpO2:  [98 %] 98 %    Physical Exam:    General: No acute distress   Lungs: clear to auscultation  Cardiovascular: S1, S2  Abdomen: Soft      -----------------------------------------------------------------------------------------------  PATIENT DISCHARGE INSTRUCTIONS: See electronic chart    Javier Murray MD    Total minutes spent on discharge plannin      The  [employer] Act makes medical notes like these available to patients in the interest of transparency. Please be advised this is a medical document. Medical documents are intended to carry relevant information, facts as evident, and the clinical opinion of the practitioner. The medical note is intended as peer to peer communication and may appear blunt or direct. It is written in medical language and may contain abbreviations or verbiage that are unfamiliar.

## 2025-03-18 ENCOUNTER — EXTERNAL FACILITY (OUTPATIENT)
Dept: FAMILY MEDICINE CLINIC | Facility: CLINIC | Age: 85
End: 2025-03-18

## 2025-03-18 DIAGNOSIS — Z98.1 ARTHRODESIS STATUS: ICD-10-CM

## 2025-03-18 DIAGNOSIS — Z91.81 AT HIGH RISK FOR FALLS: ICD-10-CM

## 2025-03-18 DIAGNOSIS — C67.2 MALIGNANT NEOPLASM OF LATERAL WALL OF URINARY BLADDER (HCC): ICD-10-CM

## 2025-03-18 DIAGNOSIS — Z91.89 AT HIGH RISK FOR MALNUTRITION: ICD-10-CM

## 2025-03-18 DIAGNOSIS — M47.22 OSTEOARTHRITIS OF SPINE WITH RADICULOPATHY, CERVICAL REGION: ICD-10-CM

## 2025-03-18 DIAGNOSIS — I48.20 ATRIAL FIBRILLATION, CHRONIC (HCC): ICD-10-CM

## 2025-03-18 DIAGNOSIS — I50.89 OTHER HEART FAILURE (HCC): ICD-10-CM

## 2025-03-18 DIAGNOSIS — L89.322 PRESSURE ULCER OF LEFT BUTTOCK, STAGE 2 (HCC): ICD-10-CM

## 2025-03-18 DIAGNOSIS — S22.060D CLOSED WEDGE COMPRESSION FRACTURE OF T8 VERTEBRA WITH ROUTINE HEALING, SUBSEQUENT ENCOUNTER: Primary | ICD-10-CM

## 2025-03-18 DIAGNOSIS — E78.00 PURE HYPERCHOLESTEROLEMIA, UNSPECIFIED: ICD-10-CM

## 2025-03-18 DIAGNOSIS — F33.9 DEPRESSION, RECURRENT: ICD-10-CM

## 2025-03-18 DIAGNOSIS — N18.32 CHRONIC KIDNEY DISEASE, STAGE 3B (HCC): ICD-10-CM

## 2025-03-18 DIAGNOSIS — E78.2 MIXED HYPERLIPIDEMIA: Chronic | ICD-10-CM

## 2025-03-18 DIAGNOSIS — I10 PRIMARY HYPERTENSION: ICD-10-CM

## 2025-03-18 DIAGNOSIS — K59.03 DRUG-INDUCED CONSTIPATION: ICD-10-CM

## 2025-03-18 DIAGNOSIS — E11.9 TYPE 2 DIABETES MELLITUS WITHOUT COMPLICATION, WITH LONG-TERM CURRENT USE OF INSULIN (HCC): Chronic | ICD-10-CM

## 2025-03-18 DIAGNOSIS — R53.1 GENERALIZED WEAKNESS: ICD-10-CM

## 2025-03-18 DIAGNOSIS — Z79.4 TYPE 2 DIABETES MELLITUS WITHOUT COMPLICATION, WITH LONG-TERM CURRENT USE OF INSULIN (HCC): Chronic | ICD-10-CM

## 2025-03-18 DIAGNOSIS — R53.81 PHYSICAL DECONDITIONING: ICD-10-CM

## 2025-03-18 DIAGNOSIS — N18.31 TYPE 2 DIABETES MELLITUS WITH STAGE 3A CHRONIC KIDNEY DISEASE, WITH LONG-TERM CURRENT USE OF INSULIN (HCC): ICD-10-CM

## 2025-03-18 DIAGNOSIS — E11.22 TYPE 2 DIABETES MELLITUS WITH STAGE 3A CHRONIC KIDNEY DISEASE, WITH LONG-TERM CURRENT USE OF INSULIN (HCC): ICD-10-CM

## 2025-03-18 DIAGNOSIS — Z79.4 TYPE 2 DIABETES MELLITUS WITH STAGE 3A CHRONIC KIDNEY DISEASE, WITH LONG-TERM CURRENT USE OF INSULIN (HCC): ICD-10-CM

## 2025-03-18 DIAGNOSIS — Z79.01 ANTICOAGULATED: ICD-10-CM

## 2025-03-18 DIAGNOSIS — R26.81 GAIT INSTABILITY: ICD-10-CM

## 2025-03-18 PROBLEM — I48.91 ATRIAL FIBRILLATION WITH RVR (HCC): Status: RESOLVED | Noted: 2022-09-13 | Resolved: 2025-03-18

## 2025-03-18 PROBLEM — K59.1 FUNCTIONAL DIARRHEA: Status: RESOLVED | Noted: 2022-10-16 | Resolved: 2025-03-18

## 2025-03-18 PROBLEM — N18.9 CHRONIC KIDNEY DISEASE, UNSPECIFIED CKD STAGE: Status: RESOLVED | Noted: 2025-03-11 | Resolved: 2025-03-18

## 2025-03-18 PROBLEM — R73.9 HYPERGLYCEMIA: Status: RESOLVED | Noted: 2025-03-11 | Resolved: 2025-03-18

## 2025-03-18 PROBLEM — I48.91 UNSPECIFIED ATRIAL FIBRILLATION (HCC): Status: RESOLVED | Noted: 2023-01-01 | Resolved: 2025-03-18

## 2025-03-18 PROBLEM — N18.2 TYPE 2 DIABETES MELLITUS WITH STAGE 2 CHRONIC KIDNEY DISEASE, WITHOUT LONG-TERM CURRENT USE OF INSULIN (HCC): Status: RESOLVED | Noted: 2023-01-01 | Resolved: 2025-03-18

## 2025-03-18 PROBLEM — E16.2 HYPOGLYCEMIA: Status: RESOLVED | Noted: 2023-02-23 | Resolved: 2025-03-18

## 2025-03-18 PROBLEM — E78.5 HYPERLIPIDEMIA: Status: RESOLVED | Noted: 2023-07-19 | Resolved: 2025-03-18

## 2025-03-18 PROBLEM — R79.89 AZOTEMIA: Status: RESOLVED | Noted: 2024-07-31 | Resolved: 2025-03-18

## 2025-03-18 PROBLEM — M54.9 BACK PAIN: Status: RESOLVED | Noted: 2025-03-11 | Resolved: 2025-03-18

## 2025-03-18 PROBLEM — I48.19 PERSISTENT ATRIAL FIBRILLATION (HCC): Status: RESOLVED | Noted: 2023-07-19 | Resolved: 2025-03-18

## 2025-03-18 PROBLEM — N39.41 URGE INCONTINENCE: Status: RESOLVED | Noted: 2023-11-22 | Resolved: 2025-03-18

## 2025-03-18 PROBLEM — R52 INTRACTABLE PAIN: Status: RESOLVED | Noted: 2025-03-11 | Resolved: 2025-03-18

## 2025-03-18 PROCEDURE — 99499 UNLISTED E&M SERVICE: CPT | Performed by: FAMILY MEDICINE

## 2025-03-18 PROCEDURE — 99306 1ST NF CARE HIGH MDM 50: CPT | Performed by: FAMILY MEDICINE

## 2025-03-18 NOTE — PROGRESS NOTES
Skilled Nursing Facility       Anastasia Hobson Author: Rafa Rodriguez MD     1940 MRN [id number]   Last Hospital  Admission 3/10/25      Last Hospital Discharge 3/14/25 PCP Houston Prado MD   Hospital [facility] Discharge 3/14/25       Date of Admission: 3/14/25    Facility: [facility]      Expected Length of Stay:   3 weeks       HPI:    Anastasia Hobson is a 84 year old female admitted to SNF for sub-acute rehabilitation.      Chief Complaint: acute back pain, with T8 compression fracture s/p fall    Date of Admission:  3/10/2025  Date of Discharge:   3/14/2025     Discharge Disposition: SNF Subacute Rehab     Discharge Diagnosis:     #intractable Back pain 2/2 subacute T8 compression fx s/p fall  Xray with worsening T8 fracture  -MRI noted  Hold DOAC, resume after kypho  PRN muscle relaxant   Continue PRN dilaudid   Failed conservative mgmt at home for the past week, now w/ worsening back pain  Minimal improvement with TLSO  Plan on kyphoplasty today     #Afib s/p ablation and flutter ablation  Cont. Rate control, BB and cardizem  Hold OAC  Discussed risks with pt in detail, she understands     #HTN  BP stable  Cont home med regimen  To f/u with her primary cardiologist     #Bladder ca      #Hypertension     #HLD  statin     #DM type 2  ICF 1:30  IC 1:15     #CKD III     #h/o cervical ca           HPI   85 yo female presenting to [facility] with intractable pain with T8 compression fx s/p accidental fall, describes mechanical with preceding lightheadedness prior to fall, with PRN medication for pain, controlling pain, with worsening constipation despite being on miralax prn and prune juice, has TLSO.   Patient has history of PAF s/p ablation and history of Afib flutter.   Patient's HTN under control and she denies any chest pain or shortness, some lightheadedness when standing otherwise, denies any new complaints.   S/p kyphoplasty on 3/14/25  Patient has history of IDDM, with stable  sugars, history of bladder cancer, CKD 3, history of cervical cancer with no recent flu like symptoms.       Allergies:  She is allergic to morphine.    Current Meds:  Current Outpatient Medications on File Prior to Visit   Medication Sig    HYDROcodone-acetaminophen 5-325 MG Oral Tab Take 1 tablet by mouth every 6 (six) hours as needed for Pain.    metoprolol succinate ER 25 MG Oral Tablet 24 Hr Take 1 tablet (25 mg total) by mouth daily.    gabapentin 100 MG Oral Cap Take 1 capsule (100 mg total) by mouth every morning AND 2 capsules (200 mg total) nightly.    FREESTYLE NATALIE 2 SENSOR Does not apply Misc USE TO MONITOR BLOOD SUGAR CONTINUOUSLY. CHANGE EVERY 14 DAYS.    Insulin Lispro, 1 Unit Dial, (HUMALOG KWIKPEN) 100 UNIT/ML Subcutaneous Solution Pen-injector INJECT 2-7 UNITS INTO THE SKIN THREE TIMES DAILY WITH MEALS AS DIRECTED    furosemide 20 MG Oral Tab     LANTUS SOLOSTAR 100 UNIT/ML Subcutaneous Solution Pen-injector INJECT 8 UNITS INTO THE SKIN NIGHTLY. PEN EXPIRES 28 DAYS AFTER FIRST USE    FENOFIBRATE 145 MG Oral Tab Take 1 tablet (145 mg total) by mouth daily.    amiodarone 200 MG Oral Tab TAKE ONE TABLET BY MOUTH ONCE DAILY. Needs appointment with cardiologist and EKG done for future refills.    hydrALAZINE 50 MG Oral Tab 1 tablet (50 mg total) 3 (three) times daily.    dilTIAZem HCl ER Beads 120 MG Oral Capsule SR 24 Hr Take 1 capsule (120 mg total) by mouth daily.    LISINOPRIL 20 MG Oral Tab TAKE ONE TABLET BY MOUTH EVERY EVENING    Insulin Pen Needle (BD PEN NEEDLE PRISCILLA U/F) 32G X 4 MM Does not apply Misc Inject 1 Pen into the skin daily.    ATORVASTATIN 80 MG Oral Tab Take 1 tablet (80 mg total) by mouth nightly.    Continuous Blood Gluc  (FREESTYLE NATALIE 2 READER) Does not apply Device 1 each daily.    Continuous Blood Gluc  (FREESTYLE NATALIE 2 READER) Does not apply Device 1 Device daily as needed.    PROBIOTIC PRODUCT OR Take 1 capsule by mouth daily. 100 billion    Potassium  Chloride ER 20 MEQ Oral Tab CR Take 1 tablet by mouth daily.    apixaban 5 MG Oral Tab Take 1 tablet (5 mg total) by mouth 2 (two) times daily. (Patient taking differently: Take 0.5 tablets (2.5 mg total) by mouth 2 (two) times daily.)    Cholecalciferol (VITAMIN D3) 250 MCG (78486 UT) Oral Tab Take 1,000 Units by mouth daily.    Calcium Citrate-Vitamin D 315-250 MG-UNIT Oral Tab Take 1 tablet by mouth daily.     No current facility-administered medications on file prior to visit.         HISTORY:  She  has a past medical history of Arrhythmia, Back pain (Oct 2023), Back problem, Bladder cancer (HCC) (10/2019), Bloating, Blurred vision (sometime late June 2024), Cancer (HCC), Congestive heart disease (HCC), Constipation, Coronary atherosclerosis, Diabetes (HCC), Diabetes mellitus (HCC), Diarrhea, unspecified, CLARKE (dyspnea on exertion) (09/27/2022), Easy bruising, Elevated cholesterol, Essential hypertension, Exposure to medical diagnostic radiation, Fatigue, Flatulence/gas pain/belching, Frequent urination (2020), Headache disorder (approx 4 wks), Hearing loss, Heart palpitations (2022), Heartburn, High blood pressure, High cholesterol, History of falling (01/01/2023), Hyperlipidemia, Irregular bowel habits, Itch of skin (May or June 2024), Leaking of urine (2020), Leg swelling, Neuropathy, Osteoporosis, Sleep disturbance, Stool incontinence, Type 2 diabetes mellitus with hyperglycemia ([facility]) (11/05/2021), Visual impairment, and Wears glasses.    She  has a past surgical history that includes Fracture surgery; colonoscopy (2019); cystourethroscopy,fulgur >5cm lesn (10/03/2019); cystourethroscopy,biopsy (12/16/2019); cystourethroscopy,fulgur .5-2cm lesn (04/16/2020); cystourethroscopy,fulgur .5-2cm lesn (07/30/2020); cystourethroscopy (11/25/2020); hysterectomy; cystoscopy,insert ureteral stent; other surgical history (04/20/2021); other surgical history (08/17/2021); other surgical history (11/16/2021); other  surgical history (03/08/2022); cataract; colonoscopy; cervical spine surgery (10/06/2023); total abdom hysterectomy (1971); spine surgery procedure unlisted (10/06/2003); tonsillectomy (1946); ndsc ablation & rcnstj [facility] w/o bypass; and port, indwelling, imp.    She family history includes Colon Polyps in her mother; Diabetes in her father and mother; Heart Attack in her mother; Other in her son; Stroke in her father.   She  reports that she quit smoking about 17 years ago. Her smoking use included cigarettes. She has a 73.5 pack-year smoking history. She has been exposed to tobacco smoke. She has never used smokeless tobacco. She reports that she does not drink alcohol and does not use drugs.     ROS:   A comprehensive 14 point review of systems was completed.     Pertinent positives and negatives noted in the HPI.      PHYSICAL EXAM:   Estimated body mass index is 23.08 kg/m² as calculated from the following:    Height as of 3/10/25: 5' 6\" (1.676 m).    Weight as of 3/10/25: 143 lb (64.9 kg).     Lines, Drains, wound: NA    Vitals are stable and reviewed from PCC and nurse note.   Alert  +weakness  No acute distress  Normocephalic and atraumatic  No nasal congestion or rhinorrhea.   No acute respiratory distress, no s/s of stridor.   Normal rate and BP is stable.   +weakness + motor deficit  No jaundice, normal sclera.  No acute abdomen distension with no ascites, flat abdomen  +gait instability   No pallor, no new rash.   Arthralgias     Medication Reviewed: in Epic and Point [facility]    Labs and Imaging: IR KYPHOPLASTY    Result Date: 3/14/2025  PROCEDURE:  IR KYPHOPLASTY  HISTORY: Age-related osteoporosis with pathological traumatic fracture of T8, initial encounter for fracture. Patient cannot return to active daily living secondary to pain.  TECHNIQUE: Prior to the procedure, I discussed with the patient and/or legal representative the potential benefits, risks, and side effects of this procedure, the  likelihood of the patient achieving goals; and the potential problems that might occur during recuperation.  I discussed reasonable alternatives to the procedure, including risks, benefits, steps to prevent infection and side effects related to the alternatives, and risks related to not receiving this procedure. A witnessed verbal and signed consent was obtained and documented in the patient's chart.  IV was checked and maintained by the nurse. Moderate conscious sedation was performed under continuous pulse oximetry and cardiac monitoring under my direct supervision.  The radiology nurse was in attendance throughout the exam. Moderate conscious sedation of 3 mg Versed and 150 mcg of Fentanyl was given.  Patient was assessed and monitoring of oxygen saturation, heart rate, and blood pressure by the nursing staff and myself during the exam for a total intraservice time of 15 minutes of conscious sedation time from 14 15 to 14 30.  Two grams of Ancef were given pre-procedurally via existing IV.  The vertebral level indicated by MRI on 3/11/2025 was T8.   The patient was placed prone on the angiographic table and the back was prepped and covered with a full body drape in the usual sterile fashion.  All operators present for the case performed standard pre-procedural prep including hand washing, sterile gloves, gown, mask, and cap.  All aspects of the maximum sterile barrier technique were followed.  A preprocedural time out was performed with all physicians, technologists, and nurses involved with the procedure.  Local anesthesia was obtained by 10 mL of 1% lidocaine utilizing a 25 gauge 1.5 inch needle as well as a 25 gauge spinal needle to the periosteum of the desired pedicles.  A small incision was created and the 13 gauge beveled ostial needle was directed to the left pedicle of T8 under fluoroscopic guidance.  Through a transpedicular approach and utilizing both AP and lateral views, the needle was introduced and  advanced slowly with a mallet. Once in the posterior vertebral body, the needle was advanced under lateral imaging to the anterior one-third of the vertebral body.  The needle was retracted slightly and balloon was advanced through the needle.  The balloon was inflated under fluoroscopic guidance.  Once the balloon was deflated and removed, methyl methacrylate resin was mixed together and injected through the needle  under fluoroscopic guidance demonstrating accurate placement without extravasation outside the vertebral body. The stylet was replaced and the needle was removed under fluoroscopic guidance to ensure no cement tracked posteriorly.  The sites were dressed with sterile dressing.  The patient tolerated the procedure without complications.  Routine post-kyphoplasty orders were placed in the chart and the patient was monitored during post-sedation time.  ESTIMATED BLOOD LOSS: Less than 5cc.  FLUOROSCOPY TIME/DOSE:  4.2 minutes  AIR KERMA:  34.46 mGy            CONCLUSION: Successful kyphoplasty of the T8 vertebral fracture   LOCATION:  [facility]   Normal examination.  Dictated by (CST): Rsus Booker MD on 3/14/2025 at 3:26 PM     Finalized by (CST): Russ Booker MD on 3/14/2025 at 3:28 PM       MRI THORACIC+LUMBAR SPINE (ALL W+WO) (CPT=72157/42331)    Result Date: 3/11/2025  PROCEDURE:  MRI THORACIC+LUMBAR SPINE (ALL W+WO) (CPT=72157/27384)  COMPARISON:  None.  INDICATIONS:  history of bladder cancer. fall, T8 fracture, worsening pain  TECHNIQUE:  The thoracic and lumbar spine were imaged.  A comprehensive examination was performed utilizing a variety of imaging planes and imaging parameters to optimize visualization of suspected pathology.  Images were performed before and after the administration of intravenous gadolinium contrast.  PATIENT STATED HISTORY:(As transcribed by Technologist)   history of bladder cancer. fall, T8 fracture, worsening pain   CONTRAST USED:  13 mL of Dotarem  FINDINGS:    Preliminary reading provided by radiologist from [facility].  Moderate T8 compression fracture.  There is wedging of this vertebra with about 40% height loss, without retropulsion, and demonstrating decreased T1 and increased STIR signal consistent with recent injury.  There is enhancement of this vertebra which may be reactive, but in the patient with history of bladder cancer, this should be followed.  No other thoracic fracture.  No cord compression.  Fracture of the distal sacrum minimally displaced.  No acute lumbar fracture.  Degenerative changes L4-L5 with disc bulging, facet arthropathy, ligamentum flavum thickening with central canal stenosis mild-moderate, with moderate right neural foraminal stenosis and milder left neural foraminal stenosis.  No other lumbar central canal stenosis.  Mild right L4-L5 foraminal stenosis.  No thoracic or lumbar region paraspinal mass.  Images, urinary bladder shown to be dilated.            CONCLUSION:  Fractures of T8 and of the distal sacrum.  No thoracic cord compression or retropulsion.  No acute lumbar spine abnormality.  Chronic changes in the lumbar spine with central spinal canal stenosis mild-moderate L4-L5 along with foraminal stenosis.  Details above.   LOCATION:  [id number]    Dictated by (CST): Bhavin Carpenter MD on 3/11/2025 at 6:21 AM     Finalized by (CST): Bhavin Carpenter MD on 3/11/2025 at 6:25 AM       XR KNEE (1 OR 2 VIEWS), RIGHT (CPT=73560)    Result Date: 3/10/2025  PROCEDURE:  XR KNEE (1 OR 2 VIEWS), RIGHT (CPT=73560)  COMPARISON:  None.  INDICATIONS:  Status post fall.  Anterior knee pain.  PATIENT STATED HISTORY: (As transcribed by Technologist)  Patient stated having chronic right anterior knee pain.    FINDINGS:  BONES:  No acute fracture, subluxation or dislocation. SOFT TISSUES:  No focal soft tissue swelling identified. EFFUSION:  None visible. OTHER:  Negative.            CONCLUSION:  No acute fracture, subluxation or dislocation.    LOCATION:  [facility]   Dictated by (CST): Atif Zaragoza MD on 3/10/2025 at 8:36 PM     Finalized by (CST): Atif Zaragoza MD on 3/10/2025 at 8:36 PM       XR FEMUR MIN 2 VIEWS RIGHT (CPT=73552)    Result Date: 3/10/2025  PROCEDURE:  XR FEMUR MIN 2 VIEWS RIGHT (CPT=73552)  TECHNIQUE:  AP and lateral views were obtained.  COMPARISON:  None.  INDICATIONS:  Status post fall last Tuesday.  Right leg pain  PATIENT STATED HISTORY: (As transcribed by Technologist)  Patient stated having chronic right hip pain.    FINDINGS:  BONES:  No acute fracture, subluxation or dislocation identified. SOFT TISSUES:  Focal soft tissue swelling is seen.  Atherosclerotic arterial vascular calcifications are seen in the superficial femoral artery EFFUSION:  None visible. OTHER:  Negative.            CONCLUSION:  No acute femur fracture.   LOCATION:  [facility]   Dictated by (CST): Atif Zaragoza MD on 3/10/2025 at 8:24 PM     Finalized by (CST): Atif Zaragoza MD on 3/10/2025 at 8:36 PM       XR THORACIC SPINE (3 VIEWS) (CPT=72072)    Result Date: 3/10/2025  PROCEDURE:  XR THORACIC SPINE (3 VIEWS) (CPT=72072)  TECHNIQUE:  AP, lateral, and swimmer's views of the thoracic spine were obtained.  COMPARISON:  [facility] , XR, XR CHEST PA + LAT CHEST (HMR=02182), 1/08/2024, 11:06 AM.  [facility] , XR, XR CHEST AP PORTABLE  (CPT=71045), 8/06/2024, 7:59 AM.  [facility] , XR, XR CHEST AP PORTABLE  (CPT=71045), 10/10/2024, 9:25 AM.  INDICATIONS:  M54.9 Mid back pain  PATIENT STATED HISTORY: (As transcribed by Technologist)  Patient stated having chronic left sided thoracic pain.    FINDINGS:    There is stable mild dextroconvex scoliosis of the thoracic spine.  There is slight exaggerated kyphosis of the thoracic spine also noted.  There is a new mild compression fracture deformity of T8.  No subluxation or dislocation is seen.  No obvious retro pulsion.  Degenerative endplate spurring noted in the thoracic spine.  Mild endplate spurring and disc space  narrowing is seen throughout most of the thoracic spine.  Osseous structures appear demineralized.  Orthopedic hardware identified in the lower cervical spine.  Atheromatous calcified plaque seen within the aorta.  Mitral annulus calcifications are noted.            CONCLUSION:  1. Slight worsening compression deformity of T8 compared to a previous exam from 1/8/2024.  Follow-up MRI may aid in determining acuity of this finding. 2. Mild degenerative and arthritic changes are seen.   LOCATION:  [facility]    Dictated by (CST): Atif Zaragoza MD on 3/10/2025 at 4:21 PM     Finalized by (CST): Atif Zaragoza MD on 3/10/2025 at 4:25 PM       XR LUMBAR SPINE (MIN 4 VIEWS) (CPT=72110)    Result Date: 3/10/2025  PROCEDURE:  XR LUMBAR SPINE (MIN 4 VIEWS) (CPT=72110)  TECHNIQUE:  AP, lateral, oblique, and coned down L5-S1 views were obtained.  COMPARISON:  [address], XR, XR LUMBAR SPINE (MIN 4 VIEWS) (CPT=72110), 5/16/2016, 8:55 AM.  INDICATIONS:  G89.29 Chronic midline low back pain, unspecified whether sciatica present M54.50 Chronic midline low back pain, unspecified whether sciatica present  PATIENT STATED HISTORY: (As transcribed by Technologist)  Patient stated having chronic left sided lumbar pain.    FINDINGS:     There is maintenance of the normal lumbar lordosis.  There is mild levoconvex scoliosis unchanged from previous.  Slight worsening disc space narrowing noted at L3-4 with mild worsening endplate spurring also noted.  There is stable 2 mm retrolisthesis of L3 on L4.  There is no acute fracture.  Vertebral body heights are maintained.  There is no dislocation.  Osseous structures appear demineralized.  There is facet arthropathy noted at L3-4, L4-5 and L5-S1 bilaterally.  Atheromatous calcified plaque seen throughout the aorta and common iliac arteries.            CONCLUSION:  Degenerative and arthritic changes are noted.  There is mild worsening degenerative disc disease at L3-4 compared to 5/16/2016.    LOCATION:  [facility]   Dictated by (CST): Atif Zaragoza MD on 3/10/2025 at 4:17 PM     Finalized by (CST): Atif Zaragoza MD on 3/10/2025 at 4:21 PM       IR PORT A CATH PROCEDURE    Result Date: 3/6/2025  PROCEDURE:  IR PORT A CATHETER REMOVAL  INDICATIONS:  C76.0 Head and neck cancer (HCC)  COMPARISON:  [facility] , [facility], IR PORT A CATH INSERTION EXCHNGE CHECK, 9/19/2024, 11:00 AM.  DESCRIPTION OF PROCEDURE:  The patient was referred for removal of the chest port.  Completion of chemotherapy.  The procedure was discussed in detail with the patient and her daughter.  Review of benefits, alternatives, and risks.  Discussion of risks included, but was not limited to infection, bleeding, organ/nerve injury.  All questions were answered.  They voiced understanding and wished to proceed.  Witnessed verbal and written informed consent were obtained.  Time-out was performed by the staff.  Preprocedure antibiotics were administered.  She was positioned supine.  The right neck and upper chest were prepped in the usual sterile manner.  All elements of maximum sterile barrier technique were used.  1% lidocaine was used locally.  Initial fluoroscopy demonstrated stable, satisfactory positioning of the right chest port.  A small skin incision was made adjacent to the reservoir.  Then using gentle blunt dissection it was loosened from the adjacent soft tissues.  Once this was accomplished, the system was removed in toto.  It appeared intact confirmed visually and by fluoroscopy.  The pocket was thoroughly irrigated with antibiotic solution.  Three 0 Vicryl closure followed with skin adhesive and Steri-Strips.  Sterile dressings were placed.  She tolerated the procedure.  No immediate  complication.  She was assessed and felt an adequate candidate for moderate sedation.  My direct supervision.  Continuous pulse oximetry and cardiac monitoring.  Intravenous Versed and fentanyl were given by the Radiology nurse.  Recorded sedation  time 17 minutes.  An IV was placed.  Patency was maintained during the procedure.  Recorded fluoroscopy time 0.1 minutes.  Cumulative air kerma 0.36 mGy.  Completion spot image.               CONCLUSION:  Uneventful removal of the right chest port.   LOCATION:  [facility]    Dictated by (CST): Isaias Charles MD on 3/06/2025 at 4:31 PM     Finalized by (CST): Isaias Charles MD on 3/06/2025 at 4:34 PM         Recent Results (from the past 72 hours)   Comp Metabolic Panel (14)    Collection Time: 03/17/25  6:13 AM   Result Value Ref Range    Glucose 133 (H) 70 - 99 mg/dL    Sodium 143 136 - 145 mmol/L    Potassium 4.1 3.5 - 5.1 mmol/L    Chloride 105 98 - 112 mmol/L    CO2 31.0 21.0 - 32.0 mmol/L    Anion Gap 7 0 - 18 mmol/L    BUN 32 (H) 9 - 23 mg/dL    Creatinine 1.59 (H) 0.55 - 1.02 mg/dL    Calcium, Total 10.1 8.7 - 10.6 mg/dL    Calculated Osmolality 305 (H) 275 - 295 mOsm/kg    eGFR-Cr 32 (L) >=60 mL/min/1.73m2     (H) <34 U/L     (H) 10 - 49 U/L    Alkaline Phosphatase 128 55 - 142 U/L    Bilirubin, Total 0.7 0.2 - 1.1 mg/dL    Total Protein 6.6 5.7 - 8.2 g/dL    Albumin 4.4 3.2 - 4.8 g/dL    Globulin  2.2 2.0 - 3.5 g/dL    A/G Ratio 2.0 1.0 - 2.0    Patient Fasting for CMP? Yes    CBC With Differential With Platelet    Collection Time: 03/17/25  6:13 AM   Result Value Ref Range    WBC 6.4 4.0 - 11.0 x10(3) uL    RBC 4.55 3.80 - 5.30 x10(6)uL    HGB 12.1 12.0 - 16.0 g/dL    HCT 38.5 35.0 - 48.0 %    .0 150.0 - 450.0 10(3)uL    MCV 84.6 80.0 - 100.0 fL    MCH 26.6 26.0 - 34.0 pg    MCHC 31.4 31.0 - 37.0 g/dL    RDW 16.6 %    Neutrophil Absolute Prelim 4.14 1.50 - 7.70 x10 (3) uL    Neutrophil Absolute 4.14 1.50 - 7.70 x10(3) uL    Lymphocyte Absolute 1.26 1.00 - 4.00 x10(3) uL    Monocyte Absolute 0.80 0.10 - 1.00 x10(3) uL    Eosinophil Absolute 0.11 0.00 - 0.70 x10(3) uL    Basophil Absolute 0.03 0.00 - 0.20 x10(3) uL    Immature Granulocyte Absolute 0.05 0.00 - 1.00 x10(3) uL    Neutrophil % 64.8 %     Lymphocyte % 19.7 %    Monocyte % 12.5 %    Eosinophil % 1.7 %    Basophil % 0.5 %    Immature Granulocyte % 0.8 %           ASSESSMENT/ PLAN:   84 year old female with acute traumatic T8 compression fracture s/p fall. S/p kyphoplasty.       1. Closed wedge compression fracture of T8 vertebra with routine healing, subsequent encounter  -s/p kyphoplasty as above.     2. Anticoagulated  -DOAC: eliquis    3. Arthrodesis status  -stable, PT/TO    4. At high risk for falls  -PT/OT    5. Atrial fibrillation, chronic (HCC)  -rate controlled, AC, and HTN is stable,    6. Chronic kidney disease, stage 3b (HCC)  -stable, CPM    7. Depression, recurrent  -stable, CPM    8. Type 2 diabetes mellitus without complication, with long-term current use of insulin (HCC)  -stable, CPM    9. Type 2 diabetes mellitus with stage 3a chronic kidney disease, with long-term current use of insulin (HCC)  -stable, CPM    10. Primary hypertension  -stable, CPM    11. Pure hypercholesterolemia, unspecified  -stable, CPM    12. Pressure ulcer of left buttock, stage 2 (HCC)  -wound care to follow, CPm    13. Mixed hyperlipidemia  -stable, CPm    14. Malignant neoplasm of lateral wall of urinary bladder (HCC)  -stable, CPm, OP PET/CT in 3 months., BCG x 6 with FELY    15. Other heart failure (HCC)  -stable, CPM    16. Osteoarthritis of spine with radiculopathy, cervical region  -stable, PT/OT    17. Drug-induced constipation  -prn medication, added colace, continue with miralax and prune juice    18. At high risk for malnutrition  -dietary to meet with patient, high fiber along with high protein    19. Generalized weakness  -PT/TO    20. Physical deconditioning  -PT/OT    21. Gait instability  -PT/OT     Anastasia Hobson needs then following services:  Assisted Living  Durable Medical Equipment  Home Health Care  Nursing Home  Occupational Therapy  Physical Therapy  Respiratory Therapy  Wound care  Dietary       I anticipate that she will need  3 weeks of therapy and recooperation before an eventual transition to home and we will work on her discharge needs.     Patient is high risk for readmission to hospital with complicated chronic medical history.   Over 45 minutes spent on reviewing imaging and labs from PCC and [facility], reviewed consults and recent hospital admission, discharge report, consults, and reviewed plan of care at SNF and discharge back to community.    Reviewed medications from hospital discharge and PCC.       Rafa Rodriguez MD 3/18/2025, 12:14 PM

## 2025-03-19 ENCOUNTER — INITIAL APN SNF VISIT (OUTPATIENT)
Dept: INTERNAL MEDICINE CLINIC | Age: 85
End: 2025-03-19

## 2025-03-19 VITALS
DIASTOLIC BLOOD PRESSURE: 68 MMHG | SYSTOLIC BLOOD PRESSURE: 141 MMHG | HEART RATE: 102 BPM | BODY MASS INDEX: 23 KG/M2 | OXYGEN SATURATION: 97 % | WEIGHT: 144 LBS | RESPIRATION RATE: 18 BRPM | TEMPERATURE: 97 F

## 2025-03-19 DIAGNOSIS — K59.09 OTHER CONSTIPATION: ICD-10-CM

## 2025-03-19 DIAGNOSIS — R74.01 ELEVATED AST (SGOT): ICD-10-CM

## 2025-03-19 DIAGNOSIS — S22.060D CLOSED WEDGE COMPRESSION FRACTURE OF T8 VERTEBRA WITH ROUTINE HEALING, SUBSEQUENT ENCOUNTER: Primary | ICD-10-CM

## 2025-03-19 DIAGNOSIS — C76.0 HEAD AND NECK CANCER (HCC): ICD-10-CM

## 2025-03-19 DIAGNOSIS — I50.32 CHRONIC HEART FAILURE WITH PRESERVED EJECTION FRACTION (HFPEF, >= 50%) (HCC): ICD-10-CM

## 2025-03-19 DIAGNOSIS — Z79.4 TYPE 2 DIABETES MELLITUS WITH STAGE 3B CHRONIC KIDNEY DISEASE, WITH LONG-TERM CURRENT USE OF INSULIN (HCC): ICD-10-CM

## 2025-03-19 DIAGNOSIS — I48.20 ATRIAL FIBRILLATION, CHRONIC (HCC): ICD-10-CM

## 2025-03-19 DIAGNOSIS — Z85.51 PERSONAL HISTORY OF MALIGNANT NEOPLASM OF BLADDER: ICD-10-CM

## 2025-03-19 DIAGNOSIS — N18.32 CHRONIC KIDNEY DISEASE, STAGE 3B (HCC): ICD-10-CM

## 2025-03-19 DIAGNOSIS — N18.32 TYPE 2 DIABETES MELLITUS WITH STAGE 3B CHRONIC KIDNEY DISEASE, WITH LONG-TERM CURRENT USE OF INSULIN (HCC): ICD-10-CM

## 2025-03-19 DIAGNOSIS — E11.22 TYPE 2 DIABETES MELLITUS WITH STAGE 3B CHRONIC KIDNEY DISEASE, WITH LONG-TERM CURRENT USE OF INSULIN (HCC): ICD-10-CM

## 2025-03-19 DIAGNOSIS — E78.5 HYPERLIPIDEMIA, UNSPECIFIED HYPERLIPIDEMIA TYPE: ICD-10-CM

## 2025-03-19 DIAGNOSIS — I10 PRIMARY HYPERTENSION: ICD-10-CM

## 2025-03-19 PROCEDURE — 99310 SBSQ NF CARE HIGH MDM 45: CPT | Performed by: NURSE PRACTITIONER

## 2025-03-19 PROCEDURE — 1123F ACP DISCUSS/DSCN MKR DOCD: CPT | Performed by: NURSE PRACTITIONER

## 2025-03-19 RX ORDER — INSULIN GLARGINE 100 [IU]/ML
8 INJECTION, SOLUTION SUBCUTANEOUS EVERY MORNING
COMMUNITY

## 2025-03-19 RX ORDER — POLYETHYLENE GLYCOL 3350 17 G/17G
17 POWDER, FOR SOLUTION ORAL DAILY PRN
COMMUNITY

## 2025-03-19 RX ORDER — SENNA AND DOCUSATE SODIUM 50; 8.6 MG/1; MG/1
2 TABLET, FILM COATED ORAL 2 TIMES DAILY
COMMUNITY
End: 2025-03-21

## 2025-03-19 NOTE — PROGRESS NOTES
Skilled Nursing Facility, Subacute Rehab  [facility] at Formerly named [facility]     Anastasia Hobson Author: Monet Marino, APRN     1940 MRN [id number]   Last Hospital  Admission 3/10/25      Last Hospital Discharge 3/14/25 PCP Houston Prado MD       HPI:      Anastasia Hobson is a 84 year old female with previous medical history including CHF, DM type II, HTN, HLD, Afib, hx bladder cancer. She was admitted to the hospital for intractable back pain after fall with T8 compression fracture. She had kyphoplasty on 3/14/25. She had syncope and dizziness at home which caused her fall. She has been weak and using a walker. Chronic constipation. Uses Sennakot and Miralax at home and still has trouble at times.   Now admitted to SNF for sub-acute rehabilitation.     Anastasia is seen in her room getting into bed. Has difficulty with lifting her legs into bed from weakness and pain. Feels her pain and weakness and are improving a little bit everyday. Was using fentanyl and Dilaudid at home. Was getting IV dilaudid inpatient. IS using Norco now which makes her dizzy and constipated.   Would be interested in using fentanyl patch again as it had less side effects. Used that for cancer related pains. At home was using Senakot and Miralax for constipation. Here has Colace and dulcolax tab prn.   DW her options and DW nursing.     Hospital Discharge Diagnoses:  Back pain 2/2 subacute T8 compression fx s/p fall  S/P  kyphoplasty 3/14/25    constipation   Afib s/p ablation  Bladder cancer  Hypertension  HLD  DM type 2  CKD III  h/o cervical ca     Chief Complaint at visit:   Chief Complaint   Patient presents with    Follow - Up     T8 closed compression fx  Pain  DM        ALLERGIES    ALLERGIES:  Allergies   Allergen Reactions    Morphine CONFUSION       CURRENT MEDS:      CURRENT MEDICATIONS   Current Outpatient Medications   Medication Sig Dispense Refill    senna-docusate 8.6-50 MG Oral Tab Take 2 tablets by  mouth in the morning and 2 tablets before bedtime.      polyethylene glycol, PEG 3350, 17 g Oral Powd Pack Take 17 g by mouth daily as needed.      HYDROcodone-acetaminophen 5-325 MG Oral Tab Take 1 tablet by mouth every 6 (six) hours as needed for Pain. (Patient taking differently: Take 1 tablet by mouth every 4 (four) hours as needed for Pain.) 20 tablet 0    metoprolol succinate ER 25 MG Oral Tablet 24 Hr Take 1 tablet (25 mg total) by mouth daily.      gabapentin 100 MG Oral Cap Take 1 capsule (100 mg total) by mouth every morning AND 2 capsules (200 mg total) nightly. 90 capsule 1    FREESTYLE NATALIE 2 SENSOR Does not apply Misc USE TO MONITOR BLOOD SUGAR CONTINUOUSLY. CHANGE EVERY 14 DAYS. 12 each 0    Insulin Lispro, 1 Unit Dial, (HUMALOG KWIKPEN) 100 UNIT/ML Subcutaneous Solution Pen-injector INJECT 2-7 UNITS INTO THE SKIN THREE TIMES DAILY WITH MEALS AS DIRECTED (Patient taking differently: INJECT 2-7 UNITS INTO THE SKIN THREE TIMES DAILY WITH MEALS AS DIRECTED      Inject as per sliding scale:   if 150 - 200 = 1;   201 - 250 = 2;   251 - 300 = 3;   301 - 350 = 4,   subcutaneously with meals for DM Call MD if Blood glucose more than 350) 15 mL 0    FENOFIBRATE 145 MG Oral Tab Take 1 tablet (145 mg total) by mouth daily. 90 tablet 0    amiodarone 200 MG Oral Tab TAKE ONE TABLET BY MOUTH ONCE DAILY. Needs appointment with cardiologist and EKG done for future refills.      hydrALAZINE 50 MG Oral Tab 1 tablet (50 mg total) 3 (three) times daily.      dilTIAZem HCl ER Beads 120 MG Oral Capsule SR 24 Hr Take 1 capsule (120 mg total) by mouth daily. 30 capsule 11    LISINOPRIL 20 MG Oral Tab TAKE ONE TABLET BY MOUTH EVERY EVENING 90 tablet 0    Insulin Pen Needle (BD PEN NEEDLE PRISCILLA U/F) 32G X 4 MM Does not apply Misc Inject 1 Pen into the skin daily. 90 each 0    ATORVASTATIN 80 MG Oral Tab Take 1 tablet (80 mg total) by mouth nightly. 90 tablet 0    Continuous Blood Gluc  (FREESTYLE NATALIE 2 READER) Does  not apply Device 1 each daily. 1 each 1    PROBIOTIC PRODUCT OR Take 1 capsule by mouth daily. 100 billion      Potassium Chloride ER 20 MEQ Oral Tab CR Take 1 tablet by mouth daily.      apixaban 5 MG Oral Tab Take 1 tablet (5 mg total) by mouth 2 (two) times daily. 60 tablet 2    Cholecalciferol (VITAMIN D3) 250 MCG (34536 UT) Oral Tab Take 1,000 Units by mouth daily.      Calcium Citrate-Vitamin D 315-250 MG-UNIT Oral Tab Take 1 tablet by mouth daily.      furosemide 20 MG Oral Tab  (Patient not taking: Reported on 3/19/2025)      Continuous Blood Gluc  (FREESTYLE NATALIE 2 READER) Does not apply Device 1 Device daily as needed. 1 each 0       HISTORY:    Past Medical History:    Arrhythmia    Back pain    Back problem    Bladder cancer (HCC)    high grade superficial TCC, recurrent July 2020 after BCG    Bloating    Blurred vision    Cancer (HCC)    Congestive heart disease (HCC)    Constipation    Coronary atherosclerosis    Diabetes (HCC)    IDDM     Diabetes mellitus (HCC)    Diarrhea, unspecified    CLARKE (dyspnea on exertion)    Easy bruising    Elevated cholesterol    Essential hypertension    Exposure to medical diagnostic radiation    Fatigue    Flatulence/gas pain/belching    Frequent urination    taking furosemide daily    Headache disorder    Hearing loss    Heart palpitations    Heartburn    occasionally    High blood pressure    High cholesterol    History of falling    Hyperlipidemia    Irregular bowel habits    Itch of skin    right hand only    Leaking of urine    sometimes    Leg swelling    Neuropathy    Osteoporosis    Sleep disturbance    Stool incontinence    when I have diarrhea    Type 2 diabetes mellitus with hyperglycemia (HCC)    Visual impairment    glasses    Wears glasses     Past Surgical History:   Procedure Laterality Date    Cataract      Cervical spine surgery  10/06/2023    Colonoscopy  2019    Colonoscopy      Cystoscopy,insert ureteral stent      Cystourethroscopy   2020    Cystoscopy Procedure Dr Josh Hernandez  - recurrent tumor on BTS    Cystourethroscopy,biopsy  2019    BBx of scar sites, NSC    Cystourethroscopy,fulgur .5-2cm lesn  2020    TURBT, NSC    Cystourethroscopy,fulgur .5-2cm lesn  2020    TURBT with Gemcitabine    Cystourethroscopy,fulgur >5cm lesn  10/03/2019    TURBT with bilateral RPGs (NSC)    Fracture surgery      left wrist    Hysterectomy      age 31    Ndsc ablation & rcnstj atria lmtd w/o bypass      Other surgical history  2021    BTS Cysto, Dr Hernandez    Other surgical history  2021    BTS Cysto Caterize Dr. Hernandez    Other surgical history  2021    BTS Cysto W/ caterigayathri Hernandez     Other surgical history  2022    BTS Cysto- Dr. Hernandez    Port, indwelling, imp      Spine surgery procedure unlisted  10/06/2003    CERVICAL    Tonsillectomy  1946    Total abdom hysterectomy  1971     Family History   Problem Relation Age of Onset    Diabetes Father     Stroke Father     Colon Polyps Mother     Diabetes Mother     Heart Attack Mother     Other (Other) Son         Afib     Social History     Socioeconomic History    Marital status:     Number of children: 3   Occupational History    Occupation: RETIRED   Tobacco Use    Smoking status: Former     Current packs/day: 0.00     Average packs/day: 1.1 packs/day for 65.3 years (73.5 ttl pk-yrs)     Types: Cigarettes     Quit date: 2007     Years since quittin.7     Passive exposure: Past    Smokeless tobacco: Never    Tobacco comments:     quit 15 years    Vaping Use    Vaping status: Never Used   Substance and Sexual Activity    Alcohol use: No    Drug use: No    Sexual activity: Not Currently     Partners: Male   Social History Narrative    , lives with daughter    Has 3 children: 2 sons, 1 daughter     Social Drivers of Health     Food Insecurity: No Food Insecurity (3/11/2025)    NCSS - Food Insecurity     Worried About Running Out of Food in the Last  Year: No     Ran Out of Food in the Last Year: No   Transportation Needs: No Transportation Needs (3/11/2025)    NCSS - Transportation     Lack of Transportation: No   Housing Stability: Not At Risk (3/11/2025)    NCSS - Housing/Utilities     Has Housing: Yes     Worried About Losing Housing: No     Unable to Get Utilities: No         POA Atif Hobson, Petty Armijo    CODE STATUS:  DNR    ADVANCED CARE PLANNING TEAM: None    SUBJECTIVE/ ROS:       REVIEW OF SYSTEMS:  GENERAL HEALTH:fatigues easily  SKIN: denies any unusual skin lesions or rashes  WOUNDS: none   EYES:no visual complaints or deficits  HENT: denies nasal congestion, sinus pain or sore throat; and hearing loss negative  RESPIRATORY: denies shortness of breath, wheezing or cough   CARDIOVASCULAR:denies chest pain, no palpitations , denies cough  GI: constipation  Gu: No urinary frequency, urgency or dysuria  MUSCULOSKELETAL:mid back pain  NEURO:--- neuropathy from chemo  PSYCHE: no symptoms of depression or anxiety  HEMATOLOGY:denies bruising, denies excessive bleeding  ENDOCRINE: denies excessive thirst or urination; denies unexpected wt gain or wt loss  ALLERGY/IMM.: denies food or seasonal allergies      OBJECTIVE:     ASSESSMENT/ PHYSICAL EXAM:     VITALS:  /68   Pulse 102   Temp 97.3 °F (36.3 °C)   Resp 18   Wt 144 lb (65.3 kg)   SpO2 97%   BMI 23.24 kg/m²      PHYSICAL EXAM:  GENERAL HEALTH: well developed, well nourished, in no apparent distress  LINES, TUBES, DRAINS:  none  SKIN: pink, warm, dry  WOUND: none noted   EYES: sclera anicteric, conjunctiva normal; there is no nystagmus, no drainage from eyes  HENT: normocephalic; normal nose, no nasal drainage, mucous membranes pink, moist.  NECK: supple, non tender, FROM  BREAST: deferred  RESPIRATORY:clear, no crackles, no wheezing, no dyspnea, no cough, room air  CARDIOVASCULAR: RRR, S1 and S2, no murmurs, no edema  ABDOMEN:  normal active BS+, soft, nondistended; no organomegaly, no  masses; nontender, no guarding, no rebound tenderness.  :no suprapubic distension  LYMPHATIC:no lymphedema  MUSCULOSKELETAL: no acute synovitis upper or lower extremity  EXTREMITIES/VASCULAR:radial pulses 2+ and dorsalis pedal pulses 2+  NEUROLOGIC: A&OX3, no focal deficits, follows commands  PSYCHIATRIC: calm, cooperative, mood and affect appropriate to situation    Hospital and rehab lab results and imaging reviewed as available.    Therapy progress update:  Ambulation: min assist with RW   Transfers: Min assist  ADLs: Min to CGA  DC plan: lives with family, stairs 2 story       DIAGNOSTICS REVIEWED AT THIS VISIT:    Lab Results   Component Value Date    WBC 6.4 03/17/2025    RBC 4.55 03/17/2025    HGB 12.1 03/17/2025    HCT 38.5 03/17/2025    MCV 84.6 03/17/2025    MCH 26.6 03/17/2025    MCHC 31.4 03/17/2025    RDW 16.6 03/17/2025    .0 03/17/2025     Lab Results   Component Value Date     (H) 03/17/2025    BUN 32 (H) 03/17/2025    BUNCREA 26.1 (H) 04/06/2021    CREATSERUM 1.59 (H) 03/17/2025    ANIONGAP 7 03/17/2025    GFRNAA 40 (L) 02/23/2022    GFRAA 47 (L) 02/23/2022    CA 10.1 03/17/2025    OSMOCALC 305 (H) 03/17/2025    ALKPHO 128 03/17/2025     (H) 03/17/2025     (H) 03/17/2025    BILT 0.7 03/17/2025    TP 6.6 03/17/2025    ALB 4.4 03/17/2025    GLOBULIN 2.2 03/17/2025     03/17/2025    K 4.1 03/17/2025     03/17/2025    CO2 31.0 03/17/2025     Lab Results   Component Value Date     (H) 03/10/2025    A1C 7.1 (H) 03/10/2025      [facility] records reviewed.  Medication reconciliation completed.        MEDICAL DECISION MAKING and PLAN OF CARE:       Assessment & Plan  Type 2 diabetes mellitus with stage 3b chronic kidney disease, with long-term current use of insulin (HCC)  Insulin lispro ISS TID with meals  Lantus 8 units nightly  Last HgbA1C 7.1       Closed wedge compression fracture of T8 vertebra with routine healing, subsequent  encounter  S/p kyphoplasty on 3/14/25  Pain management with Norco 5/325 Q4 prn  Norco worsening constipation and with dizziness  Consider fentanyl patch she used in the past for cancer pain  Gabapentin 100 mg in am and 200 mg at night       Personal history of malignant neoplasm of bladder  Follow up with oncology as an outpatient  Hx JESSICA on gabapentin       Chronic kidney disease, stage 3b ([facility])  Baseline creat 1.5-1.9, currently 1.59  Avoid nephrotoxic medications  Renal dose meds as needed  CMP weekly and prn       Other constipation  Senna-docusate 2 tbas BID  Miralax daily prn  Dulcolax 5 mg po daily prn  Monitoring for BM  Chronic at home as well       Primary hypertension  Diltiazem 120 mg daily  Furosemide 20 mg daily  Hydralazine 50 mg TID  Lisinopril 20 mg PO QPM  Metoprolol 25 mg daily  VS q shift and prn       Atrial fibrillation, chronic ([facility])  Rate controlled  Amiodarone 200 mg daily  Metoprolol 25 mg daily  Diltiazem 120 mg daily  Apixaban 5 mg BID  Follow up with cardiology as directed  Hx ablation       Chronic heart failure with preserved ejection fraction (HFpEF, >= 50%) ([facility])  Daily weights; Call if weight gain of 2# overnight or 4# in 5 days  2 gram sodium diet  2 liter/day fluid restriction  VS q shift and prn  Monitoring for edema, wt gain, shortness of breath, fatigue  CMP weekly and prn  Follow up with cardiology as directed  Metoprolol 25 mg daily  Diltiazem 120 gm daily  Furosemide 20 mg daily  Potassium chloride 20 MEQ daily       Head and neck cancer (HCC)  Completed treatment   Port a cath removed  Follow up with oncology as directed for surveillance       Hyperlipidemia, unspecified hyperlipidemia type  Atorvastatin 80 mg nightly  Fenofibrate 145 mg daily  Follow up as outpatient for lipid monitoring       Elevated AST (SGOT)  Repeat labs Friday  Using Norco prn now for pain  No abdominal pain, no nausea, no vomiting, no diarrhea         Physical Deconditioning/Weakness; at risk for  falling  Fall Precautions  [facility] team to establish care plan meeting with patient and POA/family as appropriate  [facility] team/ & discharge planner to assist with establishing safe discharge plan for next level of care  PT/OT evaluate and treat  DC planning with MDT, [place], therapies, anticipated DC date : TBD  Services on DC: [facility] RN/PT/OT/SLP/SW  Equipment on DC: Walker     Pain management  Monitor and assess pain  Norco 5/325 Q4 prn  Was on fentanyl patch previously for cancer pain   Offer to pre-medicate 30-60 min prior to therapy  Physiatry evaluation with management appreciated    Bowel management  Monitor for Bms  Adding Senna-docusate BID  Miralax daily prn  Dulcolax 5 mg tab daily prn    Vitamins/supplements as r/t deficiencies  TRISHA RD to follow while in rehab; supplementation/diet as per TRISHA RD  May continue home supplements  Calcium citrate  Vit D  Probiotic     At risk for malnutrition  Dietician consult  Weekly weights  Supplements as indicated  Encourage po intake    DVT Prophylaxis   Encourage exercise and participation with therapy as much as able  apixaban    GI prophylaxis  none    Labs  CBC, CMP weekly and prn    Follow Ups:  PCP within 7 days of DC from rehab  Follow up with IR as needed post kyphoplasty    Future Appointments   Date Time Provider Department Center   3/26/2025 11:40 AM Manny Villalta MD [id number]   5/21/2025  8:45 AM  PET DOSE [facility]   5/21/2025 10:00 AM  PET SCANNER  [facility]         This dictation was performed with a verbal recognition program (DRAGON) and it was checked for errors. It is possible that there are still dictated errors within this note. If so, please bring any errors to my attention for an addendum. All efforts were made to ensure that this note is accurate.      70 min spent w/ patient and reviewing medical records, labs, completing medication reconciliation and entering orders to establish plan of care in  [facility].      Note to patient: The 21st Century Cures Act makes medical notes like these available to patients in the interest of transparency. However, this is a medical document intended as peer to peer communication. It is written in medical language and may contain abbreviations or verbiage that are unfamiliar. It may appear blunt or direct. Medical documents are intended to carry relevant information, facts as evident, and the clinical opinion of the practitioner who signs the document.       Monet Marino, APRN  03/19/25

## 2025-03-19 NOTE — ASSESSMENT & PLAN NOTE
Baseline creat 1.5-1.9, currently 1.59  Avoid nephrotoxic medications  Renal dose meds as needed  CMP weekly and prn

## 2025-03-19 NOTE — ASSESSMENT & PLAN NOTE
Completed treatment   Port a cath removed  Follow up with oncology as directed for surveillance

## 2025-03-19 NOTE — ASSESSMENT & PLAN NOTE
Senna-docusate 2 tbas BID  Miralax daily prn  Dulcolax 5 mg po daily prn  Monitoring for BM  Chronic at home as well

## 2025-03-19 NOTE — ASSESSMENT & PLAN NOTE
Rate controlled  Amiodarone 200 mg daily  Metoprolol 25 mg daily  Diltiazem 120 mg daily  Apixaban 5 mg BID  Follow up with cardiology as directed  Hx ablation

## 2025-03-19 NOTE — ASSESSMENT & PLAN NOTE
S/p kyphoplasty on 3/14/25  Pain management with Norco 5/325 Q4 prn  Norco worsening constipation and with dizziness  Consider fentanyl patch she used in the past for cancer pain  Gabapentin 100 mg in am and 200 mg at night

## 2025-03-19 NOTE — ASSESSMENT & PLAN NOTE
Diltiazem 120 mg daily  Furosemide 20 mg daily  Hydralazine 50 mg TID  Lisinopril 20 mg PO QPM  Metoprolol 25 mg daily  VS q shift and prn

## 2025-03-21 ENCOUNTER — SNF VISIT (OUTPATIENT)
Dept: INTERNAL MEDICINE CLINIC | Age: 85
End: 2025-03-21

## 2025-03-21 VITALS
SYSTOLIC BLOOD PRESSURE: 117 MMHG | TEMPERATURE: 96 F | BODY MASS INDEX: 23 KG/M2 | WEIGHT: 140.63 LBS | OXYGEN SATURATION: 100 % | DIASTOLIC BLOOD PRESSURE: 72 MMHG | HEART RATE: 87 BPM | RESPIRATION RATE: 18 BRPM

## 2025-03-21 DIAGNOSIS — I10 PRIMARY HYPERTENSION: ICD-10-CM

## 2025-03-21 DIAGNOSIS — R74.01 ELEVATED AST (SGOT): ICD-10-CM

## 2025-03-21 DIAGNOSIS — E11.22 TYPE 2 DIABETES MELLITUS WITH STAGE 3B CHRONIC KIDNEY DISEASE, WITH LONG-TERM CURRENT USE OF INSULIN (HCC): Primary | ICD-10-CM

## 2025-03-21 DIAGNOSIS — I48.20 ATRIAL FIBRILLATION, CHRONIC (HCC): ICD-10-CM

## 2025-03-21 DIAGNOSIS — K59.09 OTHER CONSTIPATION: ICD-10-CM

## 2025-03-21 DIAGNOSIS — N18.32 TYPE 2 DIABETES MELLITUS WITH STAGE 3B CHRONIC KIDNEY DISEASE, WITH LONG-TERM CURRENT USE OF INSULIN (HCC): Primary | ICD-10-CM

## 2025-03-21 DIAGNOSIS — I50.32 CHRONIC HEART FAILURE WITH PRESERVED EJECTION FRACTION (HFPEF, >= 50%) (HCC): ICD-10-CM

## 2025-03-21 DIAGNOSIS — S22.060D CLOSED WEDGE COMPRESSION FRACTURE OF T8 VERTEBRA WITH ROUTINE HEALING, SUBSEQUENT ENCOUNTER: ICD-10-CM

## 2025-03-21 DIAGNOSIS — Z79.4 TYPE 2 DIABETES MELLITUS WITH STAGE 3B CHRONIC KIDNEY DISEASE, WITH LONG-TERM CURRENT USE OF INSULIN (HCC): Primary | ICD-10-CM

## 2025-03-21 DIAGNOSIS — N18.32 CHRONIC KIDNEY DISEASE, STAGE 3B (HCC): ICD-10-CM

## 2025-03-21 PROCEDURE — 99309 SBSQ NF CARE MODERATE MDM 30: CPT | Performed by: NURSE PRACTITIONER

## 2025-03-21 NOTE — ASSESSMENT & PLAN NOTE
Baseline creat 1.5-1.9, now 1.59  Avoid nephrotoxic medications  Renal dose meds as needed  CMP weekly and prn

## 2025-03-21 NOTE — ASSESSMENT & PLAN NOTE
Had diarrhea post bowel meds  DC Senna plus  Colace daily to start tomorrow AM  Miralax and dulcolax tab daily prn  DW patient goal BM daily or every other day

## 2025-03-21 NOTE — ASSESSMENT & PLAN NOTE
Advised patient tn   Change Lantus 8 units to am to mimic home use  Adapt Lispro ISS TID to home dosing  Monitoring glucose fasting 126-147, preprandial 129-227

## 2025-03-21 NOTE — ASSESSMENT & PLAN NOTE
Amiodatone 200 mg dialy  Diltiazem 120 mg daily  Amiodarone 200 mg dialy  Apixaban 5 mg BID  Rate controlled  Follow up with Cardiology on DC  Hx ablation

## 2025-03-21 NOTE — PROGRESS NOTES
Anastasia Hobson, 9/13/1940, 84 year old, female    Chief Complaint:    Chief Complaint   Patient presents with    Follow - Up     Compression fx s/p kyphoplasty  Pain and weakness  Constipation         Subjective:   TODAY:  Anastasia is seen in bed in her room today. She worked with therapies and it was going well. She is feeling stronger everyday. Pain is improving, she is taking the Norco less often now. Had Diarrhea after bowel meds, will reduce now. Goal for one BM daily or every other day. Appetite is OK, weight is down. No nausea, no stomach pains. Needs work on stairs with therapy, in 2 level home with family. DW her DC planning.     Denies insomnia, fatigue, fever/chills, cough, SOB, dyspnea, angina, palpitations, n/v, diarrhea, constipation, and urinary sxs.      Objective:  /72   Pulse 87   Temp (!) 96.1 °F (35.6 °C)   Resp 18   Wt 140 lb 9.6 oz (63.8 kg)   SpO2 100%   BMI 22.69 kg/m²     PHYSICAL EXAM:  GENERAL HEALTH: well developed, well nourished, in no apparent distress  LINES, TUBES, DRAINS:  none  SKIN: pink, warm, dry  WOUND: none noted   EYES: sclera anicteric, conjunctiva normal; there is no nystagmus, no drainage from eyes  HENT: normocephalic; normal nose, no nasal drainage, mucous membranes pink, moist.  NECK: supple, non tender, FROM  BREAST: deferred  RESPIRATORY:clear, no crackles, no wheezing, no dyspnea, no cough, room air  CARDIOVASCULAR: RRR, S1 and S2, no murmurs, no edema  ABDOMEN:  normal active BS+, soft, nondistended; no organomegaly, no masses; nontender, no guarding, no rebound tenderness.  :no suprapubic distension  LYMPHATIC:no lymphedema  MUSCULOSKELETAL: no acute synovitis upper or lower extremity  EXTREMITIES/VASCULAR:radial pulses 2+ and dorsalis pedal pulses 2+  NEUROLOGIC: A&OX3, no focal deficits, follows commands  PSYCHIATRIC: calm, cooperative, mood and affect appropriate to situation    Therapy update:  Transfers: Min assist  ADLS:  Min assist to  SBA  Ambulation:  175 ft with RW and CGA, 12 stairs with CGA rails on both sides  DC plan: home with family 2 story home     Medications reviewed: Yes      Current Outpatient Medications:     polyethylene glycol, PEG 3350, 17 g Oral Powd Pack, Take 17 g by mouth daily as needed., Disp: , Rfl:     insulin glargine (LANTUS SOLOSTAR) 100 UNIT/ML Subcutaneous Solution Pen-injector, 8 Units nightly., Disp: , Rfl:     HYDROcodone-acetaminophen 5-325 MG Oral Tab, Take 1 tablet by mouth every 6 (six) hours as needed for Pain. (Patient taking differently: Take 1 tablet by mouth every 4 (four) hours as needed for Pain.), Disp: 20 tablet, Rfl: 0    metoprolol succinate ER 25 MG Oral Tablet 24 Hr, Take 1 tablet (25 mg total) by mouth daily., Disp: , Rfl:     gabapentin 100 MG Oral Cap, Take 1 capsule (100 mg total) by mouth every morning AND 2 capsules (200 mg total) nightly., Disp: 90 capsule, Rfl: 1    FREESTYLE NATALIE 2 SENSOR Does not apply Misc, USE TO MONITOR BLOOD SUGAR CONTINUOUSLY. CHANGE EVERY 14 DAYS., Disp: 12 each, Rfl: 0    Insulin Lispro, 1 Unit Dial, (HUMALOG KWIKPEN) 100 UNIT/ML Subcutaneous Solution Pen-injector, INJECT 2-7 UNITS INTO THE SKIN THREE TIMES DAILY WITH MEALS AS DIRECTED (Patient taking differently: INJECT 2-7 UNITS INTO THE SKIN THREE TIMES DAILY WITH MEALS AS DIRECTED   Inject as per sliding scale:  if 150 - 200 = 1;  201 - 250 = 2;  251 - 300 = 3;  301 - 350 = 4,  subcutaneously with meals for DM Call MD if Blood glucose more than 350), Disp: 15 mL, Rfl: 0    furosemide 20 MG Oral Tab, , Disp: , Rfl:     FENOFIBRATE 145 MG Oral Tab, Take 1 tablet (145 mg total) by mouth daily., Disp: 90 tablet, Rfl: 0    amiodarone 200 MG Oral Tab, TAKE ONE TABLET BY MOUTH ONCE DAILY. Needs appointment with cardiologist and EKG done for future refills., Disp: , Rfl:     hydrALAZINE 50 MG Oral Tab, 1 tablet (50 mg total) 3 (three) times daily., Disp: , Rfl:     dilTIAZem HCl ER Beads 120 MG Oral Capsule SR 24 Hr,  Take 1 capsule (120 mg total) by mouth daily., Disp: 30 capsule, Rfl: 11    LISINOPRIL 20 MG Oral Tab, TAKE ONE TABLET BY MOUTH EVERY EVENING, Disp: 90 tablet, Rfl: 0    Insulin Pen Needle (BD PEN NEEDLE PRISCILLA U/F) 32G X 4 MM Does not apply Misc, Inject 1 Pen into the skin daily., Disp: 90 each, Rfl: 0    ATORVASTATIN 80 MG Oral Tab, Take 1 tablet (80 mg total) by mouth nightly., Disp: 90 tablet, Rfl: 0    Continuous Blood Gluc  (FREESTYLE NATALIE 2 READER) Does not apply Device, 1 each daily., Disp: 1 each, Rfl: 1    Continuous Blood Gluc  (FREESTYLE NATALIE 2 READER) Does not apply Device, 1 Device daily as needed., Disp: 1 each, Rfl: 0    PROBIOTIC PRODUCT OR, Take 1 capsule by mouth daily. 100 billion, Disp: , Rfl:     Potassium Chloride ER 20 MEQ Oral Tab CR, Take 1 tablet by mouth daily., Disp: , Rfl:     apixaban 5 MG Oral Tab, Take 1 tablet (5 mg total) by mouth 2 (two) times daily., Disp: 60 tablet, Rfl: 2    Cholecalciferol (VITAMIN D3) 250 MCG (37423 UT) Oral Tab, Take 1,000 Units by mouth daily., Disp: , Rfl:     Calcium Citrate-Vitamin D 315-250 MG-UNIT Oral Tab, Take 1 tablet by mouth daily., Disp: , Rfl:       Diagnostics reviewed:    Lab Results   Component Value Date    WBC 6.4 03/17/2025    RBC 4.55 03/17/2025    HGB 12.1 03/17/2025    HCT 38.5 03/17/2025    MCV 84.6 03/17/2025    MCH 26.6 03/17/2025    MCHC 31.4 03/17/2025    RDW 16.6 03/17/2025    .0 03/17/2025     Lab Results   Component Value Date     (H) 03/17/2025    BUN 32 (H) 03/17/2025    BUNCREA 26.1 (H) 04/06/2021    CREATSERUM 1.59 (H) 03/17/2025    ANIONGAP 7 03/17/2025    GFRNAA 40 (L) 02/23/2022    GFRAA 47 (L) 02/23/2022    CA 10.1 03/17/2025    OSMOCALC 305 (H) 03/17/2025    ALKPHO 128 03/17/2025     (H) 03/17/2025     (H) 03/17/2025    BILT 0.7 03/17/2025    TP 6.6 03/17/2025    ALB 4.4 03/17/2025    GLOBULIN 2.2 03/17/2025     03/17/2025    K 4.1 03/17/2025     03/17/2025    CO2  31.0 03/17/2025       Assessment and plan:  Assessment & Plan  Type 2 diabetes mellitus with stage 3b chronic kidney disease, with long-term current use of insulin ([facility])  Change Lantus 8 units to am to mimic home use  Adapt Lispro ISS TID to home dosing  Monitoring glucose fasting 126-147, preprandial 129-227       Closed wedge compression fracture of T8 vertebra with routine healing, subsequent encounter  Had Kyphoplasty 3/14/25  Norco prn for pain  Gabapentin for neuropathy 100 in am 200 at Night  Pain and mobility are improving       Chronic kidney disease, stage 3b ([facility])  Baseline creat 1.5-1.9, now 1.59  Avoid nephrotoxic medications  Renal dose meds as needed  CMP weekly and prn       Atrial fibrillation, chronic ([facility])  Amiodatone 200 mg dialy  Diltiazem 120 mg daily  Amiodarone 200 mg dialy  Apixaban 5 mg BID  Rate controlled  Follow up with Cardiology on DC  Hx ablation       Chronic heart failure with preserved ejection fraction (HFpEF, >= 50%) ([facility])  Daily weights; Call if weight gain of 2# overnight or 4# in 5 days  Weight is down 144-140.6 lbs  2 gram sodium diet  2 liter/day fluid restriction  VS q shift and prn  Monitoring for edema, wt gain, shortness of breath, fatigue  CMP weekly and prn  Follow up with cardiology as directed  Metoprolol 25 mg daily  Diltiazem 120 gm daily  Furosemide 20 mg daily  Potassium chloride 20 MEQ daily       Other constipation  Had diarrhea post bowel meds  DC Senna plus  Colace daily to start tomorrow AM  Miralax and dulcolax tab daily prn  DW patient goal BM daily or every other day       Primary hypertension  BP controlled  Metoprolol  Diltiazem  Furosemide  No dizziness, or lightheadedness       Elevated AST (SGOT)  Repeat labs Monday  Using Norco prn for pain, less now  No abdominal pain, no nausea, no vomiting, no diarrhea      Bili 0.7       Physical Deconditioning/Weakness; at risk for falling  Fall Precautions  [facility] team to establish care plan meeting  with patient and POA/family as appropriate  [facility] team/ & discharge planner to assist with establishing safe discharge plan for next level of care  PT/OT evaluate and treat  DC planning with MDT, [place], therapies, anticipated DC date : TBD  Services on DC: C RN/PT/OT/SLP/SW  Equipment on DC: Walker      Pain management  Monitor and assess pain  Norco 5/325 Q4 prn  Was on fentanyl patch previously for cancer pain   Offer to pre-medicate 30-60 min prior to therapy  Physiatry evaluation with management appreciated     Bowel management  Monitor for Bms  Adding Senna-docusate BID  Miralax daily prn  Dulcolax 5 mg tab daily prn     Vitamins/supplements as r/t deficiencies  [facility] RD to follow while in rehab; supplementation/diet as per [facility] RD  May continue home supplements  Calcium citrate  Vit D  Probiotic      At risk for malnutrition  Dietician consult  Weekly weights  Supplements as indicated  Encourage po intake     DVT Prophylaxis   Encourage exercise and participation with therapy as much as able  apixaban     GI prophylaxis  none     Labs  CBC, CMP weekly and prn     Follow Ups:  PCP within 7 days of DC from rehab  Follow up with IR as needed post kyphoplasty       Future Appointments   Date Time Provider Department Center   3/26/2025 11:40 AM Manny Villalta MD [id number]   5/21/2025  8:45 AM  PET DOSE RM1  [facility]   5/21/2025 10:00 AM  PET SCANNER  [facility]       *Established patient; follow-up moderately complex visit/ greater than 30     35 minutes spent w/ patient and staff, including but not limited to/ reviewing present status, needs, abilities with disciplines, reviewing medical records, vital signs, labs, completing medication reconciliation and entering orders for continued care in [facility].    Note to patient: The 21st Century Cures Act makes medical notes like these available to patients in the interest of transparency. However, this is a medical document  intended as peer to peer communication. It is written in medical language and may contain abbreviations or verbiage that are unfamiliar. It may appear blunt or direct. Medical documents are intended to carry relevant information, facts as evident, and the clinical opinion of the practitioner who signs the document.    Monet Marino, APRN  3/21/2025

## 2025-03-24 ENCOUNTER — SNF VISIT (OUTPATIENT)
Dept: INTERNAL MEDICINE CLINIC | Age: 85
End: 2025-03-24

## 2025-03-24 VITALS
RESPIRATION RATE: 19 BRPM | HEART RATE: 90 BPM | OXYGEN SATURATION: 100 % | DIASTOLIC BLOOD PRESSURE: 57 MMHG | BODY MASS INDEX: 23 KG/M2 | TEMPERATURE: 97 F | WEIGHT: 143.81 LBS | SYSTOLIC BLOOD PRESSURE: 125 MMHG

## 2025-03-24 DIAGNOSIS — I48.20 ATRIAL FIBRILLATION, CHRONIC (HCC): ICD-10-CM

## 2025-03-24 DIAGNOSIS — I10 PRIMARY HYPERTENSION: ICD-10-CM

## 2025-03-24 DIAGNOSIS — K59.09 OTHER CONSTIPATION: ICD-10-CM

## 2025-03-24 DIAGNOSIS — S22.060D CLOSED WEDGE COMPRESSION FRACTURE OF T8 VERTEBRA WITH ROUTINE HEALING, SUBSEQUENT ENCOUNTER: Primary | ICD-10-CM

## 2025-03-24 DIAGNOSIS — I50.32 CHRONIC HEART FAILURE WITH PRESERVED EJECTION FRACTION (HFPEF, >= 50%) (HCC): ICD-10-CM

## 2025-03-24 DIAGNOSIS — N18.32 TYPE 2 DIABETES MELLITUS WITH STAGE 3B CHRONIC KIDNEY DISEASE, WITH LONG-TERM CURRENT USE OF INSULIN (HCC): ICD-10-CM

## 2025-03-24 DIAGNOSIS — N18.32 CHRONIC KIDNEY DISEASE, STAGE 3B (HCC): ICD-10-CM

## 2025-03-24 DIAGNOSIS — Z79.4 TYPE 2 DIABETES MELLITUS WITH STAGE 3B CHRONIC KIDNEY DISEASE, WITH LONG-TERM CURRENT USE OF INSULIN (HCC): ICD-10-CM

## 2025-03-24 DIAGNOSIS — E11.22 TYPE 2 DIABETES MELLITUS WITH STAGE 3B CHRONIC KIDNEY DISEASE, WITH LONG-TERM CURRENT USE OF INSULIN (HCC): ICD-10-CM

## 2025-03-24 DIAGNOSIS — R74.01 ELEVATED AST (SGOT): ICD-10-CM

## 2025-03-24 NOTE — PROGRESS NOTES
Anastasia Hobson, 9/13/1940, 84 year old, female    Chief Complaint:    Chief Complaint   Patient presents with    Follow - Up     Compression fx s/p kyphoplasty  Constipation  pain        Subjective:   TODAY:  Anastasia is seen in bed in her room today.   Her pain is managed.   Appetite is OK, weight is down.   No nausea, no stomach pains. Asking for prn bowel meds today no BM in 2 days.  Needs work on stairs with therapy, in 2 level home with family. DW her DC planning.   SW order for Wheelchair placed.     Denies insomnia, fatigue, fever/chills, cough, SOB, dyspnea, angina, palpitations, n/v, diarrhea, constipation, and urinary sxs.      Objective:  /57   Pulse 90   Temp 96.8 °F (36 °C)   Resp 19   Wt 143 lb 12.8 oz (65.2 kg)   SpO2 100%   BMI 23.21 kg/m²     PHYSICAL EXAM:  GENERAL HEALTH: well developed, well nourished, in no apparent distress  LINES, TUBES, DRAINS:  none  SKIN: pink, warm, dry  WOUND: none noted   EYES: sclera anicteric, conjunctiva normal; there is no nystagmus, no drainage from eyes  HENT: normocephalic; normal nose, no nasal drainage, mucous membranes pink, moist.  NECK: supple, non tender, FROM  BREAST: deferred  RESPIRATORY:clear, no crackles, no wheezing, no dyspnea, no cough, room air  CARDIOVASCULAR: RRR, S1 and S2, no murmurs, no edema  ABDOMEN:  normal active BS+, soft, nondistended; no organomegaly, no masses; nontender, no guarding, no rebound tenderness.  :no suprapubic distension  LYMPHATIC:no lymphedema  MUSCULOSKELETAL: no acute synovitis upper or lower extremity  EXTREMITIES/VASCULAR:radial pulses 2+ and dorsalis pedal pulses 2+  NEUROLOGIC: A&OX3, no focal deficits, follows commands  PSYCHIATRIC: calm, cooperative, mood and affect appropriate to situation    Therapy update:  Transfers: Min assist  ADLS:  Min assist to SBA  Ambulation:  175 ft with RW and CGA, 12 stairs with CGA rails on both sides  DC plan: home with family 2 story home     Medications  reviewed: Yes      Current Outpatient Medications:     polyethylene glycol, PEG 3350, 17 g Oral Powd Pack, Take 17 g by mouth daily as needed., Disp: , Rfl:     insulin glargine (LANTUS SOLOSTAR) 100 UNIT/ML Subcutaneous Solution Pen-injector, Inject 8 Units into the skin every morning., Disp: , Rfl:     HYDROcodone-acetaminophen 5-325 MG Oral Tab, Take 1 tablet by mouth every 6 (six) hours as needed for Pain. (Patient taking differently: Take 1 tablet by mouth every 4 (four) hours as needed for Pain.), Disp: 20 tablet, Rfl: 0    metoprolol succinate ER 25 MG Oral Tablet 24 Hr, Take 1 tablet (25 mg total) by mouth daily., Disp: , Rfl:     gabapentin 100 MG Oral Cap, Take 1 capsule (100 mg total) by mouth every morning AND 2 capsules (200 mg total) nightly., Disp: 90 capsule, Rfl: 1    FREESTYLE NATALIE 2 SENSOR Does not apply Misc, USE TO MONITOR BLOOD SUGAR CONTINUOUSLY. CHANGE EVERY 14 DAYS., Disp: 12 each, Rfl: 0    Insulin Lispro, 1 Unit Dial, (HUMALOG KWIKPEN) 100 UNIT/ML Subcutaneous Solution Pen-injector, INJECT 2-7 UNITS INTO THE SKIN THREE TIMES DAILY WITH MEALS AS DIRECTED (Patient taking differently: INJECT 2-7 UNITS INTO THE SKIN THREE TIMES DAILY WITH MEALS AS DIRECTED   Inject as per sliding scale:  if 150 - 200 = 1;  201 - 250 = 2;  251 - 300 = 3;  301 - 350 = 4,  subcutaneously with meals for DM Call MD if Blood glucose more than 350), Disp: 15 mL, Rfl: 0    furosemide 20 MG Oral Tab, , Disp: , Rfl:     FENOFIBRATE 145 MG Oral Tab, Take 1 tablet (145 mg total) by mouth daily., Disp: 90 tablet, Rfl: 0    amiodarone 200 MG Oral Tab, TAKE ONE TABLET BY MOUTH ONCE DAILY. Needs appointment with cardiologist and EKG done for future refills., Disp: , Rfl:     hydrALAZINE 50 MG Oral Tab, 1 tablet (50 mg total) 3 (three) times daily., Disp: , Rfl:     dilTIAZem HCl ER Beads 120 MG Oral Capsule SR 24 Hr, Take 1 capsule (120 mg total) by mouth daily., Disp: 30 capsule, Rfl: 11    LISINOPRIL 20 MG Oral Tab, TAKE  ONE TABLET BY MOUTH EVERY EVENING, Disp: 90 tablet, Rfl: 0    Insulin Pen Needle (BD PEN NEEDLE PRISCILLA U/F) 32G X 4 MM Does not apply Misc, Inject 1 Pen into the skin daily., Disp: 90 each, Rfl: 0    ATORVASTATIN 80 MG Oral Tab, Take 1 tablet (80 mg total) by mouth nightly., Disp: 90 tablet, Rfl: 0    Continuous Blood Gluc  (FREESTYLE NATALIE 2 READER) Does not apply Device, 1 each daily., Disp: 1 each, Rfl: 1    Continuous Blood Gluc  (FREESTYLE NATALIE 2 READER) Does not apply Device, 1 Device daily as needed., Disp: 1 each, Rfl: 0    PROBIOTIC PRODUCT OR, Take 1 capsule by mouth daily. 100 billion, Disp: , Rfl:     Potassium Chloride ER 20 MEQ Oral Tab CR, Take 1 tablet by mouth daily., Disp: , Rfl:     apixaban 5 MG Oral Tab, Take 1 tablet (5 mg total) by mouth 2 (two) times daily., Disp: 60 tablet, Rfl: 2    Cholecalciferol (VITAMIN D3) 250 MCG (40077 UT) Oral Tab, Take 1,000 Units by mouth daily., Disp: , Rfl:     Calcium Citrate-Vitamin D 315-250 MG-UNIT Oral Tab, Take 1 tablet by mouth daily., Disp: , Rfl:       Diagnostics reviewed:    Lab Results   Component Value Date    WBC 4.4 03/24/2025    RBC 4.26 03/24/2025    HGB 11.2 (L) 03/24/2025    HCT 35.7 03/24/2025    MCV 83.8 03/24/2025    MCH 26.3 03/24/2025    MCHC 31.4 03/24/2025    RDW 15.9 03/24/2025    .0 03/24/2025     Lab Results   Component Value Date     (H) 03/24/2025    BUN 40 (H) 03/24/2025    BUNCREA 26.1 (H) 04/06/2021    CREATSERUM 2.13 (H) 03/24/2025    ANIONGAP 9 03/24/2025    GFRNAA 40 (L) 02/23/2022    GFRAA 47 (L) 02/23/2022    CA 9.4 03/24/2025    OSMOCALC 298 (H) 03/24/2025    ALKPHO 93 03/24/2025    AST 34 (H) 03/24/2025    ALT 47 03/24/2025    BILT 0.7 03/24/2025    TP 6.0 03/24/2025    ALB 4.0 03/24/2025    GLOBULIN 2.0 03/24/2025     03/24/2025    K 4.5 03/24/2025     03/24/2025    CO2 26.0 03/24/2025       Assessment and plan:  Assessment & Plan  Chronic heart failure with preserved ejection  fraction (HFpEF, >= 50%) ([facility])  Daily weights; Call if weight gain of 2# overnight or 4# in 5 days  Weight is down 144-140.6-143 lbs  2 gram sodium diet  2 liter/day fluid restriction  VS q shift and prn  Monitoring for edema, wt gain, shortness of breath, fatigue  CMP weekly and prn  Follow up with cardiology as directed  Metoprolol 25 mg daily  Diltiazem 120 gm daily  Furosemide 20 mg daily  Potassium chloride 20 MEQ daily  stable       Closed wedge compression fracture of T8 vertebra with routine healing, subsequent encounter  Had Kyphoplasty 3/14/25  Norco prn for pain  Gabapentin for neuropathy 100 in am 200 at Night  Pain and mobility are improved       Chronic kidney disease, stage 3b ([facility])  Baseline creat 1.5-1.9, now 2.13 after resuming furosemide  Avoid nephrotoxic medications  Renal dose meds as needed  CMP weekly and prn       Atrial fibrillation, chronic ([facility])  Amiodarone 200 mg dialy  Diltiazem 120 mg daily  Amiodarone 200 mg dialy  Apixaban 5 mg BID  Rate controlled  Follow up with Cardiology on DC  Hx ablation       Type 2 diabetes mellitus with stage 3b chronic kidney disease, with long-term current use of insulin ([facility])  Lantus 8 units to am  Lispro ISS TID at home dosing  Monitoring glucose fasting 126-204, preprandial 149-244       Other constipation  Labile bowels  Colace daily   Miralax and dulcolax tab daily prn  DW patient goal BM daily or every other day       Primary hypertension  BP controlled  Metoprolol  Diltiazem  Furosemide  No dizziness, or lightheadedness       Elevated AST (SGOT)  Improved today  Using Norco prn for pain, less now  No abdominal pain, no nausea, no vomiting, no diarrhea  , 34  , 47  Bili wnl       Physical Deconditioning/Weakness; at risk for falling  Fall Precautions  [facility] team to establish care plan meeting with patient and POA/family as appropriate  [facility] team/ & discharge planner to assist with establishing safe discharge plan for next  level of care  PT/OT evaluate and treat  DC planning with MDT, SW, therapies, anticipated DC date : later this week  Services on DC: C RN/PT/OT/SLP/SW  Equipment on DC: Walker, WC     Pain management  Monitor and assess pain  Norco 5/325 Q4 prn  Offer to pre-medicate 30-60 min prior to therapy  Physiatry evaluation with management appreciated     Bowel management  Monitor for Bms  Colace daily  Miralax daily prn  Dulcolax 5 mg tab daily prn     Vitamins/supplements as r/t deficiencies  [facility] RD to follow while in rehab; supplementation/diet as per [facility] RD  May continue home supplements  Calcium citrate  Vit D  Probiotic      At risk for malnutrition  Dietician consult  Weekly weights  Supplements as indicated  Encourage po intake     DVT Prophylaxis   Encourage exercise and participation with therapy as much as able  apixaban     GI prophylaxis  none     Labs  CBC, CMP weekly and prn     Follow Ups:  PCP within 7 days of DC from rehab  Follow up with IR as needed post kyphoplasty       Future Appointments   Date Time Provider Department Center   3/26/2025 11:40 AM Manny Villalta MD [facility]ONAPER [id number]   5/21/2025  8:45 AM [facility] PET DOSE RM1 [facility]   5/21/2025 10:00 AM  PET SCANNER  [facility]       *Established patient; follow-up moderately complex visit/ greater than 30     35 minutes spent w/ patient and staff, including but not limited to/ reviewing present status, needs, abilities with disciplines, reviewing medical records, vital signs, labs, completing medication reconciliation and entering orders for continued care in [facility].    Note to patient: The 21st Century Cures Act makes medical notes like these available to patients in the interest of transparency. However, this is a medical document intended as peer to peer communication. It is written in medical language and may contain abbreviations or verbiage that are unfamiliar. It may appear blunt or direct. Medical documents are intended  to carry relevant information, facts as evident, and the clinical opinion of the practitioner who signs the document.    Monet Marino, APRN  3/24/2025

## 2025-03-24 NOTE — H&P
[facility]   part of [facility]   History & Physical    Anastasia Hobson Patient Status:  Outpatient    1940 MRN [id number]   Location [facility] INTERVENTIONAL SUITES Attending No att. providers found   Hosp Day # 0 PCP [place] rPado MD     Admitting Diagnosis:   Referred for chestport removal.    History of Present Illness:   H and N cancer, completion of chemotherapy.    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    Bloating    Blurred vision    Cancer (HCC)    Congestive heart disease (HCC)    Constipation    Coronary atherosclerosis    Diabetes (HCC)    IDDM     Diabetes mellitus (HCC)    Diarrhea, unspecified    CLARKE (dyspnea on exertion)    Easy bruising    Elevated cholesterol    Essential hypertension    Exposure to medical diagnostic radiation    Fatigue    Flatulence/gas pain/belching    Frequent urination    taking furosemide daily    Headache disorder    Hearing loss    Heart palpitations    Heartburn    occasionally    High blood pressure    High cholesterol    History of falling    Hyperlipidemia    Irregular bowel habits    Itch of skin    right hand only    Leaking of urine    sometimes    Leg swelling    Neuropathy    Osteoporosis    Sleep disturbance    Stool incontinence    when I have diarrhea    Type 2 diabetes mellitus with hyperglycemia (HCC)    Visual impairment    glasses    Wears glasses       Past Surgical History:  Past Surgical History:   Procedure Laterality Date    Cataract      Cervical spine surgery  10/06/2023    Colonoscopy  2019    Colonoscopy      Cystoscopy,insert ureteral stent      Cystourethroscopy  2020    Cystoscopy Procedure Dr Josh Hernandez  - recurrent tumor on BTS    Cystourethroscopy,biopsy  2019    BBx of scar sites, [facility]    Cystourethroscopy,fulgur .5-2cm lesn  2020    TURBT, NSC     Cystourethroscopy,fulgur .5-2cm lesn  2020    TURBT with Gemcitabine    Cystourethroscopy,fulgur >5cm lesn  10/03/2019    TURBT with bilateral RPGs (NSC)    Fracture surgery      left wrist    Hysterectomy      age 31    Ndsc ablation & rcnstj [facility] w/o bypass      Other surgical history  2021    BTS CystoDr Hernandez    Other surgical history  2021    BTS Cysto Caterize Dr. Hernandez    Other surgical history  2021    BTS Cysto W/ caterize Dr. Hernandez     Other surgical history  2022    BTS Cysto- Dr. Hernandez    Port, indwelling, imp      Spine surgery procedure unlisted  10/06/2003    CERVICAL    Tonsillectomy  89    Total abdom hysterectomy         Social History:  Social History     Tobacco Use    Smoking status: Former     Current packs/day: 0.00     Average packs/day: 1.1 packs/day for 65.3 years (73.5 ttl pk-yrs)     Types: Cigarettes     Quit date: 2007     Years since quittin.7     Passive exposure: Past    Smokeless tobacco: Never    Tobacco comments:     quit 15 years    Substance Use Topics    Alcohol use: No        Family History:  Family History   Problem Relation Age of Onset    Diabetes Father     Stroke Father     Colon Polyps Mother     Diabetes Mother     Heart Attack Mother     Other (Other) Son         Afib       Allergies/Medications:   Allergies:  Allergies[1]    Medications:    Current Outpatient Medications:     gabapentin 100 MG Oral Cap, Take 1 capsule (100 mg total) by mouth every morning AND 2 capsules (200 mg total) nightly., Disp: 90 capsule, Rfl: 1    Insulin Lispro, 1 Unit Dial, (HUMALOG KWIKPEN) 100 UNIT/ML Subcutaneous Solution Pen-injector, INJECT 2-7 UNITS INTO THE SKIN THREE TIMES DAILY WITH MEALS AS DIRECTED (Patient taking differently: INJECT 2-7 UNITS INTO THE SKIN THREE TIMES DAILY WITH MEALS AS DIRECTED   Inject as per sliding scale:  if 150 - 200 = 1;  201 - 250 = 2;  251 - 300 = 3;  301 - 350 = 4,  subcutaneously with meals for DM Call MD  if Blood glucose more than 350), Disp: 15 mL, Rfl: 0    furosemide 20 MG Oral Tab, , Disp: , Rfl:     FENOFIBRATE 145 MG Oral Tab, Take 1 tablet (145 mg total) by mouth daily., Disp: 90 tablet, Rfl: 0    amiodarone 200 MG Oral Tab, TAKE ONE TABLET BY MOUTH ONCE DAILY. Needs appointment with cardiologist and EKG done for future refills., Disp: , Rfl:     hydrALAZINE 50 MG Oral Tab, 1 tablet (50 mg total) 3 (three) times daily., Disp: , Rfl:     dilTIAZem HCl ER Beads 120 MG Oral Capsule SR 24 Hr, Take 1 capsule (120 mg total) by mouth daily., Disp: 30 capsule, Rfl: 11    LISINOPRIL 20 MG Oral Tab, TAKE ONE TABLET BY MOUTH EVERY EVENING, Disp: 90 tablet, Rfl: 0    ATORVASTATIN 80 MG Oral Tab, Take 1 tablet (80 mg total) by mouth nightly., Disp: 90 tablet, Rfl: 0    PROBIOTIC PRODUCT OR, Take 1 capsule by mouth daily. 100 billion, Disp: , Rfl:     Potassium Chloride ER 20 MEQ Oral Tab CR, Take 1 tablet by mouth daily., Disp: , Rfl:     Cholecalciferol (VITAMIN D3) 250 MCG (92173 UT) Oral Tab, Take 1,000 Units by mouth daily., Disp: , Rfl:     Calcium Citrate-Vitamin D 315-250 MG-UNIT Oral Tab, Take 1 tablet by mouth daily., Disp: , Rfl:     polyethylene glycol, PEG 3350, 17 g Oral Powd Pack, Take 17 g by mouth daily as needed., Disp: , Rfl:     insulin glargine (LANTUS SOLOSTAR) 100 UNIT/ML Subcutaneous Solution Pen-injector, Inject 8 Units into the skin every morning., Disp: , Rfl:     HYDROcodone-acetaminophen 5-325 MG Oral Tab, Take 1 tablet by mouth every 6 (six) hours as needed for Pain. (Patient taking differently: Take 1 tablet by mouth every 4 (four) hours as needed for Pain.), Disp: 20 tablet, Rfl: 0    metoprolol succinate ER 25 MG Oral Tablet 24 Hr, Take 1 tablet (25 mg total) by mouth daily., Disp: , Rfl:     FREESTYLE NATALIE 2 SENSOR Does not apply Misc, USE TO MONITOR BLOOD SUGAR CONTINUOUSLY. CHANGE EVERY 14 DAYS., Disp: 12 each, Rfl: 0    Insulin Pen Needle (BD PEN NEEDLE PRISCILLA U/F) 32G X 4 MM Does not  apply Misc, Inject 1 Pen into the skin daily., Disp: 90 each, Rfl: 0    Continuous Blood Gluc  (FREESTYLE NATALIE 2 READER) Does not apply Device, 1 each daily., Disp: 1 each, Rfl: 1    Continuous Blood Gluc  (FREESTYLE NATALIE 2 READER) Does not apply Device, 1 Device daily as needed., Disp: 1 each, Rfl: 0    apixaban 5 MG Oral Tab, Take 1 tablet (5 mg total) by mouth 2 (two) times daily., Disp: 60 tablet, Rfl: 2    Physical Exam & Review of Systems:   Physical Exam:    BP (!) 185/67 (BP Location: Left arm)   Pulse 58   Temp 99.1 °F (37.3 °C) (Tympanic)   Resp 12   SpO2 99%     General: NAD  Neck: No JVD  Lungs: CTA bilat  Heart: RRR, S1, S2  Abdomen: Soft, NT/ND, BS+x4  Extremities: Warm, dry, no LE edema bilat  Pulses: 2+ bilat DP    Results:   Labs:  Recent Labs   Lab 03/24/25  0623   RBC 4.26   HGB 11.2*   HCT 35.7   MCV 83.8   MCH 26.3   MCHC 31.4   RDW 15.9   NEPRELIM 2.49   WBC 4.4   .0     No results for input(s): \"PTP\", \"INR\", \"PTT\" in the last 168 hours.  Recent Labs   Lab 03/24/25  0623   *   BUN 40*   CREATSERUM 2.13*   CA 9.4      K 4.5      CO2 26.0       Assessment/Plan:   Impression: Referral for chestport removal.    Recommendations: Outpatient procedure.    Isaias Charles MD  3/24/2025  5:25 PM           [1]   Allergies  Allergen Reactions    Morphine CONFUSION

## 2025-03-24 NOTE — ASSESSMENT & PLAN NOTE
Labile bowels  Colace daily   Miralax and dulcolax tab daily prn  DW patient goal BM daily or every other day

## 2025-03-24 NOTE — ASSESSMENT & PLAN NOTE
Amiodarone 200 mg dialy  Diltiazem 120 mg daily  Amiodarone 200 mg dialy  Apixaban 5 mg BID  Rate controlled  Follow up with Cardiology on DC  Hx ablation

## 2025-03-24 NOTE — ASSESSMENT & PLAN NOTE
Had Kyphoplasty 3/14/25  Norco prn for pain  Gabapentin for neuropathy 100 in am 200 at Night  Pain and mobility are improved

## 2025-03-24 NOTE — ASSESSMENT & PLAN NOTE
Lantus 8 units to am  Lispro ISS TID at home dosing  Monitoring glucose fasting 126-204, preprandial 149-244

## 2025-03-24 NOTE — ASSESSMENT & PLAN NOTE
Baseline creat 1.5-1.9, now 2.13 after resuming furosemide  Avoid nephrotoxic medications  Renal dose meds as needed  CMP weekly and prn        2023

## 2025-03-25 NOTE — PROGRESS NOTES
Provider Clarification    Additional information on the patient's heart failure    Type: Diastolic/HFpEF   Acuity: Chronic     This note is part of the patient's medical record.

## 2025-03-26 ENCOUNTER — OFFICE VISIT (OUTPATIENT)
Facility: LOCATION | Age: 85
End: 2025-03-26
Payer: MEDICARE

## 2025-03-26 DIAGNOSIS — C77.0 CANCER OF HEAD, FACE, OR NECK LYMPH NODES, SECONDARY (HCC): Primary | ICD-10-CM

## 2025-03-26 PROCEDURE — 31575 DIAGNOSTIC LARYNGOSCOPY: CPT | Performed by: OTOLARYNGOLOGY

## 2025-03-26 PROCEDURE — 99213 OFFICE O/P EST LOW 20 MIN: CPT | Performed by: OTOLARYNGOLOGY

## 2025-03-26 NOTE — PROGRESS NOTES
Anastasia Hobson is a 84 year old female.   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  She is status post definitive chemoradiation therapy.  She is doing fairly well.  She denies a sore throat or hemoptysis.    REVIEW OF SYSTEMS:   GENERAL HEALTH: feels well otherwise  GENERAL : denies fever, chills, sweats, weight loss, weight gain  SKIN: denies any unusual skin lesions or rashes  RESPIRATORY: denies shortness of breath with exertion  NEURO: denies headaches    EXAM:   There were no vitals taken for this visit.    System Findings Details   Constitutional  Overall appearance - Normal.   Psychiatric  Orientation - Oriented to time, place, person & situation. Appropriate mood and affect.   Head/Face  Facial features -- Normal. Skull - Normal.   Eyes  Pupils equal ,round ,react to light and accomidate   Ears, Nose, Throat, Neck  Nose clear oral cavity clear pharynx clear neck sup without masses   Neurological  Memory - Normal. Cranial nerves - Cranial nerves II through XII grossly intact.   Lymph Detail  Submental. Submandibular. Anterior cervical. Posterior cervical. Supraclavicular.     Flexible Laryngoscopy Procedure Note (09539)    Due to inability for adequate examination of the larynx and need for magnification to perform the examination, endoscopy was performed.  Risks and benefits were discussed with patient/family and they have given verbal consent to proceed.    Pre-operative Diagnosis:   1. Cancer of head, face, or neck lymph nodes, secondary (HCC)        Post-operative Diagnosis: Same    Procedure: Diagnostic flexible fiberoptic laryngoscopy    Anesthesia: topical lidocaine    Surgeon: Manny Villalta MD    EBL: 0cc    Procedure Detail & Findings: Nasopharynx base of tongue epiglottis and vocal cords are normal.    Condition: Stable    Complications: Patient tolerated the procedure well with no  immediate complications.      Manny Villalta MD    ASSESSMENT AND PLAN:   1. Cancer of head, face, or neck lymph nodes, secondary (HCC)  Status post chemoradiation therapy for squamous cell carcinoma neck with unknown primary.  I see no evidence of primary at this time or any evidence of recurrence.  She will see me back in 6 months for recheck.  I am happy to see her sooner if needed.      The patient indicates understanding of these issues and agrees to the plan.    No follow-ups on file.    Manny Villalta MD  3/26/2025  1:08 PM

## 2025-03-27 NOTE — PROGRESS NOTES
Physician Clarification    Additional information related to the patient's condition    Traumatic T8 fracture     This note is part of the patient's medical record.

## 2025-03-28 ENCOUNTER — SNF VISIT (OUTPATIENT)
Dept: INTERNAL MEDICINE CLINIC | Age: 85
End: 2025-03-28

## 2025-03-28 VITALS
WEIGHT: 139 LBS | HEART RATE: 66 BPM | OXYGEN SATURATION: 98 % | SYSTOLIC BLOOD PRESSURE: 124 MMHG | BODY MASS INDEX: 22 KG/M2 | RESPIRATION RATE: 17 BRPM | DIASTOLIC BLOOD PRESSURE: 70 MMHG | TEMPERATURE: 97 F

## 2025-03-28 DIAGNOSIS — K59.09 OTHER CONSTIPATION: ICD-10-CM

## 2025-03-28 DIAGNOSIS — N18.32 TYPE 2 DIABETES MELLITUS WITH STAGE 3B CHRONIC KIDNEY DISEASE, WITH LONG-TERM CURRENT USE OF INSULIN (HCC): ICD-10-CM

## 2025-03-28 DIAGNOSIS — E11.22 TYPE 2 DIABETES MELLITUS WITH STAGE 3B CHRONIC KIDNEY DISEASE, WITH LONG-TERM CURRENT USE OF INSULIN (HCC): ICD-10-CM

## 2025-03-28 DIAGNOSIS — Z78.9 DECREASED ACTIVITIES OF DAILY LIVING (ADL): ICD-10-CM

## 2025-03-28 DIAGNOSIS — I48.20 ATRIAL FIBRILLATION, CHRONIC (HCC): ICD-10-CM

## 2025-03-28 DIAGNOSIS — I50.32 CHRONIC HEART FAILURE WITH PRESERVED EJECTION FRACTION (HFPEF, >= 50%) (HCC): Primary | ICD-10-CM

## 2025-03-28 DIAGNOSIS — Z79.4 TYPE 2 DIABETES MELLITUS WITH STAGE 3B CHRONIC KIDNEY DISEASE, WITH LONG-TERM CURRENT USE OF INSULIN (HCC): ICD-10-CM

## 2025-03-28 DIAGNOSIS — N18.32 CHRONIC KIDNEY DISEASE, STAGE 3B (HCC): ICD-10-CM

## 2025-03-28 DIAGNOSIS — S22.060D CLOSED WEDGE COMPRESSION FRACTURE OF T8 VERTEBRA WITH ROUTINE HEALING, SUBSEQUENT ENCOUNTER: ICD-10-CM

## 2025-03-28 PROCEDURE — 99308 SBSQ NF CARE LOW MDM 20: CPT | Performed by: NURSE PRACTITIONER

## 2025-03-28 NOTE — ASSESSMENT & PLAN NOTE
Kyphoplasty 3/14/25  Norco prn for pain  Gabapentin for neuropathy 100 in am 200 at Night  Pain and mobility are improved

## 2025-03-28 NOTE — ASSESSMENT & PLAN NOTE
Labile bowels, improved she states  Colace daily   Miralax and dulcolax tab daily prn  DW patient goal BM daily or every other day

## 2025-03-28 NOTE — ASSESSMENT & PLAN NOTE
Baseline creat 1.5-1.9, now 2.13, 2.10 after resuming furosemide  Avoid nephrotoxic medications  Renal dose meds as needed  CMP weekly and prn

## 2025-03-28 NOTE — ASSESSMENT & PLAN NOTE
Amiodarone 200 mg every day   Diltiazem 120 mg daily  Amiodarone 200 mg every day   Apixaban 5 mg BID  Rate controlled  Follow up with Cardiology on DC  Hx ablation

## 2025-03-28 NOTE — PROGRESS NOTES
Anastasia Hobson, 9/13/1940, 84 year old, female    Chief Complaint:    Chief Complaint   Patient presents with    Follow - Up     Compression fx s/p kyphoplasty  Weakness and immobility        Subjective:   TODAY:  Anastasia is seen walking in her room today.   Her pain is managed.   Appetite is OK, weight is down.   No nausea, no stomach pains.BM improved now she states.  Has an appointment later today.    Denies insomnia, fatigue, fever/chills, cough, SOB, dyspnea, angina, palpitations, n/v, diarrhea, constipation, and urinary sxs.      Objective:  /70   Pulse 66   Temp 97 °F (36.1 °C)   Resp 17   Wt 139 lb (63 kg)   SpO2 98%   BMI 22.44 kg/m²     PHYSICAL EXAM:  GENERAL HEALTH: well developed, well nourished, in no apparent distress  LINES, TUBES, DRAINS:  none  SKIN: pink, warm, dry  WOUND: none noted   EYES: sclera anicteric, conjunctiva normal; there is no nystagmus, no drainage from eyes  HENT: normocephalic; normal nose, no nasal drainage, mucous membranes pink, moist.  NECK: supple, non tender, FROM  BREAST: deferred  RESPIRATORY:clear, no crackles, no wheezing, no dyspnea, no cough, room air  CARDIOVASCULAR: RRR, S1 and S2, no murmurs, no edema  ABDOMEN:  normal active BS+, soft, nondistended; no organomegaly, no masses; nontender, no guarding, no rebound tenderness.  :no suprapubic distension  LYMPHATIC:no lymphedema  MUSCULOSKELETAL: no acute synovitis upper or lower extremity, brace on now.  EXTREMITIES/VASCULAR:radial pulses 2+ and dorsalis pedal pulses 2+  NEUROLOGIC: A&OX3, no focal deficits, follows commands  PSYCHIATRIC: calm, cooperative, mood and affect appropriate to situation    Therapy update:  Transfers: Min assist  ADLS:  Min assist to SBA  Ambulation:  175 ft with RW and CGA, 12 stairs with CGA rails on both sides  DC plan: home with family 2 story home  goal home 4/3/25     Medications reviewed: Yes      Current Outpatient Medications:     polyethylene glycol, PEG 3350, 17  g Oral Powd Pack, Take 17 g by mouth daily as needed., Disp: , Rfl:     insulin glargine (LANTUS SOLOSTAR) 100 UNIT/ML Subcutaneous Solution Pen-injector, Inject 8 Units into the skin every morning., Disp: , Rfl:     HYDROcodone-acetaminophen 5-325 MG Oral Tab, Take 1 tablet by mouth every 6 (six) hours as needed for Pain. (Patient taking differently: Take 1 tablet by mouth every 4 (four) hours as needed for Pain.), Disp: 20 tablet, Rfl: 0    metoprolol succinate ER 25 MG Oral Tablet 24 Hr, Take 1 tablet (25 mg total) by mouth daily., Disp: , Rfl:     gabapentin 100 MG Oral Cap, Take 1 capsule (100 mg total) by mouth every morning AND 2 capsules (200 mg total) nightly., Disp: 90 capsule, Rfl: 1    FREESTYLE NATALIE 2 SENSOR Does not apply Misc, USE TO MONITOR BLOOD SUGAR CONTINUOUSLY. CHANGE EVERY 14 DAYS., Disp: 12 each, Rfl: 0    Insulin Lispro, 1 Unit Dial, (HUMALOG KWIKPEN) 100 UNIT/ML Subcutaneous Solution Pen-injector, INJECT 2-7 UNITS INTO THE SKIN THREE TIMES DAILY WITH MEALS AS DIRECTED (Patient taking differently: INJECT 2-7 UNITS INTO THE SKIN THREE TIMES DAILY WITH MEALS AS DIRECTED   Inject as per sliding scale:  if 150 - 200 = 1;  201 - 250 = 2;  251 - 300 = 3;  301 - 350 = 4,  subcutaneously with meals for DM Call MD if Blood glucose more than 350), Disp: 15 mL, Rfl: 0    furosemide 20 MG Oral Tab, , Disp: , Rfl:     FENOFIBRATE 145 MG Oral Tab, Take 1 tablet (145 mg total) by mouth daily., Disp: 90 tablet, Rfl: 0    amiodarone 200 MG Oral Tab, TAKE ONE TABLET BY MOUTH ONCE DAILY. Needs appointment with cardiologist and EKG done for future refills., Disp: , Rfl:     hydrALAZINE 50 MG Oral Tab, 1 tablet (50 mg total) 3 (three) times daily., Disp: , Rfl:     dilTIAZem HCl ER Beads 120 MG Oral Capsule SR 24 Hr, Take 1 capsule (120 mg total) by mouth daily., Disp: 30 capsule, Rfl: 11    LISINOPRIL 20 MG Oral Tab, TAKE ONE TABLET BY MOUTH EVERY EVENING, Disp: 90 tablet, Rfl: 0    Insulin Pen Needle (BD PEN  NEEDLE PRISCILLA U/F) 32G X 4 MM Does not apply Misc, Inject 1 Pen into the skin daily., Disp: 90 each, Rfl: 0    ATORVASTATIN 80 MG Oral Tab, Take 1 tablet (80 mg total) by mouth nightly., Disp: 90 tablet, Rfl: 0    Continuous Blood Gluc  (FREESTYLE NATALIE 2 READER) Does not apply Device, 1 each daily., Disp: 1 each, Rfl: 1    Continuous Blood Gluc  (FREESTYLE NATALIE 2 READER) Does not apply Device, 1 Device daily as needed., Disp: 1 each, Rfl: 0    PROBIOTIC PRODUCT OR, Take 1 capsule by mouth daily. 100 billion, Disp: , Rfl:     Potassium Chloride ER 20 MEQ Oral Tab CR, Take 1 tablet by mouth daily., Disp: , Rfl:     apixaban 5 MG Oral Tab, Take 1 tablet (5 mg total) by mouth 2 (two) times daily., Disp: 60 tablet, Rfl: 2    Cholecalciferol (VITAMIN D3) 250 MCG (91651 UT) Oral Tab, Take 1,000 Units by mouth daily., Disp: , Rfl:     Calcium Citrate-Vitamin D 315-250 MG-UNIT Oral Tab, Take 1 tablet by mouth daily., Disp: , Rfl:       Diagnostics reviewed:    Lab Results   Component Value Date    WBC 4.5 03/27/2025    RBC 4.18 03/27/2025    HGB 11.1 (L) 03/27/2025    HCT 34.3 (L) 03/27/2025    MCV 82.1 03/27/2025    MCH 26.6 03/27/2025    MCHC 32.4 03/27/2025    RDW 15.6 03/27/2025    .0 03/27/2025     Lab Results   Component Value Date     (H) 03/27/2025    BUN 41 (H) 03/27/2025    BUNCREA 26.1 (H) 04/06/2021    CREATSERUM 2.10 (H) 03/27/2025    ANIONGAP 9 03/27/2025    GFRNAA 40 (L) 02/23/2022    GFRAA 47 (L) 02/23/2022    CA 9.6 03/27/2025    OSMOCALC 306 (H) 03/27/2025    ALKPHO 81 03/27/2025    AST 34 (H) 03/27/2025    ALT 32 03/27/2025    BILT 0.7 03/27/2025    TP 5.9 03/27/2025    ALB 4.0 03/27/2025    GLOBULIN 1.9 (L) 03/27/2025     03/27/2025    K 4.7 03/27/2025     03/27/2025    CO2 27.0 03/27/2025       Assessment and plan:  Assessment & Plan  Chronic heart failure with preserved ejection fraction (HFpEF, >= 50%) ([facility])  Daily weights; Call if weight gain of 2# overnight  or 4# in 5 days  Weight is down 144-140.6-143-139 lbs  2 gram sodium diet, 2 liter/day fluid restriction  VS q shift and prn  Monitoring for edema, wt gain, shortness of breath, fatigue  CMP weekly and prn  Follow up with cardiology as directed  Metoprolol 25 mg daily  Diltiazem 120 gm daily  Furosemide 20 mg daily  Potassium chloride 20 MEQ daily        Chronic kidney disease, stage 3b ([facility])  Baseline creat 1.5-1.9, now 2.13, 2.10 after resuming furosemide  Avoid nephrotoxic medications  Renal dose meds as needed  CMP weekly and prn       Closed wedge compression fracture of T8 vertebra with routine healing, subsequent encounter  Kyphoplasty 3/14/25  Norco prn for pain  Gabapentin for neuropathy 100 in am 200 at Night  Pain and mobility are improved       Atrial fibrillation, chronic ([facility])  Amiodarone 200 mg every day   Diltiazem 120 mg daily  Amiodarone 200 mg every day   Apixaban 5 mg BID  Rate controlled  Follow up with Cardiology on DC  Hx ablation       Type 2 diabetes mellitus with stage 3b chronic kidney disease, with long-term current use of insulin ([facility])  Lantus 8 units to am  Lispro ISS TID at home dosing  Monitoring glucose fasting 126-181, preprandial 176-253       Other constipation  Labile bowels, improved she states  Colace daily   Miralax and dulcolax tab daily prn  DW patient goal BM daily or every other day       Decreased activities of daily living (ADL)  Physical Deconditioning/Weakness; at risk for falling  Fall Precautions  [facility] team to establish care plan meeting with patient and POA/family as appropriate  [facility] team/ & discharge planner to assist with establishing safe discharge plan for next level of care  PT/OT evaluate and treat  DC planning with [facility], [place], therapies, anticipated DC date : 4/3/25  Services on DC: C RN/PT/OT/SLP/SW  Equipment on DC: Walker, WC          Pain management  Monitor and assess pain  Norco 5/325 Q4 prn  Offer to pre-medicate 30-60 min prior to  therapy  Physiatry evaluation with management appreciated     Bowel management  Monitor for Bms  Colace daily  Miralax daily prn  Dulcolax 5 mg tab daily prn     Vitamins/supplements as r/t deficiencies  [facility] RD to follow while in rehab; supplementation/diet as per [facility] RD  May continue home supplements  Calcium citrate  Vit D  Probiotic      At risk for malnutrition  Dietician consult  Weekly weights  Supplements as indicated  Encourage po intake     DVT Prophylaxis   Encourage exercise and participation with therapy as much as able  apixaban     GI prophylaxis  none     Labs  CBC, CMP weekly and prn     Follow Ups:  PCP within 7 days of DC from rehab  Follow up with IR as needed post kyphoplasty       Future Appointments   Date Time Provider Department Center   5/21/2025  8:45 AM [facility]   5/21/2025 10:00 AM [facility]   9/24/2025 11:40 AM Manny Villalta MD EMGOTONAPER [id number]         *Established patient; follow-up mildly complex visit/ greater than 20    25 minutes spent w/ patient and staff, including but not limited to/ reviewing present status, needs, abilities with disciplines, reviewing medical records, vital signs, labs, completing medication reconciliation and entering orders for continued care in [facility].      Note to patient: The 21st Century Cures Act makes medical notes like these available to patients in the interest of transparency. However, this is a medical document intended as peer to peer communication. It is written in medical language and may contain abbreviations or verbiage that are unfamiliar. It may appear blunt or direct. Medical documents are intended to carry relevant information, facts as evident, and the clinical opinion of the practitioner who signs the document.    Monet Marino, APRN  3/28/2025

## 2025-03-28 NOTE — ASSESSMENT & PLAN NOTE
Lantus 8 units to am  Lispro ISS TID at home dosing  Monitoring glucose fasting 126-181, preprandial 176-253

## 2025-03-31 ENCOUNTER — SNF VISIT (OUTPATIENT)
Dept: INTERNAL MEDICINE CLINIC | Age: 85
End: 2025-03-31

## 2025-03-31 ENCOUNTER — TELEPHONE (OUTPATIENT)
Dept: FAMILY MEDICINE CLINIC | Facility: CLINIC | Age: 85
End: 2025-03-31

## 2025-03-31 VITALS
SYSTOLIC BLOOD PRESSURE: 137 MMHG | DIASTOLIC BLOOD PRESSURE: 62 MMHG | OXYGEN SATURATION: 98 % | RESPIRATION RATE: 18 BRPM | WEIGHT: 138.31 LBS | TEMPERATURE: 97 F | BODY MASS INDEX: 22 KG/M2 | HEART RATE: 71 BPM

## 2025-03-31 DIAGNOSIS — N18.32 TYPE 2 DIABETES MELLITUS WITH STAGE 3B CHRONIC KIDNEY DISEASE, WITH LONG-TERM CURRENT USE OF INSULIN (HCC): ICD-10-CM

## 2025-03-31 DIAGNOSIS — I10 PRIMARY HYPERTENSION: ICD-10-CM

## 2025-03-31 DIAGNOSIS — Z78.9 DECREASED ACTIVITIES OF DAILY LIVING (ADL): ICD-10-CM

## 2025-03-31 DIAGNOSIS — K59.09 OTHER CONSTIPATION: ICD-10-CM

## 2025-03-31 DIAGNOSIS — Z79.4 TYPE 2 DIABETES MELLITUS WITH STAGE 3B CHRONIC KIDNEY DISEASE, WITH LONG-TERM CURRENT USE OF INSULIN (HCC): ICD-10-CM

## 2025-03-31 DIAGNOSIS — E11.22 TYPE 2 DIABETES MELLITUS WITH STAGE 3B CHRONIC KIDNEY DISEASE, WITH LONG-TERM CURRENT USE OF INSULIN (HCC): ICD-10-CM

## 2025-03-31 DIAGNOSIS — I48.20 ATRIAL FIBRILLATION, CHRONIC (HCC): ICD-10-CM

## 2025-03-31 DIAGNOSIS — N18.32 CHRONIC KIDNEY DISEASE, STAGE 3B (HCC): ICD-10-CM

## 2025-03-31 DIAGNOSIS — I50.32 CHRONIC HEART FAILURE WITH PRESERVED EJECTION FRACTION (HFPEF, >= 50%) (HCC): Primary | ICD-10-CM

## 2025-03-31 DIAGNOSIS — S22.060D CLOSED WEDGE COMPRESSION FRACTURE OF T8 VERTEBRA WITH ROUTINE HEALING, SUBSEQUENT ENCOUNTER: ICD-10-CM

## 2025-03-31 PROCEDURE — 99308 SBSQ NF CARE LOW MDM 20: CPT | Performed by: NURSE PRACTITIONER

## 2025-03-31 NOTE — PROGRESS NOTES
Anastasia Hobson, 9/13/1940, 84 year old, female    Chief Complaint:    Chief Complaint   Patient presents with    Follow - Up     Compression fx s/p kyphoplasty          Subjective:   TODAY:  Anastasia is seen in her room today in bed.  Her pain is managed.   Appetite is OK, weight is stable.   No nausea, no stomach pains. BM improved now she states.  DW her DC planning for Thursday. She would like outpatient PT. Has DME at home. Will need some pain meds for home.     Denies insomnia, fatigue, fever/chills, cough, SOB, dyspnea, angina, palpitations, n/v, diarrhea, constipation, and urinary sxs.      Objective:  /62   Pulse 71   Temp 97 °F (36.1 °C)   Resp 18   Wt 138 lb 4.8 oz (62.7 kg)   SpO2 98%   BMI 22.32 kg/m²     PHYSICAL EXAM:  GENERAL HEALTH: well developed, well nourished, in no apparent distress  LINES, TUBES, DRAINS:  none  SKIN: pink, warm, dry  WOUND: none noted   EYES: sclera anicteric, conjunctiva normal; there is no nystagmus, no drainage from eyes  HENT: normocephalic; normal nose, no nasal drainage, mucous membranes pink, moist.  NECK: supple, non tender, FROM  BREAST: deferred  RESPIRATORY:clear, no crackles, no wheezing, no dyspnea, no cough, room air  CARDIOVASCULAR: RRR, S1 and S2, no murmurs, no edema  ABDOMEN:  normal active BS+, soft, nondistended; no organomegaly, no masses; nontender, no guarding, no rebound tenderness.  :no suprapubic distension  LYMPHATIC:no lymphedema  MUSCULOSKELETAL: no acute synovitis upper or lower extremity, brace on now.  EXTREMITIES/VASCULAR:radial pulses 2+ and dorsalis pedal pulses 2+  NEUROLOGIC: A&OX3, no focal deficits, follows commands  PSYCHIATRIC: calm, cooperative, mood and affect appropriate to situation    Therapy update:  Transfers: Min assist  ADLS:  Min assist to SBA  Ambulation:  175 ft with RW and CGA, 12 stairs with CGA rails on both sides  DC plan: home with family 2 story home  goal home 4/3/25     Medications reviewed:  Yes      Current Outpatient Medications:     polyethylene glycol, PEG 3350, 17 g Oral Powd Pack, Take 17 g by mouth daily as needed., Disp: , Rfl:     insulin glargine (LANTUS SOLOSTAR) 100 UNIT/ML Subcutaneous Solution Pen-injector, Inject 8 Units into the skin every morning., Disp: , Rfl:     HYDROcodone-acetaminophen 5-325 MG Oral Tab, Take 1 tablet by mouth every 6 (six) hours as needed for Pain. (Patient taking differently: Take 1 tablet by mouth every 4 (four) hours as needed for Pain.), Disp: 20 tablet, Rfl: 0    metoprolol succinate ER 25 MG Oral Tablet 24 Hr, Take 1 tablet (25 mg total) by mouth daily., Disp: , Rfl:     gabapentin 100 MG Oral Cap, Take 1 capsule (100 mg total) by mouth every morning AND 2 capsules (200 mg total) nightly., Disp: 90 capsule, Rfl: 1    FREESTYLE NATALIE 2 SENSOR Does not apply Misc, USE TO MONITOR BLOOD SUGAR CONTINUOUSLY. CHANGE EVERY 14 DAYS., Disp: 12 each, Rfl: 0    Insulin Lispro, 1 Unit Dial, (HUMALOG KWIKPEN) 100 UNIT/ML Subcutaneous Solution Pen-injector, INJECT 2-7 UNITS INTO THE SKIN THREE TIMES DAILY WITH MEALS AS DIRECTED (Patient taking differently: INJECT 2-7 UNITS INTO THE SKIN THREE TIMES DAILY WITH MEALS AS DIRECTED   Inject as per sliding scale:  if 150 - 200 = 1;  201 - 250 = 2;  251 - 300 = 3;  301 - 350 = 4,  subcutaneously with meals for DM Call MD if Blood glucose more than 350), Disp: 15 mL, Rfl: 0    furosemide 20 MG Oral Tab, , Disp: , Rfl:     FENOFIBRATE 145 MG Oral Tab, Take 1 tablet (145 mg total) by mouth daily., Disp: 90 tablet, Rfl: 0    amiodarone 200 MG Oral Tab, TAKE ONE TABLET BY MOUTH ONCE DAILY. Needs appointment with cardiologist and EKG done for future refills., Disp: , Rfl:     hydrALAZINE 50 MG Oral Tab, 1 tablet (50 mg total) 3 (three) times daily., Disp: , Rfl:     dilTIAZem HCl ER Beads 120 MG Oral Capsule SR 24 Hr, Take 1 capsule (120 mg total) by mouth daily., Disp: 30 capsule, Rfl: 11    LISINOPRIL 20 MG Oral Tab, TAKE ONE TABLET  BY MOUTH EVERY EVENING, Disp: 90 tablet, Rfl: 0    Insulin Pen Needle (BD PEN NEEDLE PRISCILLA U/F) 32G X 4 MM Does not apply Misc, Inject 1 Pen into the skin daily., Disp: 90 each, Rfl: 0    ATORVASTATIN 80 MG Oral Tab, Take 1 tablet (80 mg total) by mouth nightly., Disp: 90 tablet, Rfl: 0    Continuous Blood Gluc  (FREESTYLE NATALIE 2 READER) Does not apply Device, 1 each daily., Disp: 1 each, Rfl: 1    Continuous Blood Gluc  (FREESTYLE NATALIE 2 READER) Does not apply Device, 1 Device daily as needed., Disp: 1 each, Rfl: 0    PROBIOTIC PRODUCT OR, Take 1 capsule by mouth daily. 100 billion, Disp: , Rfl:     Potassium Chloride ER 20 MEQ Oral Tab CR, Take 1 tablet by mouth daily., Disp: , Rfl:     apixaban 5 MG Oral Tab, Take 1 tablet (5 mg total) by mouth 2 (two) times daily., Disp: 60 tablet, Rfl: 2    Cholecalciferol (VITAMIN D3) 250 MCG (56801 UT) Oral Tab, Take 1,000 Units by mouth daily., Disp: , Rfl:     Calcium Citrate-Vitamin D 315-250 MG-UNIT Oral Tab, Take 1 tablet by mouth daily., Disp: , Rfl:       Diagnostics reviewed:    Lab Results   Component Value Date    WBC 4.5 03/27/2025    RBC 4.18 03/27/2025    HGB 11.1 (L) 03/27/2025    HCT 34.3 (L) 03/27/2025    MCV 82.1 03/27/2025    MCH 26.6 03/27/2025    MCHC 32.4 03/27/2025    RDW 15.6 03/27/2025    .0 03/27/2025     Lab Results   Component Value Date     (H) 03/27/2025    BUN 41 (H) 03/27/2025    BUNCREA 26.1 (H) 04/06/2021    CREATSERUM 2.10 (H) 03/27/2025    ANIONGAP 9 03/27/2025    GFRNAA 40 (L) 02/23/2022    GFRAA 47 (L) 02/23/2022    CA 9.6 03/27/2025    OSMOCALC 306 (H) 03/27/2025    ALKPHO 81 03/27/2025    AST 34 (H) 03/27/2025    ALT 32 03/27/2025    BILT 0.7 03/27/2025    TP 5.9 03/27/2025    ALB 4.0 03/27/2025    GLOBULIN 1.9 (L) 03/27/2025     03/27/2025    K 4.7 03/27/2025     03/27/2025    CO2 27.0 03/27/2025       Assessment and plan:  Assessment & Plan  Chronic heart failure with preserved ejection  fraction (HFpEF, >= 50%) ([facility])  Daily weights; Call if weight gain of 2# overnight or 4# in 5 days  Weight is down 144-140.8-499-443-138.8 lbs  2 gram sodium diet, 2 liter/day fluid restriction  VS q shift and prn  Monitoring for edema, wt gain, shortness of breath, fatigue  CMP weekly and prn  Follow up with cardiology as directed  Metoprolol 25 mg daily  Diltiazem 120 gm daily  Furosemide 20 mg daily  Potassium chloride 20 MEQ daily   compensated       Chronic kidney disease, stage 3b ([facility])  Baseline creat 1.5-1.9, now 2.13, 2.10 after resuming furosemide  Avoid nephrotoxic medications  Renal dose meds as needed  CMP weekly and prn       Atrial fibrillation, chronic ([facility])  Amiodarone 200 mg every day   Diltiazem 120 mg daily  Amiodarone 200 mg every day   Apixaban 5 mg BID  Rate controlled  Follow up with Cardiology on DC  Hx ablation       Closed wedge compression fracture of T8 vertebra with routine healing, subsequent encounter  Kyphoplasty 3/14/25  Norco prn for pain  Gabapentin for neuropathy 100 in am 200 at Night  Pain and mobility are improved       Type 2 diabetes mellitus with stage 3b chronic kidney disease, with long-term current use of insulin ([facility])  Lantus 8 units to am  Lispro ISS TID at home dosing  Monitoring glucose fasting 140-169, preprandial 185-279       Primary hypertension  BP controlled  Metoprolol 25 mg daily  Diltiazem 120 mg daily   Furosemide 20 mg daily  No dizziness, or lightheadedness       Other constipation  Labile bowels, improved she states  Colace daily   Miralax and dulcolax tab daily prn  DW patient goal BM daily or every other day       Decreased activities of daily living (ADL)  Physical Deconditioning/Weakness; at risk for falling  Fall Precautions  [facility] team to establish care plan meeting with patient and POA/family as appropriate  [facility] team/ & discharge planner to assist with establishing safe discharge plan for next level of care  PT/OT evaluate and treat  DC  planning with MDT, SW, therapies, anticipated DC date : 4/3/25  Services on DC: outpatient PT   Equipment on DC: Walker, WC          Pain management  Monitor and assess pain  Norco 5/325 Q4 prn  Offer to pre-medicate 30-60 min prior to therapy  Physiatry evaluation with management appreciated     Bowel management  Monitor for Bms  Colace daily  Miralax daily prn  Dulcolax 5 mg tab daily prn     Vitamins/supplements as r/t deficiencies  [facility] RD to follow while in rehab; supplementation/diet as per [facility] RD  May continue home supplements  Calcium citrate  Vit D  Probiotic      At risk for malnutrition  Dietician consult  Weekly weights  Supplements as indicated  Encourage po intake     DVT Prophylaxis   Encourage exercise and participation with therapy as much as able  apixaban     GI prophylaxis  none     Labs  CBC, CMP weekly and prn     Follow Ups:  PCP within 7 days of DC from rehab       Future Appointments   Date Time Provider Department Center   5/21/2025  8:45 AM EH PET DOSE RM1  PET [facility]   5/21/2025 10:00 AM  [facility]   9/24/2025 11:40 AM Manny Villalta MD EMGOTONAPER [id number]         *Established patient; follow-up mildly complex visit/ greater than 20    25 minutes spent w/ patient and staff, including but not limited to/ reviewing present status, needs, abilities with disciplines, reviewing medical records, vital signs, labs, completing medication reconciliation and entering orders for continued care in [facility].      Note to patient: The 21st Century Cures Act makes medical notes like these available to patients in the interest of transparency. However, this is a medical document intended as peer to peer communication. It is written in medical language and may contain abbreviations or verbiage that are unfamiliar. It may appear blunt or direct. Medical documents are intended to carry relevant information, facts as evident, and the clinical opinion of the practitioner who  signs the document.    Monet Marino, APRN  3/31/2025

## 2025-03-31 NOTE — TELEPHONE ENCOUNTER
Received fax from [facility] regarding patient's lipid panel completed on 03/10/25    Dr. Awad reviewed: normal lipids    External results entered - send to scanning

## 2025-03-31 NOTE — TELEPHONE ENCOUNTER
Advised patient's dtr of Dr. Awad's note below. Patient's dtr verbalized understanding. No further questions at this time.

## 2025-03-31 NOTE — ASSESSMENT & PLAN NOTE
Lantus 8 units to am  Lispro ISS TID at home dosing  Monitoring glucose fasting 140-169, preprandial 185-279

## 2025-03-31 NOTE — ASSESSMENT & PLAN NOTE
BP controlled  Metoprolol 25 mg daily  Diltiazem 120 mg daily   Furosemide 20 mg daily  No dizziness, or lightheadedness

## 2025-04-01 ENCOUNTER — MED REC SCAN ONLY (OUTPATIENT)
Dept: FAMILY MEDICINE CLINIC | Facility: CLINIC | Age: 85
End: 2025-04-01

## 2025-04-02 ENCOUNTER — PATIENT OUTREACH (OUTPATIENT)
Dept: CASE MANAGEMENT | Age: 85
End: 2025-04-02

## 2025-04-02 ENCOUNTER — SNF DISCHARGE (OUTPATIENT)
Dept: INTERNAL MEDICINE CLINIC | Age: 85
End: 2025-04-02

## 2025-04-02 VITALS
SYSTOLIC BLOOD PRESSURE: 142 MMHG | HEART RATE: 100 BPM | WEIGHT: 139 LBS | BODY MASS INDEX: 22 KG/M2 | TEMPERATURE: 97 F | RESPIRATION RATE: 17 BRPM | OXYGEN SATURATION: 100 % | DIASTOLIC BLOOD PRESSURE: 84 MMHG

## 2025-04-02 DIAGNOSIS — Z79.4 TYPE 2 DIABETES MELLITUS WITH STAGE 3B CHRONIC KIDNEY DISEASE, WITH LONG-TERM CURRENT USE OF INSULIN (HCC): ICD-10-CM

## 2025-04-02 DIAGNOSIS — I10 PRIMARY HYPERTENSION: ICD-10-CM

## 2025-04-02 DIAGNOSIS — I50.32 CHRONIC HEART FAILURE WITH PRESERVED EJECTION FRACTION (HFPEF, >= 50%) (HCC): Primary | ICD-10-CM

## 2025-04-02 DIAGNOSIS — I48.20 ATRIAL FIBRILLATION, CHRONIC (HCC): ICD-10-CM

## 2025-04-02 DIAGNOSIS — N18.32 CHRONIC KIDNEY DISEASE, STAGE 3B (HCC): ICD-10-CM

## 2025-04-02 DIAGNOSIS — N18.32 TYPE 2 DIABETES MELLITUS WITH STAGE 3B CHRONIC KIDNEY DISEASE, WITH LONG-TERM CURRENT USE OF INSULIN (HCC): ICD-10-CM

## 2025-04-02 DIAGNOSIS — S22.060D CLOSED WEDGE COMPRESSION FRACTURE OF T8 VERTEBRA WITH ROUTINE HEALING, SUBSEQUENT ENCOUNTER: ICD-10-CM

## 2025-04-02 DIAGNOSIS — Z78.9 DECREASED ACTIVITIES OF DAILY LIVING (ADL): ICD-10-CM

## 2025-04-02 DIAGNOSIS — E11.22 TYPE 2 DIABETES MELLITUS WITH STAGE 3B CHRONIC KIDNEY DISEASE, WITH LONG-TERM CURRENT USE OF INSULIN (HCC): ICD-10-CM

## 2025-04-02 PROCEDURE — 99316 NF DSCHRG MGMT 30 MIN+: CPT | Performed by: NURSE PRACTITIONER

## 2025-04-02 NOTE — PROGRESS NOTES
Post-Acute Discharge Summary    Anastasia Hobson  9/13/1940  Date of Admission to [facility]: 3/10/25  Date of Hospital Discharge: 3/14/25  Hospital Discharge Diagnosis:   Back pain 2/2 subacute T8 compression fx s/p fall  S/P  kyphoplasty 3/14/25    constipation   Afib s/p ablation  Bladder cancer  Hypertension  HLD  DM type 2  CKD III  h/o cervical ca   Date of Admission to [facility]: 3/14/25  Date of Discharge from SNF: 4/3/25  Skilled nursing facility attending: Dr Rafa Rodriguez    Primary Care Physician: Houston Prado MD  Assessment & Plan  Chronic heart failure with preserved ejection fraction (HFpEF, >= 50%) ([facility])  Daily weights; Call if weight gain of 2# overnight or 4# in 5 days  Weight is down 144-140.1-096-488-138.8-139 lbs  2 gram sodium diet, 2 liter/day fluid restriction  VS q shift and prn  Monitoring for edema, wt gain, shortness of breath, fatigue  CMP weekly and prn  Follow up with cardiology as directed  Metoprolol 25 mg daily  Diltiazem 120 gm daily  Furosemide 20 mg daily  Potassium chloride 20 MEQ daily   compensated  Chronic kidney disease, stage 3b ([facility])  Baseline creat 1.5-1.9, now 2.13, 2.10 after resuming furosemide  Avoid nephrotoxic medications  Renal dose meds as needed  CMP weekly and prn  Closed wedge compression fracture of T8 vertebra with routine healing, subsequent encounter  Kyphoplasty 3/14/25  Norco prn for pain  Gabapentin for neuropathy 100 in am 200 at Night  Pain and mobility are improved  Follow up with PCP   Atrial fibrillation, chronic ([facility])  Amiodarone 200 mg every day   Diltiazem 120 mg daily  Amiodarone 200 mg every day   Apixaban 5 mg BID  Rate controlled  Follow up with Cardiology on DC  Hx ablation  Type 2 diabetes mellitus with stage 3b chronic kidney disease, with long-term current use of insulin ([facility])  Lantus 8 units to am  Lispro ISS TID at home dosing  Monitoring glucose fasting 169-240, preprandial 182-269  Primary hypertension  BP  controlled  Metoprolol 25 mg daily  Diltiazem 120 mg daily   Furosemide 20 mg daily  No dizziness, or lightheadedness  Decreased activities of daily living (ADL)  Physical Deconditioning/Weakness; at risk for falling  Fall Precautions  DC planning with MDT, SW, therapies, anticipated DC date : 4/3/25  Services on DC: outpatient PT   Equipment on DC: Walker, SHERRIE  Pain management  Monitor and assess pain  Norco 5/325 Q4 prn  Offer to pre-medicate 30-60 min prior to therapy  Physiatry evaluation with management appreciated     Bowel management  Monitor for Bms  Colace daily  Miralax daily prn  Dulcolax 5 mg tab daily prn     Vitamins/supplements as r/t deficiencies  [facility] RD to follow while in rehab; supplementation/diet as per [facility] RD  May continue home supplements  Calcium citrate  Vit D  Probiotic      At risk for malnutrition  Dietician consult  Weekly weights  Supplements as indicated  Encourage po intake     DVT Prophylaxis   Encourage exercise and participation with therapy as much as able  apixaban     GI prophylaxis  none     Labs  CBC, CMP weekly and prn     Follow Ups:  PCP within 7 days of DC from rehab               Future Appointments   Date Time Provider Department Diagonal   5/21/2025  8:45 AM EH PET DOSE RM1  PET [facility]   5/21/2025 10:00 AM EH PET SCANNER  PET [facility]   9/24/2025 11:40 AM Manny Villalta MD EMGOTONAPER [id number]      Code Status: DNR: [facility]ive Treatment  Discharge to: Home with Outpatient Services  Home Health Services ordered: None  Mobility assessment: Walker    Reliable Support: children / family / friends to help, patient and daughter  Diet:Regular diet    Your appointments       Date & Time Appointment Department (Center)    May 21, 2025 8:45 AM CDT PET WHOLE BODY SCAN TO THIGH DOSE with EH PET DOSE RM1 [facility] PET ([facility])    Please arrive 15 minutes early for this appointment.     BEFORE YOUR EXAM  -No strenuous activity for 48  hours before your exam.  -After 5:00 PM the night before your test, avoid carbohydrates (no candy, gum, mints, ice cream, cake, sweets, or soda pop).  -Do not eat any food for 6 hours before your appointment time; please only drink plain water.    DIABETIC PATIENTS ONLY  -If you are taking Metformin, please withhold for at least 48 hours before your exam.  -Do not take any insulin within 4 hours of your appointment.  If you are taking long-acting insulin, take only half the normal dose at your normal time.   -Please avoid carbohydrates the entire day before the scan.  -Do not take oral diabetes medication 4 hours prior to your test.     ON THE DAY OF YOUR EXAM  -Dress warmly and comfortably.  No metal in your clothes, such as snaps or zippers, and please leave valuables/jewelry at home.  -For this test, you will receive an injection, rest for 30-90 minutes and take a 15-50 minute scan.  -There are no side effects to the injection.        May 21, 2025 10:00 AM CDT PET WHOLE BODY SCAN TO THIGH with  PET SCANNER [facility] PET ([facility])    Please arrive 15 minutes early for this appointment.     BEFORE YOUR EXAM  -No strenuous activity for 48 hours before your exam.  -After 5:00 PM the night before your test, avoid carbohydrates (no candy, gum, mints, ice cream, cake, sweets, or soda pop).  -Do not eat any food for 6 hours before your appointment time; please only drink plain water.    DIABETIC PATIENTS ONLY  -If you are taking Metformin, please withhold for at least 48 hours before your exam.  -Do not take any insulin within 4 hours of your appointment.  If you are taking long-acting insulin, take only half the normal dose at your normal time.   -Please avoid carbohydrates the entire day before the scan.  -Do not take oral diabetes medication 4 hours prior to your test.     ON THE DAY OF YOUR EXAM  -Dress warmly and comfortably.  No metal in your clothes, such as snaps or zippers,  and please leave valuables/jewelry at home.  -For this test, you will receive an injection, rest for 30-90 minutes and take a 15-50 minute scan.  -There are no side effects to the injection.        Sep 24, 2025 11:40 AM CDT Exam - Established with Manny Villalta MD [facility], [facility] ([facility])              [facility]  [address]  [facility] [address]  [phone] [facility], [facility]  [address] [facility] [address]  [phone]          [facility]    Hospital Summary:  Anastasia Hobson is a 84 year old female with previous medical history including CHF, DM type II, HTN, HLD, Afib, hx bladder cancer. She was admitted to the hospital for intractable back pain after fall with T8 compression fracture. She had kyphoplasty on 3/14/25. She had syncope and dizziness at home which caused her fall. She has been weak and using a walker. Chronic constipation.     Skilled Nursing Facility Summary:  Anastasia is seen in her room today in bed.  Her pain is managed.   Appetite is OK, weight is stable.   No nausea, no stomach pains. BM improved now she states.  DW her DC planning: She would like outpatient PT.   Has DME at home. Will need some pain meds for home.   Her daughter will help her at home.  She has done well in rehab, pain improved. Strength, balance and ADL ability improved. Follow up with PCP on DC home.     Medication Changes During skilled nursing facility stay:     Medication List            Accurate as of April 2, 2025  2:04 PM. If you have any questions, ask your nurse or doctor.                CHANGE how you take these medications      HYDROcodone-acetaminophen 5-325 MG Tabs  Commonly known as: Norco  Take 1 tablet by mouth every 6 (six) hours as needed for Pain.  What changed: when to take this      Insulin Lispro (1 Unit Dial) 100 UNIT/ML Sopn  Commonly known as: HumaLOG KwikPen  INJECT 2-7 UNITS INTO THE SKIN THREE TIMES DAILY WITH MEALS AS DIRECTED  What changed: additional instructions            CONTINUE taking these medications      amiodarone 200 MG Tabs  Commonly known as: Pacerone     apixaban 5 MG Tabs  Commonly known as: Eliquis  Take 1 tablet (5 mg total) by mouth 2 (two) times daily.     atorvastatin 80 MG Tabs  Commonly known as: Lipitor  Take 1 tablet (80 mg total) by mouth nightly.     BD Pen Needle Sierra U/F 32G X 4 MM Misc  Generic drug: Insulin Pen Needle  Inject 1 Pen into the skin daily.     calcium citrate-vitamin D 315-250 MG-UNIT Tabs  Commonly known as: CITRACAL-D     dilTIAZem HCl ER Beads 120 MG Cp24  Commonly known as: TIAZAC  Take 1 capsule (120 mg total) by mouth daily.     fenofibrate 145 MG Tabs  Commonly known as: Tricor  Take 1 tablet (145 mg total) by mouth daily.     * FreeStyle Shilo 2 Bonaire Jeannette  1 Device daily as needed.     * FreeStyle Shilo 2 Bonaire Jeannette  1 each daily.     FreeStyle Shilo 2 Sensor Misc  USE TO MONITOR BLOOD SUGAR CONTINUOUSLY. CHANGE EVERY 14 DAYS.     furosemide 20 MG Tabs  Commonly known as: Lasix     gabapentin 100 MG Caps  Commonly known as: Neurontin  Take 1 capsule (100 mg total) by mouth every morning AND 2 capsules (200 mg total) nightly.     hydrALAZINE 50 MG Tabs  Commonly known as: Apresoline     Lantus SoloStar 100 UNIT/ML Sopn  Generic drug: insulin glargine     lisinopril 20 MG Tabs  Commonly known as: Prinivil; Zestril  TAKE ONE TABLET BY MOUTH EVERY EVENING     metoprolol succinate ER 25 MG Tb24  Commonly known as: Toprol XL     polyethylene glycol (PEG 3350) 17 g Pack  Commonly known as: Miralax     Potassium Chloride ER 20 MEQ Tbcr     PROBIOTIC PRODUCT OR     Vitamin D3 250 MCG (21778 UT) Tabs           * This list has 2 medication(s) that are the same as other medications prescribed for you. Read the directions carefully, and ask  your doctor or other care provider to review them with you.                     Objective    Vitals  /84   Pulse 100   Temp 97 °F (36.1 °C)   Resp 17   Wt 139 lb (63 kg)   SpO2 100%   BMI 22.44 kg/m²       Physical Exam .       PHYSICAL EXAM:  GENERAL HEALTH: well developed, well nourished, in no apparent distress  LINES, TUBES, DRAINS:  none  SKIN: pink, warm, dry  WOUND: none noted   EYES: sclera anicteric, conjunctiva normal; there is no nystagmus, no drainage from eyes  HENT: normocephalic; normal nose, no nasal drainage, mucous membranes pink, moist.  NECK: supple, non tender, FROM  BREAST: deferred  RESPIRATORY:clear, no crackles, no wheezing, no dyspnea, no cough, room air  CARDIOVASCULAR: RRR, S1 and S2, no murmurs, no edema  ABDOMEN:  normal active BS+, soft, nondistended; no organomegaly, no masses; nontender, no guarding, no rebound tenderness.  :no suprapubic distension  LYMPHATIC:no lymphedema  MUSCULOSKELETAL: no acute synovitis upper or lower extremity, brace on now.  EXTREMITIES/VASCULAR:radial pulses 2+ and dorsalis pedal pulses 2+  NEUROLOGIC: A&OX3, no focal deficits, follows commands  PSYCHIATRIC: calm, cooperative, mood and affect appropriate to situation     Therapy update:  Transfers: Min assist  ADLS:  Min assist to SBA  Ambulation:  175 ft with RW and CGA, 12 stairs with CGA rails on both sides  DC plan: home with family 2 story home on 4/3/25     Most Recent Labs:  Lab Results   Component Value Date    WBC 4.5 03/27/2025    RBC 4.18 03/27/2025    HGB 11.1 (L) 03/27/2025    HCT 34.3 (L) 03/27/2025    MCV 82.1 03/27/2025    MCH 26.6 03/27/2025    MCHC 32.4 03/27/2025    RDW 15.6 03/27/2025    .0 03/27/2025     Lab Results   Component Value Date     (H) 03/27/2025    BUN 41 (H) 03/27/2025    BUNCREA 26.1 (H) 04/06/2021    CREATSERUM 2.10 (H) 03/27/2025    ANIONGAP 9 03/27/2025    GFRNAA 40 (L) 02/23/2022    GFRAA 47 (L) 02/23/2022    CA 9.6 03/27/2025    OSMOCALC  306 (H) 03/27/2025    ALKPHO 81 03/27/2025    AST 34 (H) 03/27/2025    ALT 32 03/27/2025    BILT 0.7 03/27/2025    TP 5.9 03/27/2025    ALB 4.0 03/27/2025    GLOBULIN 1.9 (L) 03/27/2025     03/27/2025    K 4.7 03/27/2025     03/27/2025    CO2 27.0 03/27/2025           Time Spent: 35 minutes.  This includes but is not limited to direct patient care, reviewing this patient's vitals, labs and imaging studies, discussing in multidisciplinary rounds and with other members of the health care team as well as updating family members.     Note to patient: The 21st Century Cures Act makes medical notes like these available to patients in the interest of transparency. However, this is a medical document intended as peer to peer communication. It is written in medical language and may contain abbreviations or verbiage that are unfamiliar. It may appear blunt or direct. Medical documents are intended to carry relevant information, facts as evident, and the clinical opinion of the practitioner who signs the document.     Monet Marino, APRN  4/2/2025

## 2025-04-02 NOTE — ASSESSMENT & PLAN NOTE
Kyphoplasty 3/14/25  Norco prn for pain  Gabapentin for neuropathy 100 in am 200 at Night  Pain and mobility are improved  Follow up with PCP

## 2025-04-02 NOTE — PROGRESS NOTES
Spoke to the patient daughter who reports the patient has not been discharged from SNF. She might be discharged tomorrow. I facilitated the scheduling of her appointment with her PCP for 4/10/2025.    I updated the initial check list out reach date from 4/2/2025 to  4/4/25    Future Appointments   Date Time Provider Department Center   4/10/2025  2:30 PM Houston Prado MD [facility]   5/21/2025  8:45 AM  PET DOSE RM1  PET [facility]   5/21/2025 10:00 AM  PET SCANNER  [facility]   9/24/2025 11:40 AM Manny Villalta MD [facility] [id number]

## 2025-04-02 NOTE — ASSESSMENT & PLAN NOTE
Lantus 8 units to am  Lispro ISS TID at home dosing  Monitoring glucose fasting 169-240, preprandial 182-269

## 2025-04-04 NOTE — PROGRESS NOTES
Transitional Care Management   Discharge Date: 3/14/25  Contact Date: 2025    Assessment:  TCM Initial Assessment    General:  Assessment completed with: Patient  Patient Subjective: Doing fairly well since discharge.  Chief Complaint: Closed wedge compression fracture of T8 vertebra with routine healing, subsequent encounter (SNF discharge)  Verify patient name and  with patient/ caregiver: Yes    Hospital Stay/Discharge:  Prior to leaving the hospital were your Discharge Instructions reviewed with you?: Yes  Did you receive a copy of your written Discharge Instructions?: Yes  Do you feel better or worse since you left the hospital or emergency department?: Better    Follow - Up Appointment:  Do you have a follow-up appointment?: Yes  Date: 04/10/25  Physician: Dr. Conrad  Are there any barriers to getting to your follow-up appointment?: No    Home Health/DME:  Prior to leaving the hospital was Home Health (HH) arranged for you?: No     Prior to leaving the hospital or emergency department was Durable Medical Equipment (DME), medical supplies, or infusions arranged for you?: No  Are DME/medical supply/infusions needs identified by staff during this assessment?: No     Medications/Diet:       Were you given a different diet per your Discharge Instructions?: No     Questions/Concerns:  Do you have any questions or concerns that have not been discussed?: No       Follow-up Appointments:  Your appointments       Date & Time Appointment Department (Center)    Apr 10, 2025 2:30 PM [facility] Follow Up with Houston Prado MD [facility] ([facility])        May 21, 2025 8:45 AM T PET WHOLE BODY SCAN TO THIGH DOSE with EH PET DOSE RM1 [facility] PET ([facility])    Please arrive 15 minutes early for this appointment.     BEFORE YOUR EXAM  -No strenuous activity for 48 hours before your exam.  -After 5:00  PM the night before your test, avoid carbohydrates (no candy, gum, mints, ice cream, cake, sweets, or soda pop).  -Do not eat any food for 6 hours before your appointment time; please only drink plain water.    DIABETIC PATIENTS ONLY  -If you are taking Metformin, please withhold for at least 48 hours before your exam.  -Do not take any insulin within 4 hours of your appointment.  If you are taking long-acting insulin, take only half the normal dose at your normal time.   -Please avoid carbohydrates the entire day before the scan.  -Do not take oral diabetes medication 4 hours prior to your test.     ON THE DAY OF YOUR EXAM  -Dress warmly and comfortably.  No metal in your clothes, such as snaps or zippers, and please leave valuables/jewelry at home.  -For this test, you will receive an injection, rest for 30-90 minutes and take a 15-50 minute scan.  -There are no side effects to the injection.        May 21, 2025 10:00 AM CDT PET WHOLE BODY SCAN TO THIGH with  PET SCANNER [facility] PET ([facility])    Please arrive 15 minutes early for this appointment.     BEFORE YOUR EXAM  -No strenuous activity for 48 hours before your exam.  -After 5:00 PM the night before your test, avoid carbohydrates (no candy, gum, mints, ice cream, cake, sweets, or soda pop).  -Do not eat any food for 6 hours before your appointment time; please only drink plain water.    DIABETIC PATIENTS ONLY  -If you are taking Metformin, please withhold for at least 48 hours before your exam.  -Do not take any insulin within 4 hours of your appointment.  If you are taking long-acting insulin, take only half the normal dose at your normal time.   -Please avoid carbohydrates the entire day before the scan.  -Do not take oral diabetes medication 4 hours prior to your test.     ON THE DAY OF YOUR EXAM  -Dress warmly and comfortably.  No metal in your clothes, such as snaps or zippers, and please leave valuables/jewelry at  home.  -For this test, you will receive an injection, rest for 30-90 minutes and take a 15-50 minute scan.  -There are no side effects to the injection.        Sep 24, 2025 11:40 AM CDT Exam - Established with Manny Villalta MD [facility], [facility] ([facility]  [address]  [facility] [address]  [phone] [facility], [facility]  [address] [facility] [address]  [phone] [facility]  [address] [address]  [phone]            Transitional Care Clinic  Was TCC Ordered: No  Was TCC Scheduled: No, Explain scheduled with PCP .    Primary Care Provider (If no TCC appointment)  Does patient already have a PCP appointment scheduled? Yes  Care Manager Confirmed PCP office TCM appointment with patient    Specialist  Does the patient have any other follow-up appointment(s) that need to be scheduled? No   -If yes: Care Manager reviewed upcoming specialist appointments with patient: No   -Does the patient need assistance scheduling appointment(s): No      Book By Date: 4/17/25

## 2025-04-10 ENCOUNTER — HOSPITAL ENCOUNTER (OUTPATIENT)
Dept: GENERAL RADIOLOGY | Age: 85
Discharge: HOME | End: 2025-04-10
Attending: FAMILY MEDICINE
Payer: MEDICARE

## 2025-04-10 ENCOUNTER — OFFICE VISIT (OUTPATIENT)
Dept: FAMILY MEDICINE CLINIC | Facility: CLINIC | Age: 85
End: 2025-04-10
Payer: MEDICARE

## 2025-04-10 VITALS
DIASTOLIC BLOOD PRESSURE: 50 MMHG | HEART RATE: 85 BPM | TEMPERATURE: 97 F | WEIGHT: 143.19 LBS | BODY MASS INDEX: 23 KG/M2 | OXYGEN SATURATION: 100 % | SYSTOLIC BLOOD PRESSURE: 90 MMHG | RESPIRATION RATE: 16 BRPM

## 2025-04-10 DIAGNOSIS — M54.9 MID BACK PAIN: ICD-10-CM

## 2025-04-10 DIAGNOSIS — R26.89 NEED FOR ASSISTANCE DUE TO UNSTEADY GAIT: ICD-10-CM

## 2025-04-10 DIAGNOSIS — N18.4 CKD (CHRONIC KIDNEY DISEASE) STAGE 4, GFR 15-29 ML/MIN (HCC): ICD-10-CM

## 2025-04-10 DIAGNOSIS — Z79.4 TYPE 2 DIABETES MELLITUS WITH STAGE 4 CHRONIC KIDNEY DISEASE, WITH LONG-TERM CURRENT USE OF INSULIN (HCC): ICD-10-CM

## 2025-04-10 DIAGNOSIS — I10 BENIGN ESSENTIAL HTN: Primary | ICD-10-CM

## 2025-04-10 DIAGNOSIS — S22.060D CLOSED WEDGE COMPRESSION FRACTURE OF T8 VERTEBRA WITH ROUTINE HEALING, SUBSEQUENT ENCOUNTER: ICD-10-CM

## 2025-04-10 DIAGNOSIS — B35.3 TINEA PEDIS OF BOTH FEET: ICD-10-CM

## 2025-04-10 DIAGNOSIS — E11.22 TYPE 2 DIABETES MELLITUS WITH STAGE 4 CHRONIC KIDNEY DISEASE, WITH LONG-TERM CURRENT USE OF INSULIN (HCC): ICD-10-CM

## 2025-04-10 DIAGNOSIS — N18.4 TYPE 2 DIABETES MELLITUS WITH STAGE 4 CHRONIC KIDNEY DISEASE, WITH LONG-TERM CURRENT USE OF INSULIN (HCC): ICD-10-CM

## 2025-04-10 PROCEDURE — 72072 X-RAY EXAM THORAC SPINE 3VWS: CPT | Performed by: FAMILY MEDICINE

## 2025-04-10 PROCEDURE — 99214 OFFICE O/P EST MOD 30 MIN: CPT | Performed by: FAMILY MEDICINE

## 2025-04-10 RX ORDER — CYCLOBENZAPRINE HCL 5 MG
5 TABLET ORAL 3 TIMES DAILY PRN
Qty: 15 TABLET | Refills: 0 | Status: SHIPPED | OUTPATIENT
Start: 2025-04-10 | End: 2025-04-10

## 2025-04-10 RX ORDER — TRAMADOL HYDROCHLORIDE 50 MG/1
50 TABLET ORAL EVERY 8 HOURS PRN
Qty: 15 TABLET | Refills: 0 | Status: SHIPPED | OUTPATIENT
Start: 2025-04-10

## 2025-04-10 NOTE — PROGRESS NOTES
The following individual(s) verbally consented to be recorded using ambient AI listening technology and understand that they can each withdraw their consent to this listening technology at any point by asking the clinician to turn off or pause the recording:    Patient name: Anastasia Hobson  Additional names:  daughter        Chief Complaint   Patient presents with    Hospital F/U     Pt went to hospital on 3/10 after blacking out and falling out in the bathroom; Pt been having difficulty with pain and has been having difficulty with coming up on with words       History of Present Illness  Anastasia Hobson is an 84 year old female with chronic kidney disease and hypertension who presents with worsening back pain and medication management issues.    She is experiencing worsening back pain following a recent discharge from a nursing facility. The pain intensified a couple of days after discharge, particularly in the same area as a previous fracture at T8, which was treated with kyphoplasty about a month ago. The pain is described as worsening and not improving with current management.    She has chronic kidney disease, currently at stage 4, with an eGFR of 19 mL/min as of April 3, 2025. She reports dehydration and insufficient water intake, which may be contributing to her kidney issues.    Her blood pressure management is complicated by low systolic readings, currently at 90 mmHg. She is on lisinopril, which is being tapered from 10 mg to 5 mg. She has a blood pressure cuff that uploads readings to the cloud, allowing her cardiologist to monitor and adjust her medications as needed.    She has diabetes with a recent A1c of 7.1. She is currently facing issues with her insulin coverage, as her insurance no longer covers Lispro. She prefers using a pen for insulin administration and is seeking alternatives that her insurance will cover.    She experiences foot issues, including a bruise and athlete's foot.  She also reports difficulty with her current walker, which is unstable, and is seeking a replacement.    She has a history of a fall resulting in a blackout, which led to a fracture. The cause of the blackout is unknown, but she suspects it may be related to low blood pressure. She recently underwent a PET scan of her heart.      Past Medical History[1]    Past Surgical History[2]    Social Hx on file[3]    Family History[4]     Medications Ordered Prior to Encounter[5]      Objective  Vitals:    04/10/25 1451 04/10/25 1511   BP: 90/50    Pulse: 60 85   Resp: 16    Temp: 97.4 °F (36.3 °C)    TempSrc: Temporal    SpO2: (!) 70% 100%   Weight: 143 lb 3.2 oz (65 kg)          Body mass index is 23.11 kg/m².    Physical Exam  Constitutional:       Appearance: Normal appearance.   HEENT:      Head: Normocephalic and atraumatic.      Eyes: PERRLA no notable nystagmus     Ears: normal on observation     Nose: Nose normal.      Mouth: Mucous membranes are moist.      Neck: no masses no bruit  Cardiovascular:      Rate and Rhythm: Normal rate and regular rhythm.   Pulmonary:      Effort: Pulmonary effort is normal.      Breath sounds: Normal breath sounds.   Abdominal:      General: Bowel sounds are normal.      Palpations: Abdomen is soft. There is no mass.   Musculoskeletal:         General: Normal range of motion.      Cervical back: Normal range of motion.   Skin:     General: Skin is warm and dry.   Neurological:      General: No focal deficit present.      Mental Status: She is alert and oriented to person, place, and time.   Psychiatric:         Mood and Affect: Mood normal.         Thought Content: Thought content normal.   Bilateral barefoot skin diabetic exam is abnormal with toe deformity - or chronic midfoot/rearfoot, dystrophic nails and/or dry skin, and moist waxy interdigitous area with slight skin breakdown      Assessment and Plan  Assessment & Plan  Chronic Kidney Disease Stage 4  eGFR at 19 mL/min indicates  stage 4 CKD. Approaching dialysis need. Possible dehydration impact.  - Refer to nephrologist Dr. Kaufman.  - Order BMP post-weekend.  - Advise increasing water intake to at least three bottles daily.  - Order urine microalbumin test.    Hypertension  Systolic BP at 90 mmHg is low. Lisinopril adjustment needed. Cardiologist monitoring remotely.  - Reduce lisinopril to 10 mg for 10 days, then 5 mg.  - Coordinate with cardiologist for blood pressure management.    Diabetes Mellitus Type 2  A1c at 7.1% indicates suboptimal control. Insurance issues with insulin Lispro coverage.  - Check insurance formulary for covered insulin alternatives.  - Renew prescription for glucose sensor.    Vertebral Fracture  Post-kyphoplasty pain suggests possible new fracture or complications. Pain management is challenging.  - Order spinal x-ray.  - Prescribe Medrol pack.  - Prescribe muscle relaxer.  - Prescribe tramadol, 15 tablets, as needed.    Pain Management  Significant pain previously managed with Ruso. Tramadol and muscle relaxers preferred to minimize risks.  - Prescribe tramadol.  - Consider muscle relaxer.    Foot Care  Bruise and tinea pedis present. Emphasized foot hygiene to prevent infection.  - Instruct to dry thoroughly between toes.  - Recommend antifungal cream.  - Advise wearing supportive shoes.    Mobility Issues  Current walker unstable, increasing fall risk. Requires stable mobility aid.  - Provide prescription for a new walker.  - Complete form for handicap placard.    Follow-up  Requires follow-up for kidney function, diabetes, and pain management.  - Schedule follow-up in three weeks.  - Ensure lab work completion in three days.  - Coordinate with cardiologist.    1. Benign essential HTN    2. CKD (chronic kidney disease) stage 4, GFR 15-29 ml/min ([facility])  - Nephrology Referral - In Network  - Basic Metabolic Panel (8) [E]; Future  - Microalb/Creat Ratio, Random Urine [E]; Future    3. Type 2 diabetes mellitus  with stage 4 chronic kidney disease, with long-term current use of insulin (HCC)  - Microalb/Creat Ratio, Random Urine [E]; Future    4. Tinea pedis of both feet - OTC athletes foot cream between the toes after drying thouroughly    5. Need for assistance due to unsteady gait  - Misc. Devices (FOLDING WALKER) Does not apply Misc; 1 Device daily.  Dispense: 1 each; Refill: 0    6. Closed wedge compression fracture of T8 vertebra with routine healing, subsequent encounter  - cyclobenzaprine 5 MG Oral Tab; Take 1 tablet (5 mg total) by mouth 3 (three) times daily as needed for Muscle spasms.  Dispense: 15 tablet; Refill: 0  - traMADol 50 MG Oral Tab; Take 1 tablet (50 mg total) by mouth every 8 (eight) hours as needed for Pain.  Dispense: 15 tablet; Refill: 0    7. Mid back pain  - cyclobenzaprine 5 MG Oral Tab; Take 1 tablet (5 mg total) by mouth 3 (three) times daily as needed for Muscle spasms.  Dispense: 15 tablet; Refill: 0  - traMADol 50 MG Oral Tab; Take 1 tablet (50 mg total) by mouth every 8 (eight) hours as needed for Pain.  Dispense: 15 tablet; Refill: 0         Follow up  No follow-ups on file.      Patient Instructions  There are no Patient Instructions on file for this visit.       Houston Prado MD       This note was created by [employer] voice recognition. Errors in content may be related to improper recognition by the system; efforts to review and correct have been done but errors may still exist. Please be advised the primary purpose of this note is for me to communicate medical care. Standard sentence structure is not always used. Medical terminology and medical abbreviations may be used. There may be grammatical, typographical, and automated fill ins that may have errors missed in proofreading.        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 BCG    Bloating    Blurred vision    Cancer  (HCC)    Congestive heart disease (HCC)    Constipation    Coronary atherosclerosis    Diabetes (HCC)    IDDM     Diabetes mellitus (HCC)    Diarrhea, unspecified    CLARKE (dyspnea on exertion)    Easy bruising    Elevated cholesterol    Essential hypertension    Exposure to medical diagnostic radiation    Fatigue    Flatulence/gas pain/belching    Frequent urination    taking furosemide daily    Headache disorder    Hearing loss    Heart palpitations    Heartburn    occasionally    High blood pressure    High cholesterol    History of falling    Hyperlipidemia    Irregular bowel habits    Itch of skin    right hand only    Leaking of urine    sometimes    Leg swelling    Neuropathy    Osteoporosis    Sleep disturbance    Stool incontinence    when I have diarrhea    Type 2 diabetes mellitus with hyperglycemia (HCC)    Visual impairment    glasses    Wears glasses   [2]   Past Surgical History:  Procedure Laterality Date    Cataract      Cervical spine surgery  10/06/2023    Colonoscopy  2019    Colonoscopy      Cystoscopy,insert ureteral stent      Cystourethroscopy  11/25/2020    Cystoscopy Procedure Dr Josh Hernandez  - recurrent tumor on BTS    Cystourethroscopy,biopsy  12/16/2019    BBx of scar sites, NSC    Cystourethroscopy,fulgur .5-2cm lesn  04/16/2020    TURBT, NSC    Cystourethroscopy,fulgur .5-2cm lesn  07/30/2020    TURBT with Gemcitabine    Cystourethroscopy,fulgur >5cm lesn  10/03/2019    TURBT with bilateral RPGs (NSC)    Fracture surgery      left wrist    Hysterectomy      age 31    Ndsc ablation & rcnstj atria lmtd w/o bypass      Other surgical history  04/20/2021    BTS Dr David Loaiza    Other surgical history  08/17/2021    BTS Cysto Caterize Dr. Hernandez    Other surgical history  11/16/2021    BTS Cysto W/ caterigayathri Hernandez     Other surgical history  03/08/2022    BTS Cysto- Dr. Hernandez    Port, indwelling, imp      Spine surgery procedure unlisted  10/06/2003    CERVICAL    Tonsillectomy  1946    Total abdom  hysterectomy  1971   [3]   Social History  Socioeconomic History    Marital status:     Number of children: 3   Occupational History    Occupation: RETIRED   Tobacco Use    Smoking status: Former     Current packs/day: 0.00     Average packs/day: 1.1 packs/day for 65.3 years (73.5 ttl pk-yrs)     Types: Cigarettes     Quit date: 2007     Years since quittin.7     Passive exposure: Past    Smokeless tobacco: Never    Tobacco comments:     quit 15 years    Vaping Use    Vaping status: Never Used   Substance and Sexual Activity    Alcohol use: No    Drug use: No    Sexual activity: Not Currently     Partners: Male   [4]   Family History  Problem Relation Age of Onset    Diabetes Father     Stroke Father     Colon Polyps Mother     Diabetes Mother     Heart Attack Mother     Other (Other) Son         Afib   [5]   Current Outpatient Medications on File Prior to Visit   Medication Sig Dispense Refill    polyethylene glycol, PEG 3350, 17 g Oral Powd Pack Take 17 g by mouth daily as needed.      insulin glargine (LANTUS SOLOSTAR) 100 UNIT/ML Subcutaneous Solution Pen-injector Inject 8 Units into the skin every morning.      metoprolol succinate ER 25 MG Oral Tablet 24 Hr Take 1 tablet (25 mg total) by mouth daily.      gabapentin 100 MG Oral Cap Take 1 capsule (100 mg total) by mouth every morning AND 2 capsules (200 mg total) nightly. 90 capsule 1    Insulin Lispro, 1 Unit Dial, (HUMALOG KWIKPEN) 100 UNIT/ML Subcutaneous Solution Pen-injector INJECT 2-7 UNITS INTO THE SKIN THREE TIMES DAILY WITH MEALS AS DIRECTED (Patient taking differently: INJECT 2-7 UNITS INTO THE SKIN THREE TIMES DAILY WITH MEALS AS DIRECTED      Inject as per sliding scale:   if 150 - 200 = 1;   201 - 250 = 2;   251 - 300 = 3;   301 - 350 = 4,   subcutaneously with meals for DM Call MD if Blood glucose more than 350) 15 mL 0    furosemide 20 MG Oral Tab       FENOFIBRATE 145 MG Oral Tab Take 1 tablet (145 mg total) by mouth daily. 90  tablet 0    amiodarone 200 MG Oral Tab TAKE ONE TABLET BY MOUTH ONCE DAILY. Needs appointment with cardiologist and EKG done for future refills.      hydrALAZINE 50 MG Oral Tab 1 tablet (50 mg total) 3 (three) times daily.      dilTIAZem HCl ER Beads 120 MG Oral Capsule SR 24 Hr Take 1 capsule (120 mg total) by mouth daily. 30 capsule 11    LISINOPRIL 20 MG Oral Tab TAKE ONE TABLET BY MOUTH EVERY EVENING 90 tablet 0    ATORVASTATIN 80 MG Oral Tab Take 1 tablet (80 mg total) by mouth nightly. 90 tablet 0    PROBIOTIC PRODUCT OR Take 1 capsule by mouth daily. 100 billion      Potassium Chloride ER 20 MEQ Oral Tab CR Take 1 tablet by mouth daily.      apixaban 5 MG Oral Tab Take 1 tablet (5 mg total) by mouth 2 (two) times daily. 60 tablet 2    Cholecalciferol (VITAMIN D3) 250 MCG (97440 UT) Oral Tab Take 1,000 Units by mouth daily.      Calcium Citrate-Vitamin D 315-250 MG-UNIT Oral Tab Take 1 tablet by mouth daily.      HYDROcodone-acetaminophen 5-325 MG Oral Tab Take 1 tablet by mouth every 6 (six) hours as needed for Pain. (Patient taking differently: Take 1 tablet by mouth every 4 (four) hours as needed for Pain.) 20 tablet 0    FREESTYLE NATALIE 2 SENSOR Does not apply Misc USE TO MONITOR BLOOD SUGAR CONTINUOUSLY. CHANGE EVERY 14 DAYS. 12 each 0    Insulin Pen Needle (BD PEN NEEDLE PRISCILLA U/F) 32G X 4 MM Does not apply Misc Inject 1 Pen into the skin daily. 90 each 0    Continuous Blood Gluc  (FREESTYLE NATALIE 2 READER) Does not apply Device 1 each daily. 1 each 1     No current facility-administered medications on file prior to visit.

## 2025-04-12 ENCOUNTER — LAB ENCOUNTER (OUTPATIENT)
Dept: LAB | Age: 85
End: 2025-04-12
Attending: FAMILY MEDICINE
Payer: MEDICARE

## 2025-04-12 DIAGNOSIS — Z79.4 TYPE 2 DIABETES MELLITUS WITH STAGE 4 CHRONIC KIDNEY DISEASE, WITH LONG-TERM CURRENT USE OF INSULIN (HCC): ICD-10-CM

## 2025-04-12 DIAGNOSIS — N18.4 TYPE 2 DIABETES MELLITUS WITH STAGE 4 CHRONIC KIDNEY DISEASE, WITH LONG-TERM CURRENT USE OF INSULIN (HCC): ICD-10-CM

## 2025-04-12 DIAGNOSIS — N18.4 CKD (CHRONIC KIDNEY DISEASE) STAGE 4, GFR 15-29 ML/MIN (HCC): ICD-10-CM

## 2025-04-12 DIAGNOSIS — E11.22 TYPE 2 DIABETES MELLITUS WITH STAGE 4 CHRONIC KIDNEY DISEASE, WITH LONG-TERM CURRENT USE OF INSULIN (HCC): ICD-10-CM

## 2025-04-12 LAB
ANION GAP SERPL CALC-SCNC: 8 MMOL/L (ref 0–18)
BUN BLD-MCNC: 45 MG/DL (ref 9–23)
CALCIUM BLD-MCNC: 9.9 MG/DL (ref 8.7–10.6)
CHLORIDE SERPL-SCNC: 107 MMOL/L (ref 98–112)
CO2 SERPL-SCNC: 27 MMOL/L (ref 21–32)
CREAT BLD-MCNC: 2.09 MG/DL (ref 0.55–1.02)
CREAT UR-SCNC: 44 MG/DL
EGFRCR SERPLBLD CKD-EPI 2021: 23 ML/MIN/1.73M2 (ref 60–?)
FASTING STATUS PATIENT QL REPORTED: NO
GLUCOSE BLD-MCNC: 169 MG/DL (ref 70–99)
MICROALBUMIN UR-MCNC: 1.1 MG/DL
MICROALBUMIN/CREAT 24H UR-RTO: 25 UG/MG (ref ?–30)
OSMOLALITY SERPL CALC.SUM OF ELEC: 309 MOSM/KG (ref 275–295)
POTASSIUM SERPL-SCNC: 5.1 MMOL/L (ref 3.5–5.1)
SODIUM SERPL-SCNC: 142 MMOL/L (ref 136–145)

## 2025-04-12 PROCEDURE — 36415 COLL VENOUS BLD VENIPUNCTURE: CPT

## 2025-04-12 PROCEDURE — 80048 BASIC METABOLIC PNL TOTAL CA: CPT

## 2025-04-12 PROCEDURE — 82570 ASSAY OF URINE CREATININE: CPT

## 2025-04-12 PROCEDURE — 82043 UR ALBUMIN QUANTITATIVE: CPT

## 2025-04-14 ENCOUNTER — TELEPHONE (OUTPATIENT)
Dept: FAMILY MEDICINE CLINIC | Facility: CLINIC | Age: 85
End: 2025-04-14

## 2025-04-14 NOTE — TELEPHONE ENCOUNTER
[facility] calling regarding patient's Rx cyclobenzaprine 5 MG Oral Tab sent on 04/10/25 - pharmacist states there is a drug interaction warning  with patient's cardiac meds: amiodarone and eliquis    Clarifying that Dr. Awad aware    Please advise, thank you

## 2025-04-14 NOTE — TELEPHONE ENCOUNTER
Per Dr. Awad: Let Patient know not to take and cancel script. Consider Physical Therapy instead     Called [facility] - advised pharmacist to cancel Rx per Dr. Awad - pharmacist verbalized understanding and confirmed Rx cancelled.    Advised patient's dtr of Dr. Awad's note above and of RN's note above. Patient's dtr verbalized understanding. Patient's dtr asking about lab results - see labs 04/12/25 result notes. No further questions at this time.

## 2025-04-24 DIAGNOSIS — N18.32 TYPE 2 DIABETES MELLITUS WITH STAGE 3B CHRONIC KIDNEY DISEASE, WITH LONG-TERM CURRENT USE OF INSULIN (HCC): ICD-10-CM

## 2025-04-24 DIAGNOSIS — E11.22 TYPE 2 DIABETES MELLITUS WITH STAGE 3B CHRONIC KIDNEY DISEASE, WITH LONG-TERM CURRENT USE OF INSULIN (HCC): ICD-10-CM

## 2025-04-24 DIAGNOSIS — Z79.4 TYPE 2 DIABETES MELLITUS WITH STAGE 3B CHRONIC KIDNEY DISEASE, WITH LONG-TERM CURRENT USE OF INSULIN (HCC): ICD-10-CM

## 2025-04-24 RX ORDER — INSULIN LISPRO 100 [IU]/ML
INJECTION, SOLUTION INTRAVENOUS; SUBCUTANEOUS
Qty: 15 ML | Refills: 0 | Status: SHIPPED | OUTPATIENT
Start: 2025-04-24

## 2025-04-24 NOTE — TELEPHONE ENCOUNTER
Insulin Lispro Kwikpen 100 Unit/Ml Inj Lill     Pt failed refill protocol for the following reasons:    Diabetes Medication Protocol [id number]/24/2025 11:24 AM   Protocol Details EGFRCR or GFRNAA > 50    Medication is active on med list    Last A1C < 7.5 and within past 6 months    In person appointment or virtual visit in the past 6 mos or appointment in next 3 mos    Microalbumin procedure in past 12 months or taking ACE/ARB    GFR in the past 12 months      Last refill: 11/19/2024, for #15 mL, 0 refill  Last appt: 4/10/2025  Last Px: 1/17/2024  Next appt:   Future Appointments   Date Time Provider Department Center   5/21/2025  8:45 AM  PET [facility]SE RM1  [facility]   5/21/2025 10:00 AM  PET SCANNER  [facility]   9/24/2025 11:40 AM Manny Villalta MD EMGOTONAPER [id number]     Forward to Dr. Denae Prado, please advise on refills. Thank you.

## 2025-04-28 ENCOUNTER — TELEPHONE (OUTPATIENT)
Dept: FAMILY MEDICINE CLINIC | Facility: CLINIC | Age: 85
End: 2025-04-28

## 2025-04-28 DIAGNOSIS — M54.9 MID BACK PAIN: ICD-10-CM

## 2025-04-28 DIAGNOSIS — S22.060D CLOSED WEDGE COMPRESSION FRACTURE OF T8 VERTEBRA WITH ROUTINE HEALING, SUBSEQUENT ENCOUNTER: Primary | ICD-10-CM

## 2025-04-28 NOTE — TELEPHONE ENCOUNTER
Called and spoke with patient's EC Petty - advised her of note below - she verbalized understanding and states patient will be going to [facility] in [place], ok to send referral order. No further questions.    Physical therapy referral Order(s) pending, please review. Thank you.    Please send back to nurse pool to send referral order. Thank you

## 2025-04-29 NOTE — TELEPHONE ENCOUNTER
Lantus Solostar 100 Unit/Ml Inj Trixie   Pt failed refill protocol for the following reasons:    Diabetes Medication Protocol [id number]/28/2025 01:11 PM   Protocol Details EGFRCR or GFRNAA > 50    Medication is active on med list    Last A1C < 7.5 and within past 6 months    In person appointment or virtual visit in the past 6 mos or appointment in next 3 mos    Microalbumin procedure in past 12 months or taking ACE/ARB    GFR in the past 12 months      Last Px: 1/17/2024  Last refill: N/A  Last lab: Comp, 4/3/2025  Last appt: 4/10/2025  Next appt:   Future Appointments   Date Time Provider Department Center   5/21/2025  8:45 AM  PET DOSE RM1  [facility]   5/21/2025 10:00 AM  [facility]   9/24/2025 11:40 AM Manny Villalta MD EMGOTONAPER [id number]     Forward to Dr. Denae Prado, please advise on refills. Thank you.

## 2025-04-30 RX ORDER — INSULIN GLARGINE 100 [IU]/ML
INJECTION, SOLUTION SUBCUTANEOUS
Qty: 15 ML | Refills: 0 | Status: SHIPPED | OUTPATIENT
Start: 2025-04-30

## 2025-05-01 ENCOUNTER — PATIENT MESSAGE (OUTPATIENT)
Dept: FAMILY MEDICINE CLINIC | Facility: CLINIC | Age: 85
End: 2025-05-01

## 2025-05-01 DIAGNOSIS — E11.22 TYPE 2 DIABETES MELLITUS WITH STAGE 3B CHRONIC KIDNEY DISEASE, WITH LONG-TERM CURRENT USE OF INSULIN (HCC): ICD-10-CM

## 2025-05-01 DIAGNOSIS — N18.32 TYPE 2 DIABETES MELLITUS WITH STAGE 3B CHRONIC KIDNEY DISEASE, WITH LONG-TERM CURRENT USE OF INSULIN (HCC): ICD-10-CM

## 2025-05-01 DIAGNOSIS — Z79.4 TYPE 2 DIABETES MELLITUS WITH STAGE 3B CHRONIC KIDNEY DISEASE, WITH LONG-TERM CURRENT USE OF INSULIN (HCC): ICD-10-CM

## 2025-05-01 RX ORDER — INSULIN LISPRO 100 [IU]/ML
INJECTION, SOLUTION INTRAVENOUS; SUBCUTANEOUS
Qty: 15 ML | Refills: 0 | Status: SHIPPED | OUTPATIENT
Start: 2025-05-01

## 2025-05-01 NOTE — TELEPHONE ENCOUNTER
Insulin Lispro, 1 Unit Dial, 100 UNIT/ML Subcutaneous Solution Pen-injector     Pt failed refill protocol for the following reasons:    Diabetes Medication Protocol Ogqdff2005/01/2025 01:45 PM   Protocol Details EGFRCR or GFRNAA > 50    Medication is active on med list    Last A1C < 7.5 and within past 6 months    In person appointment or virtual visit in the past 6 mos or appointment in next 3 mos    Microalbumin procedure in past 12 months or taking ACE/ARB    GFR in the past 12 months      Last refill: N/A  Last appt: 4/10/2025  Last Px: 1/17/2024  Last lab: Comp, 3/27/2025  Next appt:   Future Appointments   Date Time Provider Department Center   5/21/2025  8:45 AM  [facility]   5/21/2025 10:00 AM  [facility]   9/24/2025 11:40 AM Manny Villalta MD EMGOTONAPER [id number]     Forward to Dr. Denae Prado, please advise on refills. Thank you.

## 2025-05-01 NOTE — TELEPHONE ENCOUNTER
Please see Patient message encounter 05/01/25    Rx ordered on 04/24/25 was not sent electronically    Order(s) pending, please review. Thank you.  Juan Diamond

## 2025-05-01 NOTE — TELEPHONE ENCOUNTER
Received fax from [facility] requesting a refill on the Insulin Lispro (1 unit dial) Subcutaneous Solution Pen-Injector 100 unit/mL

## 2025-05-19 ENCOUNTER — PATIENT MESSAGE (OUTPATIENT)
Dept: FAMILY MEDICINE CLINIC | Facility: CLINIC | Age: 85
End: 2025-05-19

## 2025-05-19 DIAGNOSIS — G89.3 NEOPLASM RELATED PAIN: ICD-10-CM

## 2025-05-19 RX ORDER — GABAPENTIN 100 MG/1
100 CAPSULE ORAL 2 TIMES DAILY
Qty: 60 CAPSULE | Refills: 0 | Status: SHIPPED | OUTPATIENT
Start: 2025-05-19

## 2025-05-19 NOTE — TELEPHONE ENCOUNTER
Refill request -    gabapentin 100 MG Oral Cap # 90 caps 1 refills    Last fill 02/27/25    Last office visit 04/10/25

## 2025-05-21 ENCOUNTER — HOSPITAL ENCOUNTER (OUTPATIENT)
Dept: NUCLEAR MEDICINE | Facility: HOSPITAL | Age: 85
Discharge: HOME | End: 2025-05-21
Attending: INTERNAL MEDICINE
Payer: MEDICARE

## 2025-05-21 DIAGNOSIS — C76.0 HEAD AND NECK CANCER (HCC): ICD-10-CM

## 2025-05-21 LAB — GLUCOSE BLD-MCNC: 127 MG/DL (ref 70–99)

## 2025-05-21 PROCEDURE — 82962 GLUCOSE BLOOD TEST: CPT

## 2025-05-21 PROCEDURE — 78815 PET IMAGE W/CT SKULL-THIGH: CPT | Performed by: INTERNAL MEDICINE

## 2025-06-16 DIAGNOSIS — G89.3 NEOPLASM RELATED PAIN: ICD-10-CM

## 2025-06-16 RX ORDER — GABAPENTIN 100 MG/1
100 CAPSULE ORAL 2 TIMES DAILY
Qty: 60 CAPSULE | Refills: 0 | Status: SHIPPED | OUTPATIENT
Start: 2025-06-16

## 2025-06-16 NOTE — TELEPHONE ENCOUNTER
Neurology Medications [id number]/16/2025 09:57 AM   Protocol Details In person appointment or virtual visit in the past 6 mos or appointment in next 3 mos    Medication is active on med list

## 2025-06-16 NOTE — TELEPHONE ENCOUNTER
Diabetic Supplies Protocol [id number]/16/2025 08:11 AM   Protocol Details In person appointment or virtual visit in the past 12 mos or appointment in next 3 mos    Medication is active on med list

## 2025-06-18 ENCOUNTER — PATIENT MESSAGE (OUTPATIENT)
Dept: FAMILY MEDICINE CLINIC | Facility: CLINIC | Age: 85
End: 2025-06-18

## 2025-06-18 DIAGNOSIS — Z79.4 INSULIN DEPENDENT TYPE 2 DIABETES MELLITUS (HCC): ICD-10-CM

## 2025-06-18 DIAGNOSIS — N18.32 TYPE 2 DIABETES MELLITUS WITH STAGE 3B CHRONIC KIDNEY DISEASE, WITH LONG-TERM CURRENT USE OF INSULIN (HCC): Primary | ICD-10-CM

## 2025-06-18 DIAGNOSIS — Z79.4 TYPE 2 DIABETES MELLITUS WITH STAGE 3B CHRONIC KIDNEY DISEASE, WITH LONG-TERM CURRENT USE OF INSULIN (HCC): Primary | ICD-10-CM

## 2025-06-18 DIAGNOSIS — E11.22 TYPE 2 DIABETES MELLITUS WITH STAGE 3B CHRONIC KIDNEY DISEASE, WITH LONG-TERM CURRENT USE OF INSULIN (HCC): Primary | ICD-10-CM

## 2025-06-18 DIAGNOSIS — Z79.4 ENCOUNTER FOR LONG-TERM (CURRENT) INSULIN USE (HCC): ICD-10-CM

## 2025-06-18 DIAGNOSIS — E11.9 INSULIN DEPENDENT TYPE 2 DIABETES MELLITUS (HCC): ICD-10-CM

## 2025-06-25 ENCOUNTER — LAB ENCOUNTER (OUTPATIENT)
Dept: LAB | Age: 85
End: 2025-06-25
Attending: INTERNAL MEDICINE
Payer: MEDICARE

## 2025-06-25 DIAGNOSIS — I48.91 ATRIAL FIBRILLATION (HCC): Primary | ICD-10-CM

## 2025-06-25 DIAGNOSIS — R06.09 DYSPNEA ON EXERTION: ICD-10-CM

## 2025-06-25 DIAGNOSIS — I50.30 DIASTOLIC HEART FAILURE (HCC): ICD-10-CM

## 2025-06-25 LAB
ANION GAP SERPL CALC-SCNC: 9 MMOL/L (ref 0–18)
BASOPHILS # BLD AUTO: 0.03 X10(3) UL (ref 0–0.2)
BASOPHILS NFR BLD AUTO: 0.4 %
BUN BLD-MCNC: 43 MG/DL (ref 9–23)
CALCIUM BLD-MCNC: 9.8 MG/DL (ref 8.7–10.6)
CHLORIDE SERPL-SCNC: 104 MMOL/L (ref 98–112)
CO2 SERPL-SCNC: 28 MMOL/L (ref 21–32)
CREAT BLD-MCNC: 2.11 MG/DL (ref 0.55–1.02)
EGFRCR SERPLBLD CKD-EPI 2021: 23 ML/MIN/1.73M2 (ref 60–?)
EOSINOPHIL # BLD AUTO: 0.12 X10(3) UL (ref 0–0.7)
EOSINOPHIL NFR BLD AUTO: 1.5 %
ERYTHROCYTE [DISTWIDTH] IN BLOOD BY AUTOMATED COUNT: 16.4 %
FASTING STATUS PATIENT QL REPORTED: NO
GLUCOSE BLD-MCNC: 172 MG/DL (ref 70–99)
HCT VFR BLD AUTO: 39.9 % (ref 35–48)
HGB BLD-MCNC: 12.2 G/DL (ref 12–16)
IMM GRANULOCYTES # BLD AUTO: 0.02 X10(3) UL (ref 0–1)
IMM GRANULOCYTES NFR BLD: 0.3 %
LYMPHOCYTES # BLD AUTO: 1.15 X10(3) UL (ref 1–4)
LYMPHOCYTES NFR BLD AUTO: 14.6 %
MCH RBC QN AUTO: 25.3 PG (ref 26–34)
MCHC RBC AUTO-ENTMCNC: 30.6 G/DL (ref 31–37)
MCV RBC AUTO: 82.8 FL (ref 80–100)
MONOCYTES # BLD AUTO: 0.79 X10(3) UL (ref 0.1–1)
MONOCYTES NFR BLD AUTO: 10 %
NEUTROPHILS # BLD AUTO: 5.79 X10 (3) UL (ref 1.5–7.7)
NEUTROPHILS # BLD AUTO: 5.79 X10(3) UL (ref 1.5–7.7)
NEUTROPHILS NFR BLD AUTO: 73.2 %
OSMOLALITY SERPL CALC.SUM OF ELEC: 307 MOSM/KG (ref 275–295)
PLATELET # BLD AUTO: 284 10(3)UL (ref 150–450)
POTASSIUM SERPL-SCNC: 4.4 MMOL/L (ref 3.5–5.1)
RBC # BLD AUTO: 4.82 X10(6)UL (ref 3.8–5.3)
SODIUM SERPL-SCNC: 141 MMOL/L (ref 136–145)
WBC # BLD AUTO: 7.9 X10(3) UL (ref 4–11)

## 2025-06-25 PROCEDURE — 80048 BASIC METABOLIC PNL TOTAL CA: CPT

## 2025-06-25 PROCEDURE — 85025 COMPLETE CBC W/AUTO DIFF WBC: CPT

## 2025-06-25 PROCEDURE — 36415 COLL VENOUS BLD VENIPUNCTURE: CPT

## 2025-06-30 NOTE — PAT NURSING NOTE
Spoke with pt regarding PAT call. She denied any missed Eliquis doses in the last 30 days.      PreOp Instructions     You are scheduled for: a Cardiac Procedure     Date of Procedure: 07/07/25     Diet Instructions: Do not eat or drink anything after midnight including gum, mints, candy, etc.     Medications: Medications you are allowed to take can be taken with a sip of water the morning of your procedure. DO NOT MISS any Eliquis doses.     Medications to Stop: DO NOT TAKE any herbal supplements and vitamins the morning of your procedure.     Other Medications: DO NOT TAKE Furosemide and Potassium the morning of your procedure.     Diabetic Instructions:   DO NOT TAKE the morning dose of short-acting Insulin.  Take 1/2 morning dose of your long-acting Insulin (Lantus) only if glucose is greater than 120.     Arrival Time: The Thursday (7/3) prior to your procedure you will receive a phone call between 3:00 pm - 6:00 pm with your arrival time. If you haven't received a phone call, please check your voicemail messages., If you did not receive a voice mail and it is after 6:00 pm, please call the nursing supervisor at [phone].    Driving After Procedure: Sedation will be given so you WILL NOT be able to drive home. You will need a responsible adult  to drive you home. You can NOT take uber or taxi unless approved by your physician in advance.     Discharge Teaching: Your nurse will give you specific instructions before discharge, Most people can resume normal activities in 24 hours, Any questions, please call the physician's office           [place] parking is available starting at 6 am or park in the [facility] garage at [facility]. Check in at the [facility] reception desk. Our  will be there to check you in for your procedure. Please bring your insurance cards and ID with you.                                                                                                                                       Please DO NOT respond to this message, the inbasket is not monitored for messages. For any questions, please call the physician's office.

## 2025-07-02 ENCOUNTER — TELEPHONE (OUTPATIENT)
Dept: FAMILY MEDICINE CLINIC | Facility: CLINIC | Age: 85
End: 2025-07-02

## 2025-07-02 NOTE — TELEPHONE ENCOUNTER
[facility]    Dr. Awad reviewed: needs face to face, did not sign      Advised patient and dtr of Dr. Awad's note above. Patient and dtr verbalized understanding and voiced frustration - states that patient never had to see a doctor so many times.  Advised patient that she is also overdue for MAWV - patient agreeable to schedule. Appt made. No further questions at this time.    Future Appointments   Date Time Provider Department Center   7/7/2025  6:00 AM  [facility]ING  [facility]   8/11/2025  1:30 PM Houston Prado MD EMGYK [facility]   9/24/2025 11:40 AM Manny Villalta MD EMGOTONAPER [id number]       Dr. Awad to review and sign paperwork from Physical Therapy  Need to fax back  Please advise. Thank you

## 2025-07-07 ENCOUNTER — HOSPITAL ENCOUNTER (OUTPATIENT)
Dept: INTERVENTIONAL RADIOLOGY/VASCULAR | Facility: HOSPITAL | Age: 85
Discharge: HOME | End: 2025-07-07
Attending: INTERNAL MEDICINE | Admitting: INTERNAL MEDICINE
Payer: MEDICARE

## 2025-07-07 VITALS
TEMPERATURE: 97 F | WEIGHT: 145 LBS | HEIGHT: 66 IN | RESPIRATION RATE: 12 BRPM | DIASTOLIC BLOOD PRESSURE: 53 MMHG | SYSTOLIC BLOOD PRESSURE: 143 MMHG | BODY MASS INDEX: 23.3 KG/M2 | HEART RATE: 58 BPM | OXYGEN SATURATION: 98 %

## 2025-07-07 DIAGNOSIS — I48.91 A-FIB (HCC): ICD-10-CM

## 2025-07-07 LAB
ATRIAL RATE: 61 BPM
GLUCOSE BLD-MCNC: 110 MG/DL (ref 70–99)
P AXIS: 73 DEGREES
P-R INTERVAL: 264 MS
Q-T INTERVAL: 466 MS
QRS DURATION: 94 MS
QTC CALCULATION (BEZET): 469 MS
R AXIS: -39 DEGREES
T AXIS: 71 DEGREES
VENTRICULAR RATE: 61 BPM

## 2025-07-07 PROCEDURE — 93005 ELECTROCARDIOGRAM TRACING: CPT

## 2025-07-07 PROCEDURE — 99152 MOD SED SAME PHYS/QHP 5/>YRS: CPT | Performed by: INTERNAL MEDICINE

## 2025-07-07 PROCEDURE — 92960 CARDIOVERSION ELECTRIC EXT: CPT | Performed by: INTERNAL MEDICINE

## 2025-07-07 PROCEDURE — 82962 GLUCOSE BLOOD TEST: CPT

## 2025-07-07 PROCEDURE — 93010 ELECTROCARDIOGRAM REPORT: CPT | Performed by: INTERNAL MEDICINE

## 2025-07-07 RX ORDER — SODIUM CHLORIDE 9 MG/ML
INJECTION, SOLUTION INTRAVENOUS CONTINUOUS
Status: DISCONTINUED | OUTPATIENT
Start: 2025-07-07 | End: 2025-07-07

## 2025-07-07 RX ORDER — METHOHEXITAL IN WATER/PF 100MG/10ML
SYRINGE (ML) INTRAVENOUS
Status: COMPLETED
Start: 2025-07-07 | End: 2025-07-07

## 2025-07-07 RX ORDER — METHOHEXITAL IN WATER/PF 100MG/10ML
100 SYRINGE (ML) INTRAVENOUS ONCE
Status: DISCONTINUED | OUTPATIENT
Start: 2025-07-07 | End: 2025-07-07 | Stop reason: HOSPADM

## 2025-07-07 RX ADMIN — METHOHEXITAL IN WATER/PF 40 MG: 100MG/10ML SYRINGE (ML) INTRAVENOUS at 09:55:00

## 2025-07-07 RX ADMIN — SODIUM CHLORIDE: 9 INJECTION, SOLUTION INTRAVENOUS at 09:39:00

## 2025-07-07 NOTE — H&P
[facility]  Pre Procedure History and Physical    Anastasia Hobson Patient Status:  Outpatient    1940 MRN [id number]   [facility] INTERVENTIONAL SUITES Attending Mindy Daniels MD   Hosp Day # 0 PCP Houston Prado MD     Consults      History of Present Illness:  Anastasia Hobson is a a(n) 84 year old female here for DCCV      Prior H/P Reviewed with no changes    History:  Past Medical History[1]  Past Surgical History[2]  Family History[3]   reports that she quit smoking about 18 years ago. Her smoking use included cigarettes. She has a 73.5 pack-year smoking history. She has been exposed to tobacco smoke. She has never used smokeless tobacco. She reports that she does not drink alcohol and does not use drugs.    Allergies:  Allergies[4]    Medications:  Current Hospital Medications[5]    OBJECTIVE      Physical Exam:  Physical Exam   Blood pressure (!) 147/104, pulse 91, temperature 96.7 °F (35.9 °C), temperature source Temporal, resp. rate 20, height 5' 6\" (1.676 m), weight 145 lb (65.8 kg), SpO2 99%.  Temp (24hrs), Av.7 °F (35.9 °C), Min:96.7 °F (35.9 °C), Max:96.7 °F (35.9 °C)    Wt Readings from Last 3 Encounters:   25 145 lb (65.8 kg)   04/10/25 143 lb 3.2 oz (65 kg)   25 139 lb (63 kg)       NAD  PERRLA/EOMI  Neck veins not elevated  Carotids- no bruits  CTA bilaterally  Cardiac- irreg irreg  Abdomen- Soft,Nontender, normal BS  Extremities- pulses normal  Edema- none  Mood /Affect Congruent  Skin- no lesions          Results:   No results for input(s): \"GLU\", \"BUN\", \"CREATSERUM\", \"GFRAA\", \"GFRNAA\", \"EGFRCR\", \"CA\", \"NA\", \"K\", \"CL\", \"CO2\" in the last 168 hours.  No results for input(s): \"RBC\", \"HGB\", \"HCT\", \"MCV\", \"MCH\", \"MCHC\", \"RDW\", \"NEPRELIM\", \"WBC\", \"PLT\" in the last 168 hours.      [unfilled]  No results for input(s): \"BNP\" in the last 168 hours.  Lab Results   Component Value Date    INR 1.32 (H) 2025    INR 1.1 2025    INR 1.1  09/19/2024     Lab Results   Component Value Date    TROP <0.046 05/15/2018    TROP <0.046 04/25/2018         No results found.       Assessment and Plan    Problem List[6]    Consent: Risks, Benefits and alternatives discussed in clinic. Reverified on the day of the procedure    Procedure Planned: DCCV    Sedation Plan: brevital    Discharge Plan: Same day      Mindy Daniels MD  Cardiac Electrophysiology  [facility]  7/7/2025  9:55 AM         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 BCG    Bloating    Blurred vision    Cancer (HCC)    Congestive heart disease (HCC)    Constipation    Coronary atherosclerosis    Diabetes (HCC)    IDDM     Diabetes mellitus (HCC)    Diarrhea, unspecified    CLARKE (dyspnea on exertion)    Easy bruising    Elevated cholesterol    Essential hypertension    Exposure to medical diagnostic radiation    Fatigue    Flatulence/gas pain/belching    Frequent urination    taking furosemide daily    Headache disorder    Hearing loss    Heart palpitations    Heartburn    occasionally    High blood pressure    High cholesterol    History of falling    Hyperlipidemia    Irregular bowel habits    Itch of skin    right hand only    Leaking of urine    sometimes    Leg swelling    Neuropathy    Osteoporosis    Sleep disturbance    Stool incontinence    when I have diarrhea    Type 2 diabetes mellitus with hyperglycemia (HCC)    Visual impairment    glasses    Wears glasses   [2]   Past Surgical History:  Procedure Laterality Date    Cataract      Cervical spine surgery  10/06/2023    Colonoscopy  2019    Colonoscopy      Cystoscopy,insert ureteral stent      Cystourethroscopy  11/25/2020    Cystoscopy Procedure Dr Josh Hernandez  - recurrent tumor on BTS    Cystourethroscopy,biopsy  12/16/2019    BBx of scar sites, [facility]    Cystourethroscopy,fulgur .5-2cm lesn  04/16/2020     TURBT, [facility]    Cystourethroscopy,fulgur .5-2cm lesn  07/30/2020    TURBT with Gemcitabine    Cystourethroscopy,fulgur >5cm lesn  10/03/2019    TURBT with bilateral RPGs ([facility])    Fracture surgery      left wrist    Hysterectomy      age 31    Ndsc ablation & rcnstj atria lmtd w/o bypass      Other surgical history  04/20/2021    BTS CystoDr Hernandez    Other surgical history  08/17/2021    BTS Cysto Caterize Dr. Hernandez    Other surgical history  11/16/2021    BTS Cysto W/ caterize Dr. Hernandez     Other surgical history  03/08/2022    BTS Cysto- Dr. Hernandez    Port, indwelling, imp      Spine surgery procedure unlisted  10/06/2003    CERVICAL    Tonsillectomy  1946    Total abdom hysterectomy  1971   [3]   Family History  Problem Relation Age of Onset    Diabetes Father     Stroke Father     Colon Polyps Mother     Diabetes Mother     Heart Attack Mother     Other (Other) Son         Afib   [4]   Allergies  Allergen Reactions    Morphine CONFUSION   [5]   Current Facility-Administered Medications:     sodium chloride 0.9% infusion, , Intravenous, Continuous    methohexital (BrevITAL) 100 mg/10mL IV syringe 100 mg, 100 mg, Intravenous, Once  [6]   Patient Active Problem List  Diagnosis    Osteoarthritis of spine with radiculopathy, cervical region    Primary hypertension    Type 2 diabetes mellitus without complication, with long-term current use of insulin (HCC)    Mixed hyperlipidemia    Arthrodesis status    Tremor of unknown origin    Personal history of colonic polyps    Malignant neoplasm of lateral wall of urinary bladder (HCC)    Stage 3b chronic kidney disease (HCC)    Advance care planning    Other constipation    Type 2 diabetes mellitus with diabetic chronic kidney disease (HCC)    At high risk for falls    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)    Heart failure, unspecified (HCC)    Chronic kidney disease, stage 3b (HCC)    Long term (current) use of oral hypoglycemic drugs    Personal history of  malignant neoplasm of bladder    Pure hypercholesterolemia, unspecified    Mass of left side of neck    Type 2 diabetes mellitus with hyperglycemia, unspecified whether long term insulin use (HCC)    Anticoagulated    Atrial fibrillation, chronic (HCC)    Palliative care by specialist    Head and neck cancer (HCC)    Pressure ulcer of left buttock, stage 2 (HCC)    Depression, recurrent    Closed wedge compression fracture of T8 vertebra with routine healing, subsequent encounter

## 2025-07-07 NOTE — PROGRESS NOTES
Pt received for elective CV with Dr. Daniels. Procedure complete. Pt tolerated well. Pt successfully cardioverted into SR. EKG done. Recovery complete. Discharge instructions given to pt and pt's daughter. IV discontinued, pt taken down to [facility] via wheelchair for discharge.

## 2025-07-07 NOTE — DISCHARGE INSTRUCTIONS
HOME CARE INSTRUCTIONS FOLLOWING CARDIOVERSION OR ICD EVALUATION      Activity:  - DO NOT drive after the procedure. You may resume driving after 24 hours.  - DO NOT operate any machinery (including kitchen appliances or power tools).  - Avoid drinking alcohol for 24 hours.  - You may resume your normal activity after 24 hours.    What is Normal:  - Your skin may be red where the patches were placed.  - The redness may last 2 to 3 days and feel like a \"sunburn\".  - You may treat the area with an over-the-counter cream that is used for sunburned skin.    Special Instructions:  - If you were taking the medication Eliquis, Xarelto, Pradaxa or Coumadin, it is very important to continue taking it until your follow-up appointment with your physician.    When you should NOTIFY YOUR PHYSICIAN:  - If you feel that you have returned to an irregular rhythm    Other:  - You may resume your present diet, unless otherwise specified by your physician.  - You may resume all of your medications as prescribed, unless otherwise directed by your physician.  - A list of your medications was provided to you at discharge.  - Do not make any personal/business decisions and/or sign any legal documents for the next 24 hours.

## 2025-07-07 NOTE — PROCEDURES
Electrophysiology Procedure Note    Anastasia Hobson Location: Cath Lab   CSN [id number] MRN [id number]   Admission Date 7/7/2025  Operation Date 07/07/25    Attending Physician Mindy Daniels MD Operating Physician Mindy Daniels MD     Pre-Operative Diagnosis:Atrial Fibrillation    Post-Operative Diagnosis: Same as above  PROCEDURE(S) PERFORMED:    1.    Cardioversion.  2.     Sedation      :  Mindy Daniels MD     ANESTHESIA:  IV sedation.  Moderate conscious sedation for this procedure was administered and personally monitored. Brevital 40 mg  Sedation time: 10 minutes     INDICATION:  Persistent Atrial Fibrillation     COMPLICATIONS:  None.     METHODS:  The patient was brought to the outpatient cardiac telemetry unit in a fasting and nonsedated state after providing informed consent.  IV sedation was administered during continuous ECG, pulse oximeter and noninvasive hemodynamic monitoring.  After administering IV Brevital in intermittent boluses, the desired level of sedation was achieved.      Cardioversion Energy:  200J    Pad Position: [place]  Pre- Cardioversion Rhythm- AF  Post Cardioversion Rhythm - NSR    CONCLUSIONS:  1.    Successful cardioversion       Plan:  1- Rhythm/Rate Control Meds:  no changes  2) Anticoagulation- no changes  3) Follow Up- 1 month                    Mindy Daniels MD     Cardiac Electrophysiololgy  [facility]

## 2025-07-09 ENCOUNTER — MED REC SCAN ONLY (OUTPATIENT)
Dept: FAMILY MEDICINE CLINIC | Facility: CLINIC | Age: 85
End: 2025-07-09

## 2025-07-16 ENCOUNTER — TELEPHONE (OUTPATIENT)
Age: 85
End: 2025-07-16

## 2025-07-16 NOTE — TELEPHONE ENCOUNTER
Pt called and advised there is no order in the system for her to schedule a PET scan. Pt would like to know if Dr. Reid wants her to continue getting the scan every 3 months and if so please put order in the system    Please call the pt back

## 2025-07-22 NOTE — PROGRESS NOTES
[facility] Note    Visit Diagnosis:  1. Head and neck cancer (HCC)          History of Present Illness: 83-year-old female with a past medical history of: A-fib on Eliquis, urothelial carcinoma, CKD 3, GERD and diabetes was referred by Dr. Fall to discuss a poorly differentiated carcinoma.     -Cystoscopy June 2024 with NAD.  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    -MRI cervical spine on 7/31/2024 incidentally noted a left lower neck mass measuring 4.5 x 3.5 cm.    -CT neck on 8/1/2024 4.2 x 2.9 x 4.2 cm left supraclavicular mass.    -Met with ENT,  and patient-tonsils were surgically absent on exam.    -Left neck mass biopsy on 8/5/2024 showed a poorly differentiated carcinoma with focal neuroendocrine differentiation.  IHC was positive for cytokeratin AE 1/3, p16, p63 and weakly positive for synaptophysin  .  -PET/CT on 8/21/24: focal marked metabolic activity in the left supraclavicular mass.  This mass is 4.9 x 4.2 cm with an SUV of 8.8.  No other lesions. LLL atelectasis or PNA    -She has a poor appetite but her daughter gets her to eat.  She uses her walker and here for most of her ADLs.  She has a history of smoking 1 pack/day for 40 years but quit about 25 years ago.  She was with her daughter Petty who is involved with her care. She is following with palliative care due to significant pain.     -Tumor board 8/28/24: Case reviewed in tumor board. PET does not show evidence of distant disease. Concern for p16+ head and neck cancer vs. Cervical esophageal cancer. EGD recommended. ENT eval recommended. We will plan on definitive treatment with chemoradiation with weekly Carbo AUC 2 + Taxol 50 mg/m2.     -ENT Eval with Dr. Villalta on 8/28/244: Flexible laryngoscopy was unremarkable     -Chemoradiation with weekly carbo AUC 2 + Taxol 50 mg/m2: 9/24/24-Current   -Week 1: 9/24/24-Carbo/Taxol   -Week 2:  10/1/24-Carbo/taxol   -Week 3: 10/8/24: NO CHEMOTHERAPY-stopped for side effects   -Week 4: 10/15/24: Carbo only   -Week 5: 10/22/24: Carbo only   -Week 6: 10/29/24: Carbo only    -RT finished 11/6/24    -PET/CT 1/31/25 with FELY    -PET/CT 5/21/25: FELY    Interval History  -PET/CT reviewed  -Doing well overall. Has a chronic cough. No dyspnea. Feels fatigued but this is unchanged   -No abdominal pain     Review of Systems: 12 Point ROS was completed and pertinent positives are in the HPI        Past Medical History:    Arrhythmia    Back pain    Back problem    Bladder cancer (HCC)    high grade superficial TCC, recurrent July 2020 after BCG    Bloating    Blurred vision    Cancer (HCC)    Congestive heart disease (HCC)    Constipation    Coronary atherosclerosis    Diabetes (HCC)    IDDM     Diabetes mellitus (HCC)    Diarrhea, unspecified    CLARKE (dyspnea on exertion)    Easy bruising    Elevated cholesterol    Essential hypertension    Exposure to medical diagnostic radiation    Fatigue    Flatulence/gas pain/belching    Frequent urination    taking furosemide daily    Headache disorder    Hearing loss    Heart palpitations    Heartburn    occasionally    High blood pressure    High cholesterol    History of falling    Hyperlipidemia    Irregular bowel habits    Itch of skin    right hand only    Leaking of urine    sometimes    Leg swelling    Neuropathy    Osteoporosis    Sleep disturbance    Stool incontinence    when I have diarrhea    Type 2 diabetes mellitus with hyperglycemia (HCC)    Visual impairment    glasses    Wears glasses     Past Surgical History:   Procedure Laterality Date    Cataract      Cervical spine surgery  10/06/2023    Colonoscopy  2019    Colonoscopy      Cystoscopy,insert ureteral stent      Cystourethroscopy  11/25/2020    Cystoscopy Procedure Dr Josh Hernandez  - recurrent tumor on BTS    Cystourethroscopy,biopsy  12/16/2019    [place] [facility]    Cystourethroscopy,fulgur .5-2cm cathy   2020    TURBT, NSC    Cystourethroscopy,fulgur .5-2cm lesn  2020    TURBT with Gemcitabine    Cystourethroscopy,fulgur >5cm lesn  10/03/2019    TURBT with bilateral RPGs (NSC)    Fracture surgery      left wrist    Hysterectomy      age 31    Ndsc ablation & rcnstj atria lmtd w/o bypass      Other surgical history  2021    BTS CystoDr Hernandez    Other surgical history  2021    BTS Cysto Caterize Dr. Hernandez    Other surgical history  2021    BTS Cysto W/ caterize Dr. Hernandez     Other surgical history  2022    BTS Cysto- Dr. Hernandez    Port, indwelling, imp      Spine surgery procedure unlisted  10/06/2003    CERVICAL    Tonsillectomy  1946    Total abdom hysterectomy  1971     Social History     Socioeconomic History    Marital status:     Number of children: 3   Occupational History    Occupation: RETIRED   Tobacco Use    Smoking status: Former     Current packs/day: 0.00     Average packs/day: 1.1 packs/day for 65.3 years (73.5 ttl pk-yrs)     Types: Cigarettes     Quit date: 2007     Years since quittin.0     Passive exposure: Past    Smokeless tobacco: Never    Tobacco comments:     quit 15 years    Vaping Use    Vaping status: Never Used   Substance and Sexual Activity    Alcohol use: No    Drug use: No    Sexual activity: Not Currently     Partners: Male      Family History   Problem Relation Age of Onset    Diabetes Father     Stroke Father     Colon Polyps Mother     Diabetes Mother     Heart Attack Mother     Other (Other) Son         Afib       Physical Exam  Height: 163.8 cm (5' 4.49\") (943)  Weight: 66 kg (145 lb 9.6 oz) (943)  BSA (Calculated - sq m): 1.72 sq meters (943)  Pulse: 57 (943)  BP: 146/64 (943)  Temp: 97.5 °F (36.4 °C) (943)  Do Not Use - Resp Rate: --  SpO2: 97 % (943)    General: NAD, AOX3  HEENT: clear op, mmm, no jvd, no scleral icterus  NO palpable LAD  CV: RRR S1S2 no murmurs  Extremities: No edema    Lungs: CTAB, no increased work of breathing  Abd: soft nt nd +BS no hepatosplenomegaly  Neuro: CN: II-XII grossly intact    Results:  Lab Results   Component Value Date    WBC 7.9 06/25/2025    HGB 12.2 06/25/2025    HCT 39.9 06/25/2025    MCV 82.8 06/25/2025    .0 06/25/2025     Lab Results   Component Value Date     06/25/2025    K 4.4 06/25/2025    CO2 28.0 06/25/2025     06/25/2025    BUN 43 (H) 06/25/2025    PHOS 4.4 12/27/2023    ALB 4.6 04/03/2025       No results found for: \"LDH\"    Radiology:   PET 5/21/25  CONCLUSION:  No evidence of abnormal FDG G metabolism in the left supraclavicular region to suggest recurrent malignancy.      Interval decrease in FDG uptake involving pleural parenchymal opacity in the left lung apex most consistent with scarring and post treatment change.  Maximum SUV now measures 2.4, previously 3.1.      Interval post vertebroplasty changes at T8 compression fracture.      Large gallstone in the gallbladder.     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     Electronically signed by Goldberg, Cathryn A, MD on 8/12/2024 at 1040        Final Diagnosis Comment      The tumor is composed of sheets of malignant cells with pleomorphic nuclei and variable amounts of eosinophilic cytoplasm.  There are areas of necrosis.     Immunohistochemistry is performed to evaluate the tumor phenotype.  The malignant cells are positive for cytokeratin AE 1/3, p16, p63 and weakly positive for synaptophysin.  A rare malignant cell stains with p40.  They are negative for all other markers tested.     The morphologic and immunohistochemical findings are consistent with a poorly differentiated carcinoma with focal neuroendocrine differentiation.  Dr. Eden has reviewed the case and concurs with the above diagnosis.         Assessment and Plan:  Left supraclavicular poorly differentiated carcinoma, p16  positive  -Her PET/CT did not show evidence of distant disease or tumor origin.  It is relatively rare for head and neck cancer to metastasize without any cervical lymphadenopathy or primary lesion (her tonsils are out). ENT evaluation was unremarkable.   -Tempus: TMB 6.3, BINU, PDL1 CPS 3, Somatic YOLANDA mutation  -She completed treatment with chemoRT 11/6/24. She did not tolerated combined carbo/taxol so taxol was dropped   -PET/CT 3 months later with FELY    Plan  -PET/CT q3 months for 1 year then q6 months for up to 5 years. Next Due 08/2025  -EGD if she has symptoms  -Follow up with ENT as needed     Fatigue  -Check Fe studies, TSH, B12, Folate and Vit D    HTN: Following with PCP    Cancer related Pain: following with palliative. 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    Follow up in 3 months     GEOVANY Reid MD  [facility]

## 2025-07-23 ENCOUNTER — OFFICE VISIT (OUTPATIENT)
Facility: LOCATION | Age: 85
End: 2025-07-23
Attending: INTERNAL MEDICINE
Payer: MEDICARE

## 2025-07-23 VITALS
OXYGEN SATURATION: 97 % | DIASTOLIC BLOOD PRESSURE: 64 MMHG | RESPIRATION RATE: 18 BRPM | SYSTOLIC BLOOD PRESSURE: 146 MMHG | HEIGHT: 64.49 IN | TEMPERATURE: 98 F | BODY MASS INDEX: 24.56 KG/M2 | WEIGHT: 145.63 LBS | HEART RATE: 57 BPM

## 2025-07-23 DIAGNOSIS — R93.89 ABNORMAL FINDINGS ON DIAGNOSTIC IMAGING OF OTHER SPECIFIED BODY STRUCTURES: ICD-10-CM

## 2025-07-23 DIAGNOSIS — E55.9 VITAMIN D DEFICIENCY, UNSPECIFIED: ICD-10-CM

## 2025-07-23 DIAGNOSIS — C76.0 HEAD AND NECK CANCER (HCC): Primary | ICD-10-CM

## 2025-07-23 LAB
BASOPHILS # BLD AUTO: 0.03 X10(3) UL (ref 0–0.2)
BASOPHILS NFR BLD AUTO: 0.4 %
DEPRECATED HBV CORE AB SER IA-ACNC: 35 NG/ML (ref 50–306)
EOSINOPHIL # BLD AUTO: 0.11 X10(3) UL (ref 0–0.7)
EOSINOPHIL NFR BLD AUTO: 1.6 %
ERYTHROCYTE [DISTWIDTH] IN BLOOD BY AUTOMATED COUNT: 17 %
FOLATE SERPL-MCNC: 20.8 NG/ML (ref 5.4–?)
HCT VFR BLD AUTO: 36.6 % (ref 35–48)
HGB BLD-MCNC: 11.7 G/DL (ref 12–16)
IMM GRANULOCYTES # BLD AUTO: 0.04 X10(3) UL (ref 0–1)
IMM GRANULOCYTES NFR BLD: 0.6 %
IRON SATN MFR SERPL: 14 % (ref 15–50)
IRON SERPL-MCNC: 60 UG/DL (ref 50–170)
LYMPHOCYTES # BLD AUTO: 0.88 X10(3) UL (ref 1–4)
LYMPHOCYTES NFR BLD AUTO: 12.5 %
MCH RBC QN AUTO: 26.1 PG (ref 26–34)
MCHC RBC AUTO-ENTMCNC: 32 G/DL (ref 31–37)
MCV RBC AUTO: 81.7 FL (ref 80–100)
MONOCYTES # BLD AUTO: 0.87 X10(3) UL (ref 0.1–1)
MONOCYTES NFR BLD AUTO: 12.3 %
NEUTROPHILS # BLD AUTO: 5.12 X10 (3) UL (ref 1.5–7.7)
NEUTROPHILS # BLD AUTO: 5.12 X10(3) UL (ref 1.5–7.7)
NEUTROPHILS NFR BLD AUTO: 72.6 %
PLATELET # BLD AUTO: 273 10(3)UL (ref 150–450)
RBC # BLD AUTO: 4.48 X10(6)UL (ref 3.8–5.3)
TOTAL IRON BINDING CAPACITY: 414 UG/DL (ref 250–425)
TRANSFERRIN SERPL-MCNC: 334 MG/DL (ref 250–380)
TSI SER-ACNC: 0.83 UIU/ML (ref 0.55–4.78)
VIT B12 SERPL-MCNC: 427 PG/ML (ref 211–911)
VIT D+METAB SERPL-MCNC: 37.5 NG/ML (ref 30–100)
WBC # BLD AUTO: 7.1 X10(3) UL (ref 4–11)

## 2025-07-23 NOTE — PROGRESS NOTES
Patient here today for follow-up. Patient states she feels okay, complains of fatigue and back pain. She states her appetite has improved since last visit.

## 2025-07-27 ENCOUNTER — HOSPITAL ENCOUNTER (EMERGENCY)
Facility: HOSPITAL | Age: 85
Discharge: HOME | End: 2025-07-27
Attending: EMERGENCY MEDICINE

## 2025-07-27 ENCOUNTER — APPOINTMENT (OUTPATIENT)
Dept: CT IMAGING | Facility: HOSPITAL | Age: 85
End: 2025-07-27
Attending: EMERGENCY MEDICINE

## 2025-07-27 VITALS
TEMPERATURE: 98 F | HEART RATE: 55 BPM | OXYGEN SATURATION: 100 % | WEIGHT: 145.5 LBS | BODY MASS INDEX: 25 KG/M2 | SYSTOLIC BLOOD PRESSURE: 194 MMHG | RESPIRATION RATE: 15 BRPM | DIASTOLIC BLOOD PRESSURE: 87 MMHG

## 2025-07-27 DIAGNOSIS — N30.01 ACUTE CYSTITIS WITH HEMATURIA: Primary | ICD-10-CM

## 2025-07-27 DIAGNOSIS — I10 HYPERTENSION, UNSPECIFIED TYPE: ICD-10-CM

## 2025-07-27 LAB
ALBUMIN SERPL-MCNC: 4.4 G/DL (ref 3.2–4.8)
ALBUMIN/GLOB SERPL: 1.8 (ref 1–2)
ALP LIVER SERPL-CCNC: 54 U/L (ref 55–142)
ALT SERPL-CCNC: 45 U/L (ref 10–49)
ANION GAP SERPL CALC-SCNC: 9 MMOL/L (ref 0–18)
AST SERPL-CCNC: 62 U/L (ref ?–34)
ATRIAL RATE: 55 BPM
BASOPHILS # BLD AUTO: 0.04 X10(3) UL (ref 0–0.2)
BASOPHILS NFR BLD AUTO: 0.5 %
BILIRUB SERPL-MCNC: 0.5 MG/DL (ref 0.2–1.1)
BILIRUB UR QL STRIP.AUTO: NEGATIVE
BUN BLD-MCNC: 39 MG/DL (ref 9–23)
CALCIUM BLD-MCNC: 9.6 MG/DL (ref 8.7–10.6)
CHLORIDE SERPL-SCNC: 105 MMOL/L (ref 98–112)
CO2 SERPL-SCNC: 29 MMOL/L (ref 21–32)
CREAT BLD-MCNC: 2 MG/DL (ref 0.55–1.02)
EGFRCR SERPLBLD CKD-EPI 2021: 24 ML/MIN/1.73M2 (ref 60–?)
EOSINOPHIL # BLD AUTO: 0.13 X10(3) UL (ref 0–0.7)
EOSINOPHIL NFR BLD AUTO: 1.8 %
ERYTHROCYTE [DISTWIDTH] IN BLOOD BY AUTOMATED COUNT: 17.2 %
GLOBULIN PLAS-MCNC: 2.4 G/DL (ref 2–3.5)
GLUCOSE BLD-MCNC: 165 MG/DL (ref 70–99)
GLUCOSE UR STRIP.AUTO-MCNC: NORMAL MG/DL
HCT VFR BLD AUTO: 38 % (ref 35–48)
HGB BLD-MCNC: 11.8 G/DL (ref 12–16)
IMM GRANULOCYTES # BLD AUTO: 0.04 X10(3) UL (ref 0–1)
IMM GRANULOCYTES NFR BLD: 0.5 %
KETONES UR STRIP.AUTO-MCNC: NEGATIVE MG/DL
LEUKOCYTE ESTERASE UR QL STRIP.AUTO: 500
LYMPHOCYTES # BLD AUTO: 1.22 X10(3) UL (ref 1–4)
LYMPHOCYTES NFR BLD AUTO: 16.5 %
MCH RBC QN AUTO: 26.1 PG (ref 26–34)
MCHC RBC AUTO-ENTMCNC: 31.1 G/DL (ref 31–37)
MCV RBC AUTO: 84.1 FL (ref 80–100)
MONOCYTES # BLD AUTO: 0.83 X10(3) UL (ref 0.1–1)
MONOCYTES NFR BLD AUTO: 11.2 %
NEUTROPHILS # BLD AUTO: 5.15 X10 (3) UL (ref 1.5–7.7)
NEUTROPHILS # BLD AUTO: 5.15 X10(3) UL (ref 1.5–7.7)
NEUTROPHILS NFR BLD AUTO: 69.5 %
OSMOLALITY SERPL CALC.SUM OF ELEC: 309 MOSM/KG (ref 275–295)
P AXIS: 75 DEGREES
P-R INTERVAL: 246 MS
PH UR STRIP.AUTO: 6 (ref 5–8)
PLATELET # BLD AUTO: 295 10(3)UL (ref 150–450)
POTASSIUM SERPL-SCNC: 4.9 MMOL/L (ref 3.5–5.1)
PROT SERPL-MCNC: 6.8 G/DL (ref 5.7–8.2)
PROT UR STRIP.AUTO-MCNC: NEGATIVE MG/DL
Q-T INTERVAL: 478 MS
QRS DURATION: 92 MS
QTC CALCULATION (BEZET): 457 MS
R AXIS: -36 DEGREES
RBC # BLD AUTO: 4.52 X10(6)UL (ref 3.8–5.3)
RBC #/AREA URNS AUTO: >10 /HPF
SODIUM SERPL-SCNC: 143 MMOL/L (ref 136–145)
SP GR UR STRIP.AUTO: 1.01 (ref 1–1.03)
T AXIS: 40 DEGREES
TROPONIN I SERPL HS-MCNC: 10 NG/L (ref ?–34)
UROBILINOGEN UR STRIP.AUTO-MCNC: NORMAL MG/DL
VENTRICULAR RATE: 55 BPM
WBC # BLD AUTO: 7.4 X10(3) UL (ref 4–11)
WBC #/AREA URNS AUTO: >50 /HPF

## 2025-07-27 PROCEDURE — 96361 HYDRATE IV INFUSION ADD-ON: CPT

## 2025-07-27 PROCEDURE — 96374 THER/PROPH/DIAG INJ IV PUSH: CPT

## 2025-07-27 PROCEDURE — 80053 COMPREHEN METABOLIC PANEL: CPT | Performed by: EMERGENCY MEDICINE

## 2025-07-27 PROCEDURE — 84484 ASSAY OF TROPONIN QUANT: CPT | Performed by: EMERGENCY MEDICINE

## 2025-07-27 PROCEDURE — 85025 COMPLETE CBC W/AUTO DIFF WBC: CPT

## 2025-07-27 PROCEDURE — 87186 SC STD MICRODIL/AGAR DIL: CPT | Performed by: EMERGENCY MEDICINE

## 2025-07-27 PROCEDURE — 93005 ELECTROCARDIOGRAM TRACING: CPT

## 2025-07-27 PROCEDURE — 96375 TX/PRO/DX INJ NEW DRUG ADDON: CPT

## 2025-07-27 PROCEDURE — 85025 COMPLETE CBC W/AUTO DIFF WBC: CPT | Performed by: EMERGENCY MEDICINE

## 2025-07-27 PROCEDURE — 87086 URINE CULTURE/COLONY COUNT: CPT | Performed by: EMERGENCY MEDICINE

## 2025-07-27 PROCEDURE — 99285 EMERGENCY DEPT VISIT HI MDM: CPT

## 2025-07-27 PROCEDURE — 84484 ASSAY OF TROPONIN QUANT: CPT

## 2025-07-27 PROCEDURE — 87077 CULTURE AEROBIC IDENTIFY: CPT | Performed by: EMERGENCY MEDICINE

## 2025-07-27 PROCEDURE — 80053 COMPREHEN METABOLIC PANEL: CPT

## 2025-07-27 PROCEDURE — 74176 CT ABD & PELVIS W/O CONTRAST: CPT | Performed by: EMERGENCY MEDICINE

## 2025-07-27 PROCEDURE — 81001 URINALYSIS AUTO W/SCOPE: CPT | Performed by: EMERGENCY MEDICINE

## 2025-07-27 PROCEDURE — 99284 EMERGENCY DEPT VISIT MOD MDM: CPT

## 2025-07-27 PROCEDURE — 93010 ELECTROCARDIOGRAM REPORT: CPT

## 2025-07-27 RX ORDER — METOPROLOL TARTRATE 25 MG/1
25 TABLET, FILM COATED ORAL ONCE
Status: COMPLETED | OUTPATIENT
Start: 2025-07-27 | End: 2025-07-27

## 2025-07-27 RX ORDER — HYDROMORPHONE HYDROCHLORIDE 1 MG/ML
0.5 INJECTION, SOLUTION INTRAMUSCULAR; INTRAVENOUS; SUBCUTANEOUS EVERY 30 MIN PRN
Refills: 0 | Status: DISCONTINUED | OUTPATIENT
Start: 2025-07-27 | End: 2025-07-27

## 2025-07-27 RX ORDER — CEFADROXIL 500 MG/1
500 CAPSULE ORAL 2 TIMES DAILY
Qty: 20 CAPSULE | Refills: 0 | Status: SHIPPED | OUTPATIENT
Start: 2025-07-27 | End: 2025-07-29

## 2025-07-27 RX ORDER — ONDANSETRON 2 MG/ML
4 INJECTION INTRAMUSCULAR; INTRAVENOUS ONCE
Status: COMPLETED | OUTPATIENT
Start: 2025-07-27 | End: 2025-07-27

## 2025-07-27 RX ORDER — HYDROCODONE BITARTRATE AND ACETAMINOPHEN 5; 325 MG/1; MG/1
1-2 TABLET ORAL EVERY 6 HOURS PRN
Qty: 10 TABLET | Refills: 0 | Status: SHIPPED | OUTPATIENT
Start: 2025-07-27 | End: 2025-08-07 | Stop reason: CLARIF

## 2025-07-27 NOTE — DISCHARGE INSTRUCTIONS
You may take an extra dose of your metoprolol on a daily basis for elevated blood pressure.    Physician follow-up this week.

## 2025-07-27 NOTE — ED PROVIDER NOTES
Patient Seen in: [facility] Emergency Department        History  Chief Complaint   Patient presents with    Urinary Symptoms     Stated Complaint: blood in urine. burning with urination. back pain no fever.    Subjective:   HPI            84-year-old female with a history of bladder carcinoma and an unknown primary carcinoma presents to the emergency department complaining of a 3-day history of severe bilateral back pain, right greater than left associated with hematuria this morning.  No fever, vomiting or diarrhea.  No prior history of kidney stones.  Past surgical history is remarkable for a ARABELLA.  Patient denies any analgesic use.      Objective:     Past Medical History:    Arrhythmia    Back pain    Back problem    Bladder cancer (HCC)    high grade superficial TCC, recurrent July 2020 after BCG    Bloating    Blurred vision    Cancer (HCC)    Congestive heart disease (HCC)    Constipation    Coronary atherosclerosis    Diabetes (HCC)    IDDM     Diabetes mellitus (HCC)    Diarrhea, unspecified    CLARKE (dyspnea on exertion)    Easy bruising    Elevated cholesterol    Essential hypertension    Exposure to medical diagnostic radiation    Fatigue    Flatulence/gas pain/belching    Frequent urination    taking furosemide daily    Headache disorder    Hearing loss    Heart palpitations    Heartburn    occasionally    High blood pressure    High cholesterol    History of falling    Hyperlipidemia    Irregular bowel habits    Itch of skin    right hand only    Leaking of urine    sometimes    Leg swelling    Neuropathy    Osteoporosis    Sleep disturbance    Stool incontinence    when I have diarrhea    Type 2 diabetes mellitus with hyperglycemia (HCC)    Visual impairment    glasses    Wears glasses              Past Surgical History:   Procedure Laterality Date    Cataract      Cervical spine surgery  10/06/2023    Colonoscopy  2019    Colonoscopy      Cystoscopy,insert ureteral stent      Cystourethroscopy   2020    Cystoscopy Procedure Dr Josh Hernandez  - recurrent tumor on BTS    Cystourethroscopy,biopsy  2019    BBx of scar sites, NSC    Cystourethroscopy,fulgur .5-2cm lesn  2020    TURBT, NSC    Cystourethroscopy,fulgur .5-2cm lesn  2020    TURBT with Gemcitabine    Cystourethroscopy,fulgur >5cm lesn  10/03/2019    TURBT with bilateral RPGs (NSC)    Fracture surgery      left wrist    Hysterectomy      age 31    Ndsc ablation & rcnstj atria lmtd w/o bypass      Other surgical history  2021    BTS Cysto, Dr Hernandez    Other surgical history  2021    BTS Cysto Caterize Dr. Hernandez    Other surgical history  2021    BTS Cysto W/ carley Hernandez     Other surgical history  2022    BTS Cysto- Dr. Hernandez    Port, indwelling, imp      Spine surgery procedure unlisted  10/06/2003    CERVICAL    Tonsillectomy  1946    Total abdom hysterectomy  1971                Social History     Socioeconomic History    Marital status:     Number of children: 3   Occupational History    Occupation: RETIRED   Tobacco Use    Smoking status: Former     Current packs/day: 0.00     Average packs/day: 1.1 packs/day for 65.3 years (73.5 ttl pk-yrs)     Types: Cigarettes     Quit date: 2007     Years since quittin.0     Passive exposure: Past    Smokeless tobacco: Never    Tobacco comments:     quit 15 years    Vaping Use    Vaping status: Never Used   Substance and Sexual Activity    Alcohol use: No    Drug use: No    Sexual activity: Not Currently     Partners: Male   Social History Narrative    , lives with daughter    Has 3 children: 2 sons, 1 daughter     Social Drivers of Health     Food Insecurity: No Food Insecurity (3/11/2025)    NCSS - Food Insecurity     Worried About Running Out of Food in the Last Year: No     Ran Out of Food in the Last Year: No   Transportation Needs: No Transportation Needs (3/11/2025)    NCSS - Transportation     Lack of Transportation: No   Housing  Stability: Not At Risk (3/11/2025)    NCSS - Housing/Utilities     Has Housing: Yes     Worried About Losing Housing: No     Unable to Get Utilities: No                                Physical Exam    ED Triage Vitals [07/27/25 1219]   BP (!) 178/67   Pulse 52   Resp 20   Temp 97.6 °F (36.4 °C)   Temp src Temporal   SpO2 99 %   O2 Device None (Room air)       Current Vitals:   Vital Signs  BP: (!) 194/87  Pulse: 55  Resp: 15  Temp: 97.6 °F (36.4 °C)  Temp src: Temporal  MAP (mmHg): (!) 120    Oxygen Therapy  SpO2: 100 %  O2 Device: None (Room air)            Physical Exam     General Appearance: This is an older female sitting on a gurney.  Vital signs were reviewed per nurses notes.  Patient is afebrile.  Initial blood pressure was 191/67.  HEENT: Normocephalic/atraumatic.  Anicteric sclera.  Oral mucosa is moist.  Oropharynx is normal.  Neck: No adenopathy or thyromegaly.  Lungs are clear to auscultation.  Heart exam: Normal S1-S2 without extra sounds or murmurs.  Regular rate and rhythm.  Abdomen: NABS.  Flat, soft with minimal epigastric tenderness.  No masses rebound or guarding.  Conklin sign is negative.  Skin is dry without rashes or lesions.  Extremities are atraumatic.  Neuroexam: Awake, conversive and moving all 4 extremities well.      ED Course  Labs Reviewed   URINALYSIS WITH CULTURE REFLEX - Abnormal; Notable for the following components:       Result Value    Clarity Urine Turbid (*)     Blood Urine 3+ (*)     Nitrite Urine 1+ (*)     Leukocyte Esterase Urine 500 (*)     WBC Urine >50 (*)     RBC Urine >10 (*)     Bacteria Urine 1+ (*)     All other components within normal limits   CBC WITH DIFFERENTIAL WITH PLATELET - Abnormal; Notable for the following components:    HGB 11.8 (*)     All other components within normal limits   COMP METABOLIC PANEL (14) - Abnormal; Notable for the following components:    Glucose 165 (*)     BUN 39 (*)     Creatinine 2.00 (*)     Calculated Osmolality 309 (*)      eGFR-Cr 24 (*)     AST 62 (*)     Alkaline Phosphatase 54 (*)     All other components within normal limits   TROPONIN I HIGH SENSITIVITY - Normal   RAINBOW DRAW BLUE   URINE CULTURE, ROUTINE     EKG    Rate, intervals and axes as noted on EKG Report.  Rate: 55  Rhythm: Sinus Rhythm  Reading: First-degree AV block.  Left axis deviation.  Minimal voltage criteria for LVH.  Septal infarct, age undetermined.  Abnormal EKG.  Agree with EKG report.              Intravenous access was obtained.  Laboratory studies were drawn.  Urine was sent.  IV fluids, analgesics and antiemetics were administered.    CT ABDOMEN+PELVIS KIDNEYSTONE 2D RNDR(NO IV,NO ORAL)(CPT=74176)  Result Date: 7/27/2025  PROCEDURE: CT ABDOMEN+PELVIS KIDNEYSTONE 2D RNDR(NO IV,NO ORAL)(CPT=74176) INDICATIONS: blood in urine. burning with urination. back pain no fever. COMPARISON: TECHNIQUE: Multi-slice CT images of the abdomen and pelvis were performed without intravenous contrast material. Automated exposure control techniques for dose reduction were used, adjustment of the mA and/or kV were based on patient's size. Use of iterative reconstruction technique for dose reduction was used. Dose information is transmitted to the ACR (American College of Radiology) NRDR (National Radiology Data Registry) which includes the Dose Index Registry. FINDINGS: KIDNEYS: Mild/moderate bilateral renal cortical atrophy. Bilateral extrarenal pelves noted. Mild dilation of left lower pole calyces, similar compared to 5/21/2025. Note made of mild left perinephric stranding. No obstructing urinary calculi noted. URINARY BLADDER: Mild concentric thickening of the urinary bladder. No intraluminal calculi. LIVER: Subtle surface nodularity with hypertrophy of the lateral segment left lobe, morphologic features suggesting cirrhosis. BILIARY: Large gallbladder calculus noted. No associated gallbladder wall thickening or pericholecystic fluid/inflammation. Normal caliber of the  bile ducts. PANCREAS: No lesion, fluid collection, ductal dilatation, or atrophy. SPLEEN: No enlargement or focal lesion. ADRENALS: No mass or enlargement. AORTA/VASCULAR: Moderate calcified atherosclerosis without aneurysm. RETROPERITONEUM: No mass or enlarged adenopathy. BOWEL/MESENTERY: Normal. No visible mass, obstruction, or bowel wall thickening. Normal appendix. ABDOMINAL WALL: Normal. No mass or hernia. PELVIC NODES: Normal. No adenopathy. PELVIC ORGANS: Hysterectomy. Partially calcified cystic lesion of the left ovary, stable. BONES: Osteoarthritis of the hips and spine with remote fractures of the left superior/inferior pubic rami. LUNG BASES: No visible pulmonary or pleural disease. OTHER: Negative.     CONCLUSION: Mild left perinephric stranding. No significant collecting system dilation or obstructing urinary calculi. Findings may reflect sequela of a recently passed stone, differential including primary cystitis with ascending UTI. Recommend correlation with urinalysis. Electronically Verified and Signed by Attending Radiologist: Rosemarie Ashton MD 7/27/2025 2:18 PM Workstation: [id number]    CATH CARDIOVERSION  Result Date: 7/7/2025  Mindy Daniels MD     7/7/2025  9:56 AM  Electrophysiology Procedure Note Anastasia Hobson Location: Cath Lab [facility] [id number] MRN [id number] Admission Date 7/7/2025  Operation Date 07/07/25  Attending Physician Mindy Daniels MD Operating Physician Mindy Daniels MD Pre-Operative Diagnosis:Atrial Fibrillation Post-Operative Diagnosis: Same as above PROCEDURE(S) PERFORMED:  1.    Cardioversion. 2.     Sedation  :  Mindy Daniels MD  ANESTHESIA:  IV sedation. Moderate conscious sedation for this procedure was administered and personally monitored. Brevital 40 mg Sedation time: 10 minutes  INDICATION:  Persistent Atrial Fibrillation  COMPLICATIONS:  None.  METHODS:  The patient was brought to the outpatient cardiac telemetry unit in a fasting and nonsedated  state after providing informed consent.  IV sedation was administered during continuous ECG, pulse oximeter and noninvasive hemodynamic monitoring.  After administering IV Brevital in intermittent boluses, the desired level of sedation was achieved.  Cardioversion Energy:  200J Pad Position: [place] Pre- Cardioversion Rhythm- AF Post Cardioversion Rhythm - NSR CONCLUSIONS: 1.    Successful cardioversion  Plan: 1- Rhythm/Rate Control Meds:  no changes 2) Anticoagulation- no changes 3) Follow Up- 1 month       Mindy Daniels MD  Cardiac Electrophysiololgy [facility]      I personally reviewed the images myself and went over results with patient.    I viewed the CT kidney stone films from today myself.  There is no evidence of obstructing stones.    Patient was treated with intravenous Ancef in the emergency department.  Test results and treatment plan were discussed prior to disposition.                  MDM     #1.  Hemorrhagic cystitis.  No evidence of obstructing stone.  Antibiotics initiated.  Likely source of the patient's back pain.  Full 10-day course of antibiotics given.  2.  Hypertension.  May be related to agitation from ED visit as well as the patient's back pain.  A dose of metoprolol tartrate was given in the emergency department.  Patient was advised she can take an extra dose of her metoprolol at home on a daily basis if needed for markedly elevated blood pressure.  BP prior to discharge was 194/87.        Medical Decision Making      Disposition and Plan     Clinical Impression:  1. Acute cystitis with hematuria    2. Hypertension, unspecified type         Disposition:  Discharge  7/27/2025  2:31 pm    Follow-up:  Houston Prado MD  [address] [address]  [phone]    Call in 2 day(s)            Medications Prescribed:  Discharge Medication List as of 7/27/2025  2:53 PM        START taking these medications    Details   cefadroxil 500 MG Oral  Cap Take 1 capsule (500 mg total) by mouth 2 (two) times daily for 10 days., Normal, Disp-20 capsule, R-0      HYDROcodone-acetaminophen 5-325 MG Oral Tab Take 1-2 tablets by mouth every 6 (six) hours as needed for Pain., Normal, Disp-10 tablet, R-0                   Supplementary Documentation:

## 2025-07-27 NOTE — ED INITIAL ASSESSMENT (HPI)
+blood in urine and bilateral flank pain for last 3 days hx bladder cancer with recent removed of tumor x2 weeks ago. Done same with Dr Hernandez. Burning with urination.

## 2025-07-27 NOTE — ED INITIAL ASSESSMENT (HPI)
Pt presented to the ED accompanied by her daughter with c/o painful urination, hematuria, and juvenal back pain. h/o bladder CA    Pt reports symptoms have been ongoing and progressively worsening. States she is currently taking blood thinners. denies fevers, chills, CP, or SOB.    Additionally, Pt reports her HR has been \"really low\" over the past weeks, but has not experienced dizziness, syncope, or palpitations.

## 2025-07-29 RX ORDER — SULFAMETHOXAZOLE AND TRIMETHOPRIM 400; 80 MG/1; MG/1
1 TABLET ORAL 2 TIMES DAILY
Qty: 14 TABLET | Refills: 0 | Status: SHIPPED | OUTPATIENT
Start: 2025-07-29 | End: 2025-08-07 | Stop reason: CLARIF

## 2025-08-01 ENCOUNTER — OFFICE VISIT (OUTPATIENT)
Facility: LOCATION | Age: 85
End: 2025-08-01
Attending: INTERNAL MEDICINE

## 2025-08-01 VITALS
WEIGHT: 148 LBS | BODY MASS INDEX: 24.96 KG/M2 | OXYGEN SATURATION: 97 % | DIASTOLIC BLOOD PRESSURE: 65 MMHG | RESPIRATION RATE: 18 BRPM | SYSTOLIC BLOOD PRESSURE: 175 MMHG | TEMPERATURE: 98 F | HEART RATE: 63 BPM | HEIGHT: 64.49 IN

## 2025-08-01 DIAGNOSIS — C76.0 HEAD AND NECK CANCER (HCC): Primary | ICD-10-CM

## 2025-08-01 RX ORDER — ONDANSETRON 2 MG/ML
8 INJECTION INTRAMUSCULAR; INTRAVENOUS AS NEEDED
Start: 2025-08-01

## 2025-08-07 ENCOUNTER — APPOINTMENT (OUTPATIENT)
Dept: CT IMAGING | Facility: HOSPITAL | Age: 85
End: 2025-08-07
Attending: INTERNAL MEDICINE

## 2025-08-07 ENCOUNTER — APPOINTMENT (OUTPATIENT)
Dept: GENERAL RADIOLOGY | Facility: HOSPITAL | Age: 85
End: 2025-08-07

## 2025-08-07 ENCOUNTER — HOSPITAL ENCOUNTER (OUTPATIENT)
Facility: HOSPITAL | Age: 85
Setting detail: OBSERVATION
Discharge: HOME | End: 2025-08-09
Attending: EMERGENCY MEDICINE | Admitting: INTERNAL MEDICINE

## 2025-08-07 ENCOUNTER — APPOINTMENT (OUTPATIENT)
Dept: CT IMAGING | Facility: HOSPITAL | Age: 85
End: 2025-08-07
Attending: EMERGENCY MEDICINE

## 2025-08-07 DIAGNOSIS — R53.1 WEAKNESS: ICD-10-CM

## 2025-08-07 DIAGNOSIS — I50.9 ACUTE ON CHRONIC CONGESTIVE HEART FAILURE, UNSPECIFIED HEART FAILURE TYPE (HCC): Primary | ICD-10-CM

## 2025-08-07 LAB
ALBUMIN SERPL-MCNC: 4.5 G/DL (ref 3.2–4.8)
ALBUMIN/GLOB SERPL: 1.9 (ref 1–2)
ALP LIVER SERPL-CCNC: 55 U/L (ref 55–142)
ALT SERPL-CCNC: 55 U/L (ref 10–49)
ANION GAP SERPL CALC-SCNC: 16 MMOL/L (ref 0–18)
AST SERPL-CCNC: 70 U/L (ref ?–34)
ATRIAL RATE: 66 BPM
BASOPHILS # BLD AUTO: 0.04 X10(3) UL (ref 0–0.2)
BASOPHILS NFR BLD AUTO: 0.5 %
BILIRUB SERPL-MCNC: 0.6 MG/DL (ref 0.2–1.1)
BUN BLD-MCNC: 45 MG/DL (ref 9–23)
CALCIUM BLD-MCNC: 9.6 MG/DL (ref 8.7–10.6)
CHLORIDE SERPL-SCNC: 105 MMOL/L (ref 98–112)
CO2 SERPL-SCNC: 23 MMOL/L (ref 21–32)
CREAT BLD-MCNC: 2.88 MG/DL (ref 0.55–1.02)
EGFRCR SERPLBLD CKD-EPI 2021: 16 ML/MIN/1.73M2 (ref 60–?)
EOSINOPHIL # BLD AUTO: 0.13 X10(3) UL (ref 0–0.7)
EOSINOPHIL NFR BLD AUTO: 1.5 %
ERYTHROCYTE [DISTWIDTH] IN BLOOD BY AUTOMATED COUNT: 17.8 %
EST. AVERAGE GLUCOSE BLD GHB EST-MCNC: 146 MG/DL (ref 68–126)
GLOBULIN PLAS-MCNC: 2.4 G/DL (ref 2–3.5)
GLUCOSE BLD-MCNC: 115 MG/DL (ref 70–99)
GLUCOSE BLD-MCNC: 183 MG/DL (ref 70–99)
GLUCOSE BLD-MCNC: 60 MG/DL (ref 70–99)
HBA1C MFR BLD: 6.7 % (ref ?–5.7)
HCT VFR BLD AUTO: 38.6 % (ref 35–48)
HGB BLD-MCNC: 12.2 G/DL (ref 12–16)
IMM GRANULOCYTES # BLD AUTO: 0.04 X10(3) UL (ref 0–1)
IMM GRANULOCYTES NFR BLD: 0.5 %
LYMPHOCYTES # BLD AUTO: 2.24 X10(3) UL (ref 1–4)
LYMPHOCYTES NFR BLD AUTO: 25.7 %
MCH RBC QN AUTO: 26.1 PG (ref 26–34)
MCHC RBC AUTO-ENTMCNC: 31.6 G/DL (ref 31–37)
MCV RBC AUTO: 82.5 FL (ref 80–100)
MONOCYTES # BLD AUTO: 1.14 X10(3) UL (ref 0.1–1)
MONOCYTES NFR BLD AUTO: 13.1 %
NEUTROPHILS # BLD AUTO: 5.13 X10 (3) UL (ref 1.5–7.7)
NEUTROPHILS # BLD AUTO: 5.13 X10(3) UL (ref 1.5–7.7)
NEUTROPHILS NFR BLD AUTO: 58.7 %
NT-PROBNP SERPL-MCNC: 1414 PG/ML (ref ?–450)
OSMOLALITY SERPL CALC.SUM OF ELEC: 307 MOSM/KG (ref 275–295)
P AXIS: 62 DEGREES
P-R INTERVAL: 248 MS
PLATELET # BLD AUTO: 296 10(3)UL (ref 150–450)
POTASSIUM SERPL-SCNC: 4.9 MMOL/L (ref 3.5–5.1)
PROT SERPL-MCNC: 6.9 G/DL (ref 5.7–8.2)
Q-T INTERVAL: 428 MS
QRS DURATION: 88 MS
QTC CALCULATION (BEZET): 448 MS
R AXIS: -37 DEGREES
RBC # BLD AUTO: 4.68 X10(6)UL (ref 3.8–5.3)
SODIUM SERPL-SCNC: 144 MMOL/L (ref 136–145)
T AXIS: 54 DEGREES
TROPONIN I SERPL HS-MCNC: 10 NG/L (ref ?–34)
VENTRICULAR RATE: 66 BPM
WBC # BLD AUTO: 8.7 X10(3) UL (ref 4–11)

## 2025-08-07 PROCEDURE — 99223 1ST HOSP IP/OBS HIGH 75: CPT | Performed by: INTERNAL MEDICINE

## 2025-08-07 PROCEDURE — 71045 X-RAY EXAM CHEST 1 VIEW: CPT

## 2025-08-07 PROCEDURE — 71250 CT THORAX DX C-: CPT | Performed by: INTERNAL MEDICINE

## 2025-08-07 PROCEDURE — 70450 CT HEAD/BRAIN W/O DYE: CPT | Performed by: EMERGENCY MEDICINE

## 2025-08-07 RX ORDER — DEXTROSE MONOHYDRATE 25 G/50ML
50 INJECTION, SOLUTION INTRAVENOUS
Status: DISCONTINUED | OUTPATIENT
Start: 2025-08-07 | End: 2025-08-09

## 2025-08-07 RX ORDER — ONDANSETRON 2 MG/ML
4 INJECTION INTRAMUSCULAR; INTRAVENOUS EVERY 6 HOURS PRN
Status: DISCONTINUED | OUTPATIENT
Start: 2025-08-07 | End: 2025-08-09

## 2025-08-07 RX ORDER — AMIODARONE HYDROCHLORIDE 200 MG/1
200 TABLET ORAL DAILY
Status: DISCONTINUED | OUTPATIENT
Start: 2025-08-07 | End: 2025-08-07

## 2025-08-07 RX ORDER — FENOFIBRATE 134 MG/1
134 CAPSULE ORAL
Status: DISCONTINUED | OUTPATIENT
Start: 2025-08-08 | End: 2025-08-09

## 2025-08-07 RX ORDER — NICOTINE POLACRILEX 4 MG
30 LOZENGE BUCCAL
Status: DISCONTINUED | OUTPATIENT
Start: 2025-08-07 | End: 2025-08-09

## 2025-08-07 RX ORDER — NITROGLYCERIN 0.4 MG/1
TABLET SUBLINGUAL
Status: DISCONTINUED
Start: 2025-08-07 | End: 2025-08-07 | Stop reason: WASHOUT

## 2025-08-07 RX ORDER — HYDRALAZINE HYDROCHLORIDE 50 MG/1
100 TABLET, FILM COATED ORAL 3 TIMES DAILY
Status: DISCONTINUED | OUTPATIENT
Start: 2025-08-07 | End: 2025-08-09

## 2025-08-07 RX ORDER — DILTIAZEM HYDROCHLORIDE 120 MG/1
120 CAPSULE, EXTENDED RELEASE ORAL DAILY
Status: DISCONTINUED | OUTPATIENT
Start: 2025-08-08 | End: 2025-08-09

## 2025-08-07 RX ORDER — METOPROLOL SUCCINATE 25 MG/1
25 TABLET, EXTENDED RELEASE ORAL DAILY
Status: DISCONTINUED | OUTPATIENT
Start: 2025-08-07 | End: 2025-08-07

## 2025-08-07 RX ORDER — BISACODYL 10 MG
10 SUPPOSITORY, RECTAL RECTAL
Status: DISCONTINUED | OUTPATIENT
Start: 2025-08-07 | End: 2025-08-09

## 2025-08-07 RX ORDER — ATORVASTATIN CALCIUM 80 MG/1
80 TABLET, FILM COATED ORAL NIGHTLY
Status: DISCONTINUED | OUTPATIENT
Start: 2025-08-07 | End: 2025-08-09

## 2025-08-07 RX ORDER — METOCLOPRAMIDE HYDROCHLORIDE 5 MG/ML
5 INJECTION INTRAMUSCULAR; INTRAVENOUS EVERY 8 HOURS PRN
Status: DISCONTINUED | OUTPATIENT
Start: 2025-08-07 | End: 2025-08-09

## 2025-08-07 RX ORDER — POLYETHYLENE GLYCOL 3350 17 G/17G
17 POWDER, FOR SOLUTION ORAL DAILY PRN
Status: DISCONTINUED | OUTPATIENT
Start: 2025-08-07 | End: 2025-08-09

## 2025-08-07 RX ORDER — ECHINACEA PURPUREA EXTRACT 125 MG
1 TABLET ORAL
Status: DISCONTINUED | OUTPATIENT
Start: 2025-08-07 | End: 2025-08-09

## 2025-08-07 RX ORDER — GABAPENTIN 100 MG/1
100 CAPSULE ORAL 2 TIMES DAILY
Status: DISCONTINUED | OUTPATIENT
Start: 2025-08-07 | End: 2025-08-09

## 2025-08-07 RX ORDER — SENNOSIDES 8.6 MG
17.2 TABLET ORAL NIGHTLY PRN
Status: DISCONTINUED | OUTPATIENT
Start: 2025-08-07 | End: 2025-08-09

## 2025-08-07 RX ORDER — ACETAMINOPHEN 500 MG
1000 TABLET ORAL EVERY 8 HOURS PRN
Status: DISCONTINUED | OUTPATIENT
Start: 2025-08-07 | End: 2025-08-09

## 2025-08-07 RX ORDER — LISINOPRIL 20 MG/1
20 TABLET ORAL 2 TIMES DAILY
COMMUNITY
End: 2025-08-09

## 2025-08-07 RX ORDER — BENZONATATE 100 MG/1
200 CAPSULE ORAL 3 TIMES DAILY PRN
Status: DISCONTINUED | OUTPATIENT
Start: 2025-08-07 | End: 2025-08-09

## 2025-08-07 RX ORDER — NICOTINE POLACRILEX 4 MG
15 LOZENGE BUCCAL
Status: DISCONTINUED | OUTPATIENT
Start: 2025-08-07 | End: 2025-08-09

## 2025-08-08 ENCOUNTER — APPOINTMENT (OUTPATIENT)
Dept: CV DIAGNOSTICS | Facility: HOSPITAL | Age: 85
End: 2025-08-08
Attending: INTERNAL MEDICINE

## 2025-08-08 LAB
ADENOVIRUS PCR:: NOT DETECTED
ALBUMIN SERPL-MCNC: 4 G/DL (ref 3.2–4.8)
ALBUMIN/GLOB SERPL: 1.7 (ref 1–2)
ALP LIVER SERPL-CCNC: 50 U/L (ref 55–142)
ALT SERPL-CCNC: 49 U/L (ref 10–49)
ANION GAP SERPL CALC-SCNC: 9 MMOL/L (ref 0–18)
AST SERPL-CCNC: 56 U/L (ref ?–34)
B PARAPERT DNA SPEC QL NAA+PROBE: NOT DETECTED
B PERT DNA SPEC QL NAA+PROBE: NOT DETECTED
BASOPHILS # BLD AUTO: 0.03 X10(3) UL (ref 0–0.2)
BASOPHILS NFR BLD AUTO: 0.4 %
BILIRUB SERPL-MCNC: 0.8 MG/DL (ref 0.2–1.1)
BILIRUB UR QL STRIP.AUTO: NEGATIVE
BUN BLD-MCNC: 43 MG/DL (ref 9–23)
C PNEUM DNA SPEC QL NAA+PROBE: NOT DETECTED
CALCIUM BLD-MCNC: 9.1 MG/DL (ref 8.7–10.6)
CHLORIDE SERPL-SCNC: 108 MMOL/L (ref 98–112)
CLARITY UR REFRACT.AUTO: CLEAR
CO2 SERPL-SCNC: 23 MMOL/L (ref 21–32)
CORONAVIRUS 229E PCR:: NOT DETECTED
CORONAVIRUS HKU1 PCR:: NOT DETECTED
CORONAVIRUS NL63 PCR:: NOT DETECTED
CORONAVIRUS OC43 PCR:: NOT DETECTED
CREAT BLD-MCNC: 2.79 MG/DL (ref 0.55–1.02)
EGFRCR SERPLBLD CKD-EPI 2021: 16 ML/MIN/1.73M2 (ref 60–?)
EOSINOPHIL # BLD AUTO: 0.16 X10(3) UL (ref 0–0.7)
EOSINOPHIL NFR BLD AUTO: 2.3 %
ERYTHROCYTE [DISTWIDTH] IN BLOOD BY AUTOMATED COUNT: 17.9 %
FLUAV RNA SPEC QL NAA+PROBE: NOT DETECTED
FLUBV RNA SPEC QL NAA+PROBE: NOT DETECTED
GLOBULIN PLAS-MCNC: 2.3 G/DL (ref 2–3.5)
GLUCOSE BLD-MCNC: 107 MG/DL (ref 70–99)
GLUCOSE BLD-MCNC: 116 MG/DL (ref 70–99)
GLUCOSE BLD-MCNC: 146 MG/DL (ref 70–99)
GLUCOSE BLD-MCNC: 255 MG/DL (ref 70–99)
GLUCOSE BLD-MCNC: 262 MG/DL (ref 70–99)
GLUCOSE UR STRIP.AUTO-MCNC: NORMAL MG/DL
HCT VFR BLD AUTO: 34.6 % (ref 35–48)
HGB BLD-MCNC: 11.4 G/DL (ref 12–16)
IMM GRANULOCYTES # BLD AUTO: 0.03 X10(3) UL (ref 0–1)
IMM GRANULOCYTES NFR BLD: 0.4 %
KETONES UR STRIP.AUTO-MCNC: NEGATIVE MG/DL
LEUKOCYTE ESTERASE UR QL STRIP.AUTO: NEGATIVE
LYMPHOCYTES # BLD AUTO: 1 X10(3) UL (ref 1–4)
LYMPHOCYTES NFR BLD AUTO: 14.3 %
MAGNESIUM SERPL-MCNC: 2.1 MG/DL (ref 1.6–2.6)
MCH RBC QN AUTO: 26.5 PG (ref 26–34)
MCHC RBC AUTO-ENTMCNC: 32.9 G/DL (ref 31–37)
MCV RBC AUTO: 80.5 FL (ref 80–100)
METAPNEUMOVIRUS PCR:: NOT DETECTED
MONOCYTES # BLD AUTO: 0.93 X10(3) UL (ref 0.1–1)
MONOCYTES NFR BLD AUTO: 13.3 %
MYCOPLASMA PNEUMONIA PCR:: NOT DETECTED
NEUTROPHILS # BLD AUTO: 4.85 X10 (3) UL (ref 1.5–7.7)
NEUTROPHILS # BLD AUTO: 4.85 X10(3) UL (ref 1.5–7.7)
NEUTROPHILS NFR BLD AUTO: 69.3 %
NITRITE UR QL STRIP.AUTO: NEGATIVE
OSMOLALITY SERPL CALC.SUM OF ELEC: 302 MOSM/KG (ref 275–295)
PARAINFLUENZA 1 PCR:: NOT DETECTED
PARAINFLUENZA 2 PCR:: NOT DETECTED
PARAINFLUENZA 3 PCR:: NOT DETECTED
PARAINFLUENZA 4 PCR:: NOT DETECTED
PH UR STRIP.AUTO: 7.5 (ref 5–8)
PLATELET # BLD AUTO: 250 10(3)UL (ref 150–450)
POTASSIUM SERPL-SCNC: 4.5 MMOL/L (ref 3.5–5.1)
PROT SERPL-MCNC: 6.3 G/DL (ref 5.7–8.2)
PROT UR STRIP.AUTO-MCNC: NEGATIVE MG/DL
RBC # BLD AUTO: 4.3 X10(6)UL (ref 3.8–5.3)
RBC UR QL AUTO: NEGATIVE
RHINOVIRUS/ENTERO PCR:: NOT DETECTED
RSV RNA SPEC QL NAA+PROBE: NOT DETECTED
SARS-COV-2 RNA NPH QL NAA+NON-PROBE: NOT DETECTED
SODIUM SERPL-SCNC: 140 MMOL/L (ref 136–145)
SP GR UR STRIP.AUTO: 1.02 (ref 1–1.03)
UROBILINOGEN UR STRIP.AUTO-MCNC: NORMAL MG/DL
WBC # BLD AUTO: 7 X10(3) UL (ref 4–11)

## 2025-08-08 PROCEDURE — 93306 TTE W/DOPPLER COMPLETE: CPT | Performed by: INTERNAL MEDICINE

## 2025-08-08 PROCEDURE — 99232 SBSQ HOSP IP/OBS MODERATE 35: CPT | Performed by: HOSPITALIST

## 2025-08-08 RX ORDER — OXYCODONE HYDROCHLORIDE 5 MG/1
5 TABLET ORAL ONCE
Refills: 0 | Status: COMPLETED | OUTPATIENT
Start: 2025-08-08 | End: 2025-08-08

## 2025-08-09 VITALS
OXYGEN SATURATION: 95 % | BODY MASS INDEX: 25 KG/M2 | TEMPERATURE: 98 F | SYSTOLIC BLOOD PRESSURE: 167 MMHG | RESPIRATION RATE: 16 BRPM | HEART RATE: 69 BPM | DIASTOLIC BLOOD PRESSURE: 70 MMHG | WEIGHT: 145.19 LBS

## 2025-08-09 LAB
ALBUMIN SERPL-MCNC: 3.9 G/DL (ref 3.2–4.8)
ALBUMIN/GLOB SERPL: 1.8 (ref 1–2)
ALP LIVER SERPL-CCNC: 50 U/L (ref 55–142)
ALT SERPL-CCNC: 45 U/L (ref 10–49)
ANION GAP SERPL CALC-SCNC: 11 MMOL/L (ref 0–18)
AST SERPL-CCNC: 52 U/L (ref ?–34)
BASOPHILS # BLD AUTO: 0.02 X10(3) UL (ref 0–0.2)
BASOPHILS NFR BLD AUTO: 0.3 %
BILIRUB SERPL-MCNC: 0.7 MG/DL (ref 0.2–1.1)
BUN BLD-MCNC: 44 MG/DL (ref 9–23)
CALCIUM BLD-MCNC: 9.2 MG/DL (ref 8.7–10.6)
CHLORIDE SERPL-SCNC: 110 MMOL/L (ref 98–112)
CO2 SERPL-SCNC: 21 MMOL/L (ref 21–32)
CREAT BLD-MCNC: 2.88 MG/DL (ref 0.55–1.02)
EGFRCR SERPLBLD CKD-EPI 2021: 16 ML/MIN/1.73M2 (ref 60–?)
EOSINOPHIL # BLD AUTO: 0.17 X10(3) UL (ref 0–0.7)
EOSINOPHIL NFR BLD AUTO: 2.9 %
ERYTHROCYTE [DISTWIDTH] IN BLOOD BY AUTOMATED COUNT: 18.1 %
GLOBULIN PLAS-MCNC: 2.2 G/DL (ref 2–3.5)
GLUCOSE BLD-MCNC: 131 MG/DL (ref 70–99)
GLUCOSE BLD-MCNC: 155 MG/DL (ref 70–99)
GLUCOSE BLD-MCNC: 207 MG/DL (ref 70–99)
HCT VFR BLD AUTO: 33.9 % (ref 35–48)
HGB BLD-MCNC: 10.8 G/DL (ref 12–16)
IMM GRANULOCYTES # BLD AUTO: 0.03 X10(3) UL (ref 0–1)
IMM GRANULOCYTES NFR BLD: 0.5 %
IRON SATN MFR SERPL: 39 % (ref 15–50)
IRON SERPL-MCNC: 129 UG/DL (ref 50–170)
LYMPHOCYTES # BLD AUTO: 0.96 X10(3) UL (ref 1–4)
LYMPHOCYTES NFR BLD AUTO: 16.4 %
MCH RBC QN AUTO: 26.5 PG (ref 26–34)
MCHC RBC AUTO-ENTMCNC: 31.9 G/DL (ref 31–37)
MCV RBC AUTO: 83.3 FL (ref 80–100)
MONOCYTES # BLD AUTO: 0.96 X10(3) UL (ref 0.1–1)
MONOCYTES NFR BLD AUTO: 16.4 %
NEUTROPHILS # BLD AUTO: 3.71 X10 (3) UL (ref 1.5–7.7)
NEUTROPHILS # BLD AUTO: 3.71 X10(3) UL (ref 1.5–7.7)
NEUTROPHILS NFR BLD AUTO: 63.5 %
OSMOLALITY SERPL CALC.SUM OF ELEC: 308 MOSM/KG (ref 275–295)
PLATELET # BLD AUTO: 228 10(3)UL (ref 150–450)
POTASSIUM SERPL-SCNC: 4.9 MMOL/L (ref 3.5–5.1)
PROT SERPL-MCNC: 6.1 G/DL (ref 5.7–8.2)
RBC # BLD AUTO: 4.07 X10(6)UL (ref 3.8–5.3)
SODIUM SERPL-SCNC: 142 MMOL/L (ref 136–145)
TOTAL IRON BINDING CAPACITY: 330 UG/DL (ref 250–425)
TRANSFERRIN SERPL-MCNC: 272 MG/DL (ref 250–380)
WBC # BLD AUTO: 5.9 X10(3) UL (ref 4–11)

## 2025-08-09 PROCEDURE — 99239 HOSP IP/OBS DSCHRG MGMT >30: CPT | Performed by: HOSPITALIST

## 2025-08-09 RX ORDER — AMLODIPINE BESYLATE 10 MG/1
10 TABLET ORAL DAILY
Qty: 30 TABLET | Refills: 1 | Status: SHIPPED | OUTPATIENT
Start: 2025-08-10

## 2025-08-09 RX ORDER — AMLODIPINE BESYLATE 5 MG/1
10 TABLET ORAL DAILY
Status: DISCONTINUED | OUTPATIENT
Start: 2025-08-09 | End: 2025-08-09

## 2025-08-09 RX ORDER — AMLODIPINE BESYLATE 5 MG/1
10 TABLET ORAL DAILY
Status: DISCONTINUED | OUTPATIENT
Start: 2025-08-10 | End: 2025-08-09

## 2025-08-11 ENCOUNTER — HOSPITAL ENCOUNTER (OUTPATIENT)
Dept: NUCLEAR MEDICINE | Facility: HOSPITAL | Age: 85
Discharge: HOME | End: 2025-08-11
Attending: INTERNAL MEDICINE

## 2025-08-11 DIAGNOSIS — C76.0 HEAD AND NECK CANCER (HCC): ICD-10-CM

## 2025-08-11 LAB — GLUCOSE BLD-MCNC: 120 MG/DL (ref 70–99)

## 2025-08-11 PROCEDURE — 78815 PET IMAGE W/CT SKULL-THIGH: CPT | Performed by: INTERNAL MEDICINE

## 2025-08-11 PROCEDURE — 82962 GLUCOSE BLOOD TEST: CPT

## 2025-08-13 DIAGNOSIS — N18.32 TYPE 2 DIABETES MELLITUS WITH STAGE 3B CHRONIC KIDNEY DISEASE, WITH LONG-TERM CURRENT USE OF INSULIN (HCC): ICD-10-CM

## 2025-08-13 DIAGNOSIS — Z79.4 TYPE 2 DIABETES MELLITUS WITH STAGE 3B CHRONIC KIDNEY DISEASE, WITH LONG-TERM CURRENT USE OF INSULIN (HCC): ICD-10-CM

## 2025-08-13 DIAGNOSIS — E11.22 TYPE 2 DIABETES MELLITUS WITH STAGE 3B CHRONIC KIDNEY DISEASE, WITH LONG-TERM CURRENT USE OF INSULIN (HCC): ICD-10-CM

## 2025-08-15 ENCOUNTER — TELEPHONE (OUTPATIENT)
Dept: FAMILY MEDICINE CLINIC | Facility: CLINIC | Age: 85
End: 2025-08-15

## 2025-08-17 ENCOUNTER — PATIENT MESSAGE (OUTPATIENT)
Dept: FAMILY MEDICINE CLINIC | Facility: CLINIC | Age: 85
End: 2025-08-17

## 2025-08-17 DIAGNOSIS — N18.32 TYPE 2 DIABETES MELLITUS WITH STAGE 3B CHRONIC KIDNEY DISEASE, WITH LONG-TERM CURRENT USE OF INSULIN (HCC): ICD-10-CM

## 2025-08-17 DIAGNOSIS — Z79.4 TYPE 2 DIABETES MELLITUS WITH STAGE 3B CHRONIC KIDNEY DISEASE, WITH LONG-TERM CURRENT USE OF INSULIN (HCC): ICD-10-CM

## 2025-08-17 DIAGNOSIS — E11.22 TYPE 2 DIABETES MELLITUS WITH STAGE 3B CHRONIC KIDNEY DISEASE, WITH LONG-TERM CURRENT USE OF INSULIN (HCC): ICD-10-CM

## 2025-08-17 RX ORDER — INSULIN LISPRO 100 [IU]/ML
INJECTION, SOLUTION INTRAVENOUS; SUBCUTANEOUS
Qty: 15 ML | Refills: 0 | Status: SHIPPED | OUTPATIENT
Start: 2025-08-17

## 2025-08-18 RX ORDER — INSULIN LISPRO 100 [IU]/ML
INJECTION, SOLUTION INTRAVENOUS; SUBCUTANEOUS
Qty: 15 ML | Refills: 0
Start: 2025-08-18

## 2025-08-19 ENCOUNTER — OFFICE VISIT (OUTPATIENT)
Dept: FAMILY MEDICINE CLINIC | Facility: CLINIC | Age: 85
End: 2025-08-19

## 2025-08-19 VITALS
TEMPERATURE: 96 F | HEIGHT: 64 IN | RESPIRATION RATE: 16 BRPM | DIASTOLIC BLOOD PRESSURE: 60 MMHG | OXYGEN SATURATION: 95 % | WEIGHT: 145 LBS | BODY MASS INDEX: 24.75 KG/M2 | HEART RATE: 69 BPM | SYSTOLIC BLOOD PRESSURE: 140 MMHG

## 2025-08-19 DIAGNOSIS — I10 BENIGN ESSENTIAL HTN: ICD-10-CM

## 2025-08-19 DIAGNOSIS — C67.2 MALIGNANT NEOPLASM OF LATERAL WALL OF URINARY BLADDER (HCC): ICD-10-CM

## 2025-08-19 DIAGNOSIS — R06.02 SHORTNESS OF BREATH: ICD-10-CM

## 2025-08-19 DIAGNOSIS — Z00.00 MEDICARE ANNUAL WELLNESS VISIT, SUBSEQUENT: Primary | ICD-10-CM

## 2025-08-19 DIAGNOSIS — R79.89 ELEVATED SERUM CREATININE: ICD-10-CM

## 2025-08-19 PROBLEM — M62.81 MUSCLE WEAKNESS: Status: ACTIVE | Noted: 2018-05-20

## 2025-08-19 PROBLEM — M54.6 PAIN IN THORACIC SPINE: Status: ACTIVE | Noted: 2025-03-14

## 2025-08-19 PROBLEM — M47.22 RADICULOPATHY DUE TO CERVICAL SPONDYLOSIS: Status: ACTIVE | Noted: 2018-05-07

## 2025-08-19 PROBLEM — R26.2 DIFFICULTY WALKING: Status: RESOLVED | Noted: 2018-05-20 | Resolved: 2025-08-19

## 2025-08-19 PROBLEM — C53.9 MALIGNANT TUMOR OF CERVIX (HCC): Status: ACTIVE | Noted: 2025-03-14

## 2025-08-19 PROBLEM — L89.322 PRESSURE ULCER OF LEFT BUTTOCK, STAGE 2 (HCC): Status: RESOLVED | Noted: 2024-11-19 | Resolved: 2025-08-19

## 2025-08-19 PROBLEM — I50.32 CHRONIC HEART FAILURE WITH PRESERVED EJECTION FRACTION (HCC): Status: ACTIVE | Noted: 2025-05-09

## 2025-08-19 PROBLEM — R41.841 COGNITIVE COMMUNICATION DISORDER: Status: ACTIVE | Noted: 2025-03-14

## 2025-08-19 PROCEDURE — G0439 PPPS, SUBSEQ VISIT: HCPCS | Performed by: FAMILY MEDICINE

## 2025-08-22 ENCOUNTER — LAB ENCOUNTER (OUTPATIENT)
Dept: LAB | Age: 85
End: 2025-08-22
Attending: FAMILY MEDICINE

## 2025-08-22 ENCOUNTER — OFFICE VISIT (OUTPATIENT)
Facility: LOCATION | Age: 85
End: 2025-08-22
Attending: INTERNAL MEDICINE

## 2025-08-22 VITALS
DIASTOLIC BLOOD PRESSURE: 64 MMHG | TEMPERATURE: 98 F | BODY MASS INDEX: 24.39 KG/M2 | SYSTOLIC BLOOD PRESSURE: 129 MMHG | WEIGHT: 144.63 LBS | HEART RATE: 67 BPM | HEIGHT: 64.49 IN | RESPIRATION RATE: 18 BRPM | OXYGEN SATURATION: 99 %

## 2025-08-22 DIAGNOSIS — I50.9 ACUTE ON CHRONIC CONGESTIVE HEART FAILURE, UNSPECIFIED HEART FAILURE TYPE (HCC): ICD-10-CM

## 2025-08-22 DIAGNOSIS — C76.0 HEAD AND NECK CANCER (HCC): Primary | ICD-10-CM

## 2025-08-22 DIAGNOSIS — R93.89 ABNORMAL FINDINGS ON DIAGNOSTIC IMAGING OF OTHER SPECIFIED BODY STRUCTURES: ICD-10-CM

## 2025-08-22 DIAGNOSIS — G89.3 NEOPLASM RELATED PAIN: ICD-10-CM

## 2025-08-22 DIAGNOSIS — Z00.00 MEDICARE ANNUAL WELLNESS VISIT, SUBSEQUENT: ICD-10-CM

## 2025-08-22 LAB
ALBUMIN SERPL-MCNC: 4.5 G/DL (ref 3.2–4.8)
ALBUMIN/GLOB SERPL: 1.9 (ref 1–2)
ALP LIVER SERPL-CCNC: 49 U/L (ref 55–142)
ALT SERPL-CCNC: 29 U/L (ref 10–49)
ANION GAP SERPL CALC-SCNC: 12 MMOL/L (ref 0–18)
AST SERPL-CCNC: 40 U/L (ref ?–34)
BASOPHILS # BLD AUTO: 0.02 X10(3) UL (ref 0–0.2)
BASOPHILS NFR BLD AUTO: 0.3 %
BILIRUB SERPL-MCNC: 0.7 MG/DL (ref 0.2–1.1)
BUN BLD-MCNC: 26 MG/DL (ref 9–23)
CALCIUM BLD-MCNC: 10.5 MG/DL (ref 8.7–10.6)
CHLORIDE SERPL-SCNC: 106 MMOL/L (ref 98–112)
CHOLEST SERPL-MCNC: 145 MG/DL (ref ?–200)
CO2 SERPL-SCNC: 24 MMOL/L (ref 21–32)
CREAT BLD-MCNC: 1.74 MG/DL (ref 0.55–1.02)
EGFRCR SERPLBLD CKD-EPI 2021: 29 ML/MIN/1.73M2 (ref 60–?)
EOSINOPHIL # BLD AUTO: 0.04 X10(3) UL (ref 0–0.7)
EOSINOPHIL NFR BLD AUTO: 0.6 %
ERYTHROCYTE [DISTWIDTH] IN BLOOD BY AUTOMATED COUNT: 18.3 %
FASTING PATIENT LIPID ANSWER: YES
FASTING STATUS PATIENT QL REPORTED: YES
GLOBULIN PLAS-MCNC: 2.4 G/DL (ref 2–3.5)
GLUCOSE BLD-MCNC: 71 MG/DL (ref 70–99)
HCT VFR BLD AUTO: 39.2 % (ref 35–48)
HDLC SERPL-MCNC: 68 MG/DL (ref 40–59)
HGB BLD-MCNC: 12.7 G/DL (ref 12–16)
IMM GRANULOCYTES # BLD AUTO: 0.03 X10(3) UL (ref 0–1)
IMM GRANULOCYTES NFR BLD: 0.4 %
LDLC SERPL CALC-MCNC: 60 MG/DL (ref ?–100)
LYMPHOCYTES # BLD AUTO: 1.11 X10(3) UL (ref 1–4)
LYMPHOCYTES NFR BLD AUTO: 15.5 %
MCH RBC QN AUTO: 26.8 PG (ref 26–34)
MCHC RBC AUTO-ENTMCNC: 32.4 G/DL (ref 31–37)
MCV RBC AUTO: 82.7 FL (ref 80–100)
MONOCYTES # BLD AUTO: 0.92 X10(3) UL (ref 0.1–1)
MONOCYTES NFR BLD AUTO: 12.8 %
NEUTROPHILS # BLD AUTO: 5.05 X10 (3) UL (ref 1.5–7.7)
NEUTROPHILS # BLD AUTO: 5.05 X10(3) UL (ref 1.5–7.7)
NEUTROPHILS NFR BLD AUTO: 70.4 %
NONHDLC SERPL-MCNC: 77 MG/DL (ref ?–130)
OSMOLALITY SERPL CALC.SUM OF ELEC: 297 MOSM/KG (ref 275–295)
PLATELET # BLD AUTO: 234 10(3)UL (ref 150–450)
POTASSIUM SERPL-SCNC: 4.7 MMOL/L (ref 3.5–5.1)
PROT SERPL-MCNC: 6.9 G/DL (ref 5.7–8.2)
RBC # BLD AUTO: 4.74 X10(6)UL (ref 3.8–5.3)
SODIUM SERPL-SCNC: 142 MMOL/L (ref 136–145)
TRIGL SERPL-MCNC: 94 MG/DL (ref 30–149)
VLDLC SERPL CALC-MCNC: 14 MG/DL (ref 0–30)
WBC # BLD AUTO: 7.2 X10(3) UL (ref 4–11)

## 2025-08-22 PROCEDURE — 80061 LIPID PANEL: CPT

## 2025-08-22 PROCEDURE — 85025 COMPLETE CBC W/AUTO DIFF WBC: CPT

## 2025-08-22 PROCEDURE — 80053 COMPREHEN METABOLIC PANEL: CPT

## 2025-08-22 PROCEDURE — 36415 COLL VENOUS BLD VENIPUNCTURE: CPT

## 2025-08-22 RX ORDER — HYDROMORPHONE HYDROCHLORIDE 2 MG/1
TABLET ORAL EVERY 4 HOURS PRN
Qty: 90 TABLET | Refills: 0 | Status: SHIPPED | OUTPATIENT
Start: 2025-08-22

## 2025-08-27 ENCOUNTER — TUMOR BOARD CONFERENCE (OUTPATIENT)
Facility: LOCATION | Age: 85
End: 2025-08-27

## 2025-08-27 ENCOUNTER — TELEPHONE (OUTPATIENT)
Facility: LOCATION | Age: 85
End: 2025-08-27

## 2025-08-28 ENCOUNTER — LAB ENCOUNTER (OUTPATIENT)
Dept: LAB | Age: 85
End: 2025-08-28
Attending: FAMILY MEDICINE

## 2025-08-28 ENCOUNTER — OFFICE VISIT (OUTPATIENT)
Facility: LOCATION | Age: 85
End: 2025-08-28
Attending: INTERNAL MEDICINE

## 2025-08-28 ENCOUNTER — OFFICE VISIT (OUTPATIENT)
Facility: CLINIC | Age: 85
End: 2025-08-28

## 2025-08-28 VITALS
SYSTOLIC BLOOD PRESSURE: 114 MMHG | WEIGHT: 145 LBS | RESPIRATION RATE: 16 BRPM | OXYGEN SATURATION: 98 % | HEIGHT: 64 IN | BODY MASS INDEX: 24.75 KG/M2 | HEART RATE: 71 BPM | DIASTOLIC BLOOD PRESSURE: 68 MMHG

## 2025-08-28 DIAGNOSIS — R06.09 DOE (DYSPNEA ON EXERTION): ICD-10-CM

## 2025-08-28 DIAGNOSIS — R79.89 ELEVATED SERUM CREATININE: ICD-10-CM

## 2025-08-28 DIAGNOSIS — R91.8 MULTIPLE PULMONARY NODULES: Primary | ICD-10-CM

## 2025-08-28 DIAGNOSIS — Z51.5 PALLIATIVE CARE BY SPECIALIST: ICD-10-CM

## 2025-08-28 DIAGNOSIS — I10 BENIGN ESSENTIAL HTN: ICD-10-CM

## 2025-08-28 DIAGNOSIS — G89.3 NEOPLASM RELATED PAIN: Primary | ICD-10-CM

## 2025-08-28 DIAGNOSIS — C76.0 HEAD AND NECK CANCER (HCC): ICD-10-CM

## 2025-08-28 LAB
CREAT UR-SCNC: 68.9 MG/DL
MICROALBUMIN UR-MCNC: <0.3 MG/DL

## 2025-08-28 PROCEDURE — 99204 OFFICE O/P NEW MOD 45 MIN: CPT | Performed by: INTERNAL MEDICINE

## 2025-08-28 PROCEDURE — 82570 ASSAY OF URINE CREATININE: CPT

## 2025-08-28 PROCEDURE — 82043 UR ALBUMIN QUANTITATIVE: CPT

## 2025-08-28 RX ORDER — FENTANYL 12.5 UG/1
1 PATCH TRANSDERMAL
Qty: 10 PATCH | Refills: 0 | Status: SHIPPED | OUTPATIENT
Start: 2025-08-28

## 2025-09-01 DIAGNOSIS — C76.0: Primary | ICD-10-CM

## (undated) DIAGNOSIS — M54.2 NECK PAIN: ICD-10-CM

## (undated) DIAGNOSIS — M54.50 LOW BACK PAIN: ICD-10-CM

## (undated) DIAGNOSIS — M47.22 OSTEOARTHRITIS OF SPINE WITH RADICULOPATHY, CERVICAL REGION: ICD-10-CM

## (undated) DEVICE — DRAPE C-ARM UNIVERSAL

## (undated) DEVICE — GIJAW SINGLE-USE BIOPSY FORCEPS WITH NEEDLE: Brand: GIJAW

## (undated) DEVICE — STERILE SYNTHETIC POLYISOPRENE POWDER-FREE SURGICAL GLOVES WITH HYDROGEL COATING: Brand: PROTEXIS

## (undated) DEVICE — CHEMOTHERAPY CONTAINER,SLIDE LID, YELLOW: Brand: SHARPSAFETY

## (undated) DEVICE — REM POLYHESIVE ADULT PATIENT RETURN ELECTRODE: Brand: VALLEYLAB

## (undated) DEVICE — Device

## (undated) DEVICE — KIT VLV 5 PC AIR H2O SUCT BX ENDOGATOR CONN

## (undated) DEVICE — UPPER EXTREMITY CDS-LF: Brand: MEDLINE INDUSTRIES, INC.

## (undated) DEVICE — SOL  .9 3000ML

## (undated) DEVICE — DRAPE,U/SHT,SPLIT,FILM,60X84,STERILE: Brand: MEDLINE

## (undated) DEVICE — COVER,MAYO STAND,STERILE: Brand: MEDLINE

## (undated) DEVICE — HOOK LOCK LATEX FREE ELASTIC BANDAGE 3INX5YD

## (undated) DEVICE — PLASTC TOOMEY SYRNG DISP

## (undated) DEVICE — SOL  .9 1000ML BTL

## (undated) DEVICE — SUTURE ETHILON 3-0 PS-2

## (undated) DEVICE — GLOVE SURG TRIUMPH SZ 8-1/2

## (undated) DEVICE — 1200CC GUARDIAN II: Brand: GUARDIAN

## (undated) DEVICE — PADDING CAST COTTON  4

## (undated) DEVICE — CAUTERY CORD DISP E0503

## (undated) DEVICE — BAG DRAIN INFECTION CNTRL 2000

## (undated) DEVICE — NON-ADHERENT STRIPS,OIL EMULSION: Brand: CURITY

## (undated) DEVICE — SUTURE VICRYL 3-0 CT-2

## (undated) DEVICE — KENDALL SCD EXPRESS SLEEVES, KNEE LENGTH, MEDIUM: Brand: KENDALL SCD

## (undated) DEVICE — KIT MFLD FOR SPEC COLL

## (undated) DEVICE — GLOVE SURG SENSICARE SZ 8

## (undated) DEVICE — ZIMMER® STERILE DISPOSABLE TOURNIQUET CUFF WITH PLC, DUAL PORT, SINGLE BLADDER, 18 IN. (46 CM)

## (undated) DEVICE — KIT CUSTOM ENDOPROCEDURE STERIS

## (undated) DEVICE — CYSTO CDS-LF: Brand: MEDLINE INDUSTRIES, INC.

## (undated) DEVICE — K WIRE

## (undated) DEVICE — SYRINGE 30ML LL TIP

## (undated) DEVICE — IMPLANTABLE DEVICE
Type: IMPLANTABLE DEVICE | Status: NON-FUNCTIONAL
Removed: 2018-04-26

## (undated) DEVICE — 3M™ RED DOT™ MONITORING ELECTRODE WITH FOAM TAPE AND STICKY GEL, 50/BAG, 20/CASE, 72/PLT 2570: Brand: RED DOT™

## (undated) DEVICE — PADDING CAST COTTON STER 3

## (undated) DEVICE — ARM SLING: Brand: DEROYAL

## (undated) DEVICE — 10FT COMBINED O2 DELIVERY/CO2 MONITORING. FILTER WITH MICROSTREAM TYPE LUER: Brand: DUAL ADULT NASAL CANNULA

## (undated) DEVICE — BITEBLOCK ENDOSCP 60FR MAXI STRP

## (undated) DEVICE — GLOVE ALOETOUCH ORTHO SZ 8

## (undated) DEVICE — Device: Brand: OLYMPUS

## (undated) DEVICE — GLOVE SURG TRIUMPH SZ 8

## (undated) DEVICE — SOL H2O 1000ML BTL

## (undated) NOTE — IP AVS SNAPSHOT
[address]            (For Outpatient Use Only) Initial Admit Date: 4/25/2018   Inpt/Obs Admit Date: Inpt: 4/26/18 / Obs: 04/25/18   Discharge Date:    Hospital Acct:  [de-identified]   MRN: [de-identified]   CSN: [id number]        [facility] Subscriber Name:  Chavez Rai :    Subscriber ID:  Pt Rel to Subscriber:    Hospital Account Financial Class: Medicare    2018

## (undated) NOTE — LETTER
Your patient was recently seen at the [facility] for a hospital follow-up visit. The visit note is attached. Please contact the clinic with any questions at [phone].     Thank you,  RUIZ Delgado

## (undated) NOTE — LETTER
Anastasia Hobson   [address] [facility] [address]           Dear Anastasia Hobson     Our records indicate that you have outstanding lab work and or testing that was ordered for you and has not yet been completed:  Lab Frequency Next Occurrence   CT GATED HEART (PULMONARY VEIN) W CONTRAST(CPT=75572) Once 07/07/2023   XR Shoulder left complete (Min 2 views) - [facility] Consult Only Once 02/01/2024           To provide you with the best possible care, please complete these orders at your earliest convenience. If you have recently completed these orders please disregard this letter.     If you have any questions please call the office at [phone].     Thank you,     [facility]

## (undated) NOTE — LETTER
[facility] [address], [address] [place] [address]  Consent for Procedure/Sedation    Date: 09/27/2022    Time: 1830      1. I hereby authorize Dr. Mat Rosas, my physician and his/her assistants (if applicable), which may include medical students, residents, and/or fellows, to perform the following surgical operation/ procedure and administer such anesthesia as may be determined necessary by my physician:  Operation/Procedure name (s) Direct Current Cardioverison on Next Performance  2. I recognize that during the surgical operation/procedure, unforeseen conditions may necessitate additional or different procedures than those listed above. I, therefore, further authorize and request that the above-named surgeon, assistants, or designees perform such procedures as are, in their judgment, necessary and desirable. 3.   My surgeon/physician has discussed prior to my surgery the potential benefits, risks and side effects of this procedure; the likelihood of achieving goals; and potential problems that might occur during recuperation. They also discussed reasonable alternatives to the procedure, including risks, benefits, and side effects related to the alternatives and risks related to not receiving this procedure. I have had all my questions answered and I acknowledge that no guarantee has been made as to the result that may be obtained. 4.   Should the need arise during my operation or immediate post-operative period, I also consent to the administration of blood and/or blood products. Further, I understand that despite careful testing and screening of blood or blood products by collecting agencies, I may still be subject to ill effects as a result of receiving a blood transfusion and/or blood products.   The following are some, but not all, of the potential risks that can occur: fever and allergic reactions, hemolytic reactions, transmission of diseases such as Hepatitis, AIDS and Cytomegalovirus (CMV) and fluid overload. In the event that I wish to have an autologous transfusion of my own blood, or a directed donor transfusion. I will discuss this with my physician. 5.   I authorize the use of any specimen, organs, tissues, body parts or foreign objects that may be removed from my body during the operation/procedure for diagnosis, research or teaching purposes and their subsequent disposal by hospital authorities. I also authorize the release of specimen test results and/or written reports to my treating physician on the hospital medical staff or other referring or consulting physicians involved in my care, at the discretion of the Pathologist or my treating physician. 6.   I consent to the photographing or videotaping of the operations or procedures to be performed, including appropriate portions of my body for medical, scientific, or educational purposes, provided my identity is not revealed by the pictures or by descriptive texts accompanying them. If the procedure has been photographed/videotaped, the surgeon will obtain the original picture, image, videotape or CD. The hospital will not be responsible for storage, release or maintenance of the picture, image, tape or CD.    7.   I consent to the presence of a  or observers in the operating room as deemed necessary by my physician or their designees. 8.   I recognize that in the event my procedure results in extended X-Ray/fluoroscopy time, I may develop a skin reaction. 9. If I have a Do Not Attempt Resuscitation (DNAR) order in place, that status will be suspended while in the operating room, procedural suite, and during the recovery period unless otherwise explicitly stated by me (or a person authorized to consent on my behalf). The surgeon or my attending physician will determine when the applicable recovery period ends for purposes of reinstating the DNAR order. 10. Patients having a sterilization procedure:  I understand that if the procedure is successful the results will be permanent and it will therefore be impossible for me to inseminate, conceive, or bear children. I also understand that the procedure is intended to result in sterility, although the result has not been guaranteed. 11. I acknowledge that my physician has explained sedation/analgesia administration to me including the risk and benefits I consent to the administration of sedation/analgesia as may be necessary or desirable in the judgment of my physician.     I CERTIFY THAT I HAVE READ AND FULLY UNDERSTAND THE ABOVE CONSENT TO OPERATION and/or OTHER PROCEDURE.      _________________________________________  __________________________________  Signature of Patient     Signature of Responsible Person         ___________________________________         Printed Name of Responsible Person           _________________________________                 Relationship to Patient  _________________________________________  ______________________________  Signature of Witness          Date  Time      Patient Name: Katerine Veras     : 1940                 Printed: 2022     Medical Record #: [id number]                     Page 1 of

## (undated) NOTE — LETTER
[facility]  [address] [facility], [address] [place] [address]    Consent for Operation    Date: __________________    Time: _______________    1.  I authorize the performance upon Simona Lilly the following operation:    Procedure(s):  LEFT WRIST OPEN RED procedure has been videotaped, the surgeon will obtain the original videotape. The hospital will not be responsible for storage or maintenance of this tape.     6. For the purpose of advancing medical education, I consent to the admittance of observers to t STATEMENTS REQUIRING INSERTION OR COMPLETION WERE FILLED IN.     Signature of Patient:   ___________________________    When the patient is a minor or mentally incompetent to give consent:  Signature of person authorized to consent for patient: ____________ drugs/illegal medications). Failure to inform my anesthesiologist about these medicines may increase my risk of anesthetic complications. · If I am allergic to anything or have had a reaction to anesthesia before.     3. I understand how the anesthesia med I have discussed the procedure and information above with the patient (or patient’s representative) and answered their questions. The patient or their representative has agreed to have anesthesia services.     _______________________________________________

## (undated) NOTE — LETTER
[facility] [address], [address] [place] [address]  Consent for Procedure/Sedation    Date: 09/27/2022    Time: 1830      1. I hereby authorize Dr. Yudelka Mas, my physician and his/her assistants (if applicable), which may include medical students, residents, and/or fellows, to perform the following surgical operation/ procedure and administer such anesthesia as may be determined necessary by my physician:  Operation/Procedure name (s) Transesophageal Echocardiogram on [address] [place] [address]  2. I recognize that during the surgical operation/procedure, unforeseen conditions may necessitate additional or different procedures than those listed above. I, therefore, further authorize and request that the above-named surgeon, assistants, or designees perform such procedures as are, in their judgment, necessary and desirable. 3.   My surgeon/physician has discussed prior to my surgery the potential benefits, risks and side effects of this procedure; the likelihood of achieving goals; and potential problems that might occur during recuperation. They also discussed reasonable alternatives to the procedure, including risks, benefits, and side effects related to the alternatives and risks related to not receiving this procedure. I have had all my questions answered and I acknowledge that no guarantee has been made as to the result that may be obtained. 4.   Should the need arise during my operation or immediate post-operative period, I also consent to the administration of blood and/or blood products. Further, I understand that despite careful testing and screening of blood or blood products by collecting agencies, I may still be subject to ill effects as a result of receiving a blood transfusion and/or blood products.   The following are some, but not all, of the potential risks that can occur: fever and allergic reactions, hemolytic reactions, transmission of diseases such as Hepatitis, AIDS and Cytomegalovirus (CMV) and fluid overload. In the event that I wish to have an autologous transfusion of my own blood, or a directed donor transfusion. I will discuss this with my physician. 5.   I authorize the use of any specimen, organs, tissues, body parts or foreign objects that may be removed from my body during the operation/procedure for diagnosis, research or teaching purposes and their subsequent disposal by hospital authorities. I also authorize the release of specimen test results and/or written reports to my treating physician on the hospital medical staff or other referring or consulting physicians involved in my care, at the discretion of the Pathologist or my treating physician. 6.   I consent to the photographing or videotaping of the operations or procedures to be performed, including appropriate portions of my body for medical, scientific, or educational purposes, provided my identity is not revealed by the pictures or by descriptive texts accompanying them. If the procedure has been photographed/videotaped, the surgeon will obtain the original picture, image, videotape or CD. The hospital will not be responsible for storage, release or maintenance of the picture, image, tape or CD.    7.   I consent to the presence of a  or observers in the operating room as deemed necessary by my physician or their designees. 8.   I recognize that in the event my procedure results in extended X-Ray/fluoroscopy time, I may develop a skin reaction. 9. If I have a Do Not Attempt Resuscitation (DNAR) order in place, that status will be suspended while in the operating room, procedural suite, and during the recovery period unless otherwise explicitly stated by me (or a person authorized to consent on my behalf). The surgeon or my attending physician will determine when the applicable recovery period ends for purposes of reinstating the DNAR order. 10. Patients having a sterilization procedure:  I understand that if the procedure is successful the results will be permanent and it will therefore be impossible for me to inseminate, conceive, or bear children. I also understand that the procedure is intended to result in sterility, although the result has not been guaranteed. 11. I acknowledge that my physician has explained sedation/analgesia administration to me including the risk and benefits I consent to the administration of sedation/analgesia as may be necessary or desirable in the judgment of my physician.     I CERTIFY THAT I HAVE READ AND FULLY UNDERSTAND THE ABOVE CONSENT TO OPERATION and/or OTHER PROCEDURE.      _________________________________________  __________________________________  Signature of Patient     Signature of Responsible Person         ___________________________________         Printed Name of Responsible Person           _________________________________                 Relationship to Patient  _________________________________________  ______________________________  Signature of Witness          Date  Time      Patient Name: Justyn Fallon     : 1940                 Printed: 2022     Medical Record #: [id number]                     Page 1

## (undated) NOTE — LETTER
Justyn Fallon   [address] [place] [address]           Dear Justyn Fallon     Our records indicate that you are due for a 3-6 month diabetes follow up with Dr. Pili Pedraza. Please call our office to schedule that visit. To provide you with the best possible care, please complete these orders at your earliest convenience. If you have recently completed these orders please disregard this letter. If you have any questions please call the office at [phone].      Thank you,     [facility]

## (undated) NOTE — Clinical Note
Hi Dr. Prado, pt feeling better since discharge.  Has scheduled visit with you.  Thank you, Lucie

## (undated) NOTE — Clinical Note
Pt has HFU on 3/1/23. She was contacted for TCM, pt reports she is doing well. Pt denies concerns at this time. Medication list reviewed. Pt is set to see Dr. Anniece Hodgkins on 3/6/23. Thank you.

## (undated) NOTE — LETTER
Your patient was recently seen at the [facility] for a hospital follow-up visit. The visit note is attached. Please contact the clinic with any questions at [phone].     Thank you,  RUIZ Howard

## (undated) NOTE — ED AVS SNAPSHOT
Ling Corralesmina   MRN: [id number]    Department:  [facility] Emergency Department   Date of Visit:  7/11/2019           Disclosure     Insurance plans vary and the physician(s) referred by the ER may not be covered by your plan.  Please contact yo tell this physician (or your personal doctor if your instructions are to return to your personal doctor) about any new or lasting problems. The primary care or specialist physician will see patients referred from the [facility] Emergency Department.  Bindu Rodriguez

## (undated) NOTE — Clinical Note
Hello all  This is an 83 year old with a new L supraclavicular mass. 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 PET does not show a primary site and there is no distant disease. She is presenting with dysphagia and pain.   Dr. Gonsales-can you see her for an EGD please?  Dr. Villalta-can she see you for an ENT exam?  Lotus-rad onc consult for possible chemoRT  Debby-can you add to GI TB. New patient review.   Thanks all

## (undated) NOTE — ED AVS SNAPSHOT
Ramin Olmedo   MRN: [id number]    Department:  [facility] Emergency Department   Date of Visit:  5/15/2018           Disclosure     Insurance plans vary and the physician(s) referred by the ER may not be covered by your plan.  Please contact yo tell this physician (or your personal doctor if your instructions are to return to your personal doctor) about any new or lasting problems. The primary care or specialist physician will see patients referred from the [facility] Emergency Department.  Shelton Lombard

## (undated) NOTE — LETTER
Your patient was recently seen at the [facility] for a hospital follow-up visit. The visit note is attached. Please contact the clinic with any questions at [phone].     Thank you,  RUIZ Silva

## (undated) NOTE — IP AVS SNAPSHOT
Patient Demographics     Address  [id number] [place] [address]  [place] [address] Phone  [phone] [facility]) *Preferred*  [phone] [facility]) E-mail Address  [email or web address]      Emergency Contact(s)     Name Relation Home Work [address] [place] [address] docusate sodium 100 MG Caps  Commonly known as:  COLACE      Take 100 mg by mouth 2 (two) times daily. Jeff Hall MD         Fenofibrate 160 MG Tabs      Take 160 mg by mouth daily.           furosemide 20 MG Tabs  Commonly known as:  LASIX      Take 2 [id number] HYDROcodone-acetaminophen (NORCO)  MG per tab 1-2 tablet 04/28/18 1429 Given      [id number] HYDROcodone-acetaminophen (NORCO)  MG per tab 1-2 tablet 04/28/18 1814 Given      [id number] HYDROcodone-acetaminophen ([facility])  MG per t 04/28/18 1429 Given      [id number] Insulin Aspart Pen (NOVOLOG) 100 UNIT/ML flexpen 1-10 Units 04/28/18 1814 Given      [id number] Insulin Aspart Pen (NOVOLOG) 100 UNIT/ML flexpen 1-10 Units 04/29/18 0913 Given      [id number] Insulin Aspart Pen (NOVOLOG) 10 URINALYSIS WITH CULTURE REFLEX [[id number]] (Abnormal)  Resulted: 04/28/18 1803, Result status: Final result   Ordering provider:  Tamar Guardado MD  04/28/18 0091 Resulting lab:  [facility]    Specimen Information    Type Source Collected On   Urine — 04/2 Hosp Day # 0 PCP Denece Gowers     Chief Complaint: Fall    History of Present Illness: Nicolasa Mcintyre is a 68year old female with history of hypertension hyperlipidemia and type 2 diabetes went to get her dog to go for a walk and the dog pulled on its l furosemide 20 MG Oral Tab Take 20 mg by mouth daily. Disp:  Rfl: 2       Review of Systems:   A comprehensive 14 point review of systems was completed. Pertinent positives and negatives noted in the HPI.     Physical Exam:    /68 (BP Location: [facility] orthopedic surgery. 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 Will consult cardiology for cardiac clearance. Troponin negative patient not having any chest pain.   3. Type 2 diabetes-we will place on hyperglycemia protoc medical history of hypertension, hypercholesterolemia, diabetes mellitus type 2 who fell on her left side after her dog pulled her on its leash. Imaging consistent with left superior pubic ramus fracture and distal left radial fracture.   The patient was e conducted and negative unless otherwise stated in the HPI. PHYSICAL EXAMINATION:    GENERAL:  No acute distress. Thin build. VITAL SIGNS:  Temperature 98.6, heart rate 68, respiratory rate 18, blood pressure 121/53, saturating 95% on room air.   HE 4.   Acute renal failure. 5.   Diabetes mellitus type 2.    6.   Hypertension with hyperlipidemia. IMPAIRMENTS:  ADLs, functional ability, balance, strength, endurance, self-cares, transfers, hygiene.     RECOMMENDATIONS:  The patient's biggest barrier radius fx on 4/26/18, Left superior pubic ramus fx on 4/25/18 following a fall she suffered due to her dog pulling her down. Imaging:  FINDINGS:    BONES:  There is a nondisplaced fracture of the left superior pubic ramus.   No evidence of femoral fractu Weight Bearing Restriction: L lower extremity;L upper extremity     L Upper Extremity: Non-Weight Bearing     L Lower Extremity: Weight Bearing as Tolerated[SG.3]    PAIN ASSESSMENT[SG.1]   Ratin  Location: L groin and L arm  Management Techniques:  Act support. MIN A to scoot hips toward EOB to rest feet on floor. Able to participate in seated hand and LE therex with improved tolerance. Required MOD A x2 for sit>stand transfer; bed height slightly elevated and use of hemiwalker in R UE.  Improved initiati requiring MAX A x2 for bed<>chair transfers and limited gait control/tolerance.  Patient continues to demonstrate deficits in strength, balance, endurance, mobility and functional independence, and will benefit from continued skilled PT during patient's IP Physical Therapy Note signed by Arturo Hardy PT at 4/29/2018 10:03 AM  Version 1 of 2    Author:  Arturo Hardy PT Service:  Rehab Author Type:  Physical Therapist    Filed:  4/29/2018 10:03 AM Date of Service:  4/29/2018  9:56 AM Status:  Mert Nash Essential hypertension    Type 2 diabetes mellitus without complication, with long-term current use of insulin (HCC)    Mixed hyperlipidemia    Acute renal failure (ARF) (HCC)[SG.2]      Past Medical History[SG.1]  Past Medical History:   Diagnosis Date -   Climbing 3-5 steps with a railing?: Total[SG. 2]       AM-PAC Score:[SG.1]  Raw Score: 11   PT Approx Degree of Impairment Score: 72.57%   Standardized Score (AM-PAC Scale): 33.86   CMS Modifier (G-Code): CL[SG.2]    FUNCTIONAL ABILITY STATUS  Gait Asse aware of session/findings; All patient questions and concerns addressed;SCDs in place; Ice applied[SG. 2]    ASSESSMENT   Pt continues to make slow but significant improvements in ability to participate in bed mobility, sit<>stand transfers, and initial gait Goal #6    Goal Comments: Goals established on 4/27/2018; progressing 4/29/2018[SG.1]         Attribution Trujillo    SG. 1 - Hailey Werner, PT on 4/29/2018  9:56 AM  SG.2 - Hailey Werner, PT on 4/29/2018  9:57 AM               Physical Therapy Note si Closed fracture of left wrist, initial encounter    Type 2 diabetes mellitus with hyperglycemia, unspecified whether long term insulin use ([facility] [address]    Essential hypertension    Type 2 diabetes mellitus without complication, with long-term current use of ins -   Moving to and from a bed to a chair (including a wheelchair)?: A Lot   -   Need to walk in hospital room?: A Lot   -   Climbing 3-5 steps with a railing?: Total[SG. 2]       AM-PAC Score:[SG.1]  Raw Score: 11   PT Approx Degree of Impairment Score: 72. 5 person MARSHAL BALDERRAMA. Willing to transfer to [facility], but increased anxiety and decreased ability to take small, shuffled steps with R or L LE. 3 small pivot steps with MAX A x2 and positioning of BSC behind pt. MOD x2 for stand>sit transfer.  Pt reports she will try t training;Stair training;Transfer training;Balance training  Rehab Potential : Good  Frequency (Obs): Daily[SG. 2]    CURRENT GOALS    Goal #1 Patient is able to demonstrate supine - sit EOB @ level: moderate assistance     Goal #2 Patient is able to demonst BONES:  There is a comminuted intra-articular fracture involving the left distal radius with approximately 1.3 cm of proximal displacement of and anterior fracture fragment. There is a avulsion fracture of the ulnar styloid.   The images were nonstandard wheelchair and had just underwent surgery himself, so she won't have support from him. Patient's son was present during session. SUBJECTIVE  \"My arm and leg are pretty much useless right now. \" Patient rated her pain as 8/10 while at rest and 10/10 dur AM-PAC '6-Clicks' INPATIENT SHORT FORM - BASIC MOBILITY  How much difficulty does the patient currently have. ..[SM.1]  -   Turning over in bed (including adjusting bedclothes, sheets and blankets)?: A Little   -   Sitting down on and standing up from a bahman pain during transfer to [facility] and was unable to tolerate much weight onto L LE. Patient was instructed on standing at platform RW to sitting in [facility], she performed with max-assist x2 and verbal cues for hand placement.  Patient was educated on health benefits [facility])[VB.0] The AM-PAC '6-Clicks' Inpatient Basic Mobility Short Form was completed and this patient is demonstrating a 68.66% degree of impairment in mobility.  Research supports that patients with this level of impairment may benefit from sub-acute rehab OCCUPATIONAL THERAPY EVALUATION - INPATIENT     Room Number: 364/364-A  Evaluation Date: 4/27/2018  Type of Evaluation: Initial  Presenting Problem: L wrist ORIF and pubic rami fx    Physician Order: IP Consult to Occupational Therapy  Reason for Therapy: L Lower Extremity: Weight Bearing as Tolerated    PAIN ASSESSMENT  Ratin  Location: L wrist and leg  Management Techniques: Relaxation;Repositioning    COGNITION  Overall Cognitive Status:  WFL - within functional limits    VISION  Current Vision: no v activity. Pt is Max a sup-sit at EOB w Mod a for balance due to pain. Max a scooting to EOB due to only able to use R UE to help propel herself forward. OT adjusted Pt's sling in sitting for proper positioning of L UE.  Pt educated in use of adl ae but is u function.     Patient Complexity  Occupational Profile/Medical History MODERATE - Expanded review of history including review of medical or therapy record   Specific performance deficits impacting engagement in ADL/IADL MODERATE  3 - 5 performance deficits

## (undated) NOTE — LETTER
[address]  [address]  Consent for Procedure/Sedation  Date: 3/14/2025         Time: 1330    I hereby authorize Dr. Solano, my physician and his/her assistants (if applicable), which may include medical students, residents, and/or fellows, to perform the following surgical operation/ procedure and administer such anesthesia as may be determined necessary by my physician: Kyphoplasty  on Anastasia Hobson  2.   I recognize that during the surgical operation/procedure, unforeseen conditions may necessitate additional or different procedures than those listed above.  I, therefore, further authorize and request that the above-named surgeon, assistants, or designees perform such procedures as are, in their judgment, necessary and desirable.    3.   My surgeon/physician has discussed prior to my surgery the potential benefits, risks and side effects of this procedure; the likelihood of achieving goals; and potential problems that might occur during recuperation.  They also discussed reasonable alternatives to the procedure, including risks, benefits, and side effects related to the alternatives and risks related to not receiving this procedure.  I have had all my questions answered and I acknowledge that no guarantee has been made as to the result that may be obtained.    4.   Should the need arise during my operation/procedure, which includes change of level of care prior to discharge, I also consent to the administration of blood and/or blood products.  Further, I understand that despite careful testing and screening of blood or blood products by collecting agencies, I may still be subject to ill effects as a result of receiving a blood transfusion and/or blood products.  The following are some, but not all, of the potential risks that can occur: fever and allergic reactions, hemolytic reactions, transmission of diseases such as Hepatitis, AIDS and Cytomegalovirus (CMV) and fluid  overload.  In the event that I wish to have an autologous transfusion of my own blood, or a directed donor transfusion, I will discuss this with my physician.   Check only if Refusing Blood or Blood Products  I understand refusal of blood or blood products as deemed necessary by my physician may have serious consequences to my condition to include possible death. I hereby assume responsibility for my refusal and release the hospital, its personnel, and my physicians from any responsibility for the consequences of my refusal.         o  Refuse         5.   I authorize the use of any specimen, organs, tissues, body parts or foreign objects that may be removed from my body during the operation/procedure for diagnosis, research or teaching purposes and their subsequent disposal by hospital authorities.  I also authorize the release of specimen test results and/or written reports to my treating physician on the hospital medical staff or other referring or consulting physicians involved in my care, at the discretion of the Pathologist or my treating physician.    6.   I consent to the photographing or videotaping of the operations or procedures to be performed, including appropriate portions of my body for medical, scientific, or educational purposes, provided my identity is not revealed by the pictures or by descriptive texts accompanying them.  If the procedure has been photographed/videotaped, the surgeon will obtain the original picture, image, videotape or CD.  The hospital will not be responsible for storage, release or maintenance of the picture, image, tape or CD.    7.   I consent to the presence of a  or observers in the operating room as deemed necessary by my physician or their designees.    8.   I recognize that in the event my procedure results in extended X-Ray/fluoroscopy time, I may develop a skin reaction.    9. If I have a Do Not Attempt Resuscitation (DNAR) order in place, that status  will be suspended while in the operating room, procedural suite, and during the recovery period unless otherwise explicitly stated by me (or a person authorized to consent on my behalf). The surgeon or my attending physician will determine when the applicable recovery period ends for purposes of reinstating the DNAR order.  10. Patients having a sterilization procedure: I understand that if the procedure is successful the results will be permanent and it will therefore be impossible for me to inseminate, conceive, or bear children.  I also understand that the procedure is intended to result in sterility, although the result has not been guaranteed.   11. I acknowledge that my physician has explained sedation/analgesia administration to me including the risk and benefits I consent to the administration of sedation/analgesia as may be necessary or desirable in the judgment of my physician.    I CERTIFY THAT I HAVE READ AND FULLY UNDERSTAND THE ABOVE CONSENT TO OPERATION and/or OTHER PROCEDURE.        ____________________________________       _________________________________      ______________________________  Signature of Patient         Signature of Responsible Person        Printed Name of Responsible Person        ____________________________________      _________________________________      ______________________________       Signature of Witness          Relationship to Patient                       Date                                       Time  Patient Name: Anastasia Lux Jazlyn  : 1940    Reviewed: 2024   Printed: 2025  Medical Record #: [id number] Page 1 of 1

## (undated) NOTE — LETTER
[facility]  [address] [facility], [address] [place] [address]    Consent for Operation    Date: __________________    Time: _______________    1.  I authorize the performance upon Dena Razo the following operation:    Procedure(s):  RIGHT WRIST OPEN RE procedure has been videotaped, the surgeon will obtain the original videotape. The hospital will not be responsible for storage or maintenance of this tape.     6. For the purpose of advancing medical education, I consent to the admittance of observers to t STATEMENTS REQUIRING INSERTION OR COMPLETION WERE FILLED IN.     Signature of Patient:   ___________________________    When the patient is a minor or mentally incompetent to give consent:  Signature of person authorized to consent for patient: ____________ drugs/illegal medications). Failure to inform my anesthesiologist about these medicines may increase my risk of anesthetic complications. · If I am allergic to anything or have had a reaction to anesthesia before.     3. I understand how the anesthesia med I have discussed the procedure and information above with the patient (or patient’s representative) and answered their questions. The patient or their representative has agreed to have anesthesia services.     _______________________________________________